# Patient Record
Sex: MALE | Race: WHITE | NOT HISPANIC OR LATINO | Employment: OTHER | URBAN - NONMETROPOLITAN AREA
[De-identification: names, ages, dates, MRNs, and addresses within clinical notes are randomized per-mention and may not be internally consistent; named-entity substitution may affect disease eponyms.]

---

## 2017-01-17 RX ORDER — QUETIAPINE FUMARATE 300 MG/1
TABLET, FILM COATED ORAL
Qty: 60 TABLET | Refills: 5 | Status: SHIPPED | OUTPATIENT
Start: 2017-01-17 | End: 2017-10-09 | Stop reason: SDUPTHER

## 2017-03-23 ENCOUNTER — OFFICE VISIT (OUTPATIENT)
Dept: ORTHOPEDIC SURGERY | Facility: CLINIC | Age: 62
End: 2017-03-23

## 2017-03-23 VITALS — HEIGHT: 69 IN | WEIGHT: 218 LBS | BODY MASS INDEX: 32.29 KG/M2

## 2017-03-23 DIAGNOSIS — M75.41 SHOULDER IMPINGEMENT, RIGHT: Primary | ICD-10-CM

## 2017-03-23 DIAGNOSIS — M25.512 CHRONIC PAIN OF BOTH SHOULDERS: ICD-10-CM

## 2017-03-23 DIAGNOSIS — G89.29 CHRONIC PAIN OF BOTH SHOULDERS: ICD-10-CM

## 2017-03-23 DIAGNOSIS — E11.9 TYPE 2 DIABETES MELLITUS WITHOUT COMPLICATION, WITHOUT LONG-TERM CURRENT USE OF INSULIN (HCC): ICD-10-CM

## 2017-03-23 DIAGNOSIS — M25.511 CHRONIC PAIN OF BOTH SHOULDERS: ICD-10-CM

## 2017-03-23 PROCEDURE — 99213 OFFICE O/P EST LOW 20 MIN: CPT | Performed by: ORTHOPAEDIC SURGERY

## 2017-03-23 RX ORDER — METHOCARBAMOL 750 MG/1
750 TABLET, FILM COATED ORAL 4 TIMES DAILY PRN
COMMUNITY
End: 2017-10-09

## 2017-03-23 RX ORDER — GABAPENTIN 300 MG/1
300 CAPSULE ORAL 3 TIMES DAILY
COMMUNITY
End: 2017-11-22

## 2017-03-23 RX ORDER — NABUMETONE 750 MG/1
750 TABLET, FILM COATED ORAL 2 TIMES DAILY
Qty: 60 TABLET | Refills: 2 | Status: SHIPPED | OUTPATIENT
Start: 2017-03-23 | End: 2017-03-25 | Stop reason: ALTCHOICE

## 2017-03-23 RX ORDER — HYDROXYZINE 50 MG/1
50 TABLET, FILM COATED ORAL 3 TIMES DAILY PRN
COMMUNITY
End: 2017-11-22 | Stop reason: SDUPTHER

## 2017-03-23 RX ORDER — CYCLOBENZAPRINE HCL 10 MG
10 TABLET ORAL 3 TIMES DAILY PRN
COMMUNITY
End: 2018-08-20

## 2017-03-23 NOTE — PROGRESS NOTES
Mikey Allison is a 61 y.o. male returns for     Chief Complaint   Patient presents with   • Right Shoulder - Follow-up   • Results     repeat xrays done today in office       HISTORY OF PRESENT ILLNESS:   Patient returns with right shoulder pain.  No specific injury.  Pain with activity.  2 weeks ago got an IM injection in buttock which helped a little.  Has been taking meloxicam without improvement.       CONCURRENT MEDICAL HISTORY:    Past Medical History:   Diagnosis Date   • Abdominal pain     left side   • Acute sinusitis    • Ankle joint pain    • Bipolar disorder    • Bipolar disorder, unspecified    • Cellulitis and abscess of trunk     axilla   • Chest pain    • Chronic anemia    • Degenerative joint disease involving multiple joints     back   • Depressive disorder    • Diabetes mellitus    • Diabetes mellitus with no complication     type 2 or unspecified type uncontrolled   • Diverticular disease    • Dyspnea    • Enlarged prostate     on [place]-on CT   • Essential hypertension    • Febrile convulsion    • Gastroesophageal reflux disease    • Generalized anxiety disorder    • H/O echocardiogram 10/16/2007    Mild to moderate concentric LV hypertrophy with mild left atrial enlargement. LV appears to be hypodynamic with preserved LV systolic function with EF 55-60%. Pericardium appears to be normal with a small pericardial effusion    • Hemorrhoids    • Hyperlipidemia    • Hypertensive disorder    • Hypoglycemia    • Infectious disorder of kidney     kidney infection-treated by [facility]   • Left lower quadrant pain    • Loss of appetite    • Obesity    • Osteoarthritis    • Pain in joint involving ankle and foot    • Pain in limb    • Psoriasis    • Retention of urine     with cath-treated by the [facility]   • Screening for malignant neoplasm of colon    • Sepsis due to urinary tract infection     urosepsis treated by [facility]   • Sinusitis    • Syncope    • Unspecified essential hypertension         Allergies   Allergen Reactions   • Codeine    • Sulfa Antibiotics          Current Outpatient Prescriptions:   •  cyclobenzaprine (FLEXERIL) 10 MG tablet, Take 10 mg by mouth 3 (Three) Times a Day As Needed for Muscle Spasms., Disp: , Rfl:   •  gabapentin (NEURONTIN) 300 MG capsule, Take 300 mg by mouth 3 (Three) Times a Day., Disp: , Rfl:   •  hydrOXYzine (ATARAX) 50 MG tablet, Take 50 mg by mouth 3 (Three) Times a Day As Needed for Itching., Disp: , Rfl:   •  methocarbamol (ROBAXIN) 750 MG tablet, Take 750 mg by mouth 4 (Four) Times a Day As Needed for Muscle Spasms., Disp: , Rfl:   •  SITagliptin (JANUVIA) 100 MG tablet, Take 100 mg by mouth Daily., Disp: , Rfl:   •  Canagliflozin (INVOKANA) 300 MG tablet, Take 300 mg by mouth., Disp: , Rfl:   •  diclofenac (VOLTAREN) 75 MG EC tablet, Take 1 tablet by mouth 2 (Two) Times a Day., Disp: 60 tablet, Rfl: 1  •  lisinopril-hydrochlorothiazide (PRINZIDE,ZESTORETIC) 20-12.5 MG per tablet, , Disp: , Rfl:   •  metFORMIN (GLUCOPHAGE) 1000 MG tablet, , Disp: , Rfl:   •  pantoprazole (PROTONIX) 40 MG EC tablet, , Disp: , Rfl: 5  •  pravastatin (PRAVACHOL) 40 MG tablet, , Disp: , Rfl:   •  QUEtiapine (SEROquel) 300 MG tablet, TAKE TWO TABLETS BY MOUTH AT BEDTIME, Disp: 60 tablet, Rfl: 5    Past Surgical History:   Procedure Laterality Date   • ANKLE SURGERY Left 11/11/2008    Removal of syndesmotic screws, left ankle. Painful syndesmotic screws, left ankle.)   • ANKLE SURGERY  10/19/2007    Open reduction-internal fixation with fluoroscopic control with final x-ray PA and lateral of the ankle. Trimalleolar fracture/subluxation, left ankle.)   • CIRCUMCISION  2016   • COLONOSCOPY  03/10/2015    Diverticulosis found in the sigmoid colon. hemorrhoids found in the anus. Normal cecum   • CYSTOSCOPY  06/22/2016    Cystoscopy, dilation, anchor of Tim catheter. Benign prostatic hypertrophy, urinary retention, urethral stricture history of.   • INJECTION OF MEDICATION       "Kenalog (3)   • STOMACH SURGERY     • TIBIA FRACTURE SURGERY         ROS  No fevers or chills.  No chest pain or shortness of air.  No GI or  disturbances.    PHYSICAL EXAMINATION:       Ht 69\" (175.3 cm)  Wt 218 lb (98.9 kg)  BMI 32.19 kg/m2    Physical Exam   Constitutional: He is oriented to person, place, and time. He appears well-developed and well-nourished.   Neurological: He is alert and oriented to person, place, and time.   Psychiatric: He has a normal mood and affect. His behavior is normal. Judgment and thought content normal.       GAIT:     [x]  Normal  []  Antalgic    Assistive device: [x]  None  []  Walker     []  Crutches  []  Cane     []  Wheelchair  []  Stretcher    Right Shoulder Exam     Tenderness   The patient is experiencing tenderness in the acromioclavicular joint.    Range of Motion   Active Abduction: 90   Forward Flexion: 130   Internal Rotation 0 degrees: Sacrum     Muscle Strength   Abduction: 3/5   Supraspinatus: 3/5     Tests   Cross Arm: positive  Hawkin's test: positive  Impingement: positive    Other   Sensation: normal  Pulse: present    Comments:  Stable exam.              Xr Shoulder 2+ View Right    Result Date: 3/23/2017  Narrative: 3 views of the right shoulder show acceptable position and alignment of the glenohumeral joint.  There is mild osteophyte formation on the inferior aspect of the humeral head.  There is moderate to severe osteoarthritic change noted at the acromioclavicular joint with subclavicular spurring.  Type II acromion is noted.  no comparison views. 03/23/17 at 4:46 PM by Maximus Schreiber MD             ASSESSMENT:    Diagnoses and all orders for this visit:    Shoulder impingement, right  -     MRI Shoulder Right Without Contrast; Future    Chronic pain of both shoulders  -     MRI Shoulder Right Without Contrast; Future    Type 2 diabetes mellitus without complication, without long-term current use of insulin    Other orders  -     SITagliptin " (JANUVIA) 100 MG tablet; Take 100 mg by mouth Daily.  -     gabapentin (NEURONTIN) 300 MG capsule; Take 300 mg by mouth 3 (Three) Times a Day.  -     methocarbamol (ROBAXIN) 750 MG tablet; Take 750 mg by mouth 4 (Four) Times a Day As Needed for Muscle Spasms.  -     cyclobenzaprine (FLEXERIL) 10 MG tablet; Take 10 mg by mouth 3 (Three) Times a Day As Needed for Muscle Spasms.  -     hydrOXYzine (ATARAX) 50 MG tablet; Take 50 mg by mouth 3 (Three) Times a Day As Needed for Itching.  -     Discontinue: nabumetone (RELAFEN) 750 MG tablet; Take 1 tablet by mouth 2 (Two) Times a Day.          PLAN    Patient has had NSAIDS and an IM steroid injection without improvement - need MRI to assess integrity of rotator cuff.  Will switch from mobic to nabumetone (pharmacy later called stating that he was actually already on nabumetone and therefore he was switched to diclofenac.  Will continue with home exercises and stretching.    Return for recheck for MRI results.    Maximus Schreiber MD

## 2017-03-24 ENCOUNTER — TELEPHONE (OUTPATIENT)
Dept: ORTHOPEDIC SURGERY | Facility: CLINIC | Age: 62
End: 2017-03-24

## 2017-03-24 RX ORDER — DICLOFENAC SODIUM 75 MG/1
75 TABLET, DELAYED RELEASE ORAL 2 TIMES DAILY
Qty: 60 TABLET | Refills: 1 | Status: SHIPPED | OUTPATIENT
Start: 2017-03-24 | End: 2017-05-09

## 2017-03-24 NOTE — TELEPHONE ENCOUNTER
----- Message from Maximus Schreiber MD sent at 3/24/2017  2:48 PM CDT -----  Try diclofenac instead of nabumetone.  75bid  Thanks  maryann  ----- Message -----     From: Sheila Valentino MA     Sent: 3/24/2017   2:30 PM       To: Maximus Schreiber MD        ----- Message -----     From: Amirah Davis     Sent: 3/24/2017   1:55 PM       To: [facility]    DR. Schreiber,    Patient called stating that the medicine that was called into his pharmacy was the same medicine that he had been taking that was prescribed by dr monteiro in [place].  He states you told him to stop taking the medicine prescribed by dr monteiro and to take the new medicine, but these medicines are the same.    Pt's# [phone]    -Amirah

## 2017-03-30 ENCOUNTER — HOSPITAL ENCOUNTER (OUTPATIENT)
Dept: MRI IMAGING | Facility: HOSPITAL | Age: 62
Discharge: HOME | End: 2017-03-30
Admitting: ORTHOPAEDIC SURGERY

## 2017-03-30 DIAGNOSIS — M25.512 CHRONIC PAIN OF BOTH SHOULDERS: ICD-10-CM

## 2017-03-30 DIAGNOSIS — M75.41 SHOULDER IMPINGEMENT, RIGHT: ICD-10-CM

## 2017-03-30 DIAGNOSIS — G89.29 CHRONIC PAIN OF BOTH SHOULDERS: ICD-10-CM

## 2017-03-30 DIAGNOSIS — M25.511 CHRONIC PAIN OF BOTH SHOULDERS: ICD-10-CM

## 2017-03-30 PROCEDURE — 73221 MRI JOINT UPR EXTREM W/O DYE: CPT

## 2017-04-09 ENCOUNTER — APPOINTMENT (OUTPATIENT)
Dept: GENERAL RADIOLOGY | Facility: HOSPITAL | Age: 62
End: 2017-04-09

## 2017-04-09 ENCOUNTER — HOSPITAL ENCOUNTER (EMERGENCY)
Facility: HOSPITAL | Age: 62
Discharge: HOME | End: 2017-04-09
Attending: FAMILY MEDICINE | Admitting: FAMILY MEDICINE

## 2017-04-09 VITALS
DIASTOLIC BLOOD PRESSURE: 76 MMHG | RESPIRATION RATE: 18 BRPM | HEART RATE: 72 BPM | WEIGHT: 218 LBS | BODY MASS INDEX: 32.29 KG/M2 | HEIGHT: 69 IN | SYSTOLIC BLOOD PRESSURE: 107 MMHG | TEMPERATURE: 97.7 F | OXYGEN SATURATION: 100 %

## 2017-04-09 DIAGNOSIS — R10.11 RIGHT UPPER QUADRANT ABDOMINAL PAIN: Primary | ICD-10-CM

## 2017-04-09 DIAGNOSIS — K59.00 CONSTIPATION, UNSPECIFIED CONSTIPATION TYPE: ICD-10-CM

## 2017-04-09 LAB
ALBUMIN SERPL-MCNC: 3.9 G/DL (ref 3.4–4.8)
ALBUMIN/GLOB SERPL: 1.3 G/DL (ref 1.1–1.8)
ALP SERPL-CCNC: 85 U/L (ref 38–126)
ALT SERPL W P-5'-P-CCNC: 18 U/L (ref 21–72)
ANION GAP SERPL CALCULATED.3IONS-SCNC: 14 MMOL/L (ref 5–15)
AST SERPL-CCNC: 28 U/L (ref 17–59)
BACTERIA UR QL AUTO: ABNORMAL /HPF
BASOPHILS # BLD AUTO: 0.03 10*3/MM3 (ref 0–0.2)
BASOPHILS NFR BLD AUTO: 0.3 % (ref 0–2)
BILIRUB SERPL-MCNC: 0.6 MG/DL (ref 0.2–1.3)
BILIRUB UR QL STRIP: NEGATIVE
BUN BLD-MCNC: 19 MG/DL (ref 7–21)
BUN/CREAT SERPL: 18.3 (ref 7–25)
CALCIUM SPEC-SCNC: 8.4 MG/DL (ref 8.4–10.2)
CHLORIDE SERPL-SCNC: 94 MMOL/L (ref 95–110)
CLARITY UR: CLEAR
CO2 SERPL-SCNC: 23 MMOL/L (ref 22–31)
COLOR UR: YELLOW
CREAT BLD-MCNC: 1.04 MG/DL (ref 0.7–1.3)
DEPRECATED RDW RBC AUTO: 40.1 FL (ref 35.1–43.9)
EOSINOPHIL # BLD AUTO: 0.25 10*3/MM3 (ref 0–0.7)
EOSINOPHIL NFR BLD AUTO: 2.9 % (ref 0–7)
ERYTHROCYTE [DISTWIDTH] IN BLOOD BY AUTOMATED COUNT: 13.2 % (ref 11.5–14.5)
GFR SERPL CREATININE-BSD FRML MDRD: 72 ML/MIN/1.73 (ref 60–113)
GLOBULIN UR ELPH-MCNC: 2.9 GM/DL (ref 2.3–3.5)
GLUCOSE BLD-MCNC: 82 MG/DL (ref 60–100)
GLUCOSE UR STRIP-MCNC: NEGATIVE MG/DL
HCT VFR BLD AUTO: 30.4 % (ref 39–49)
HGB BLD-MCNC: 10.8 G/DL (ref 13.7–17.3)
HGB UR QL STRIP.AUTO: NEGATIVE
HOLD SPECIMEN: NORMAL
HYALINE CASTS UR QL AUTO: ABNORMAL /LPF
IMM GRANULOCYTES # BLD: 0.03 10*3/MM3 (ref 0–0.02)
IMM GRANULOCYTES NFR BLD: 0.3 % (ref 0–0.5)
KETONES UR QL STRIP: ABNORMAL
LEUKOCYTE ESTERASE UR QL STRIP.AUTO: ABNORMAL
LYMPHOCYTES # BLD AUTO: 1.68 10*3/MM3 (ref 0.6–4.2)
LYMPHOCYTES NFR BLD AUTO: 19.4 % (ref 10–50)
MCH RBC QN AUTO: 29.7 PG (ref 26.5–34)
MCHC RBC AUTO-ENTMCNC: 35.5 G/DL (ref 31.5–36.3)
MCV RBC AUTO: 83.5 FL (ref 80–98)
MONOCYTES # BLD AUTO: 1.03 10*3/MM3 (ref 0–0.9)
MONOCYTES NFR BLD AUTO: 11.9 % (ref 0–12)
NEUTROPHILS # BLD AUTO: 5.64 10*3/MM3 (ref 2–8.6)
NEUTROPHILS NFR BLD AUTO: 65.2 % (ref 37–80)
NITRITE UR QL STRIP: NEGATIVE
PH UR STRIP.AUTO: 6 [PH] (ref 5–9)
PLATELET # BLD AUTO: 285 10*3/MM3 (ref 150–450)
PMV BLD AUTO: 8 FL (ref 8–12)
POTASSIUM BLD-SCNC: 3.6 MMOL/L (ref 3.5–5.1)
PROT SERPL-MCNC: 6.8 G/DL (ref 6.3–8.6)
PROT UR QL STRIP: NEGATIVE
RBC # BLD AUTO: 3.64 10*6/MM3 (ref 4.37–5.74)
RBC # UR: ABNORMAL /HPF
REF LAB TEST METHOD: ABNORMAL
SODIUM BLD-SCNC: 131 MMOL/L (ref 137–145)
SP GR UR STRIP: 1.02 (ref 1–1.03)
SQUAMOUS #/AREA URNS HPF: ABNORMAL /HPF
UROBILINOGEN UR QL STRIP: ABNORMAL
WBC NRBC COR # BLD: 8.66 10*3/MM3 (ref 3.2–9.8)
WBC UR QL AUTO: ABNORMAL /HPF
WHOLE BLOOD HOLD SPECIMEN: NORMAL

## 2017-04-09 PROCEDURE — 87086 URINE CULTURE/COLONY COUNT: CPT | Performed by: NURSE PRACTITIONER

## 2017-04-09 PROCEDURE — 74022 RADEX COMPL AQT ABD SERIES: CPT

## 2017-04-09 PROCEDURE — 81001 URINALYSIS AUTO W/SCOPE: CPT | Performed by: NURSE PRACTITIONER

## 2017-04-09 PROCEDURE — 85025 COMPLETE CBC W/AUTO DIFF WBC: CPT | Performed by: NURSE PRACTITIONER

## 2017-04-09 PROCEDURE — 99284 EMERGENCY DEPT VISIT MOD MDM: CPT

## 2017-04-09 PROCEDURE — 80053 COMPREHEN METABOLIC PANEL: CPT | Performed by: NURSE PRACTITIONER

## 2017-04-09 RX ORDER — ASPIRIN AND DIPYRIDAMOLE 25; 200 MG/1; MG/1
1 CAPSULE, EXTENDED RELEASE ORAL DAILY
COMMUNITY
End: 2017-10-09

## 2017-04-09 RX ORDER — MAGNESIUM CARB/ALUMINUM HYDROX 105-160MG
296 TABLET,CHEWABLE ORAL ONCE
COMMUNITY
End: 2017-10-09

## 2017-04-09 RX ORDER — LEVETIRACETAM 500 MG/1
500 TABLET ORAL DAILY
COMMUNITY
End: 2017-10-09

## 2017-04-09 RX ORDER — CIPROFLOXACIN 500 MG/1
500 TABLET, FILM COATED ORAL 2 TIMES DAILY
COMMUNITY
End: 2017-05-09

## 2017-04-09 RX ORDER — HYDROCODONE BITARTRATE AND ACETAMINOPHEN 10; 325 MG/1; MG/1
1 TABLET ORAL EVERY 6 HOURS PRN
COMMUNITY
End: 2017-10-09

## 2017-04-09 RX ORDER — CLINDAMYCIN HYDROCHLORIDE 300 MG/1
300 CAPSULE ORAL 3 TIMES DAILY
COMMUNITY
End: 2017-05-09

## 2017-04-09 RX ORDER — HYDROCODONE BITARTRATE AND ACETAMINOPHEN 5; 325 MG/1; MG/1
1 TABLET ORAL EVERY 6 HOURS PRN
Qty: 15 TABLET | Refills: 0 | Status: SHIPPED | OUTPATIENT
Start: 2017-04-09 | End: 2017-10-09

## 2017-04-09 RX ORDER — CLONAZEPAM 0.5 MG/1
0.5 TABLET ORAL 2 TIMES DAILY PRN
COMMUNITY
End: 2017-10-09

## 2017-04-09 RX ORDER — FLUOXETINE HYDROCHLORIDE 20 MG/1
20 CAPSULE ORAL 3 TIMES DAILY
COMMUNITY
End: 2017-11-22 | Stop reason: SDUPTHER

## 2017-04-09 NOTE — ED NOTES
Patient presented to Ed with C/O Right upper side pain that began about one wk with increased pain in the last 2 days. Seen at [facility] ED yesterday. Given pain med shot, and antibiotic RX.  Lesion noted on left neck area, redness and swelling.     Alyssa Wan LPN  04/09/17 1519

## 2017-04-09 NOTE — ED PROVIDER NOTES
Subjective   HPI Comments: C/o abdominal pain and     Patient is a 61 y.o. male presenting with abdominal pain.   History provided by:  Patient  Abdominal Pain   Pain location:  RUQ and RLQ  Pain radiates to:  Does not radiate  Pain severity:  Mild  Onset quality:  Gradual  Duration: several   Timing:  Intermittent  Associated symptoms: no diarrhea, no nausea and no vomiting        Review of Systems   Constitutional: Negative.    HENT: Negative.    Eyes: Negative.    Respiratory: Negative.    Cardiovascular: Negative.    Gastrointestinal: Positive for abdominal pain. Negative for diarrhea, nausea and vomiting.   Genitourinary: Negative.    Musculoskeletal: Negative.    Skin: Negative.    Neurological: Negative.    Psychiatric/Behavioral: Negative.        Past Medical History:   Diagnosis Date   • Abdominal pain     left side   • Acute sinusitis    • Ankle joint pain    • Bipolar disorder    • Bipolar disorder, unspecified    • Cellulitis and abscess of trunk     axilla   • Chest pain    • Chronic anemia    • Degenerative joint disease involving multiple joints     back   • Depressive disorder    • Diabetes mellitus    • Diabetes mellitus with no complication     type 2 or unspecified type uncontrolled   • Diverticular disease    • Dyspnea    • Enlarged prostate     on [place]-on CT   • Essential hypertension    • Febrile convulsion    • Gastroesophageal reflux disease    • Generalized anxiety disorder    • H/O echocardiogram 10/16/2007    Mild to moderate concentric LV hypertrophy with mild left atrial enlargement. LV appears to be hypodynamic with preserved LV systolic function with EF 55-60%. Pericardium appears to be normal with a small pericardial effusion    • Hemorrhoids    • Hyperlipidemia    • Hypertensive disorder    • Hypoglycemia    • Infectious disorder of kidney     kidney infection-treated by [facility]   • Left lower quadrant pain    • Loss of appetite    • Obesity    • Osteoarthritis    • Pain in joint  involving ankle and foot    • Pain in limb    • Psoriasis    • Retention of urine     with cath-treated by the [facility]   • Screening for malignant neoplasm of colon    • Sepsis due to urinary tract infection     urosepsis treated by [facility]   • Sinusitis    • Syncope    • Unspecified essential hypertension        Allergies   Allergen Reactions   • Codeine    • Sulfa Antibiotics        Past Surgical History:   Procedure Laterality Date   • ANKLE SURGERY Left 11/11/2008    Removal of syndesmotic screws, left ankle. Painful syndesmotic screws, left ankle.)   • ANKLE SURGERY  10/19/2007    Open reduction-internal fixation with fluoroscopic control with final x-ray PA and lateral of the ankle. Trimalleolar fracture/subluxation, left ankle.)   • CIRCUMCISION  2016   • COLONOSCOPY  03/10/2015    Diverticulosis found in the sigmoid colon. hemorrhoids found in the anus. Normal cecum   • CYSTOSCOPY  06/22/2016    Cystoscopy, dilation, anchor of Tim catheter. Benign prostatic hypertrophy, urinary retention, urethral stricture history of.   • INJECTION OF MEDICATION      Kenalog (3)   • STOMACH SURGERY     • TIBIA FRACTURE SURGERY         Family History   Problem Relation Age of Onset   • Diabetes Other    • Heart disease Other    • Hypertension Other    • Kidney disease Other        Social History     Social History   • Marital status:      Spouse name: N/A   • Number of children: N/A   • Years of education: N/A     Social History Main Topics   • Smoking status: Former Smoker   • Smokeless tobacco: None      Comment: smoked 2 years   • Alcohol use 0.6 oz/week     1 Cans of beer per week      Comment: 1-2 every other day   • Drug use: No   • Sexual activity: Defer     Other Topics Concern   • None     Social History Narrative   • None           Objective   Physical Exam   Constitutional: He is oriented to person, place, and time. He appears well-developed and well-nourished.   HENT:   Head: Normocephalic.  "  Eyes: EOM are normal. Pupils are equal, round, and reactive to light.   Neck: Normal range of motion. Neck supple.   Cardiovascular: Normal rate and regular rhythm.    Pulmonary/Chest: Effort normal and breath sounds normal.   Abdominal: Soft. Bowel sounds are normal.   Abdomen large, round, soft, mildly tender on right quads, + BS   Neurological: He is alert and oriented to person, place, and time.   Skin: Skin is warm and dry.   Nursing note and vitals reviewed.  /76 (BP Location: Left arm, Patient Position: Lying)  Pulse 72  Temp 97.7 °F (36.5 °C) (Oral)   Resp 18  Ht 69\" (175.3 cm)  Wt 218 lb (98.9 kg)  SpO2 100%  BMI 32.19 kg/m2      Procedures         ED Course  ED Course      XR Abdomen 2 View With Chest 1 View   Final Result   CONCLUSION:   Large amount retained feces in the colon.   No acute disease in the chest.      [id number]      Electronically signed by:  Ino Wilhelm MD  4/9/2017 5:40 PM CDT   Workstation: [email or web address]        Results for orders placed or performed during the hospital encounter of 04/09/17   Urine Culture   Result Value Ref Range    Urine Culture No growth at 24 hours    Comprehensive Metabolic Panel   Result Value Ref Range    Glucose 82 60 - 100 mg/dL    BUN 19 7 - 21 mg/dL    Creatinine 1.04 0.70 - 1.30 mg/dL    Sodium 131 (L) 137 - 145 mmol/L    Potassium 3.6 3.5 - 5.1 mmol/L    Chloride 94 (L) 95 - 110 mmol/L    CO2 23.0 22.0 - 31.0 mmol/L    Calcium 8.4 8.4 - 10.2 mg/dL    Total Protein 6.8 6.3 - 8.6 g/dL    Albumin 3.90 3.40 - 4.80 g/dL    ALT (SGPT) 18 (L) 21 - 72 U/L    AST (SGOT) 28 17 - 59 U/L    Alkaline Phosphatase 85 38 - 126 U/L    Total Bilirubin 0.6 0.2 - 1.3 mg/dL    eGFR Non African Amer 72 >60 mL/min/1.73    Globulin 2.9 2.3 - 3.5 gm/dL    A/G Ratio 1.3 1.1 - 1.8 g/dL    BUN/Creatinine Ratio 18.3 7.0 - 25.0    Anion Gap 14.0 5.0 - 15.0 mmol/L   CBC Auto Differential   Result Value Ref Range    WBC 8.66 3.20 - 9.80 10*3/mm3    RBC 3.64 (L) 4.37 - 5.74 " 10*6/mm3    Hemoglobin 10.8 (L) 13.7 - 17.3 g/dL    Hematocrit 30.4 (L) 39.0 - 49.0 %    MCV 83.5 80.0 - 98.0 fL    MCH 29.7 26.5 - 34.0 pg    MCHC 35.5 31.5 - 36.3 g/dL    RDW 13.2 11.5 - 14.5 %    RDW-SD 40.1 35.1 - 43.9 fl    MPV 8.0 8.0 - 12.0 fL    Platelets 285 150 - 450 10*3/mm3    Neutrophil % 65.2 37.0 - 80.0 %    Lymphocyte % 19.4 10.0 - 50.0 %    Monocyte % 11.9 0.0 - 12.0 %    Eosinophil % 2.9 0.0 - 7.0 %    Basophil % 0.3 0.0 - 2.0 %    Immature Grans % 0.3 0.0 - 0.5 %    Neutrophils, Absolute 5.64 2.00 - 8.60 10*3/mm3    Lymphocytes, Absolute 1.68 0.60 - 4.20 10*3/mm3    Monocytes, Absolute 1.03 (H) 0.00 - 0.90 10*3/mm3    Eosinophils, Absolute 0.25 0.00 - 0.70 10*3/mm3    Basophils, Absolute 0.03 0.00 - 0.20 10*3/mm3    Immature Grans, Absolute 0.03 (H) 0.00 - 0.02 10*3/mm3   Urinalysis With / Culture If Indicated   Result Value Ref Range    Color, UA Yellow Yellow, Straw, Dark Yellow, Mariama    Appearance, UA Clear Clear    pH, UA 6.0 5.0 - 9.0    Specific Gravity, UA 1.025 1.003 - 1.030    Glucose, UA Negative Negative    Ketones, UA Trace (A) Negative    Bilirubin, UA Negative Negative    Blood, UA Negative Negative    Protein, UA Negative Negative    Leuk Esterase, UA Trace (A) Negative    Nitrite, UA Negative Negative    Urobilinogen, UA 1.0 E.U./dL 0.2 - 1.0 E.U./dL   Urinalysis, Microscopic Only   Result Value Ref Range    RBC, UA 0-2 (A) None Seen /HPF    WBC, UA 6-12 (A) None Seen, 0-2, 3-5 /HPF    Bacteria, UA None Seen None Seen /HPF    Squamous Epithelial Cells, UA None Seen None Seen, 0-2 /HPF    Hyaline Casts, UA 3-6 None Seen /LPF    Methodology Automated Microscopy    Light Blue Top   Result Value Ref Range    Extra Tube hold for add-on    Gold Top - SST   Result Value Ref Range    Extra Tube Hold for add-ons.                    MDM  Number of Diagnoses or Management Options  Constipation, unspecified constipation type:   Right upper quadrant abdominal pain:   Diagnosis management  comments: ISAIAH Request # : [id number]  C/o abdominal pain. He has with him DC note from ED from [place] yesterday. Admits he has not yet taken the magnesium citrate he was given yesterday for the constipation. Wants something for pain. Few norco given, advised to use the magnesium cit. Labs WNL. X-rays reveal large amt. Feces in colon.      Final diagnoses:   Right upper quadrant abdominal pain   Constipation, unspecified constipation type            Angela Mobley, APRN  04/11/17 0711

## 2017-04-09 NOTE — DISCHARGE INSTRUCTIONS
Continue on the antibiotics.  Take the magnesium citrate.  Do not drive on the pain medicine.  Follow up with DR Kim.

## 2017-04-14 LAB — BACTERIA SPEC AEROBE CULT: NORMAL

## 2017-05-09 ENCOUNTER — OFFICE VISIT (OUTPATIENT)
Dept: ORTHOPEDIC SURGERY | Facility: CLINIC | Age: 62
End: 2017-05-09

## 2017-05-09 VITALS — BODY MASS INDEX: 31.16 KG/M2 | HEIGHT: 69 IN | WEIGHT: 210.4 LBS

## 2017-05-09 DIAGNOSIS — E11.9 DIABETES MELLITUS WITH NO COMPLICATION (HCC): ICD-10-CM

## 2017-05-09 DIAGNOSIS — I10 ESSENTIAL HYPERTENSION: Primary | ICD-10-CM

## 2017-05-09 DIAGNOSIS — M75.41 SHOULDER IMPINGEMENT, RIGHT: ICD-10-CM

## 2017-05-09 PROBLEM — M75.101 ROTATOR CUFF SYNDROME OF RIGHT SHOULDER: Status: ACTIVE | Noted: 2017-05-09

## 2017-05-09 PROCEDURE — 99213 OFFICE O/P EST LOW 20 MIN: CPT | Performed by: ORTHOPAEDIC SURGERY

## 2017-05-09 PROCEDURE — 20610 DRAIN/INJ JOINT/BURSA W/O US: CPT | Performed by: ORTHOPAEDIC SURGERY

## 2017-05-09 RX ADMIN — TRIAMCINOLONE ACETONIDE 40 MG: 40 INJECTION, SUSPENSION INTRA-ARTICULAR; INTRAMUSCULAR at 09:45

## 2017-05-09 RX ADMIN — LIDOCAINE HYDROCHLORIDE 2 ML: 10 INJECTION, SOLUTION INFILTRATION; PERINEURAL at 09:45

## 2017-05-12 RX ORDER — LIDOCAINE HYDROCHLORIDE 10 MG/ML
2 INJECTION, SOLUTION INFILTRATION; PERINEURAL
Status: COMPLETED | OUTPATIENT
Start: 2017-05-09 | End: 2017-05-09

## 2017-05-12 RX ORDER — TRIAMCINOLONE ACETONIDE 40 MG/ML
40 INJECTION, SUSPENSION INTRA-ARTICULAR; INTRAMUSCULAR
Status: COMPLETED | OUTPATIENT
Start: 2017-05-09 | End: 2017-05-09

## 2017-08-08 ENCOUNTER — OFFICE VISIT (OUTPATIENT)
Dept: ORTHOPEDIC SURGERY | Facility: CLINIC | Age: 62
End: 2017-08-08

## 2017-08-08 VITALS — HEIGHT: 69 IN | BODY MASS INDEX: 31.1 KG/M2 | WEIGHT: 210 LBS

## 2017-08-08 DIAGNOSIS — M75.41 SHOULDER IMPINGEMENT, RIGHT: ICD-10-CM

## 2017-08-08 DIAGNOSIS — M75.101 ROTATOR CUFF SYNDROME OF RIGHT SHOULDER: Primary | ICD-10-CM

## 2017-08-08 PROCEDURE — 20610 DRAIN/INJ JOINT/BURSA W/O US: CPT | Performed by: NURSE PRACTITIONER

## 2017-08-08 PROCEDURE — 99214 OFFICE O/P EST MOD 30 MIN: CPT | Performed by: NURSE PRACTITIONER

## 2017-08-08 RX ORDER — TRIAMCINOLONE ACETONIDE 40 MG/ML
40 INJECTION, SUSPENSION INTRA-ARTICULAR; INTRAMUSCULAR
Status: COMPLETED | OUTPATIENT
Start: 2017-08-08 | End: 2017-08-08

## 2017-08-08 RX ORDER — LIDOCAINE HYDROCHLORIDE 10 MG/ML
2 INJECTION, SOLUTION INFILTRATION; PERINEURAL
Status: COMPLETED | OUTPATIENT
Start: 2017-08-08 | End: 2017-08-08

## 2017-08-08 RX ADMIN — LIDOCAINE HYDROCHLORIDE 2 ML: 10 INJECTION, SOLUTION INFILTRATION; PERINEURAL at 13:41

## 2017-08-08 RX ADMIN — TRIAMCINOLONE ACETONIDE 40 MG: 40 INJECTION, SUSPENSION INTRA-ARTICULAR; INTRAMUSCULAR at 13:41

## 2017-08-08 NOTE — PROGRESS NOTES
Mikey Allison is a 62 y.o. male returns for     Chief Complaint   Patient presents with   • Right Shoulder - Follow-up       HISTORY OF PRESENT ILLNESS: Patient follows up for right shoulder pain. His last injection was given 5/9/17.       CONCURRENT MEDICAL HISTORY:    Past Medical History:   Diagnosis Date   • Abdominal pain     left side   • Acute sinusitis    • Ankle joint pain    • Bipolar disorder    • Bipolar disorder, unspecified    • Cellulitis and abscess of trunk     axilla   • Chest pain    • Chronic anemia    • Degenerative joint disease involving multiple joints     back   • Depressive disorder    • Diabetes mellitus    • Diabetes mellitus with no complication     type 2 or unspecified type uncontrolled   • Diverticular disease    • Dyspnea    • Enlarged prostate     on [place]-on CT   • Essential hypertension    • Febrile convulsion    • Gastroesophageal reflux disease    • Generalized anxiety disorder    • H/O echocardiogram 10/16/2007    Mild to moderate concentric LV hypertrophy with mild left atrial enlargement. LV appears to be hypodynamic with preserved LV systolic function with EF 55-60%. Pericardium appears to be normal with a small pericardial effusion    • Hemorrhoids    • Hyperlipidemia    • Hypertensive disorder    • Hypoglycemia    • Infectious disorder of kidney     kidney infection-treated by [facility]   • Left lower quadrant pain    • Loss of appetite    • Obesity    • Osteoarthritis    • Pain in joint involving ankle and foot    • Pain in limb    • Psoriasis    • Retention of urine     with cath-treated by the [facility]   • Screening for malignant neoplasm of colon    • Sepsis due to urinary tract infection     urosepsis treated by [facility]   • Sinusitis    • Syncope    • Unspecified essential hypertension        Allergies   Allergen Reactions   • Codeine    • Sulfa Antibiotics          Current Outpatient Prescriptions:   •  aspirin-dipyridamole (AGGRENOX)  MG per 12  hr capsule, Take 1 capsule by mouth Daily., Disp: , Rfl:   •  Canagliflozin (INVOKANA) 300 MG tablet, Take 300 mg by mouth., Disp: , Rfl:   •  clonazePAM (KlonoPIN) 0.5 MG tablet, Take 0.5 mg by mouth 2 (Two) Times a Day As Needed for Seizures., Disp: , Rfl:   •  cyclobenzaprine (FLEXERIL) 10 MG tablet, Take 10 mg by mouth 3 (Three) Times a Day As Needed for Muscle Spasms., Disp: , Rfl:   •  FLUoxetine (PROzac) 20 MG capsule, Take 20 mg by mouth 3 (Three) Times a Day., Disp: , Rfl:   •  gabapentin (NEURONTIN) 300 MG capsule, Take 300 mg by mouth 3 (Three) Times a Day., Disp: , Rfl:   •  HYDROcodone-acetaminophen (NORCO)  MG per tablet, Take 1 tablet by mouth Every 6 (Six) Hours As Needed for Moderate Pain (4-6)., Disp: , Rfl:   •  HYDROcodone-acetaminophen (NORCO) 5-325 MG per tablet, Take 1 tablet by mouth Every 6 (Six) Hours As Needed for Moderate Pain (4-6)., Disp: 15 tablet, Rfl: 0  •  hydrOXYzine (ATARAX) 50 MG tablet, Take 50 mg by mouth 3 (Three) Times a Day As Needed for Itching., Disp: , Rfl:   •  levETIRAcetam (KEPPRA) 500 MG tablet, Take 500 mg by mouth Daily., Disp: , Rfl:   •  lisinopril-hydrochlorothiazide (PRINZIDE,ZESTORETIC) 20-12.5 MG per tablet, , Disp: , Rfl:   •  magnesium citrate 1.745 GM/30ML solution solution, Take 296 mL by mouth 1 (One) Time., Disp: , Rfl:   •  metFORMIN (GLUCOPHAGE) 1000 MG tablet, , Disp: , Rfl:   •  methocarbamol (ROBAXIN) 750 MG tablet, Take 750 mg by mouth 4 (Four) Times a Day As Needed for Muscle Spasms., Disp: , Rfl:   •  oxaprozin (DAYPRO) 600 MG tablet, Take 2 tablets by mouth Daily., Disp: 60 tablet, Rfl: 2  •  pantoprazole (PROTONIX) 40 MG EC tablet, , Disp: , Rfl: 5  •  pravastatin (PRAVACHOL) 40 MG tablet, , Disp: , Rfl:   •  QUEtiapine (SEROquel) 300 MG tablet, TAKE TWO TABLETS BY MOUTH AT BEDTIME, Disp: 60 tablet, Rfl: 5  •  SITagliptin (JANUVIA) 100 MG tablet, Take 100 mg by mouth Daily., Disp: , Rfl:     Past Surgical History:   Procedure Laterality  "Date   • ANKLE SURGERY Left 11/11/2008    Removal of syndesmotic screws, left ankle. Painful syndesmotic screws, left ankle.)   • ANKLE SURGERY  10/19/2007    Open reduction-internal fixation with fluoroscopic control with final x-ray PA and lateral of the ankle. Trimalleolar fracture/subluxation, left ankle.)   • CIRCUMCISION  2016   • COLONOSCOPY  03/10/2015    Diverticulosis found in the sigmoid colon. hemorrhoids found in the anus. Normal cecum   • CYSTOSCOPY  06/22/2016    Cystoscopy, dilation, anchor of Tim catheter. Benign prostatic hypertrophy, urinary retention, urethral stricture history of.   • INJECTION OF MEDICATION      Kenalog (3)   • STOMACH SURGERY     • TIBIA FRACTURE SURGERY         ROS  No fevers or chills.  No chest pain or shortness of air.  No GI or  disturbances.    PHYSICAL EXAMINATION:       Ht 69\" (175.3 cm)  Wt 210 lb (95.3 kg)  BMI 31.01 kg/m2    Physical Exam   Constitutional: He is oriented to person, place, and time. Vital signs are normal. He appears well-developed and well-nourished. He is cooperative.   HENT:   Head: Normocephalic and atraumatic.   Neck: Trachea normal and phonation normal.   Pulmonary/Chest: Effort normal. No respiratory distress.   Abdominal: Soft. Normal appearance. He exhibits no distension.   Neurological: He is alert and oriented to person, place, and time. GCS eye subscore is 4. GCS verbal subscore is 5. GCS motor subscore is 6.   Skin: Skin is warm, dry and intact.   Psychiatric: He has a normal mood and affect. His speech is normal and behavior is normal. Judgment and thought content normal. Cognition and memory are normal.   Vitals reviewed.      GAIT:     [x]  Normal  []  Antalgic    Assistive device: [x]  None  []  Walker     []  Crutches  []  Cane     []  Wheelchair  []  Stretcher    Right Shoulder Exam     Tenderness   The patient is experiencing tenderness in the acromioclavicular joint.    Range of Motion   Active Abduction: 90   Forward " Flexion: 130   Internal Rotation 0 degrees: Sacrum     Muscle Strength   Abduction: 3/5   Supraspinatus: 3/5     Tests   Cross Arm: positive  Hawkin's test: positive  Impingement: positive    Other   Sensation: normal  Pulse: present    Comments:  Stable exam.        Large Joint Arthrocentesis  Date/Time: 8/8/2017 1:41 PM  Consent given by: patient  Site marked: site marked  Timeout: Immediately prior to procedure a time out was called to verify the correct patient, procedure, equipment, support staff and site/side marked as required   Supporting Documentation  Indications: pain   Procedure Details  Location: shoulder - R subacromial bursa  Preparation: Patient was prepped and draped in the usual sterile fashion  Needle size: 22 G  Approach: posterior  Medications administered: 40 mg triamcinolone acetonide 40 MG/ML; 2 mL lidocaine 1 %  Patient tolerance: patient tolerated the procedure well with no immediate complications            No results found.          ASSESSMENT:    Diagnoses and all orders for this visit:    Rotator cuff syndrome of right shoulder  -     Large Joint Arthrocentesis    Shoulder impingement, right  -     Large Joint Arthrocentesis          PLAN  Repeated the injection today and recommend progression of activity as tolerated based on pain.  F/u 4-6 weeks with Dr. Schreiber.   No Follow-up on file.    SAMUEL Ellis

## 2017-08-29 ENCOUNTER — OFFICE VISIT (OUTPATIENT)
Dept: ORTHOPEDIC SURGERY | Facility: CLINIC | Age: 62
End: 2017-08-29

## 2017-08-29 VITALS — WEIGHT: 213 LBS | BODY MASS INDEX: 31.55 KG/M2 | HEIGHT: 69 IN

## 2017-08-29 DIAGNOSIS — M75.41 SHOULDER IMPINGEMENT, RIGHT: Primary | ICD-10-CM

## 2017-08-29 DIAGNOSIS — M75.101 ROTATOR CUFF SYNDROME OF RIGHT SHOULDER: ICD-10-CM

## 2017-08-29 PROCEDURE — 20610 DRAIN/INJ JOINT/BURSA W/O US: CPT | Performed by: ORTHOPAEDIC SURGERY

## 2017-08-29 PROCEDURE — 99213 OFFICE O/P EST LOW 20 MIN: CPT | Performed by: ORTHOPAEDIC SURGERY

## 2017-08-29 RX ORDER — CELECOXIB 200 MG/1
200 CAPSULE ORAL DAILY
Qty: 30 CAPSULE | Refills: 3 | Status: SHIPPED | OUTPATIENT
Start: 2017-08-29 | End: 2017-11-22 | Stop reason: SDUPTHER

## 2017-08-29 RX ADMIN — TRIAMCINOLONE ACETONIDE 40 MG: 40 INJECTION, SUSPENSION INTRA-ARTICULAR; INTRAMUSCULAR at 14:30

## 2017-08-29 RX ADMIN — LIDOCAINE HYDROCHLORIDE 2 ML: 10 INJECTION, SOLUTION INFILTRATION; PERINEURAL at 14:30

## 2017-08-31 RX ORDER — TRIAMCINOLONE ACETONIDE 40 MG/ML
40 INJECTION, SUSPENSION INTRA-ARTICULAR; INTRAMUSCULAR
Status: COMPLETED | OUTPATIENT
Start: 2017-08-29 | End: 2017-08-29

## 2017-08-31 RX ORDER — LIDOCAINE HYDROCHLORIDE 10 MG/ML
2 INJECTION, SOLUTION INFILTRATION; PERINEURAL
Status: COMPLETED | OUTPATIENT
Start: 2017-08-29 | End: 2017-08-29

## 2017-09-19 ENCOUNTER — OFFICE VISIT (OUTPATIENT)
Dept: ORTHOPEDIC SURGERY | Facility: CLINIC | Age: 62
End: 2017-09-19

## 2017-09-19 VITALS — BODY MASS INDEX: 32.14 KG/M2 | HEIGHT: 69 IN | WEIGHT: 217 LBS

## 2017-09-19 DIAGNOSIS — I10 ESSENTIAL HYPERTENSION: ICD-10-CM

## 2017-09-19 DIAGNOSIS — M75.111 PARTIAL NONTRAUMATIC TEAR OF ROTATOR CUFF, RIGHT: ICD-10-CM

## 2017-09-19 DIAGNOSIS — M75.41 SHOULDER IMPINGEMENT, RIGHT: Primary | ICD-10-CM

## 2017-09-19 DIAGNOSIS — M75.101 ROTATOR CUFF SYNDROME OF RIGHT SHOULDER: ICD-10-CM

## 2017-09-19 DIAGNOSIS — E11.9 DIABETES MELLITUS WITH NO COMPLICATION (HCC): ICD-10-CM

## 2017-09-19 PROCEDURE — 99214 OFFICE O/P EST MOD 30 MIN: CPT | Performed by: ORTHOPAEDIC SURGERY

## 2017-09-19 NOTE — PROGRESS NOTES
Mikey Allison is a 62 y.o. male returns for     Chief Complaint   Patient presents with   • Right Shoulder - Follow-up       HISTORY OF PRESENT ILLNESS: steroid injection right shoulder done on 8/29/2017 lasted about 2 weeks. Patient would like to discuss surgery.   Injector last month and has been injected 3-4 times in total.  The last injection helped some but now his pain has worsened again.  He is doing home exercise program.  He has pain at night and pain with any activity, especially that overhead.     CONCURRENT MEDICAL HISTORY:    Past Medical History:   Diagnosis Date   • Abdominal pain     left side   • Acute sinusitis    • Ankle joint pain    • Bipolar disorder    • Bipolar disorder, unspecified    • Cellulitis and abscess of trunk     axilla   • Chest pain    • Chronic anemia    • Degenerative joint disease involving multiple joints     back   • Depressive disorder    • Diabetes mellitus    • Diabetes mellitus with no complication     type 2 or unspecified type uncontrolled   • Diverticular disease    • Dyspnea    • Enlarged prostate     on ME-on CT   • Essential hypertension    • Febrile convulsion    • Gastroesophageal reflux disease    • Generalized anxiety disorder    • H/O echocardiogram 10/16/2007    Mild to moderate concentric LV hypertrophy with mild left atrial enlargement. LV appears to be hypodynamic with preserved LV systolic function with EF 55-60%. Pericardium appears to be normal with a small pericardial effusion    • Hemorrhoids    • Hyperlipidemia    • Hypertensive disorder    • Hypoglycemia    • Infectious disorder of kidney     kidney infection-treated by [facility]   • Left lower quadrant pain    • Loss of appetite    • Obesity    • Osteoarthritis    • Pain in joint involving ankle and foot    • Pain in limb    • Psoriasis    • Retention of urine     with cath-treated by the [facility]   • Screening for malignant neoplasm of colon    • Sepsis due to urinary tract infection      urosepsis treated by [facility]   • Sinusitis    • Syncope    • Unspecified essential hypertension        Allergies   Allergen Reactions   • Codeine    • Sulfa Antibiotics          Current Outpatient Prescriptions:   •  aspirin-dipyridamole (AGGRENOX)  MG per 12 hr capsule, Take 1 capsule by mouth Daily., Disp: , Rfl:   •  Canagliflozin (INVOKANA) 300 MG tablet, Take 300 mg by mouth., Disp: , Rfl:   •  celecoxib (CeleBREX) 200 MG capsule, Take 1 capsule by mouth Daily., Disp: 30 capsule, Rfl: 3  •  clonazePAM (KlonoPIN) 0.5 MG tablet, Take 0.5 mg by mouth 2 (Two) Times a Day As Needed for Seizures., Disp: , Rfl:   •  cyclobenzaprine (FLEXERIL) 10 MG tablet, Take 10 mg by mouth 3 (Three) Times a Day As Needed for Muscle Spasms., Disp: , Rfl:   •  FLUoxetine (PROzac) 20 MG capsule, Take 20 mg by mouth 3 (Three) Times a Day., Disp: , Rfl:   •  gabapentin (NEURONTIN) 300 MG capsule, Take 300 mg by mouth 3 (Three) Times a Day., Disp: , Rfl:   •  HYDROcodone-acetaminophen (NORCO)  MG per tablet, Take 1 tablet by mouth Every 6 (Six) Hours As Needed for Moderate Pain (4-6)., Disp: , Rfl:   •  HYDROcodone-acetaminophen (NORCO) 5-325 MG per tablet, Take 1 tablet by mouth Every 6 (Six) Hours As Needed for Moderate Pain (4-6)., Disp: 15 tablet, Rfl: 0  •  hydrOXYzine (ATARAX) 50 MG tablet, Take 50 mg by mouth 3 (Three) Times a Day As Needed for Itching., Disp: , Rfl:   •  levETIRAcetam (KEPPRA) 500 MG tablet, Take 500 mg by mouth Daily., Disp: , Rfl:   •  lisinopril-hydrochlorothiazide (PRINZIDE,ZESTORETIC) 20-12.5 MG per tablet, , Disp: , Rfl:   •  magnesium citrate 1.745 GM/30ML solution solution, Take 296 mL by mouth 1 (One) Time., Disp: , Rfl:   •  metFORMIN (GLUCOPHAGE) 1000 MG tablet, , Disp: , Rfl:   •  methocarbamol (ROBAXIN) 750 MG tablet, Take 750 mg by mouth 4 (Four) Times a Day As Needed for Muscle Spasms., Disp: , Rfl:   •  oxaprozin (DAYPRO) 600 MG tablet, Take 2 tablets by mouth Daily., Disp: 60  "tablet, Rfl: 2  •  pantoprazole (PROTONIX) 40 MG EC tablet, , Disp: , Rfl: 5  •  pravastatin (PRAVACHOL) 40 MG tablet, , Disp: , Rfl:   •  QUEtiapine (SEROquel) 300 MG tablet, TAKE TWO TABLETS BY MOUTH AT BEDTIME, Disp: 60 tablet, Rfl: 5  •  SITagliptin (JANUVIA) 100 MG tablet, Take 100 mg by mouth Daily., Disp: , Rfl:     Past Surgical History:   Procedure Laterality Date   • ANKLE SURGERY Left 11/11/2008    Removal of syndesmotic screws, left ankle. Painful syndesmotic screws, left ankle.)   • ANKLE SURGERY  10/19/2007    Open reduction-internal fixation with fluoroscopic control with final x-ray PA and lateral of the ankle. Trimalleolar fracture/subluxation, left ankle.)   • CIRCUMCISION  2016   • COLONOSCOPY  03/10/2015    Diverticulosis found in the sigmoid colon. hemorrhoids found in the anus. Normal cecum   • CYSTOSCOPY  06/22/2016    Cystoscopy, dilation, anchor of Tim catheter. Benign prostatic hypertrophy, urinary retention, urethral stricture history of.   • INJECTION OF MEDICATION      Kenalog (3)   • STOMACH SURGERY     • TIBIA FRACTURE SURGERY         ROS  No fevers or chills.  No chest pain or shortness of air.  No GI or  disturbances.    PHYSICAL EXAMINATION:       Ht 69\" (175.3 cm)  Wt 217 lb (98.4 kg)  BMI 32.05 kg/m2    Physical Exam   Constitutional: He is oriented to person, place, and time. He appears well-developed and well-nourished. No distress.   Cardiovascular: Normal rate and regular rhythm.    Pulmonary/Chest: Effort normal and breath sounds normal.   Abdominal: Soft. Bowel sounds are normal.   Neurological: He is alert and oriented to person, place, and time.   Psychiatric: He has a normal mood and affect. His behavior is normal. Judgment and thought content normal.   Vitals reviewed.      GAIT:     [x]  Normal  []  Antalgic    Assistive device: [x]  None  []  Walker     []  Crutches  []  Cane     []  Wheelchair  []  Stretcher    Right Shoulder Exam     Tenderness   The patient is " experiencing tenderness in the acromioclavicular joint and acromion.    Range of Motion   Active Abduction: 70   Forward Flexion: 110   Internal Rotation 0 degrees: L3     Muscle Strength   Abduction: 4/5   Supraspinatus: 4/5     Tests   Cross Arm: positive  Hawkin's test: positive  Impingement: positive    Other   Erythema: absent  Sensation: normal  Pulse: present                PROCEDURE: MRI  right  Shoulder without contrast     Indication:  Increasing severity of chronic right shoulder pain  x2 years      Technique: Coronal T1, T2, PD; sagittal T1 and T2; axial T1 and  T2*.1.5 Mariola Magnet      Prior Relevant Exam: None        Limitations:None     Acromioclavicular Joint: Moderate arthropathy acromioclavicular  joint with joint space narrowing, osteophyte production,  subchondral edema, joint space hypertrophy, and mild impingement  upon the tendon and muscle of the supraspinatus.      Acromion: Type II     Rotator Cuff: Mild increased signal intensity involving the mid  to anterior fibers of the tendon of the supraspinatus, consistent  with a mild tendinosis. No evidence of partial or full-thickness  tear. Tendon of the infraspinatus appears intact.      Subacromial/Subdeltoid fluid: Small      Joint effusion:Physiologic     Biceps Labral Complex: Normal     Inferior Glenohumeral Ligament: Thickening of the inferior  glenohumeral ligament with relatively normal signal intensity and  no abnormal fluid collection. Correlation for adhesive  capsulitis.      Labrum: Absence of the anterior superior labrum noted with  thickening of the middle glenohumeral ligament, consistent with  Driss complex, normal variant. The mid to posterior labrum  appears intact.      Teres Minor Tendon: Normal     Subscapularis Tendon: Mild thinning of the tendon near its  attachment, likely tendinosis.      Biceps Tendon: Normal     Glenoid Fossa: Normal     Rotator interval: Normal     Articular Cartilage: Thinning of the articular  cartilage, likely  degenerative      Bone Marrow: Irregularity bone marrow signal intensity involving  the superior glenoid with associated osteophyte formation, likely  degenerative. 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 Correlation for any evidence of  suprascapular nerve impingement.      Misc: NA        IMPRESSION:  CONCLUSION:     1. Mild tendinosis mid to anterior fibers of the tendon of the  supraspinatus.  2. Irregular bone marrow signal intensity involving the superior  glenoid with associated osteophyte formation, likely  degenerative. 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 Correlation for any evidence of  suprascapular nerve impingement.  3. Tendinosis of the subscapularis tendon.  4. Absence of the anterior superior labrum with thickening of the  middle glenohumeral ligament, consistent with Driss complex,  normal variant.  5. Thickening of the inferior glenohumeral ligament with  relatively normal signal intensity and no abnormal fluid  collection. Correlation for adhesive capsulitis.  6. Subacromial/subdeltoid effusion.  7. Moderate acromioclavicular joint arthropathy      Electronically signed by:  Masood Russell MD  3/30/2017 3:43 PM CDT  Workstation: [employer]        ASSESSMENT:    Diagnoses and all orders for this visit:    Shoulder impingement, right  -     Case Request; Standing  -     ceFAZolin (ANCEF) 2 g in sodium chloride 0.9 % 100 mL IVPB; Infuse 2 g into a venous catheter 1 (One) Time.  -     Case Request    Rotator cuff syndrome of right shoulder  -     Case Request; Standing  -     ceFAZolin (ANCEF) 2 g in sodium chloride 0.9 % 100 mL IVPB; Infuse 2 g into a venous catheter 1 (One) Time.  -     Case Request    Essential hypertension  -     Case Request; Standing  -     ceFAZolin (ANCEF) 2 g in sodium chloride 0.9 % 100 mL IVPB; Infuse 2 g into  a venous catheter 1 (One) Time.  -     Case Request    Diabetes mellitus with no complication  -     Case Request; Standing  -     ceFAZolin (ANCEF) 2 g in sodium chloride 0.9 % 100 mL IVPB; Infuse 2 g into a venous catheter 1 (One) Time.  -     Case Request    Partial nontraumatic tear of rotator cuff, right  -     Case Request; Standing  -     ceFAZolin (ANCEF) 2 g in sodium chloride 0.9 % 100 mL IVPB; Infuse 2 g into a venous catheter 1 (One) Time.  -     Case Request    Other orders  -     Follow Anesthesia Guidelines / Standing Orders; Future  -     Provide instructions to patient regarding NPO status  -     Follow Anesthesia Guidelines / Standing Orders; Standing  -     Verify NPO Status; Standing  -     AMPARO hose- To be placed on patient in pre-op; Standing  -     POC Glucose Fingerstick; Standing  -     Clip operative site; Standing  -     Obtain informed consent (if not collected inpatient or PAT); Standing          PLAN    The patient voiced understanding of the risks, benefits, and alternative forms of treatment that were discussed and the patient consents to proceed with surgery.  All risks, benefits and alternatives were discussed.  Risks including to but not exclusive to anesthetic complications, including death, MI, CVA, infection, bleeding DVT, fracture, residual pain and need for future surgery.  This discussion was held with the patient by Maximus Schreiber MD and all questions were answered.    Plan SHOULDER ARTHROSCOPY with  RAFAELA procedure, and Subacromial decompression, and possible rotator cuff repair - right    Return for Post-operative eval.    Maximus Schreiber MD

## 2017-09-21 PROBLEM — M75.110 PARTIAL NONTRAUMATIC TEAR OF ROTATOR CUFF: Status: ACTIVE | Noted: 2017-09-21

## 2017-10-09 ENCOUNTER — APPOINTMENT (OUTPATIENT)
Dept: PREADMISSION TESTING | Facility: HOSPITAL | Age: 62
End: 2017-10-09

## 2017-10-09 VITALS
OXYGEN SATURATION: 99 % | SYSTOLIC BLOOD PRESSURE: 110 MMHG | HEART RATE: 79 BPM | DIASTOLIC BLOOD PRESSURE: 70 MMHG | RESPIRATION RATE: 16 BRPM | WEIGHT: 219 LBS | HEIGHT: 69 IN | BODY MASS INDEX: 32.44 KG/M2

## 2017-10-09 LAB
ANION GAP SERPL CALCULATED.3IONS-SCNC: 12 MMOL/L (ref 5–15)
BUN BLD-MCNC: 14 MG/DL (ref 7–21)
BUN/CREAT SERPL: 18.7 (ref 7–25)
CALCIUM SPEC-SCNC: 9.5 MG/DL (ref 8.4–10.2)
CHLORIDE SERPL-SCNC: 102 MMOL/L (ref 95–110)
CO2 SERPL-SCNC: 23 MMOL/L (ref 22–31)
CREAT BLD-MCNC: 0.75 MG/DL (ref 0.7–1.3)
GFR SERPL CREATININE-BSD FRML MDRD: 105 ML/MIN/1.73 (ref 49–113)
GLUCOSE BLD-MCNC: 168 MG/DL (ref 60–100)
POTASSIUM BLD-SCNC: 3.9 MMOL/L (ref 3.5–5.1)
SODIUM BLD-SCNC: 137 MMOL/L (ref 137–145)

## 2017-10-09 PROCEDURE — 36415 COLL VENOUS BLD VENIPUNCTURE: CPT

## 2017-10-09 PROCEDURE — 93005 ELECTROCARDIOGRAM TRACING: CPT

## 2017-10-09 PROCEDURE — 93010 ELECTROCARDIOGRAM REPORT: CPT | Performed by: INTERNAL MEDICINE

## 2017-10-09 PROCEDURE — 80048 BASIC METABOLIC PNL TOTAL CA: CPT | Performed by: ANESTHESIOLOGY

## 2017-10-09 RX ORDER — QUETIAPINE FUMARATE 300 MG/1
600 TABLET, FILM COATED ORAL NIGHTLY
COMMUNITY
End: 2017-11-22 | Stop reason: SDUPTHER

## 2017-10-09 RX ORDER — SODIUM CHLORIDE 9 MG/ML
1000 INJECTION, SOLUTION INTRAVENOUS CONTINUOUS PRN
Status: CANCELLED | OUTPATIENT
Start: 2017-10-18

## 2017-10-09 NOTE — DISCHARGE INSTRUCTIONS
[facility]  Pre-op Information and Guidelines    You will be called after 2 p.m. the day before your surgery (Friday for Monday surgery) and notified of your time for arrival and approximate surgery time.  If you have not received a call by 4P.M., please contact Same Day Surgery at [phone] of if outside [facility] call [phone].    Please Follow these Important Safety Guidelines:    • The morning of your procedure, take only the medications listed below with   A sip of water:_____________________________________________       FLEXERIL, PROZAC, NEURONTIN, PROTONIX_____      • DO NOT eat or drink anything after 12:00 midnight the night before surgery  Specific instructions concerning drinking clear liquids will be discussed during  the pre-surgery instruction call the day before your surgery.    • If you take a blood thinner (ex. Plavix, Coumadin, aspirin), ask your doctor when to stop it before surgery  STOP DATE: _________________    • Only 2 visitors are allowed in patient rooms at a time  Your visitors will be asked to wait in the lobby until the admission process is complete with the exception of a parent with a child and patients in need of special assistance.    • YOU CANNOT DRIVE YOURSELF HOME  You must be accompanied by someone who will be responsible for driving you home after surgery and for your care at home.    • DO NOT chew gum, use breath mints, hard candy, or smoke the day of surgery  • DO NOT drink alcohol for at least 24 hours before your surgery  • DO NOT wear any jewelry and remove all body piercing before coming to the hospital  • DO NOT wear make-up to the hospital  • If you are having surgery on an extremity (arm/leg/foot) remove nail polish/artificial nails on the surgical side  • Clothing, glasses, contacts, dentures, and hairpieces must be removed before surgery  • Bathe the night before or the morning of your surgery and do not use powders/lotions on  skin.

## 2017-10-18 ENCOUNTER — ANESTHESIA (OUTPATIENT)
Dept: PERIOP | Facility: HOSPITAL | Age: 62
End: 2017-10-18

## 2017-10-18 ENCOUNTER — HOSPITAL ENCOUNTER (OUTPATIENT)
Facility: HOSPITAL | Age: 62
Setting detail: HOSPITAL OUTPATIENT SURGERY
Discharge: HOME | End: 2017-10-18
Attending: ORTHOPAEDIC SURGERY | Admitting: ORTHOPAEDIC SURGERY

## 2017-10-18 ENCOUNTER — ANESTHESIA EVENT (OUTPATIENT)
Dept: PERIOP | Facility: HOSPITAL | Age: 62
End: 2017-10-18

## 2017-10-18 VITALS
WEIGHT: 222 LBS | HEART RATE: 72 BPM | SYSTOLIC BLOOD PRESSURE: 154 MMHG | RESPIRATION RATE: 18 BRPM | TEMPERATURE: 97.1 F | DIASTOLIC BLOOD PRESSURE: 83 MMHG | OXYGEN SATURATION: 99 % | BODY MASS INDEX: 32.88 KG/M2 | HEIGHT: 69 IN

## 2017-10-18 DIAGNOSIS — M75.111 PARTIAL NONTRAUMATIC TEAR OF ROTATOR CUFF, RIGHT: ICD-10-CM

## 2017-10-18 DIAGNOSIS — I10 ESSENTIAL HYPERTENSION: ICD-10-CM

## 2017-10-18 DIAGNOSIS — M75.41 IMPINGEMENT SYNDROME OF RIGHT SHOULDER: Primary | ICD-10-CM

## 2017-10-18 DIAGNOSIS — M75.41 SHOULDER IMPINGEMENT, RIGHT: ICD-10-CM

## 2017-10-18 DIAGNOSIS — E11.9 DIABETES MELLITUS WITH NO COMPLICATION (HCC): ICD-10-CM

## 2017-10-18 DIAGNOSIS — M75.101 ROTATOR CUFF SYNDROME OF RIGHT SHOULDER: ICD-10-CM

## 2017-10-18 PROBLEM — M75.110 PARTIAL NONTRAUMATIC TEAR OF ROTATOR CUFF: Status: ACTIVE | Noted: 2017-09-21

## 2017-10-18 LAB
GLUCOSE BLDC GLUCOMTR-MCNC: 115 MG/DL (ref 70–130)
GLUCOSE BLDC GLUCOMTR-MCNC: 174 MG/DL (ref 70–130)

## 2017-10-18 PROCEDURE — 25010000002 FENTANYL CITRATE (PF) 100 MCG/2ML SOLUTION: Performed by: NURSE ANESTHETIST, CERTIFIED REGISTERED

## 2017-10-18 PROCEDURE — 82962 GLUCOSE BLOOD TEST: CPT

## 2017-10-18 PROCEDURE — 25010000002 HYDROMORPHONE PER 4 MG: Performed by: NURSE ANESTHETIST, CERTIFIED REGISTERED

## 2017-10-18 PROCEDURE — 25010000002 ROPIVACAINE PER 1 MG: Performed by: NURSE ANESTHETIST, CERTIFIED REGISTERED

## 2017-10-18 PROCEDURE — 25010000002 DEXAMETHASONE PER 1 MG: Performed by: NURSE ANESTHETIST, CERTIFIED REGISTERED

## 2017-10-18 PROCEDURE — 25010000002 ONDANSETRON PER 1 MG: Performed by: NURSE ANESTHETIST, CERTIFIED REGISTERED

## 2017-10-18 PROCEDURE — 29826 SHO ARTHRS SRG DECOMPRESSION: CPT | Performed by: ORTHOPAEDIC SURGERY

## 2017-10-18 PROCEDURE — 25010000003 CEFAZOLIN PER 500 MG: Performed by: ORTHOPAEDIC SURGERY

## 2017-10-18 PROCEDURE — 25010000002 MIDAZOLAM PER 1 MG: Performed by: NURSE ANESTHETIST, CERTIFIED REGISTERED

## 2017-10-18 PROCEDURE — 29827 SHO ARTHRS SRG RT8TR CUF RPR: CPT | Performed by: ORTHOPAEDIC SURGERY

## 2017-10-18 PROCEDURE — 25010000002 PROPOFOL 10 MG/ML EMULSION: Performed by: NURSE ANESTHETIST, CERTIFIED REGISTERED

## 2017-10-18 PROCEDURE — 25010000002 EPINEPHRINE 1 MG/ML SOLUTION: Performed by: ORTHOPAEDIC SURGERY

## 2017-10-18 PROCEDURE — 29824 SHO ARTHRS SRG DSTL CLAVICLC: CPT | Performed by: ORTHOPAEDIC SURGERY

## 2017-10-18 RX ORDER — FENTANYL CITRATE 50 UG/ML
INJECTION, SOLUTION INTRAMUSCULAR; INTRAVENOUS AS NEEDED
Status: DISCONTINUED | OUTPATIENT
Start: 2017-10-18 | End: 2017-10-18 | Stop reason: SURG

## 2017-10-18 RX ORDER — PROMETHAZINE HYDROCHLORIDE 25 MG/ML
12.5 INJECTION, SOLUTION INTRAMUSCULAR; INTRAVENOUS ONCE AS NEEDED
Status: DISCONTINUED | OUTPATIENT
Start: 2017-10-18 | End: 2017-10-18 | Stop reason: HOSPADM

## 2017-10-18 RX ORDER — ROPIVACAINE HYDROCHLORIDE 5 MG/ML
INJECTION, SOLUTION EPIDURAL; INFILTRATION; PERINEURAL AS NEEDED
Status: DISCONTINUED | OUTPATIENT
Start: 2017-10-18 | End: 2017-10-18 | Stop reason: SURG

## 2017-10-18 RX ORDER — PROPOFOL 10 MG/ML
VIAL (ML) INTRAVENOUS AS NEEDED
Status: DISCONTINUED | OUTPATIENT
Start: 2017-10-18 | End: 2017-10-18 | Stop reason: SURG

## 2017-10-18 RX ORDER — EPINEPHRINE 1 MG/ML
INJECTION INTRAMUSCULAR; INTRAVENOUS; SUBCUTANEOUS AS NEEDED
Status: DISCONTINUED | OUTPATIENT
Start: 2017-10-18 | End: 2017-10-18 | Stop reason: HOSPADM

## 2017-10-18 RX ORDER — MIDAZOLAM HYDROCHLORIDE 1 MG/ML
INJECTION INTRAMUSCULAR; INTRAVENOUS AS NEEDED
Status: DISCONTINUED | OUTPATIENT
Start: 2017-10-18 | End: 2017-10-18 | Stop reason: SURG

## 2017-10-18 RX ORDER — HYDROCODONE BITARTRATE AND ACETAMINOPHEN 7.5; 325 MG/1; MG/1
1 TABLET ORAL ONCE AS NEEDED
Status: DISCONTINUED | OUTPATIENT
Start: 2017-10-18 | End: 2017-10-18 | Stop reason: HOSPADM

## 2017-10-18 RX ORDER — SODIUM CHLORIDE 9 MG/ML
1000 INJECTION, SOLUTION INTRAVENOUS CONTINUOUS PRN
Status: DISCONTINUED | OUTPATIENT
Start: 2017-10-18 | End: 2017-10-18 | Stop reason: HOSPADM

## 2017-10-18 RX ORDER — LIDOCAINE HYDROCHLORIDE 20 MG/ML
INJECTION, SOLUTION INFILTRATION; PERINEURAL AS NEEDED
Status: DISCONTINUED | OUTPATIENT
Start: 2017-10-18 | End: 2017-10-18 | Stop reason: SURG

## 2017-10-18 RX ORDER — HYDROMORPHONE HCL 110MG/55ML
PATIENT CONTROLLED ANALGESIA SYRINGE INTRAVENOUS AS NEEDED
Status: DISCONTINUED | OUTPATIENT
Start: 2017-10-18 | End: 2017-10-18 | Stop reason: SURG

## 2017-10-18 RX ORDER — DEXAMETHASONE SODIUM PHOSPHATE 4 MG/ML
INJECTION, SOLUTION INTRA-ARTICULAR; INTRALESIONAL; INTRAMUSCULAR; INTRAVENOUS; SOFT TISSUE AS NEEDED
Status: DISCONTINUED | OUTPATIENT
Start: 2017-10-18 | End: 2017-10-18 | Stop reason: SURG

## 2017-10-18 RX ORDER — ONDANSETRON 2 MG/ML
4 INJECTION INTRAMUSCULAR; INTRAVENOUS ONCE AS NEEDED
Status: DISCONTINUED | OUTPATIENT
Start: 2017-10-18 | End: 2017-10-18 | Stop reason: HOSPADM

## 2017-10-18 RX ORDER — HYDROCODONE BITARTRATE AND ACETAMINOPHEN 7.5; 325 MG/1; MG/1
1 TABLET ORAL EVERY 4 HOURS PRN
Qty: 40 TABLET | Refills: 0 | Status: SHIPPED | OUTPATIENT
Start: 2017-10-18 | End: 2017-10-25

## 2017-10-18 RX ORDER — ONDANSETRON 2 MG/ML
INJECTION INTRAMUSCULAR; INTRAVENOUS AS NEEDED
Status: DISCONTINUED | OUTPATIENT
Start: 2017-10-18 | End: 2017-10-18 | Stop reason: SURG

## 2017-10-18 RX ADMIN — HYDROCODONE BITARTRATE AND ACETAMINOPHEN 1 TABLET: 7.5; 325 TABLET ORAL at 13:06

## 2017-10-18 RX ADMIN — HYDROMORPHONE HYDROCHLORIDE 0.5 MG: 2 INJECTION INTRAMUSCULAR; INTRAVENOUS; SUBCUTANEOUS at 10:44

## 2017-10-18 RX ADMIN — HYDROMORPHONE HYDROCHLORIDE 0.5 MG: 2 INJECTION INTRAMUSCULAR; INTRAVENOUS; SUBCUTANEOUS at 10:40

## 2017-10-18 RX ADMIN — FENTANYL CITRATE 50 MCG: 50 INJECTION, SOLUTION INTRAMUSCULAR; INTRAVENOUS at 09:57

## 2017-10-18 RX ADMIN — MIDAZOLAM 2 MG: 1 INJECTION INTRAMUSCULAR; INTRAVENOUS at 09:54

## 2017-10-18 RX ADMIN — ONDANSETRON 4 MG: 2 INJECTION INTRAMUSCULAR; INTRAVENOUS at 10:05

## 2017-10-18 RX ADMIN — FENTANYL CITRATE 50 MCG: 50 INJECTION, SOLUTION INTRAMUSCULAR; INTRAVENOUS at 10:38

## 2017-10-18 RX ADMIN — HYDROMORPHONE HYDROCHLORIDE 0.5 MG: 1 INJECTION, SOLUTION INTRAMUSCULAR; INTRAVENOUS; SUBCUTANEOUS at 11:50

## 2017-10-18 RX ADMIN — ROPIVACAINE HYDROCHLORIDE 30 ML: 5 INJECTION, SOLUTION EPIDURAL; INFILTRATION; PERINEURAL at 10:04

## 2017-10-18 RX ADMIN — SODIUM CHLORIDE 1000 ML: 9 INJECTION, SOLUTION INTRAVENOUS at 07:53

## 2017-10-18 RX ADMIN — CEFAZOLIN SODIUM 2 G: 1 INJECTION, POWDER, FOR SOLUTION INTRAMUSCULAR; INTRAVENOUS at 10:10

## 2017-10-18 RX ADMIN — DEXAMETHASONE SODIUM PHOSPHATE 4 MG: 4 INJECTION, SOLUTION INTRAMUSCULAR; INTRAVENOUS at 10:05

## 2017-10-18 RX ADMIN — PROPOFOL 150 MG: 10 INJECTION, EMULSION INTRAVENOUS at 10:05

## 2017-10-18 RX ADMIN — LIDOCAINE HYDROCHLORIDE 40 MG: 20 INJECTION, SOLUTION INFILTRATION; PERINEURAL at 10:05

## 2017-10-18 RX ADMIN — PROPOFOL 30 MG: 10 INJECTION, EMULSION INTRAVENOUS at 10:38

## 2017-10-18 NOTE — ANESTHESIA PROCEDURE NOTES
Airway  Urgency: elective    Airway not difficult    General Information and Staff    Patient location during procedure: OR  CRNA: BRANDON ROSAS    Indications and Patient Condition  Indications for airway management: airway protection    Preoxygenated: yes  MILS maintained throughout  Mask difficulty assessment: 0 - not attempted    Final Airway Details  Final airway type: supraglottic airway      Successful airway: classic  Size 4    Number of attempts at approach: 1

## 2017-10-18 NOTE — ANESTHESIA POSTPROCEDURE EVALUATION
Patient: Mikey Allison    Procedure Summary     Date Anesthesia Start Anesthesia Stop Room / Location    10/18/17 0954 [id number]  [facility] OR 08 / [facility] OR       Procedure Diagnosis Surgeon Provider    SHOULDER ARTHROSCOPY WITH RAFAELA PROCEDURE, AND SUBACROMIAL DECOMPRESSION, AND POSSIBLE ROTATOR  CUFF REPAIR       SHOULDER ARTHROSCOPY WITH RAFAELA PROCEDURE, AND SUBACROMIAL DECOMPRESSION, NO ROTATOR CUFF REPAIR PERFORMED (Right Shoulder) Essential hypertension; Diabetes mellitus with no complication; Rotator cuff syndrome of right shoulder; Partial nontraumatic tear of rotator cuff, right; Shoulder impingement, right  (Essential hypertension [I10]; Diabetes mellitus with no complication [E11.9]; Rotator cuff syndrome of right shoulder [M75.101]; Partial nontraumatic tear of rotator cuff, right [M75.111]; Shoulder impingement, right [M75.41]) MD Collin Black MD          Anesthesia Type: general, regional  Last vitals  BP   103/63 (10/18/17 0800)   Temp   96.3 °F (35.7 °C) (10/18/17 0800)   Pulse   69 (10/18/17 0800)   Resp   20 (10/18/17 0800)     SpO2   99 % (10/18/17 0800)     Post Anesthesia Care and Evaluation    Patient location during evaluation: PACU  Patient participation: complete - patient participated  Level of consciousness: awake and alert  Pain management: adequate  Airway patency: patent  Anesthetic complications: No anesthetic complications    Cardiovascular status: acceptable  Respiratory status: acceptable  Hydration status: acceptable

## 2017-10-18 NOTE — PLAN OF CARE
Problem: Patient Care Overview (Adult)  Goal: Plan of Care Review  Outcome: Outcome(s) achieved Date Met:  10/18/17  Discharge criteria met

## 2017-10-18 NOTE — OP NOTE
NAME: Mikey Allison     YOB: 1955    DATE OF SURGERY: 10/18/2017    PREOPERATIVE DIAGNOSIS:  Essential hypertension [I10]  Diabetes mellitus with no complication [E11.9]  Rotator cuff syndrome of right shoulder [M75.101]  Partial nontraumatic tear of rotator cuff, right [M75.111]  Shoulder impingement, right [M75.41]    POSTOPERATIVE DIAGNOSIS:  Post-Op Diagnosis Codes:     * Essential hypertension [I10]     * Diabetes mellitus with no complication [E11.9]     * Rotator cuff syndrome of right shoulder [M75.101]     * Partial nontraumatic tear of rotator cuff, right [M75.111]     * Shoulder impingement, right [M75.41]    PROCEDURE PERFORMED:      Arthroscopy of the Right shoulder with   1.  RAFAELA procedure   2.  Sub-acromial decompression    SURGEON:  Maximus Schreiber MD    Assistant:  MAYI Arboleda    Staff:    Circulator: Sharon Harris RN  Scrub Person: John Soria RN  Assistant: Pebbles Salcido CSA    Anesthesia:  General with Block      Estimated Blood Loss:  minimal    Specimens : None    Complications: none    Implants:  Nothing was implanted during the procedure    DESCRIPTION OF PROCEDURE:   Once consent was obtained, the patient was taken to the operating. Once adequate anesthesia was obtained, then the patient was rolled in the lateral decubitus position, Right side up. All bony prominences were well padded, and an axillary roll was placed. The Right upper extremity was then manipulated through a full range of  motion without any difficulty. The Right upper extremity was then  prepped and draped in the standard surgical fashion. The Right arm was  then placed in the arthroscopic arm gomez. The standard posterior  portal was made, and the diagnostic portion of the arthroscopy began.  The intra-articular portion of the exam showed that the articular  surface was Noted to have multiple areas of 1 cartilage.  No rei bare bone was encountered but significant  arthritic change was identified.. The biceps tendon showed a firm attachment onto the glenoid labrum. The rotator cuff was visualized and found to be intact.  There was a large intra-articular bubble present.. There were no loose bodies in the intra-articular space or in the subacromial recess.      The subacromial space was then entered and there was noted to be a large  subacromial spur on the anterior aspect. There also was noted to be  significant inflammatory bursitis, which was resected using electrocautery and the  suction shaver. Once the acromion and the clavicle were clearly  identified, then the ememtt was used to resect the subacromial spur and to  turn the acromion into a type 1 acromion. This was done after having  established a lateral portal using an 18-gauge spinal needle for  localization and a small stab wound incision.      At this point, the anterior portal was also established using an 18-  gauge spinal needle for localization and a small stab wound incision.  The cannula was inserted and the distal clavicle resection was performed  in the standard fashion resecting approximately 8-10 mm of bone.      The attention was returned to the rotator cuff which was found to be intact with no signs of bursal sided fraying.  No partial thickness tear was identified.    The shaver was then allowed to run on suction to remove any  remaining loose fragments. The arthroscopy was then terminated. The  wounds were closed with interrupted nylon suture, covered with Xeroform  gauze, 4x4's, and Elastoplast dressing. The patient was then awakened  and taken to the recovery room in good condition. He tolerated the  procedure very well.      Maximus Schreiber MD     Date: 10/18/2017  Time: 11:32 AM

## 2017-10-18 NOTE — ANESTHESIA PROCEDURE NOTES
Peripheral Block    Patient location during procedure: OR  Start time: 10/18/2017 10:00 AM  Stop time: 10/18/2017 10:04 AM  Reason for block: at surgeon's request and post-op pain management  Performed by  CRNA: BRANDON ROSAS  Preanesthetic Checklist  Completed: patient identified, site marked, surgical consent, pre-op evaluation, timeout performed, IV checked, risks and benefits discussed and monitors and equipment checked  Prep:  Pt Position: supine  Sterile barriers:cap, mask and sterile barriers  Procedure  Sedation:yes  Performed under: MAC  Guidance:ultrasound guided  ULTRASOUND INTERPRETATION.  Using ultrasound guidance a 20 G gauge needle was placed in close proximity to the brachial plexus nerve, at which point, under ultrasound guidance anesthetic was injected in the area of the nerve and spread of the anesthesia was seen on ultrasound in close proximity thereto.  There were no abnormalities seen on ultrasound; a digital image was taken; and the patient tolerated the procedure with no complications. Images:still images obtained    Laterality:right  Block Type:interscalene  Injection Technique:single-shot  Needle Type:echogenic  Needle Gauge:20 G    Medications  Local Injected:ropivacaine 0.5% Local Amount Injected:30mL  Post Assessment  Injection Assessment: negative aspiration for heme, no paresthesia on injection and incremental injection  Patient Tolerance:comfortable throughout block  Complications:no

## 2017-10-18 NOTE — ANESTHESIA PREPROCEDURE EVALUATION
Anesthesia Evaluation     NPO Solid Status: > 8 hours  NPO Liquid Status: > 8 hours     Airway   Mallampati: II  TM distance: >3 FB  possible difficult intubation  Dental    (+) poor dentition    Pulmonary     breath sounds clear to auscultation  (+) shortness of breath,   Cardiovascular     ECG reviewed  Rhythm: regular  Rate: normal    (+) hypertension well controlled, hyperlipidemia      Neuro/Psych  (+) seizures well controlled, syncope, psychiatric history Anxiety and Depression,    GI/Hepatic/Renal/Endo    (+) obesity,  GERD well controlled, diabetes mellitus type 2 well controlled,     Musculoskeletal     Abdominal     Abdomen: soft.   Substance History      OB/GYN          Other   (+) arthritis                                     Anesthesia Plan    ASA 3     general and regional   (Explained the interscalene block to patient along with the plan for a general anesthetic.)  intravenous induction   Anesthetic plan and risks discussed with patient.

## 2017-10-18 NOTE — H&P (VIEW-ONLY)
Mikey Allison is a 62 y.o. male returns for     Chief Complaint   Patient presents with   • Right Shoulder - Follow-up       HISTORY OF PRESENT ILLNESS: steroid injection right shoulder done on 8/29/2017 lasted about 2 weeks. Patient would like to discuss surgery.   Injector last month and has been injected 3-4 times in total.  The last injection helped some but now his pain has worsened again.  He is doing home exercise program.  He has pain at night and pain with any activity, especially that overhead.     CONCURRENT MEDICAL HISTORY:    Past Medical History:   Diagnosis Date   • Abdominal pain     left side   • Acute sinusitis    • Ankle joint pain    • Bipolar disorder    • Bipolar disorder, unspecified    • Cellulitis and abscess of trunk     axilla   • Chest pain    • Chronic anemia    • Degenerative joint disease involving multiple joints     back   • Depressive disorder    • Diabetes mellitus    • Diabetes mellitus with no complication     type 2 or unspecified type uncontrolled   • Diverticular disease    • Dyspnea    • Enlarged prostate     on [place]-on CT   • Essential hypertension    • Febrile convulsion    • Gastroesophageal reflux disease    • Generalized anxiety disorder    • H/O echocardiogram 10/16/2007    Mild to moderate concentric LV hypertrophy with mild left atrial enlargement. LV appears to be hypodynamic with preserved LV systolic function with EF 55-60%. Pericardium appears to be normal with a small pericardial effusion    • Hemorrhoids    • Hyperlipidemia    • Hypertensive disorder    • Hypoglycemia    • Infectious disorder of kidney     kidney infection-treated by [facility]   • Left lower quadrant pain    • Loss of appetite    • Obesity    • Osteoarthritis    • Pain in joint involving ankle and foot    • Pain in limb    • Psoriasis    • Retention of urine     with cath-treated by the [facility]   • Screening for malignant neoplasm of colon    • Sepsis due to urinary tract infection      urosepsis treated by [facility]   • Sinusitis    • Syncope    • Unspecified essential hypertension        Allergies   Allergen Reactions   • Codeine    • Sulfa Antibiotics          Current Outpatient Prescriptions:   •  aspirin-dipyridamole (AGGRENOX)  MG per 12 hr capsule, Take 1 capsule by mouth Daily., Disp: , Rfl:   •  Canagliflozin (INVOKANA) 300 MG tablet, Take 300 mg by mouth., Disp: , Rfl:   •  celecoxib (CeleBREX) 200 MG capsule, Take 1 capsule by mouth Daily., Disp: 30 capsule, Rfl: 3  •  clonazePAM (KlonoPIN) 0.5 MG tablet, Take 0.5 mg by mouth 2 (Two) Times a Day As Needed for Seizures., Disp: , Rfl:   •  cyclobenzaprine (FLEXERIL) 10 MG tablet, Take 10 mg by mouth 3 (Three) Times a Day As Needed for Muscle Spasms., Disp: , Rfl:   •  FLUoxetine (PROzac) 20 MG capsule, Take 20 mg by mouth 3 (Three) Times a Day., Disp: , Rfl:   •  gabapentin (NEURONTIN) 300 MG capsule, Take 300 mg by mouth 3 (Three) Times a Day., Disp: , Rfl:   •  HYDROcodone-acetaminophen (NORCO)  MG per tablet, Take 1 tablet by mouth Every 6 (Six) Hours As Needed for Moderate Pain (4-6)., Disp: , Rfl:   •  HYDROcodone-acetaminophen (NORCO) 5-325 MG per tablet, Take 1 tablet by mouth Every 6 (Six) Hours As Needed for Moderate Pain (4-6)., Disp: 15 tablet, Rfl: 0  •  hydrOXYzine (ATARAX) 50 MG tablet, Take 50 mg by mouth 3 (Three) Times a Day As Needed for Itching., Disp: , Rfl:   •  levETIRAcetam (KEPPRA) 500 MG tablet, Take 500 mg by mouth Daily., Disp: , Rfl:   •  lisinopril-hydrochlorothiazide (PRINZIDE,ZESTORETIC) 20-12.5 MG per tablet, , Disp: , Rfl:   •  magnesium citrate 1.745 GM/30ML solution solution, Take 296 mL by mouth 1 (One) Time., Disp: , Rfl:   •  metFORMIN (GLUCOPHAGE) 1000 MG tablet, , Disp: , Rfl:   •  methocarbamol (ROBAXIN) 750 MG tablet, Take 750 mg by mouth 4 (Four) Times a Day As Needed for Muscle Spasms., Disp: , Rfl:   •  oxaprozin (DAYPRO) 600 MG tablet, Take 2 tablets by mouth Daily., Disp: 60  "tablet, Rfl: 2  •  pantoprazole (PROTONIX) 40 MG EC tablet, , Disp: , Rfl: 5  •  pravastatin (PRAVACHOL) 40 MG tablet, , Disp: , Rfl:   •  QUEtiapine (SEROquel) 300 MG tablet, TAKE TWO TABLETS BY MOUTH AT BEDTIME, Disp: 60 tablet, Rfl: 5  •  SITagliptin (JANUVIA) 100 MG tablet, Take 100 mg by mouth Daily., Disp: , Rfl:     Past Surgical History:   Procedure Laterality Date   • ANKLE SURGERY Left 11/11/2008    Removal of syndesmotic screws, left ankle. Painful syndesmotic screws, left ankle.)   • ANKLE SURGERY  10/19/2007    Open reduction-internal fixation with fluoroscopic control with final x-ray PA and lateral of the ankle. Trimalleolar fracture/subluxation, left ankle.)   • CIRCUMCISION  2016   • COLONOSCOPY  03/10/2015    Diverticulosis found in the sigmoid colon. hemorrhoids found in the anus. Normal cecum   • CYSTOSCOPY  06/22/2016    Cystoscopy, dilation, anchor of Tim catheter. Benign prostatic hypertrophy, urinary retention, urethral stricture history of.   • INJECTION OF MEDICATION      Kenalog (3)   • STOMACH SURGERY     • TIBIA FRACTURE SURGERY         ROS  No fevers or chills.  No chest pain or shortness of air.  No GI or  disturbances.    PHYSICAL EXAMINATION:       Ht 69\" (175.3 cm)  Wt 217 lb (98.4 kg)  BMI 32.05 kg/m2    Physical Exam   Constitutional: He is oriented to person, place, and time. He appears well-developed and well-nourished. No distress.   Cardiovascular: Normal rate and regular rhythm.    Pulmonary/Chest: Effort normal and breath sounds normal.   Abdominal: Soft. Bowel sounds are normal.   Neurological: He is alert and oriented to person, place, and time.   Psychiatric: He has a normal mood and affect. His behavior is normal. Judgment and thought content normal.   Vitals reviewed.      GAIT:     [x]  Normal  []  Antalgic    Assistive device: [x]  None  []  Walker     []  Crutches  []  Cane     []  Wheelchair  []  Stretcher    Right Shoulder Exam     Tenderness   The patient is " experiencing tenderness in the acromioclavicular joint and acromion.    Range of Motion   Active Abduction: 70   Forward Flexion: 110   Internal Rotation 0 degrees: L3     Muscle Strength   Abduction: 4/5   Supraspinatus: 4/5     Tests   Cross Arm: positive  Hawkin's test: positive  Impingement: positive    Other   Erythema: absent  Sensation: normal  Pulse: present                PROCEDURE: MRI  right  Shoulder without contrast     Indication:  Increasing severity of chronic right shoulder pain  x2 years      Technique: Coronal T1, T2, PD; sagittal T1 and T2; axial T1 and  T2*.1.5 Mariola Magnet      Prior Relevant Exam: None        Limitations:None     Acromioclavicular Joint: Moderate arthropathy acromioclavicular  joint with joint space narrowing, osteophyte production,  subchondral edema, joint space hypertrophy, and mild impingement  upon the tendon and muscle of the supraspinatus.      Acromion: Type II     Rotator Cuff: Mild increased signal intensity involving the mid  to anterior fibers of the tendon of the supraspinatus, consistent  with a mild tendinosis. No evidence of partial or full-thickness  tear. Tendon of the infraspinatus appears intact.      Subacromial/Subdeltoid fluid: Small      Joint effusion:Physiologic     Biceps Labral Complex: Normal     Inferior Glenohumeral Ligament: Thickening of the inferior  glenohumeral ligament with relatively normal signal intensity and  no abnormal fluid collection. Correlation for adhesive  capsulitis.      Labrum: Absence of the anterior superior labrum noted with  thickening of the middle glenohumeral ligament, consistent with  Driss complex, normal variant. The mid to posterior labrum  appears intact.      Teres Minor Tendon: Normal     Subscapularis Tendon: Mild thinning of the tendon near its  attachment, likely tendinosis.      Biceps Tendon: Normal     Glenoid Fossa: Normal     Rotator interval: Normal     Articular Cartilage: Thinning of the articular  cartilage, likely  degenerative      Bone Marrow: Irregularity bone marrow signal intensity involving  the superior glenoid with associated osteophyte formation, likely  degenerative. 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 Correlation for any evidence of  suprascapular nerve impingement.      Misc: NA        IMPRESSION:  CONCLUSION:     1. Mild tendinosis mid to anterior fibers of the tendon of the  supraspinatus.  2. Irregular bone marrow signal intensity involving the superior  glenoid with associated osteophyte formation, likely  degenerative. 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 Correlation for any evidence of  suprascapular nerve impingement.  3. Tendinosis of the subscapularis tendon.  4. Absence of the anterior superior labrum with thickening of the  middle glenohumeral ligament, consistent with Driss complex,  normal variant.  5. Thickening of the inferior glenohumeral ligament with  relatively normal signal intensity and no abnormal fluid  collection. Correlation for adhesive capsulitis.  6. Subacromial/subdeltoid effusion.  7. Moderate acromioclavicular joint arthropathy      Electronically signed by:  Masood Russell MD  3/30/2017 3:43 PM CDT  Workstation: [employer]        ASSESSMENT:    Diagnoses and all orders for this visit:    Shoulder impingement, right  -     Case Request; Standing  -     ceFAZolin (ANCEF) 2 g in sodium chloride 0.9 % 100 mL IVPB; Infuse 2 g into a venous catheter 1 (One) Time.  -     Case Request    Rotator cuff syndrome of right shoulder  -     Case Request; Standing  -     ceFAZolin (ANCEF) 2 g in sodium chloride 0.9 % 100 mL IVPB; Infuse 2 g into a venous catheter 1 (One) Time.  -     Case Request    Essential hypertension  -     Case Request; Standing  -     ceFAZolin (ANCEF) 2 g in sodium chloride 0.9 % 100 mL IVPB; Infuse 2 g into  a venous catheter 1 (One) Time.  -     Case Request    Diabetes mellitus with no complication  -     Case Request; Standing  -     ceFAZolin (ANCEF) 2 g in sodium chloride 0.9 % 100 mL IVPB; Infuse 2 g into a venous catheter 1 (One) Time.  -     Case Request    Partial nontraumatic tear of rotator cuff, right  -     Case Request; Standing  -     ceFAZolin (ANCEF) 2 g in sodium chloride 0.9 % 100 mL IVPB; Infuse 2 g into a venous catheter 1 (One) Time.  -     Case Request    Other orders  -     Follow Anesthesia Guidelines / Standing Orders; Future  -     Provide instructions to patient regarding NPO status  -     Follow Anesthesia Guidelines / Standing Orders; Standing  -     Verify NPO Status; Standing  -     AMPARO hose- To be placed on patient in pre-op; Standing  -     POC Glucose Fingerstick; Standing  -     Clip operative site; Standing  -     Obtain informed consent (if not collected inpatient or PAT); Standing          PLAN    The patient voiced understanding of the risks, benefits, and alternative forms of treatment that were discussed and the patient consents to proceed with surgery.  All risks, benefits and alternatives were discussed.  Risks including to but not exclusive to anesthetic complications, including death, MI, CVA, infection, bleeding DVT, fracture, residual pain and need for future surgery.  This discussion was held with the patient by Maximus Schreiber MD and all questions were answered.    Plan SHOULDER ARTHROSCOPY with  RAFAELA procedure, and Subacromial decompression, and possible rotator cuff repair - right    Return for Post-operative eval.    Maximus Schreiber MD

## 2017-10-18 NOTE — PLAN OF CARE
"Problem: Perioperative Period (Adult)  Goal: Signs and Symptoms of Listed Potential Problems Will be Absent or Manageable (Perioperative Period)  Outcome: Ongoing (interventions implemented as appropriate)    Problem: Patient Care Overview (Adult)  Goal: Plan of Care Review  Outcome: Ongoing (interventions implemented as appropriate)    10/18/17 1205   Coping/Psychosocial Response Interventions   Plan Of Care Reviewed With patient   Patient Care Overview   Progress improving   Outcome Evaluation   Outcome Summary/Follow up Plan VSS on room air oxygen. Denies having nausea, tolerating ice chips well. States right shoulder is heavy and \"tired\" feeling. No nonverbal indicators of pain present. Ready for phase II care.           "

## 2017-10-20 ENCOUNTER — TELEPHONE (OUTPATIENT)
Dept: ORTHOPEDIC SURGERY | Facility: CLINIC | Age: 62
End: 2017-10-20

## 2017-10-20 NOTE — TELEPHONE ENCOUNTER
Patient has been instructed to put ice on it. Which he has not done. Also to start the celebrex back.

## 2017-10-25 ENCOUNTER — OFFICE VISIT (OUTPATIENT)
Dept: ORTHOPEDIC SURGERY | Facility: CLINIC | Age: 62
End: 2017-10-25

## 2017-10-25 DIAGNOSIS — M75.41 SHOULDER IMPINGEMENT, RIGHT: Primary | ICD-10-CM

## 2017-10-25 DIAGNOSIS — M75.101 ROTATOR CUFF SYNDROME OF RIGHT SHOULDER: ICD-10-CM

## 2017-10-25 PROBLEM — M25.811 SHOULDER IMPINGEMENT, RIGHT: Status: ACTIVE | Noted: 2017-10-25

## 2017-10-25 PROCEDURE — 99024 POSTOP FOLLOW-UP VISIT: CPT | Performed by: NURSE PRACTITIONER

## 2017-10-25 RX ORDER — NABUMETONE 750 MG/1
TABLET, FILM COATED ORAL
COMMUNITY
Start: 2017-10-07 | End: 2017-11-07

## 2017-10-25 RX ORDER — HYDROCODONE BITARTRATE AND ACETAMINOPHEN 10; 325 MG/1; MG/1
TABLET ORAL
Qty: 40 TABLET | Refills: 0 | Status: SHIPPED | OUTPATIENT
Start: 2017-10-25 | End: 2017-11-01 | Stop reason: SDUPTHER

## 2017-10-25 NOTE — PROGRESS NOTES
Mikey Allison is a 62 y.o. male returns for     Chief Complaint   Patient presents with   • Right Shoulder - Post-op       HISTORY OF PRESENT ILLNESS: Patient presents to office for increased right shoulder pain post-operatively.  Patient underwent right shoulder arthroscopy with Rashawn procedure and subacromial decompression on 10/18/2017 per Dr. Schreiber.  Patient is wearing a sling and has started physical therapy.       CONCURRENT MEDICAL HISTORY:    Past Medical History:   Diagnosis Date   • Abdominal pain     left side   • Acute sinusitis    • Ankle joint pain    • Bipolar disorder    • Bipolar disorder, unspecified    • Cellulitis and abscess of trunk     axilla   • Chest pain    • Chronic anemia    • Degenerative joint disease involving multiple joints     back   • Depressive disorder    • Diabetes mellitus    • Diabetes mellitus with no complication     type 2 or unspecified type uncontrolled   • Diverticular disease    • Dyspnea    • Enlarged prostate     on [place]-on CT   • Essential hypertension    • Febrile convulsion    • Gastroesophageal reflux disease    • Generalized anxiety disorder    • H/O echocardiogram 10/16/2007    Mild to moderate concentric LV hypertrophy with mild left atrial enlargement. LV appears to be hypodynamic with preserved LV systolic function with EF 55-60%. Pericardium appears to be normal with a small pericardial effusion    • Hemorrhoids    • Hyperlipidemia    • Hypertensive disorder    • Hypoglycemia    • Infectious disorder of kidney     kidney infection-treated by [facility]   • Left lower quadrant pain    • Loss of appetite    • Obesity    • Osteoarthritis    • Pain in joint involving ankle and foot    • Pain in limb    • Psoriasis    • Retention of urine     with cath-treated by the [facility]   • Screening for malignant neoplasm of colon    • Sepsis due to urinary tract infection     urosepsis treated by [facility]   • Sinusitis    • Syncope    • Unspecified  essential hypertension        Allergies   Allergen Reactions   • Codeine Itching and Nausea And Vomiting   • Sulfa Antibiotics Other (See Comments)     unknown         Current Outpatient Prescriptions:   •  Canagliflozin (INVOKANA) 300 MG tablet, Take 300 mg by mouth Daily., Disp: , Rfl:   •  cyclobenzaprine (FLEXERIL) 10 MG tablet, Take 10 mg by mouth 3 (Three) Times a Day As Needed for Muscle Spasms., Disp: , Rfl:   •  FLUoxetine (PROzac) 20 MG capsule, Take 20 mg by mouth 3 (Three) Times a Day., Disp: , Rfl:   •  gabapentin (NEURONTIN) 300 MG capsule, Take 300 mg by mouth 3 (Three) Times a Day., Disp: , Rfl:   •  hydrOXYzine (ATARAX) 50 MG tablet, Take 50 mg by mouth 3 (Three) Times a Day As Needed for Itching., Disp: , Rfl:   •  lisinopril-hydrochlorothiazide (PRINZIDE,ZESTORETIC) 20-12.5 MG per tablet, Take 1 tablet by mouth Daily., Disp: , Rfl:   •  metFORMIN (GLUCOPHAGE) 1000 MG tablet, Take 1,000 mg by mouth 2 (Two) Times a Day With Meals., Disp: , Rfl:   •  metFORMIN (GLUCOPHAGE) 1000 MG tablet, Take 500 mg by mouth Every Night., Disp: , Rfl:   •  nabumetone (RELAFEN) 750 MG tablet, , Disp: , Rfl:   •  oxaprozin (DAYPRO) 600 MG tablet, Take 2 tablets by mouth Daily., Disp: 60 tablet, Rfl: 2  •  pantoprazole (PROTONIX) 40 MG EC tablet, Take 40 mg by mouth Daily., Disp: , Rfl: 5  •  pravastatin (PRAVACHOL) 40 MG tablet, , Disp: , Rfl:   •  QUEtiapine (SEROquel) 300 MG tablet, Take 600 mg by mouth Every Night., Disp: , Rfl:   •  SITagliptin (JANUVIA) 100 MG tablet, Take 100 mg by mouth Daily., Disp: , Rfl:   •  celecoxib (CeleBREX) 200 MG capsule, Take 1 capsule by mouth Daily., Disp: 30 capsule, Rfl: 3  •  HYDROcodone-acetaminophen (NORCO)  MG per tablet, 1 tab po q 4 to 6 hour prn pain, Disp: 40 tablet, Rfl: 0    Past Surgical History:   Procedure Laterality Date   • ANKLE SURGERY Left 11/11/2008    Removal of syndesmotic screws, left ankle. Painful syndesmotic screws, left ankle.)   • ANKLE SURGERY   10/19/2007    Open reduction-internal fixation with fluoroscopic control with final x-ray PA and lateral of the ankle. Trimalleolar fracture/subluxation, left ankle.)   • CIRCUMCISION  2016   • COLONOSCOPY  03/10/2015    Diverticulosis found in the sigmoid colon. hemorrhoids found in the anus. Normal cecum   • CYSTOSCOPY  06/22/2016    Cystoscopy, dilation, anchor of Tim catheter. Benign prostatic hypertrophy, urinary retention, urethral stricture history of.   • INJECTION OF MEDICATION      Kenalog (3)   • SHOULDER ARTHROSCOPY Right 10/18/2017    Procedure: SHOULDER ARTHROSCOPY WITH RAFAELA PROCEDURE, AND SUBACROMIAL DECOMPRESSION, AND POSSIBLE ROTATOR  CUFF REPAIR       SHOULDER ARTHROSCOPY WITH RAFAELA PROCEDURE, AND SUBACROMIAL DECOMPRESSION, NO ROTATOR CUFF REPAIR PERFORMED;  Surgeon: Maximus Schreiber MD;  Location: [facility];  Service:    • STOMACH SURGERY     • TIBIA FRACTURE SURGERY         ROS  No fevers or chills.  No chest pain or shortness of air.  No GI or  disturbances. Right shoulder pain.     PHYSICAL EXAMINATION:       There were no vitals taken for this visit.    Physical Exam   Constitutional: He is oriented to person, place, and time. Vital signs are normal. He appears well-developed and well-nourished.   HENT:   Head: Normocephalic.   Pulmonary/Chest: Effort normal. No respiratory distress.   Abdominal: Soft. He exhibits no distension.   Neurological: He is alert and oriented to person, place, and time. GCS eye subscore is 4. GCS verbal subscore is 5. GCS motor subscore is 6.   Skin: Skin is warm, dry and intact.   Psychiatric: He has a normal mood and affect. His speech is normal and behavior is normal. Judgment and thought content normal. Cognition and memory are normal.   Vitals reviewed.      GAIT:     [x]  Normal  []  Antalgic    Assistive device: [x]  None  []  Walker     []  Crutches  []  Cane     []  Wheelchair  []  Stretcher    Right Shoulder Exam     Tenderness   Right  "shoulder tenderness location: diffuse.    Range of Motion   Active Abduction: 70   Passive Abduction: 90   Forward Flexion: 70     Muscle Strength   Abduction: 4/5   Supraspinatus: 4/5   Biceps: 4/5     Other   Erythema: absent  Sensation: normal  Pulse: present    Comments:  Mild, diffuse swelling present to right shoulder.  Arthroscopic incisions ×3 noted with sutures in place.  Incisions are well approximated with no erythema and no drainage.  No signs of infection noted.      Left Shoulder Exam   Left shoulder exam is normal.              ASSESSMENT:    Diagnoses and all orders for this visit:    Shoulder impingement, right    Rotator cuff syndrome of right shoulder    PLAN    Patient reports he is having increased pain since undergoing right shoulder arthroscopy with Rashawn procedure and subacromial decompression per Dr. Schreiber on 10/18/2017.  Patient states that he has taken all his pain medication that he was given and that it was \"not really helping him at all\".  Patient has started physical therapy with some increased pain following therapy as well.  On physical exam, his incisions look good with no signs of infection noted.  Patient is smiling, talkative and interactive with staff in office today.  He appears in no acute distress.  Pain medication is refilled today and Norco is increased for improved pain control.  Norco 10 mg prescribed today.  Prescription written per SAMUEL Kessler.  Patient is instructed to take the medication as prescribed.  Continue with physical therapy as previously ordered.  Follow-up on 10/30/2017 as scheduled with SAMUEL Kessler for recheck.    Return on 10/30/2017 with SAMUEL Kessler as scheduled.      This document has been electronically signed by SAMUEL Dangelo on October 25, 2017 3:37 PM      SAMUEL Dangelo  "

## 2017-11-01 ENCOUNTER — OFFICE VISIT (OUTPATIENT)
Dept: ORTHOPEDIC SURGERY | Facility: CLINIC | Age: 62
End: 2017-11-01

## 2017-11-01 VITALS — WEIGHT: 222 LBS | BODY MASS INDEX: 32.88 KG/M2 | HEIGHT: 69 IN

## 2017-11-01 DIAGNOSIS — R52 PAIN: Primary | ICD-10-CM

## 2017-11-01 DIAGNOSIS — M75.101 ROTATOR CUFF SYNDROME OF RIGHT SHOULDER: Primary | ICD-10-CM

## 2017-11-01 DIAGNOSIS — M25.511 CHRONIC RIGHT SHOULDER PAIN: ICD-10-CM

## 2017-11-01 DIAGNOSIS — G89.29 CHRONIC RIGHT SHOULDER PAIN: ICD-10-CM

## 2017-11-01 DIAGNOSIS — M75.41 SHOULDER IMPINGEMENT, RIGHT: ICD-10-CM

## 2017-11-01 PROCEDURE — 99024 POSTOP FOLLOW-UP VISIT: CPT | Performed by: NURSE PRACTITIONER

## 2017-11-01 RX ORDER — HYDROCODONE BITARTRATE AND ACETAMINOPHEN 10; 325 MG/1; MG/1
TABLET ORAL
Qty: 40 TABLET | Refills: 0 | Status: SHIPPED | OUTPATIENT
Start: 2017-11-01 | End: 2017-11-10 | Stop reason: SDUPTHER

## 2017-11-01 NOTE — PROGRESS NOTES
Mikey Allison is a 62 y.o. male returns for     Chief Complaint   Patient presents with   • Right Shoulder - Follow-up     New xrays done today       HISTORY OF PRESENT ILLNESS: Right shoulder pain. Patient fell on Monday and injured his shoulder.        CONCURRENT MEDICAL HISTORY:    Past Medical History:   Diagnosis Date   • Abdominal pain     left side   • Acute sinusitis    • Ankle joint pain    • Bipolar disorder    • Bipolar disorder, unspecified    • Cellulitis and abscess of trunk     axilla   • Chest pain    • Chronic anemia    • Degenerative joint disease involving multiple joints     back   • Depressive disorder    • Diabetes mellitus    • Diabetes mellitus with no complication     type 2 or unspecified type uncontrolled   • Diverticular disease    • Dyspnea    • Enlarged prostate     on [place]-on CT   • Essential hypertension    • Febrile convulsion    • Gastroesophageal reflux disease    • Generalized anxiety disorder    • H/O echocardiogram 10/16/2007    Mild to moderate concentric LV hypertrophy with mild left atrial enlargement. LV appears to be hypodynamic with preserved LV systolic function with EF 55-60%. Pericardium appears to be normal with a small pericardial effusion    • Hemorrhoids    • Hyperlipidemia    • Hypertensive disorder    • Hypoglycemia    • Infectious disorder of kidney     kidney infection-treated by [facility]   • Left lower quadrant pain    • Loss of appetite    • Obesity    • Osteoarthritis    • Pain in joint involving ankle and foot    • Pain in limb    • Psoriasis    • Retention of urine     with cath-treated by the [facility]   • Screening for malignant neoplasm of colon    • Sepsis due to urinary tract infection     urosepsis treated by [facility]   • Sinusitis    • Syncope    • Unspecified essential hypertension        Allergies   Allergen Reactions   • Codeine Itching and Nausea And Vomiting   • Sulfa Antibiotics Other (See Comments)     unknown         Current  Outpatient Prescriptions:   •  Canagliflozin (INVOKANA) 300 MG tablet, Take 300 mg by mouth Daily., Disp: , Rfl:   •  celecoxib (CeleBREX) 200 MG capsule, Take 1 capsule by mouth Daily., Disp: 30 capsule, Rfl: 3  •  cyclobenzaprine (FLEXERIL) 10 MG tablet, Take 10 mg by mouth 3 (Three) Times a Day As Needed for Muscle Spasms., Disp: , Rfl:   •  FLUoxetine (PROzac) 20 MG capsule, Take 20 mg by mouth 3 (Three) Times a Day., Disp: , Rfl:   •  gabapentin (NEURONTIN) 300 MG capsule, Take 300 mg by mouth 3 (Three) Times a Day., Disp: , Rfl:   •  HYDROcodone-acetaminophen (NORCO)  MG per tablet, 1 tab po q 6 hour prn pain, Disp: 40 tablet, Rfl: 0  •  hydrOXYzine (ATARAX) 50 MG tablet, Take 50 mg by mouth 3 (Three) Times a Day As Needed for Itching., Disp: , Rfl:   •  lisinopril-hydrochlorothiazide (PRINZIDE,ZESTORETIC) 20-12.5 MG per tablet, Take 1 tablet by mouth Daily., Disp: , Rfl:   •  metFORMIN (GLUCOPHAGE) 1000 MG tablet, Take 1,000 mg by mouth 2 (Two) Times a Day With Meals., Disp: , Rfl:   •  metFORMIN (GLUCOPHAGE) 1000 MG tablet, Take 500 mg by mouth Every Night., Disp: , Rfl:   •  nabumetone (RELAFEN) 750 MG tablet, , Disp: , Rfl:   •  oxaprozin (DAYPRO) 600 MG tablet, Take 2 tablets by mouth Daily., Disp: 60 tablet, Rfl: 2  •  pantoprazole (PROTONIX) 40 MG EC tablet, Take 40 mg by mouth Daily., Disp: , Rfl: 5  •  pravastatin (PRAVACHOL) 40 MG tablet, , Disp: , Rfl:   •  QUEtiapine (SEROquel) 300 MG tablet, Take 600 mg by mouth Every Night., Disp: , Rfl:   •  SITagliptin (JANUVIA) 100 MG tablet, Take 100 mg by mouth Daily., Disp: , Rfl:     Past Surgical History:   Procedure Laterality Date   • ANKLE SURGERY Left 11/11/2008    Removal of syndesmotic screws, left ankle. Painful syndesmotic screws, left ankle.)   • ANKLE SURGERY  10/19/2007    Open reduction-internal fixation with fluoroscopic control with final x-ray PA and lateral of the ankle. Trimalleolar fracture/subluxation, left ankle.)   •  "CIRCUMCISION  2016   • COLONOSCOPY  03/10/2015    Diverticulosis found in the sigmoid colon. hemorrhoids found in the anus. Normal cecum   • CYSTOSCOPY  06/22/2016    Cystoscopy, dilation, anchor of Tim catheter. Benign prostatic hypertrophy, urinary retention, urethral stricture history of.   • INJECTION OF MEDICATION      Kenalog (3)   • SHOULDER ARTHROSCOPY Right 10/18/2017    Procedure: SHOULDER ARTHROSCOPY WITH RAFAELA PROCEDURE, AND SUBACROMIAL DECOMPRESSION, AND POSSIBLE ROTATOR  CUFF REPAIR       SHOULDER ARTHROSCOPY WITH RAFAELA PROCEDURE, AND SUBACROMIAL DECOMPRESSION, NO ROTATOR CUFF REPAIR PERFORMED;  Surgeon: Maximus Schreiber MD;  Location: [facility];  Service:    • STOMACH SURGERY     • TIBIA FRACTURE SURGERY         ROS  No fevers or chills.  No chest pain or shortness of air.  No GI or  disturbances.    PHYSICAL EXAMINATION:       Ht 69\" (175.3 cm)  Wt 222 lb (101 kg)  BMI 32.78 kg/m2    Physical Exam   Constitutional: He is oriented to person, place, and time. Vital signs are normal. He appears well-developed and well-nourished. He is cooperative.   HENT:   Head: Normocephalic and atraumatic.   Neck: Trachea normal and phonation normal.   Pulmonary/Chest: Effort normal. No respiratory distress.   Abdominal: Soft. Normal appearance. He exhibits no distension.   Neurological: He is alert and oriented to person, place, and time. GCS eye subscore is 4. GCS verbal subscore is 5. GCS motor subscore is 6.   Skin: Skin is warm, dry and intact.   Psychiatric: He has a normal mood and affect. His speech is normal and behavior is normal. Judgment and thought content normal. Cognition and memory are normal.   Vitals reviewed.      GAIT:     [x]  Normal  []  Antalgic    Assistive device: [x]  None  []  Walker     []  Crutches  []  Cane     []  Wheelchair  []  Stretcher    Right Shoulder Exam     Tenderness   The patient is experiencing tenderness in the acromioclavicular joint.    Other   Erythema: " absent  Scars: present  Sensation: normal  Pulse: present    Comments:  See pt note for motion and strength.       Left Shoulder Exam   Left shoulder exam is normal.              Xr Clavicle Right    Result Date: 11/2/2017  Narrative: 2 views of the right clavicle reveal no acute radiological abnormality noted. 11/02/17 at 8:03 AM by SAMUEL Ellis     Xr Shoulder 2+ View Right    Result Date: 11/2/2017  Narrative: 3 views of the right shoulder reveal mild glenohumeral degenerative changes without any acute radiological abnormalities noted. 11/02/17 at 8:03 AM by SAMUEL Ellis             ASSESSMENT:    Diagnoses and all orders for this visit:    Rotator cuff syndrome of right shoulder    Shoulder impingement, right    Chronic right shoulder pain    Other orders  -     HYDROcodone-acetaminophen (NORCO)  MG per tablet; 1 tab po q 6 hour prn pain          PLAN  No acute fracture noted on xray, continued ice and ROM therapy with f/u planned in one month.   No Follow-up on file.    SAMUEL Ellis

## 2017-11-07 ENCOUNTER — LAB (OUTPATIENT)
Dept: LAB | Facility: HOSPITAL | Age: 62
End: 2017-11-07

## 2017-11-07 ENCOUNTER — OFFICE VISIT (OUTPATIENT)
Dept: ORTHOPEDIC SURGERY | Facility: CLINIC | Age: 62
End: 2017-11-07

## 2017-11-07 DIAGNOSIS — M75.101 ROTATOR CUFF SYNDROME OF RIGHT SHOULDER: Primary | ICD-10-CM

## 2017-11-07 DIAGNOSIS — M75.101 ROTATOR CUFF SYNDROME OF RIGHT SHOULDER: ICD-10-CM

## 2017-11-07 DIAGNOSIS — G89.29 CHRONIC RIGHT SHOULDER PAIN: ICD-10-CM

## 2017-11-07 DIAGNOSIS — M25.511 CHRONIC RIGHT SHOULDER PAIN: ICD-10-CM

## 2017-11-07 DIAGNOSIS — M75.41 SHOULDER IMPINGEMENT, RIGHT: ICD-10-CM

## 2017-11-07 LAB
BASOPHILS # BLD AUTO: 0.04 10*3/MM3 (ref 0–0.2)
BASOPHILS NFR BLD AUTO: 0.6 % (ref 0–2)
CRP SERPL-MCNC: 1.1 MG/DL (ref 0–1)
DEPRECATED RDW RBC AUTO: 44.9 FL (ref 35.1–43.9)
EOSINOPHIL # BLD AUTO: 0.24 10*3/MM3 (ref 0–0.7)
EOSINOPHIL NFR BLD AUTO: 3.7 % (ref 0–7)
ERYTHROCYTE [DISTWIDTH] IN BLOOD BY AUTOMATED COUNT: 13.5 % (ref 11.5–14.5)
HCT VFR BLD AUTO: 37.8 % (ref 39–49)
HGB BLD-MCNC: 12.9 G/DL (ref 13.7–17.3)
IMM GRANULOCYTES # BLD: 0.01 10*3/MM3 (ref 0–0.02)
IMM GRANULOCYTES NFR BLD: 0.2 % (ref 0–0.5)
LYMPHOCYTES # BLD AUTO: 1.77 10*3/MM3 (ref 0.6–4.2)
LYMPHOCYTES NFR BLD AUTO: 26.9 % (ref 10–50)
MCH RBC QN AUTO: 30.9 PG (ref 26.5–34)
MCHC RBC AUTO-ENTMCNC: 34.1 G/DL (ref 31.5–36.3)
MCV RBC AUTO: 90.4 FL (ref 80–98)
MONOCYTES # BLD AUTO: 0.66 10*3/MM3 (ref 0–0.9)
MONOCYTES NFR BLD AUTO: 10 % (ref 0–12)
NEUTROPHILS # BLD AUTO: 3.85 10*3/MM3 (ref 2–8.6)
NEUTROPHILS NFR BLD AUTO: 58.6 % (ref 37–80)
PLATELET # BLD AUTO: 318 10*3/MM3 (ref 150–450)
PMV BLD AUTO: 9.5 FL (ref 8–12)
RBC # BLD AUTO: 4.18 10*6/MM3 (ref 4.37–5.74)
WBC NRBC COR # BLD: 6.57 10*3/MM3 (ref 3.2–9.8)

## 2017-11-07 PROCEDURE — 99024 POSTOP FOLLOW-UP VISIT: CPT | Performed by: NURSE PRACTITIONER

## 2017-11-07 PROCEDURE — 86140 C-REACTIVE PROTEIN: CPT | Performed by: NURSE PRACTITIONER

## 2017-11-07 PROCEDURE — 85025 COMPLETE CBC W/AUTO DIFF WBC: CPT | Performed by: NURSE PRACTITIONER

## 2017-11-07 PROCEDURE — 36415 COLL VENOUS BLD VENIPUNCTURE: CPT

## 2017-11-07 RX ORDER — CLINDAMYCIN HYDROCHLORIDE 300 MG/1
300 CAPSULE ORAL 3 TIMES DAILY
Qty: 30 CAPSULE | Refills: 0 | Status: SHIPPED | OUTPATIENT
Start: 2017-11-07 | End: 2017-11-22

## 2017-11-07 NOTE — PROGRESS NOTES
Mikey Allison is a 62 y.o. male is s/p       Chief Complaint   Patient presents with   • Right Shoulder - Post-op       HISTORY OF PRESENT ILLNESS: Arthroscopy of the Right shoulder with                        1.  RAFAELA procedure                        2.  Sub-acromial decompression done on 10/18/2017.     62-year-old  male status post subacromial decompression with Rafaela procedure is in the office today complaining of some drainage out of the posterior portal incision which he noted earlier this week.  We received a call from the physical therapy's office stating that he needed to be evaluated today for this drainage.  Patient reports a scant amount of drainage that he noted one day on his shirt.  He denies any fever chills or evidence of sepsis.  I have evaluated him last week after a fall and told him to resume his therapy once he felt he could.  He reports that his pain has improved since his fall last week.       Allergies   Allergen Reactions   • Codeine Itching and Nausea And Vomiting   • Sulfa Antibiotics Other (See Comments)     unknown         Current Outpatient Prescriptions:   •  Canagliflozin (INVOKANA) 300 MG tablet, Take 300 mg by mouth Daily., Disp: , Rfl:   •  celecoxib (CeleBREX) 200 MG capsule, Take 1 capsule by mouth Daily., Disp: 30 capsule, Rfl: 3  •  cyclobenzaprine (FLEXERIL) 10 MG tablet, Take 10 mg by mouth 3 (Three) Times a Day As Needed for Muscle Spasms., Disp: , Rfl:   •  FLUoxetine (PROzac) 20 MG capsule, Take 20 mg by mouth 3 (Three) Times a Day., Disp: , Rfl:   •  gabapentin (NEURONTIN) 300 MG capsule, Take 300 mg by mouth 3 (Three) Times a Day., Disp: , Rfl:   •  HYDROcodone-acetaminophen (NORCO)  MG per tablet, 1 tab po q 6 hour prn pain, Disp: 40 tablet, Rfl: 0  •  hydrOXYzine (ATARAX) 50 MG tablet, Take 50 mg by mouth 3 (Three) Times a Day As Needed for Itching., Disp: , Rfl:   •  lisinopril-hydrochlorothiazide (PRINZIDE,ZESTORETIC) 20-12.5 MG per  tablet, Take 1 tablet by mouth Daily., Disp: , Rfl:   •  metFORMIN (GLUCOPHAGE) 1000 MG tablet, Take 1,000 mg by mouth 2 (Two) Times a Day With Meals., Disp: , Rfl:   •  metFORMIN (GLUCOPHAGE) 1000 MG tablet, Take 500 mg by mouth Every Night., Disp: , Rfl:   •  nabumetone (RELAFEN) 750 MG tablet, , Disp: , Rfl:   •  oxaprozin (DAYPRO) 600 MG tablet, Take 2 tablets by mouth Daily., Disp: 60 tablet, Rfl: 2  •  pantoprazole (PROTONIX) 40 MG EC tablet, Take 40 mg by mouth Daily., Disp: , Rfl: 5  •  pravastatin (PRAVACHOL) 40 MG tablet, , Disp: , Rfl:   •  QUEtiapine (SEROquel) 300 MG tablet, Take 600 mg by mouth Every Night., Disp: , Rfl:   •  SITagliptin (JANUVIA) 100 MG tablet, Take 100 mg by mouth Daily., Disp: , Rfl:     No fevers or chills.  No nausea or vomiting.      PHYSICAL EXAMINATION:       Mikey Allison is a 62 y.o. male    Patient is awake and alert, answers questions appropriately and is in no apparent distress.    GAIT:     [x]  Normal  []  Antalgic    Assistive device: [x]  None  []  Walker     []  Crutches  []  Cane     []  Wheelchair  []  Stretcher    Right Shoulder Exam     Comments:  His sutures were removed today.  There is some tenderness and erythema surrounding the port incision in the posterior shoulder however no active drainage is noted.  The other portal incisions are approximated without any evidence of erythema or drainage as well.      Left Shoulder Exam   Left shoulder exam is normal.                      ASSESSMENT:    Diagnoses and all orders for this visit:    Rotator cuff syndrome of right shoulder    Shoulder impingement, right    Chronic right shoulder pain          PLAN  We will recommend some labs for baseline CRP and sedimentation rate and we'll start the patient on a short course of clindamycin for possible stitch abscess with follow-up planned next week for recheck.  Patient was instructed to continue physical therapy as directed  No Follow-up on file.    Prince CARLTON  Mely, SAMUEL

## 2017-11-10 ENCOUNTER — OFFICE VISIT (OUTPATIENT)
Dept: ORTHOPEDIC SURGERY | Facility: CLINIC | Age: 62
End: 2017-11-10

## 2017-11-10 VITALS — BODY MASS INDEX: 32.88 KG/M2 | WEIGHT: 222 LBS | HEIGHT: 69 IN

## 2017-11-10 DIAGNOSIS — M25.511 CHRONIC RIGHT SHOULDER PAIN: ICD-10-CM

## 2017-11-10 DIAGNOSIS — M75.101 ROTATOR CUFF SYNDROME OF RIGHT SHOULDER: ICD-10-CM

## 2017-11-10 DIAGNOSIS — M75.41 SHOULDER IMPINGEMENT, RIGHT: Primary | ICD-10-CM

## 2017-11-10 DIAGNOSIS — G89.29 CHRONIC RIGHT SHOULDER PAIN: ICD-10-CM

## 2017-11-10 PROCEDURE — 99024 POSTOP FOLLOW-UP VISIT: CPT | Performed by: NURSE PRACTITIONER

## 2017-11-10 RX ORDER — HYDROCODONE BITARTRATE AND ACETAMINOPHEN 10; 325 MG/1; MG/1
TABLET ORAL
Qty: 40 TABLET | Refills: 0 | Status: SHIPPED | OUTPATIENT
Start: 2017-11-10 | End: 2017-11-20 | Stop reason: SDUPTHER

## 2017-11-10 NOTE — PROGRESS NOTES
Postop Follow-up    Name:  Mikey Allison  Date:  11/10/2017  :  1955    Chief Complaint:    Chief Complaint   Patient presents with   • Right Shoulder - Follow-up     Date of surgery:  10/18/17    Procedure: SHOULDER ARTHROSCOPY WITH RAFAELA PROCEDURE, AND SUBACROMIAL DECOMPRESSION, AND POSSIBLE ROTATOR  CUFF REPAIR       SHOULDER ARTHROSCOPY WITH RAFAELA PROCEDURE, AND SUBACROMIAL DECOMPRESSION, NO ROTATOR CUFF REPAIR PERFORMED - Right    History of Present Illness: Patient is having a lot of pain. He does continue with PT but is finding it difficult.        Current Outpatient Prescriptions:   •  Canagliflozin (INVOKANA) 300 MG tablet, Take 300 mg by mouth Daily., Disp: , Rfl:   •  celecoxib (CeleBREX) 200 MG capsule, Take 1 capsule by mouth Daily., Disp: 30 capsule, Rfl: 3  •  clindamycin (CLEOCIN) 300 MG capsule, Take 1 capsule by mouth 3 (Three) Times a Day., Disp: 30 capsule, Rfl: 0  •  cyclobenzaprine (FLEXERIL) 10 MG tablet, Take 10 mg by mouth 3 (Three) Times a Day As Needed for Muscle Spasms., Disp: , Rfl:   •  FLUoxetine (PROzac) 20 MG capsule, Take 20 mg by mouth 3 (Three) Times a Day., Disp: , Rfl:   •  gabapentin (NEURONTIN) 300 MG capsule, Take 300 mg by mouth 3 (Three) Times a Day., Disp: , Rfl:   •  HYDROcodone-acetaminophen (NORCO)  MG per tablet, 1 tab po q 6 hour prn pain, Disp: 40 tablet, Rfl: 0  •  hydrOXYzine (ATARAX) 50 MG tablet, Take 50 mg by mouth 3 (Three) Times a Day As Needed for Itching., Disp: , Rfl:   •  lisinopril-hydrochlorothiazide (PRINZIDE,ZESTORETIC) 20-12.5 MG per tablet, Take 1 tablet by mouth Daily., Disp: , Rfl:   •  metFORMIN (GLUCOPHAGE) 1000 MG tablet, Take 1,000 mg by mouth 2 (Two) Times a Day With Meals., Disp: , Rfl:   •  metFORMIN (GLUCOPHAGE) 1000 MG tablet, Take 500 mg by mouth Every Night., Disp: , Rfl:   •  pantoprazole (PROTONIX) 40 MG EC tablet, Take 40 mg by mouth Daily., Disp: , Rfl: 5  •  pravastatin (PRAVACHOL) 40 MG tablet, , Disp: ,  "Rfl:   •  QUEtiapine (SEROquel) 300 MG tablet, Take 600 mg by mouth Every Night., Disp: , Rfl:   •  SITagliptin (JANUVIA) 100 MG tablet, Take 100 mg by mouth Daily., Disp: , Rfl:     Allergies   Allergen Reactions   • Codeine Itching and Nausea And Vomiting   • Sulfa Antibiotics Other (See Comments)     unknown         Exam:  Vitals:    11/10/17 1322   Weight: 222 lb (101 kg)   Height: 69\" (175.3 cm)       The patient is awake, alert, and oriented and in no apparent distress.    Right upper extremity:     Motion limited, edges of the incisions are approximated without any evidence of infection.    Erythema of the posterior port incision has improved.       Left upper extremity:    Right lower extremity:    Left lower extremity:      Xr Clavicle Right    Result Date: 11/2/2017  Narrative: 2 views of the right clavicle reveal no acute radiological abnormality noted. 11/02/17 at 8:03 AM by SAMUEL Ellis     Xr Shoulder 2+ View Right    Result Date: 11/2/2017  Narrative: 3 views of the right shoulder reveal mild glenohumeral degenerative changes without any acute radiological abnormalities noted. 11/02/17 at 8:03 AM by SAMUEL Ellis         Assessment:  Diagnoses and all orders for this visit:    Shoulder impingement, right  -     MRI Shoulder Right Without Contrast; Future    Rotator cuff syndrome of right shoulder  -     MRI Shoulder Right Without Contrast; Future    Chronic right shoulder pain  -     MRI Shoulder Right Without Contrast; Future    Other orders  -     HYDROcodone-acetaminophen (NORCO)  MG per tablet; 1 tab po q 6 hour prn pain          Plan:  Recommend MRI of the right shoulder to evaluate the integrity of the RTC post fall over one week ago without any improvement.       No Follow-up on file.      11/10/17 at 1:22 PM by SAMUEL Ellis  "

## 2017-11-20 ENCOUNTER — OFFICE VISIT (OUTPATIENT)
Dept: ORTHOPEDIC SURGERY | Facility: CLINIC | Age: 62
End: 2017-11-20

## 2017-11-20 VITALS — BODY MASS INDEX: 32.88 KG/M2 | HEIGHT: 69 IN | WEIGHT: 222 LBS

## 2017-11-20 DIAGNOSIS — Z53.21 PATIENT LEFT WITHOUT BEING SEEN: Primary | ICD-10-CM

## 2017-11-20 RX ORDER — HYDROCODONE BITARTRATE AND ACETAMINOPHEN 10; 325 MG/1; MG/1
TABLET ORAL
Qty: 40 TABLET | Refills: 0 | Status: SHIPPED | OUTPATIENT
Start: 2017-11-20 | End: 2017-11-29

## 2017-11-22 ENCOUNTER — OFFICE VISIT (OUTPATIENT)
Dept: FAMILY MEDICINE CLINIC | Facility: CLINIC | Age: 62
End: 2017-11-22

## 2017-11-22 VITALS
HEART RATE: 94 BPM | SYSTOLIC BLOOD PRESSURE: 130 MMHG | WEIGHT: 225 LBS | BODY MASS INDEX: 33.33 KG/M2 | HEIGHT: 69 IN | OXYGEN SATURATION: 98 % | DIASTOLIC BLOOD PRESSURE: 78 MMHG

## 2017-11-22 DIAGNOSIS — M15.9 PRIMARY OSTEOARTHRITIS INVOLVING MULTIPLE JOINTS: Chronic | ICD-10-CM

## 2017-11-22 DIAGNOSIS — Z00.00 ROUTINE GENERAL MEDICAL EXAMINATION AT A HEALTH CARE FACILITY: ICD-10-CM

## 2017-11-22 DIAGNOSIS — F33.1 DEPRESSION, MAJOR, RECURRENT, MODERATE (HCC): Primary | Chronic | ICD-10-CM

## 2017-11-22 DIAGNOSIS — E11.9 TYPE 2 DIABETES MELLITUS WITHOUT COMPLICATION, WITHOUT LONG-TERM CURRENT USE OF INSULIN (HCC): Chronic | ICD-10-CM

## 2017-11-22 DIAGNOSIS — K21.00 REFLUX ESOPHAGITIS: Chronic | ICD-10-CM

## 2017-11-22 DIAGNOSIS — Z12.5 SCREENING FOR PROSTATE CANCER: ICD-10-CM

## 2017-11-22 DIAGNOSIS — I10 ESSENTIAL HYPERTENSION: Chronic | ICD-10-CM

## 2017-11-22 PROCEDURE — 99214 OFFICE O/P EST MOD 30 MIN: CPT | Performed by: INTERNAL MEDICINE

## 2017-11-22 RX ORDER — QUETIAPINE FUMARATE 300 MG/1
600 TABLET, FILM COATED ORAL NIGHTLY
Qty: 60 TABLET | Refills: 0 | Status: SHIPPED | OUTPATIENT
Start: 2017-11-22 | End: 2017-12-22 | Stop reason: SDUPTHER

## 2017-11-22 RX ORDER — CELECOXIB 200 MG/1
200 CAPSULE ORAL DAILY
Qty: 30 CAPSULE | Refills: 0 | Status: SHIPPED | OUTPATIENT
Start: 2017-11-22 | End: 2017-12-22 | Stop reason: SDUPTHER

## 2017-11-22 RX ORDER — LISINOPRIL AND HYDROCHLOROTHIAZIDE 20; 12.5 MG/1; MG/1
1 TABLET ORAL DAILY
Qty: 30 TABLET | Refills: 0 | Status: SHIPPED | OUTPATIENT
Start: 2017-11-22 | End: 2017-12-22 | Stop reason: SDUPTHER

## 2017-11-22 RX ORDER — HYDROXYZINE 50 MG/1
50 TABLET, FILM COATED ORAL 3 TIMES DAILY PRN
Qty: 90 TABLET | Refills: 0 | Status: SHIPPED | OUTPATIENT
Start: 2017-11-22 | End: 2017-12-22 | Stop reason: SDUPTHER

## 2017-11-22 RX ORDER — FLUOXETINE HYDROCHLORIDE 20 MG/1
20 CAPSULE ORAL 3 TIMES DAILY
Qty: 90 CAPSULE | Refills: 0 | Status: SHIPPED | OUTPATIENT
Start: 2017-11-22 | End: 2017-12-22 | Stop reason: SDUPTHER

## 2017-11-28 DIAGNOSIS — G89.29 CHRONIC RIGHT SHOULDER PAIN: ICD-10-CM

## 2017-11-28 DIAGNOSIS — M25.511 CHRONIC RIGHT SHOULDER PAIN: ICD-10-CM

## 2017-11-28 DIAGNOSIS — M75.41 SHOULDER IMPINGEMENT, RIGHT: ICD-10-CM

## 2017-11-28 DIAGNOSIS — M75.101 ROTATOR CUFF SYNDROME OF RIGHT SHOULDER: ICD-10-CM

## 2017-11-29 ENCOUNTER — OFFICE VISIT (OUTPATIENT)
Dept: ORTHOPEDIC SURGERY | Facility: CLINIC | Age: 62
End: 2017-11-29

## 2017-11-29 VITALS — WEIGHT: 231 LBS | HEIGHT: 69 IN | BODY MASS INDEX: 34.21 KG/M2
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
## 2017-11-29 DIAGNOSIS — Z98.890 S/P ROTATOR CUFF REPAIR: Primary | ICD-10-CM

## 2017-11-29 PROCEDURE — 99024 POSTOP FOLLOW-UP VISIT: CPT | Performed by: NURSE PRACTITIONER

## 2017-11-29 RX ORDER — HYDROCODONE BITARTRATE AND ACETAMINOPHEN 7.5; 325 MG/1; MG/1
1 TABLET ORAL EVERY 6 HOURS PRN
Qty: 40 TABLET | Refills: 0 | Status: SHIPPED | OUTPATIENT
Start: 2017-11-29 | End: 2017-12-09

## 2017-11-29 NOTE — PROGRESS NOTES
Mikey Allison is a 62 y.o. male returns for     Chief Complaint   Patient presents with   • Right Shoulder - Follow-up       HISTORY OF PRESENT ILLNESS:       CONCURRENT MEDICAL HISTORY:    Past Medical History:   Diagnosis Date   • Abdominal pain     left side   • Acute sinusitis    • Ankle joint pain    • Bipolar disorder    • Bipolar disorder, unspecified    • Cellulitis and abscess of trunk     axilla   • Chest pain    • Chronic anemia    • Degenerative joint disease involving multiple joints     back   • Depressive disorder    • Diabetes mellitus    • Diabetes mellitus with no complication     type 2 or unspecified type uncontrolled   • Diverticular disease    • Dyspnea    • Enlarged prostate     on [place]-on CT   • Essential hypertension    • Febrile convulsion    • Gastroesophageal reflux disease    • Generalized anxiety disorder    • H/O echocardiogram 10/16/2007    Mild to moderate concentric LV hypertrophy with mild left atrial enlargement. LV appears to be hypodynamic with preserved LV systolic function with EF 55-60%. Pericardium appears to be normal with a small pericardial effusion    • Hemorrhoids    • Hyperlipidemia    • Hypertensive disorder    • Hypoglycemia    • Infectious disorder of kidney     kidney infection-treated by [facility]   • Left lower quadrant pain    • Loss of appetite    • Obesity    • Osteoarthritis    • Pain in joint involving ankle and foot    • Pain in limb    • Psoriasis    • Retention of urine     with cath-treated by the [facility]   • Screening for malignant neoplasm of colon    • Sepsis due to urinary tract infection     urosepsis treated by [facility]   • Sinusitis    • Syncope    • Unspecified essential hypertension        Allergies   Allergen Reactions   • Codeine Itching and Nausea And Vomiting   • Sulfa Antibiotics Other (See Comments)     unknown         Current Outpatient Prescriptions:   •  celecoxib (CeleBREX) 200 MG capsule, Take 1 capsule by mouth Daily.,  Disp: 30 capsule, Rfl: 0  •  cyclobenzaprine (FLEXERIL) 10 MG tablet, Take 10 mg by mouth 3 (Three) Times a Day As Needed for Muscle Spasms., Disp: , Rfl:   •  FLUoxetine (PROzac) 20 MG capsule, Take 1 capsule by mouth 3 (Three) Times a Day., Disp: 90 capsule, Rfl: 0  •  HYDROcodone-acetaminophen (NORCO) 7.5-325 MG per tablet, Take 1 tablet by mouth Every 6 (Six) Hours As Needed for Moderate Pain  for up to 10 days., Disp: 40 tablet, Rfl: 0  •  hydrOXYzine (ATARAX) 50 MG tablet, Take 1 tablet by mouth 3 (Three) Times a Day As Needed for Itching., Disp: 90 tablet, Rfl: 0  •  lisinopril-hydrochlorothiazide (PRINZIDE,ZESTORETIC) 20-12.5 MG per tablet, Take 1 tablet by mouth Daily., Disp: 30 tablet, Rfl: 0  •  metFORMIN (GLUCOPHAGE) 1000 MG tablet, Take 1 tablet by mouth 2 (Two) Times a Day With Meals., Disp: 60 tablet, Rfl: 0  •  pantoprazole (PROTONIX) 40 MG EC tablet, Take 40 mg by mouth Daily., Disp: , Rfl: 5  •  QUEtiapine (SEROquel) 300 MG tablet, Take 2 tablets by mouth Every Night., Disp: 60 tablet, Rfl: 0  •  SITagliptin (JANUVIA) 100 MG tablet, Take 1 tablet by mouth Daily., Disp: 30 tablet, Rfl: 0    Past Surgical History:   Procedure Laterality Date   • ANKLE SURGERY Left 11/11/2008    Removal of syndesmotic screws, left ankle. Painful syndesmotic screws, left ankle.)   • ANKLE SURGERY  10/19/2007    Open reduction-internal fixation with fluoroscopic control with final x-ray PA and lateral of the ankle. Trimalleolar fracture/subluxation, left ankle.)   • CIRCUMCISION  2016   • COLONOSCOPY  03/10/2015    Diverticulosis found in the sigmoid colon. hemorrhoids found in the anus. Normal cecum   • CYSTOSCOPY  06/22/2016    Cystoscopy, dilation, anchor of Tim catheter. Benign prostatic hypertrophy, urinary retention, urethral stricture history of.   • INJECTION OF MEDICATION      Kenalog (3)   • SHOULDER ARTHROSCOPY Right 10/18/2017    Procedure: SHOULDER ARTHROSCOPY WITH RAFAELA PROCEDURE, AND SUBACROMIAL  "DECOMPRESSION, AND POSSIBLE ROTATOR  CUFF REPAIR       SHOULDER ARTHROSCOPY WITH RAFAELA PROCEDURE, AND SUBACROMIAL DECOMPRESSION, NO ROTATOR CUFF REPAIR PERFORMED;  Surgeon: Maximus Schreiber MD;  Location: [facility];  Service:    • STOMACH SURGERY     • TIBIA FRACTURE SURGERY         ROS  No fevers or chills.  No chest pain or shortness of air.  No GI or  disturbances.    PHYSICAL EXAMINATION:       Ht 69\" (175.3 cm)  Wt 231 lb (105 kg)  BMI 34.11 kg/m2    Physical Exam   Constitutional: He is oriented to person, place, and time. Vital signs are normal. He appears well-developed and well-nourished. He is cooperative.   HENT:   Head: Normocephalic and atraumatic.   Neck: Trachea normal and phonation normal.   Pulmonary/Chest: Effort normal. No respiratory distress.   Abdominal: Soft. Normal appearance. He exhibits no distension.   Neurological: He is alert and oriented to person, place, and time. GCS eye subscore is 4. GCS verbal subscore is 5. GCS motor subscore is 6.   Skin: Skin is warm, dry and intact.   Psychiatric: He has a normal mood and affect. His speech is normal and behavior is normal. Judgment and thought content normal. Cognition and memory are normal.   Vitals reviewed.      GAIT:     []  Normal  []  Antalgic    Assistive device: []  None  []  Walker     []  Crutches  []  Cane     []  Wheelchair  []  Stretcher    Right Shoulder Exam     Other   Erythema: absent  Scars: present  Sensation: normal  Pulse: present    Comments:  Motion and pain has improved, incisions have healed without any evidence of infection.         Left Shoulder Exam   Left shoulder exam is normal.              MRI shoulder right without [id number]/2017  [facility]  Result Impression     1. Undisplaced fracture of the acromion .  2. Degenerative change in the AC joint .  3. Tear of the superior labrum without displacement .   4. Mild degenerative change in the glenohumeral joint  5. Contusion to the posterior " upper deltoid   Result Narrative   Fell 3 weeks ago    MRI right shoulder: There is extensive marrow edema in the acromion with a vertical line of signal change that crosses the acromion without displacement, this may represent a fracture .    The AC joint has degenerative change .    The rotator cuff is intact . There is fluid in the subacromial recess that may be related to the injury to the acromion . There is low-grade injury or contusion to the posterior deltoid without a focal hematoma .    The superior labrum is indistinct and has increased signal and has probably been torn but is not displaced .      there is mild degenerative change in the glenohumeral joint               ASSESSMENT:    Diagnoses and all orders for this visit:    S/P rotator cuff repair  -     Ambulatory Referral to Physical Therapy Evaluate and treat    Fall, initial encounter  -     Ambulatory Referral to Physical Therapy Evaluate and treat    Other orders  -     HYDROcodone-acetaminophen (NORCO) 7.5-325 MG per tablet; Take 1 tablet by mouth Every 6 (Six) Hours As Needed for Moderate Pain  for up to 10 days.          PLAN  Progress ROM and activity as tolerated based on pain, [facility],  Transition out of sling, titrate  pain meds     This patient has tried and failed a course of multiple treatment modalities which include but are not limited to the use of  anti-inflammatories/acetaminiphine, physical therapy/home exercises, rest/ice/heat and/or has underwent a surgical procedure/ traumatic injury.  Therefore, I will recommend a course of narcotic pain medication for this patient until they're pain has been sufficiently reduced to a level that I deem acceptable to be treated with alternative treatment options.       No Follow-up on file.    SAMUEL Ellis

## 2017-12-12 RX ORDER — HYDROCODONE BITARTRATE AND ACETAMINOPHEN 7.5; 325 MG/1; MG/1
1 TABLET ORAL EVERY 8 HOURS PRN
Qty: 40 TABLET | Refills: 0 | Status: SHIPPED | OUTPATIENT
Start: 2017-12-12 | End: 2017-12-22

## 2017-12-21 ENCOUNTER — LAB (OUTPATIENT)
Dept: LAB | Facility: OTHER | Age: 62
End: 2017-12-21

## 2017-12-21 ENCOUNTER — TELEPHONE (OUTPATIENT)
Dept: ORTHOPEDIC SURGERY | Facility: CLINIC | Age: 62
End: 2017-12-21

## 2017-12-21 DIAGNOSIS — Z00.00 ROUTINE GENERAL MEDICAL EXAMINATION AT A HEALTH CARE FACILITY: ICD-10-CM

## 2017-12-21 DIAGNOSIS — E11.9 TYPE 2 DIABETES MELLITUS WITHOUT COMPLICATION, WITHOUT LONG-TERM CURRENT USE OF INSULIN (HCC): Chronic | ICD-10-CM

## 2017-12-21 DIAGNOSIS — I10 ESSENTIAL HYPERTENSION: Chronic | ICD-10-CM

## 2017-12-21 LAB
ALBUMIN SERPL-MCNC: 4.2 G/DL (ref 3.2–5.5)
ALBUMIN UR-MCNC: 1.7 MG/L
ALBUMIN/GLOB SERPL: 1.4 G/DL (ref 1–3)
ALP SERPL-CCNC: 80 U/L (ref 15–121)
ALT SERPL W P-5'-P-CCNC: 9 U/L (ref 10–60)
ANION GAP SERPL CALCULATED.3IONS-SCNC: 10 MMOL/L (ref 5–15)
AST SERPL-CCNC: 18 U/L (ref 10–60)
BILIRUB SERPL-MCNC: 0.6 MG/DL (ref 0.2–1)
BUN BLD-MCNC: 15 MG/DL (ref 8–25)
BUN/CREAT SERPL: 15 (ref 7–25)
CALCIUM SPEC-SCNC: 9.4 MG/DL (ref 8.4–10.8)
CHLORIDE SERPL-SCNC: 102 MMOL/L (ref 100–112)
CHOLEST SERPL-MCNC: 181 MG/DL (ref 150–200)
CO2 SERPL-SCNC: 24 MMOL/L (ref 20–32)
CREAT BLD-MCNC: 1 MG/DL (ref 0.4–1.3)
GFR SERPL CREATININE-BSD FRML MDRD: 75 ML/MIN/1.73 (ref 49–113)
GLOBULIN UR ELPH-MCNC: 3 GM/DL (ref 2.5–4.6)
GLUCOSE BLD-MCNC: 105 MG/DL (ref 70–100)
HBA1C MFR BLD: 6.3 % (ref 4–5.6)
HDLC SERPL-MCNC: 52 MG/DL (ref 35–100)
LDLC SERPL CALC-MCNC: 114 MG/DL
LDLC/HDLC SERPL: 2.2 {RATIO}
POTASSIUM BLD-SCNC: 4.2 MMOL/L (ref 3.4–5.4)
PROT SERPL-MCNC: 7.2 G/DL (ref 6.7–8.2)
PROT UR-MCNC: 8 MG/DL
SODIUM BLD-SCNC: 136 MMOL/L (ref 134–146)
TRIGL SERPL-MCNC: 73 MG/DL (ref 35–160)
VLDLC SERPL-MCNC: 14.6 MG/DL

## 2017-12-21 PROCEDURE — 36415 COLL VENOUS BLD VENIPUNCTURE: CPT | Performed by: INTERNAL MEDICINE

## 2017-12-21 PROCEDURE — 84443 ASSAY THYROID STIM HORMONE: CPT | Performed by: INTERNAL MEDICINE

## 2017-12-21 PROCEDURE — 84436 ASSAY OF TOTAL THYROXINE: CPT | Performed by: INTERNAL MEDICINE

## 2017-12-21 PROCEDURE — 84153 ASSAY OF PSA TOTAL: CPT | Performed by: INTERNAL MEDICINE

## 2017-12-21 PROCEDURE — 80053 COMPREHEN METABOLIC PANEL: CPT | Performed by: INTERNAL MEDICINE

## 2017-12-21 PROCEDURE — 84156 ASSAY OF PROTEIN URINE: CPT | Performed by: INTERNAL MEDICINE

## 2017-12-21 PROCEDURE — 80061 LIPID PANEL: CPT | Performed by: INTERNAL MEDICINE

## 2017-12-21 PROCEDURE — 83036 HEMOGLOBIN GLYCOSYLATED A1C: CPT | Performed by: INTERNAL MEDICINE

## 2017-12-21 PROCEDURE — 82043 UR ALBUMIN QUANTITATIVE: CPT | Performed by: INTERNAL MEDICINE

## 2017-12-22 LAB
PSA SERPL-MCNC: 0.23 NG/ML (ref 0–4)
T4 SERPL-MCNC: 11.4 MCG/DL (ref 5.5–11)
TSH SERPL DL<=0.05 MIU/L-ACNC: 1.99 MIU/ML (ref 0.46–4.68)

## 2017-12-22 RX ORDER — CELECOXIB 200 MG/1
200 CAPSULE ORAL DAILY
Qty: 30 CAPSULE | Refills: 5 | Status: SHIPPED | OUTPATIENT
Start: 2017-12-22 | End: 2018-06-11 | Stop reason: SDUPTHER

## 2017-12-22 RX ORDER — LISINOPRIL AND HYDROCHLOROTHIAZIDE 20; 12.5 MG/1; MG/1
1 TABLET ORAL DAILY
Qty: 30 TABLET | Refills: 5 | Status: SHIPPED | OUTPATIENT
Start: 2017-12-22 | End: 2018-06-11 | Stop reason: SDUPTHER

## 2017-12-22 RX ORDER — HYDROXYZINE 50 MG/1
50 TABLET, FILM COATED ORAL 3 TIMES DAILY PRN
Qty: 90 TABLET | Refills: 5 | Status: SHIPPED | OUTPATIENT
Start: 2017-12-22 | End: 2018-06-11

## 2017-12-22 RX ORDER — HYDROCODONE BITARTRATE AND ACETAMINOPHEN 5; 325 MG/1; MG/1
1 TABLET ORAL EVERY 6 HOURS PRN
Qty: 40 TABLET | Refills: 0 | Status: SHIPPED | OUTPATIENT
Start: 2017-12-22 | End: 2018-01-01

## 2017-12-22 RX ORDER — FLUOXETINE HYDROCHLORIDE 20 MG/1
20 CAPSULE ORAL 3 TIMES DAILY
Qty: 90 CAPSULE | Refills: 5 | Status: SHIPPED | OUTPATIENT
Start: 2017-12-22 | End: 2018-02-12 | Stop reason: ALTCHOICE

## 2017-12-22 RX ORDER — QUETIAPINE FUMARATE 300 MG/1
600 TABLET, FILM COATED ORAL NIGHTLY
Qty: 60 TABLET | Refills: 5 | Status: SHIPPED | OUTPATIENT
Start: 2017-12-22 | End: 2018-06-11 | Stop reason: SDUPTHER

## 2017-12-22 RX ORDER — PANTOPRAZOLE SODIUM 40 MG/1
40 TABLET, DELAYED RELEASE ORAL DAILY
Qty: 30 TABLET | Refills: 5 | Status: SHIPPED | OUTPATIENT
Start: 2017-12-22 | End: 2018-06-11 | Stop reason: SDUPTHER

## 2018-01-03 ENCOUNTER — OFFICE VISIT (OUTPATIENT)
Dept: ORTHOPEDIC SURGERY | Facility: CLINIC | Age: 63
End: 2018-01-03

## 2018-01-03 VITALS — HEIGHT: 69 IN | WEIGHT: 226.2 LBS | BODY MASS INDEX: 33.5 KG/M2

## 2018-01-03 DIAGNOSIS — Z98.890 S/P ROTATOR CUFF REPAIR: ICD-10-CM

## 2018-01-03 DIAGNOSIS — M75.41 SHOULDER IMPINGEMENT, RIGHT: Primary | ICD-10-CM

## 2018-01-03 DIAGNOSIS — S42.124D CLOSED NONDISPLACED FRACTURE OF RIGHT ACROMIAL PROCESS WITH ROUTINE HEALING, SUBSEQUENT ENCOUNTER: ICD-10-CM

## 2018-01-03 PROCEDURE — 99024 POSTOP FOLLOW-UP VISIT: CPT | Performed by: NURSE PRACTITIONER

## 2018-01-03 RX ORDER — HYDROCODONE BITARTRATE AND ACETAMINOPHEN 5; 325 MG/1; MG/1
1 TABLET ORAL EVERY 8 HOURS PRN
Qty: 30 TABLET | Refills: 0 | Status: SHIPPED | OUTPATIENT
Start: 2018-01-03 | End: 2018-01-13

## 2018-01-03 NOTE — PROGRESS NOTES
Mikey Allison is a 62 y.o. male returns for     Chief Complaint   Patient presents with   • Right Shoulder - Follow-up       HISTORY OF PRESENT ILLNESS:   Patient reports that he has improved since both previous evaluation.  He still states that he is experiencing some pain over the posterior aspect of the shoulder.     CONCURRENT MEDICAL HISTORY:    Past Medical History:   Diagnosis Date   • Abdominal pain     left side   • Acute sinusitis    • Ankle joint pain    • Bipolar disorder    • Bipolar disorder, unspecified    • Cellulitis and abscess of trunk     axilla   • Chest pain    • Chronic anemia    • Degenerative joint disease involving multiple joints     back   • Depressive disorder    • Diabetes mellitus    • Diabetes mellitus with no complication     type 2 or unspecified type uncontrolled   • Diverticular disease    • Dyspnea    • Enlarged prostate     on OR-on CT   • Essential hypertension    • Febrile convulsion    • Gastroesophageal reflux disease    • Generalized anxiety disorder    • H/O echocardiogram 10/16/2007    Mild to moderate concentric LV hypertrophy with mild left atrial enlargement. LV appears to be hypodynamic with preserved LV systolic function with EF 55-60%. Pericardium appears to be normal with a small pericardial effusion    • Hemorrhoids    • Hyperlipidemia    • Hypertensive disorder    • Hypoglycemia    • Infectious disorder of kidney     kidney infection-treated by [facility]   • Left lower quadrant pain    • Loss of appetite    • Obesity    • Osteoarthritis    • Pain in joint involving ankle and foot    • Pain in limb    • Psoriasis    • Retention of urine     with cath-treated by the [facility]   • Screening for malignant neoplasm of colon    • Sepsis due to urinary tract infection     urosepsis treated by [facility]   • Sinusitis    • Syncope    • Unspecified essential hypertension        Allergies   Allergen Reactions   • Codeine Itching and Nausea And Vomiting   •  Sulfa Antibiotics Other (See Comments)     unknown         Current Outpatient Prescriptions:   •  celecoxib (CeleBREX) 200 MG capsule, Take 1 capsule by mouth Daily., Disp: 30 capsule, Rfl: 5  •  cyclobenzaprine (FLEXERIL) 10 MG tablet, Take 10 mg by mouth 3 (Three) Times a Day As Needed for Muscle Spasms., Disp: , Rfl:   •  FLUoxetine (PROzac) 20 MG capsule, Take 1 capsule by mouth 3 (Three) Times a Day., Disp: 90 capsule, Rfl: 5  •  HYDROcodone-acetaminophen (NORCO) 5-325 MG per tablet, Take 1 tablet by mouth Every 8 (Eight) Hours As Needed for Mild Pain  (PRN) for up to 10 days., Disp: 30 tablet, Rfl: 0  •  hydrOXYzine (ATARAX) 50 MG tablet, Take 1 tablet by mouth 3 (Three) Times a Day As Needed for Itching., Disp: 90 tablet, Rfl: 5  •  lisinopril-hydrochlorothiazide (PRINZIDE,ZESTORETIC) 20-12.5 MG per tablet, Take 1 tablet by mouth Daily., Disp: 30 tablet, Rfl: 5  •  metFORMIN (GLUCOPHAGE) 1000 MG tablet, Take 1 tablet by mouth 2 (Two) Times a Day With Meals., Disp: 60 tablet, Rfl: 5  •  pantoprazole (PROTONIX) 40 MG EC tablet, Take 1 tablet by mouth Daily., Disp: 30 tablet, Rfl: 5  •  QUEtiapine (SEROquel) 300 MG tablet, Take 2 tablets by mouth Every Night., Disp: 60 tablet, Rfl: 5  •  SITagliptin (JANUVIA) 100 MG tablet, Take 1 tablet by mouth Daily., Disp: 30 tablet, Rfl: 5    Past Surgical History:   Procedure Laterality Date   • ANKLE SURGERY Left 11/11/2008    Removal of syndesmotic screws, left ankle. Painful syndesmotic screws, left ankle.)   • ANKLE SURGERY  10/19/2007    Open reduction-internal fixation with fluoroscopic control with final x-ray PA and lateral of the ankle. Trimalleolar fracture/subluxation, left ankle.)   • CIRCUMCISION  2016   • COLONOSCOPY  03/10/2015    Diverticulosis found in the sigmoid colon. hemorrhoids found in the anus. Normal cecum   • CYSTOSCOPY  06/22/2016    Cystoscopy, dilation, anchor of Tim catheter. Benign prostatic hypertrophy, urinary retention, urethral  "stricture history of.   • INJECTION OF MEDICATION      Kenalog (3)   • SHOULDER ARTHROSCOPY Right 10/18/2017    Procedure: SHOULDER ARTHROSCOPY WITH RAFAELA PROCEDURE, AND SUBACROMIAL DECOMPRESSION, AND POSSIBLE ROTATOR  CUFF REPAIR       SHOULDER ARTHROSCOPY WITH RAFAELA PROCEDURE, AND SUBACROMIAL DECOMPRESSION, NO ROTATOR CUFF REPAIR PERFORMED;  Surgeon: Maximus Schreiber MD;  Location: [facility];  Service:    • STOMACH SURGERY     • TIBIA FRACTURE SURGERY         ROS  No fevers or chills.  No chest pain or shortness of air.  No GI or  disturbances.    PHYSICAL EXAMINATION:       Ht 175.3 cm (69\")  Wt 103 kg (226 lb 3.2 oz)  BMI 33.4 kg/m2    Physical Exam   Constitutional: He is oriented to person, place, and time. Vital signs are normal. He appears well-developed and well-nourished. He is cooperative.   HENT:   Head: Normocephalic and atraumatic.   Neck: Trachea normal and phonation normal.   Pulmonary/Chest: Effort normal. No respiratory distress.   Abdominal: Soft. Normal appearance. He exhibits no distension.   Neurological: He is alert and oriented to person, place, and time. GCS eye subscore is 4. GCS verbal subscore is 5. GCS motor subscore is 6.   Skin: Skin is warm, dry and intact.   Psychiatric: He has a normal mood and affect. His speech is normal and behavior is normal. Judgment and thought content normal. Cognition and memory are normal.   Vitals reviewed.      GAIT:     [x]  Normal  []  Antalgic    Assistive device: [x]  None  []  Walker     []  Crutches  []  Cane     []  Wheelchair  []  Stretcher    Right Shoulder Exam     Tenderness   The patient is experiencing tenderness in the acromion.    Range of Motion   Right shoulder active abduction: 90.   Right shoulder forward flexion: 90.   Internal Rotation 0 degrees: Sacrum     Muscle Strength   Abduction: 4/5   Internal Rotation: 4/5   External Rotation: 4/5   Supraspinatus: 4/5     Other   Erythema: absent  Scars: present  Sensation: " normal  Pulse: present      Left Shoulder Exam   Left shoulder exam is normal.              No results found.          ASSESSMENT:    Diagnoses and all orders for this visit:    Shoulder impingement, right  -     XR Shoulder 2+ View Right    S/P rotator cuff repair  -     XR Shoulder 2+ View Right    Closed nondisplaced fracture of right acromial process with routine healing, subsequent encounter    Other orders  -     HYDROcodone-acetaminophen (NORCO) 5-325 MG per tablet; Take 1 tablet by mouth Every 8 (Eight) Hours As Needed for Mild Pain  (PRN) for up to 10 days.          PLAN  Motion and pain has improved, the plan will be continue doing his range of motion exercises and have him follow up in 4-6 weeks for recheck.  We will also continue titrating his narcotic pain medication.  This patient has tried and failed a course of multiple treatment modalities which include but are not limited to the use of  anti-inflammatories/acetaminiphine, physical therapy/home exercises, rest/ice/heat and/or has underwent a surgical procedure/ traumatic injury.  Therefore, I will recommend a course of narcotic pain medication for this patient until they're pain has been sufficiently reduced to a level that I deem acceptable to be treated with alternative treatment options.       No Follow-up on file.    Prince Boone, SAMUEL

## 2018-01-15 RX ORDER — HYDROCODONE BITARTRATE AND ACETAMINOPHEN 5; 325 MG/1; MG/1
1 TABLET ORAL EVERY 8 HOURS PRN
Qty: 30 TABLET | Refills: 0 | Status: SHIPPED | OUTPATIENT
Start: 2018-01-15 | End: 2018-01-25

## 2018-01-23 ENCOUNTER — OFFICE VISIT (OUTPATIENT)
Dept: FAMILY MEDICINE CLINIC | Facility: CLINIC | Age: 63
End: 2018-01-23

## 2018-01-23 VITALS
HEART RATE: 89 BPM | DIASTOLIC BLOOD PRESSURE: 70 MMHG | OXYGEN SATURATION: 98 % | SYSTOLIC BLOOD PRESSURE: 128 MMHG | HEIGHT: 69 IN | BODY MASS INDEX: 33.47 KG/M2 | WEIGHT: 226 LBS

## 2018-01-23 DIAGNOSIS — F33.42 RECURRENT MAJOR DEPRESSIVE DISORDER, IN FULL REMISSION (HCC): Chronic | ICD-10-CM

## 2018-01-23 DIAGNOSIS — I10 ESSENTIAL HYPERTENSION: Chronic | ICD-10-CM

## 2018-01-23 DIAGNOSIS — E11.9 TYPE 2 DIABETES MELLITUS WITHOUT COMPLICATION, WITHOUT LONG-TERM CURRENT USE OF INSULIN (HCC): Primary | ICD-10-CM

## 2018-01-23 DIAGNOSIS — M15.9 PRIMARY OSTEOARTHRITIS INVOLVING MULTIPLE JOINTS: Chronic | ICD-10-CM

## 2018-01-23 DIAGNOSIS — K21.9 GASTROESOPHAGEAL REFLUX DISEASE WITHOUT ESOPHAGITIS: Chronic | ICD-10-CM

## 2018-01-23 PROCEDURE — 99213 OFFICE O/P EST LOW 20 MIN: CPT | Performed by: INTERNAL MEDICINE

## 2018-01-25 RX ORDER — TRAMADOL HYDROCHLORIDE 50 MG/1
TABLET ORAL
Qty: 60 TABLET | Refills: 0 | Status: SHIPPED | OUTPATIENT
Start: 2018-01-25 | End: 2018-06-28 | Stop reason: SDUPTHER

## 2018-01-25 RX ORDER — HYDROCODONE BITARTRATE AND ACETAMINOPHEN 5; 325 MG/1; MG/1
1 TABLET ORAL EVERY 8 HOURS PRN
Qty: 30 TABLET | Refills: 0 | OUTPATIENT
Start: 2018-01-25

## 2018-01-30 ENCOUNTER — TELEPHONE (OUTPATIENT)
Dept: ORTHOPEDIC SURGERY | Facility: CLINIC | Age: 63
End: 2018-01-30

## 2018-02-12 ENCOUNTER — LAB (OUTPATIENT)
Dept: LAB | Facility: OTHER | Age: 63
End: 2018-02-12

## 2018-02-12 ENCOUNTER — OFFICE VISIT (OUTPATIENT)
Dept: FAMILY MEDICINE CLINIC | Facility: CLINIC | Age: 63
End: 2018-02-12

## 2018-02-12 VITALS
TEMPERATURE: 99.9 F | HEART RATE: 94 BPM | DIASTOLIC BLOOD PRESSURE: 80 MMHG | RESPIRATION RATE: 22 BRPM | BODY MASS INDEX: 31.55 KG/M2 | WEIGHT: 213 LBS | OXYGEN SATURATION: 99 % | HEIGHT: 69 IN | SYSTOLIC BLOOD PRESSURE: 130 MMHG

## 2018-02-12 DIAGNOSIS — M25.511 ACUTE PAIN OF RIGHT SHOULDER: ICD-10-CM

## 2018-02-12 DIAGNOSIS — F41.9 ANXIETY: ICD-10-CM

## 2018-02-12 DIAGNOSIS — R50.9 FEVER, UNSPECIFIED FEVER CAUSE: ICD-10-CM

## 2018-02-12 DIAGNOSIS — J40 BRONCHITIS: Primary | ICD-10-CM

## 2018-02-12 DIAGNOSIS — J01.00 ACUTE MAXILLARY SINUSITIS, RECURRENCE NOT SPECIFIED: ICD-10-CM

## 2018-02-12 LAB
FLUAV AG NPH QL: NEGATIVE
FLUBV AG NPH QL IA: NEGATIVE

## 2018-02-12 PROCEDURE — 96372 THER/PROPH/DIAG INJ SC/IM: CPT | Performed by: NURSE PRACTITIONER

## 2018-02-12 PROCEDURE — 99214 OFFICE O/P EST MOD 30 MIN: CPT | Performed by: NURSE PRACTITIONER

## 2018-02-12 PROCEDURE — 87804 INFLUENZA ASSAY W/OPTIC: CPT | Performed by: NURSE PRACTITIONER

## 2018-02-12 RX ORDER — METHYLPREDNISOLONE ACETATE 80 MG/ML
80 INJECTION, SUSPENSION INTRA-ARTICULAR; INTRALESIONAL; INTRAMUSCULAR; SOFT TISSUE ONCE
Status: COMPLETED | OUTPATIENT
Start: 2018-02-12 | End: 2018-02-12

## 2018-02-12 RX ORDER — AMOXICILLIN AND CLAVULANATE POTASSIUM 875; 125 MG/1; MG/1
1 TABLET, FILM COATED ORAL 2 TIMES DAILY
Qty: 20 TABLET | Refills: 0 | Status: SHIPPED | OUTPATIENT
Start: 2018-02-12 | End: 2018-06-11

## 2018-02-12 RX ORDER — GUAIFENESIN 600 MG/1
1200 TABLET, EXTENDED RELEASE ORAL 2 TIMES DAILY
Qty: 40 TABLET | Refills: 0 | Status: SHIPPED | OUTPATIENT
Start: 2018-02-12 | End: 2018-06-11

## 2018-02-12 RX ORDER — FLUOXETINE HYDROCHLORIDE 20 MG/1
20 CAPSULE ORAL 3 TIMES DAILY
COMMUNITY
End: 2018-06-11

## 2018-02-12 RX ORDER — DEXTROMETHORPHAN HYDROBROMIDE AND PROMETHAZINE HYDROCHLORIDE 15; 6.25 MG/5ML; MG/5ML
5 SYRUP ORAL 4 TIMES DAILY PRN
Qty: 118 ML | Refills: 0 | Status: SHIPPED | OUTPATIENT
Start: 2018-02-12 | End: 2018-06-11

## 2018-02-12 RX ORDER — ALBUTEROL SULFATE 90 UG/1
AEROSOL, METERED RESPIRATORY (INHALATION)
Qty: 18 G | Refills: 0 | Status: SHIPPED | OUTPATIENT
Start: 2018-02-12 | End: 2018-08-20 | Stop reason: SDUPTHER

## 2018-02-12 RX ADMIN — METHYLPREDNISOLONE ACETATE 80 MG: 80 INJECTION, SUSPENSION INTRA-ARTICULAR; INTRALESIONAL; INTRAMUSCULAR; SOFT TISSUE at 11:30

## 2018-02-14 NOTE — PROGRESS NOTES
Subjective   Mikey Allison is a 62 y.o. male who presents to the office complaining of possible flu.     History of Present Illness     Patient comes in for possible flu.  He states that last Sunday he started coughing a lot, was told he was pale looking, and started to drink plenty of fluids.  He states that over the next couple of days he developed a fever at home, coughing a lot more water, shortness of breath, chest tightness, sinus pain and pressure maxillary, headache, and wheezing at bedtime.  Patient also states that he hurts all over, 10 out of 10 on the pain scale.  Patient states that he has not really taken anything for this, just increased his fluid intake.  Patient is a diabetic, last A1c on 12/21/17 was 6.3.    Patient states that he recently broke the right side of his collarbone, and that or so is weaning him off Lortabs.  Patient states that this is still bothering him and causing him significant amount of pain.  Patient records show that patient was prescribed tramadol by Prince Hassan or so last month on 1/25/17.  Patient also has an appointment with Dr. Schreiber on 2/14/18.    Patient states that he is getting treatment for anxiety and depression, states that the Prozac is not helping him anymore with the anxiety but the depression is not that bad.  Patient denies any thoughts of harming himself or others.  Patient is currently being prescribed Seroquel at 600 mg at bedtime, hydroxyzine and 50 mg 3 times a day, and Prozac at 20 mg 3 times a day.  Patient mentions that he would like to be put on something else for his anxiety.    The following portions of the patient's history were reviewed and updated as appropriate: allergies, current medications, past family history, past medical history, past social history, past surgical history and problem list.    Review of Systems   Constitutional: Positive for activity change, appetite change, chills, fatigue and fever. Negative for unexpected  weight change.   HENT: Positive for congestion, postnasal drip, rhinorrhea, sinus pain, sinus pressure and sore throat. Negative for dental problem, drooling, ear discharge, ear pain, facial swelling, hearing loss, mouth sores, nosebleeds, sneezing, tinnitus and trouble swallowing.    Eyes: Negative.    Respiratory: Positive for cough, chest tightness, shortness of breath and wheezing. Negative for apnea, choking and stridor.    Cardiovascular: Negative for chest pain, palpitations and leg swelling.   Gastrointestinal: Negative for abdominal distention, abdominal pain, constipation, nausea and vomiting.   Genitourinary: Negative.    Musculoskeletal: Positive for arthralgias (right shoulder pain).   Neurological: Positive for headaches. Negative for dizziness and weakness.   Psychiatric/Behavioral: Negative for self-injury and suicidal ideas. The patient is nervous/anxious.        Past Medical History:   Diagnosis Date   • Abdominal pain     left side   • Acute sinusitis    • Ankle joint pain    • Bipolar disorder    • Bipolar disorder, unspecified    • Cellulitis and abscess of trunk     axilla   • Chest pain    • Chronic anemia    • Degenerative joint disease involving multiple joints     back   • Depressive disorder    • Diabetes mellitus    • Diabetes mellitus with no complication     type 2 or unspecified type uncontrolled   • Diverticular disease    • Dyspnea    • Enlarged prostate     on PA-on CT   • Essential hypertension    • Febrile convulsion    • Gastroesophageal reflux disease    • Generalized anxiety disorder    • H/O echocardiogram 10/16/2007    Mild to moderate concentric LV hypertrophy with mild left atrial enlargement. LV appears to be hypodynamic with preserved LV systolic function with EF 55-60%. Pericardium appears to be normal with a small pericardial effusion    • Hemorrhoids    • Hyperlipidemia    • Hypertensive disorder    • Hypoglycemia    • Infectious disorder of kidney     kidney  "infection-treated by [facility]   • Left lower quadrant pain    • Loss of appetite    • Obesity    • Osteoarthritis    • Pain in joint involving ankle and foot    • Pain in limb    • Psoriasis    • Retention of urine     with cath-treated by the [facility]   • Screening for malignant neoplasm of colon    • Sepsis due to urinary tract infection     urosepsis treated by [facility]   • Sinusitis    • Syncope    • Unspecified essential hypertension        Family History   Problem Relation Age of Onset   • Diabetes Other    • Heart disease Other    • Hypertension Other    • Kidney disease Other           Objective   /80  Pulse 94  Temp 99.9 °F (37.7 °C) (Temporal Artery )   Resp 22  Ht 175.3 cm (69\")  Wt 96.6 kg (213 lb)  SpO2 99%  BMI 31.45 kg/m2  Physical Exam   Constitutional: He is oriented to person, place, and time. He appears well-developed and well-nourished. He is cooperative. He does not have a sickly appearance. He does not appear ill. No distress.   HENT:   Right Ear: Hearing, tympanic membrane, external ear and ear canal normal. No drainage. Tympanic membrane is not injected, not erythematous, not retracted and not bulging. No decreased hearing is noted.   Left Ear: Hearing, tympanic membrane, external ear and ear canal normal. No drainage. Tympanic membrane is not injected, not erythematous, not retracted and not bulging. No decreased hearing is noted.   Nose: Mucosal edema and sinus tenderness present. No rhinorrhea. Right sinus exhibits maxillary sinus tenderness. Right sinus exhibits no frontal sinus tenderness. Left sinus exhibits maxillary sinus tenderness. Left sinus exhibits no frontal sinus tenderness.   Mouth/Throat: Uvula is midline and mucous membranes are normal. Oropharyngeal exudate present.   Eyes: Conjunctivae are normal. Pupils are equal, round, and reactive to light.   Cardiovascular: Normal rate, regular rhythm, normal heart sounds and intact distal pulses.  Exam reveals " no gallop and no friction rub.    No murmur heard.  Pulmonary/Chest: Effort normal and breath sounds normal. No respiratory distress. He has no wheezes. He has no rales.   Abdominal: Soft. Normal appearance and bowel sounds are normal. He exhibits no shifting dullness, no distension, no pulsatile liver, no fluid wave, no abdominal bruit, no ascites, no pulsatile midline mass and no mass. There is no hepatosplenomegaly. There is no tenderness. There is no rebound, no guarding, no CVA tenderness, no tenderness at McBurney's point and negative Gavin's sign. No hernia.   Musculoskeletal:        Right shoulder: He exhibits decreased range of motion (Limited due to pain), tenderness and pain.   Neurological: He is alert and oriented to person, place, and time. He has normal strength and normal reflexes. He is not disoriented. No sensory deficit.   Psychiatric: He has a normal mood and affect. His speech is normal and behavior is normal. Thought content normal. His mood appears not anxious. He is not actively hallucinating. Cognition and memory are normal. He does not exhibit a depressed mood.   Patient is dressed appropriately for weather and situation, makes eye contact, and engages in conversation.  He is attentive.   Nursing note and vitals reviewed.       PHQ-2/PHQ-9 Depression Screening 2/12/2018   Little interest or pleasure in doing things 3   Feeling down, depressed, or hopeless 3   Trouble falling or staying asleep, or sleeping too much 2   Feeling tired or having little energy 3   Poor appetite or overeating 3   Feeling bad about yourself - or that you are a failure or have let yourself or your family down 0   Trouble concentrating on things, such as reading the newspaper or watching television 0   Moving or speaking so slowly that other people could have noticed. Or the opposite - being so fidgety or restless that you have been moving around a lot more than usual 3   Thoughts that you would be better off dead,  or of hurting yourself in some way 0   Total Score 17   If you checked off any problems, how difficult have these problems made it for you to do your work, take care of things at home, or get along with other people? Somewhat difficult         Assessment/Plan   Mikey was seen today for influenza.    Diagnoses and all orders for this visit:    Fever, unspecified fever cause  -     Influenza Antigen, Rapid - Swab, Nasopharynx; Future    Bronchitis  -     promethazine-dextromethorphan (PROMETHAZINE-DM) 6.25-15 MG/5ML syrup; Take 5 mL by mouth 4 (Four) Times a Day As Needed (cough and nausea). Do not drive with this medication  -     methylPREDNISolone acetate (DEPO-medrol) injection 80 mg; Inject 1 mL into the shoulder, thigh, or buttocks 1 (One) Time.  -     albuterol (PROVENTIL HFA;VENTOLIN HFA) 108 (90 Base) MCG/ACT inhaler; 2 puffs every 4-6 hours until cough/wheezing improve, than taper off of    Acute maxillary sinusitis, recurrence not specified  -     guaiFENesin (MUCINEX) 600 MG 12 hr tablet; Take 2 tablets by mouth 2 (Two) Times a Day.  -     amoxicillin-clavulanate (AUGMENTIN) 875-125 MG per tablet; Take 1 tablet by mouth 2 (Two) Times a Day.    Acute pain of right shoulder  -     ketorolac (TORADOL) injection 60 mg; Inject 2 mL into the shoulder, thigh, or buttocks 1 (One) Time.    Anxiety      Bronchitis, maxillary sinusitis, and fever-acute problem: Rapid flu test done in office, flu test negative.  Patient given Depo-Medrol in office, prescribed Augmentin, Mucinex, promethazine DM for cough, and albuterol inhaler to use every 4-6 hours 2 puffs while awake until wheezing and cough improve then he may taper off.  Patient educated drink plenty of fluids and rest.    Acute pain of right shoulder: Gave Toradol injection in office.  Patient was recently prescribed tramadol and is following up with Dr. Schreiber for his shoulder pain, educated patient that she needed to discuss this pain with Dr. Schreiber tomorrow  and that I could not prescribe anything else for his pain at this time.    Anxiety: Patient educated that he is already taking 3 psych medications that after to psych medications I refer patient to psychiatry to continue if they are not working.  I educated patient that I do not feel comfortable changing his medications since he is on 3 different medications at very high dosages.  Educated patient that he would need to follow up with Dr. Alvarado to change these work and refer him to psych.  Patient will schedule appointment with Dr. Alvarado.     Patient educated to follow-up sooner than next scheduled appointment if condition(s) worse or do not improve. Patient states understanding and is in agreeance with plan of care. An After Visit Summary was printed and given to the patient.      Current Outpatient Prescriptions:   •  celecoxib (CeleBREX) 200 MG capsule, Take 1 capsule by mouth Daily., Disp: 30 capsule, Rfl: 5  •  cyclobenzaprine (FLEXERIL) 10 MG tablet, Take 10 mg by mouth 3 (Three) Times a Day As Needed for Muscle Spasms., Disp: , Rfl:   •  FLUoxetine (PROzac) 20 MG capsule, Take 20 mg by mouth 3 (Three) Times a Day., Disp: , Rfl:   •  hydrOXYzine (ATARAX) 50 MG tablet, Take 1 tablet by mouth 3 (Three) Times a Day As Needed for Itching., Disp: 90 tablet, Rfl: 5  •  lisinopril-hydrochlorothiazide (PRINZIDE,ZESTORETIC) 20-12.5 MG per tablet, Take 1 tablet by mouth Daily., Disp: 30 tablet, Rfl: 5  •  metFORMIN (GLUCOPHAGE) 1000 MG tablet, Take 1 tablet by mouth 2 (Two) Times a Day With Meals., Disp: 60 tablet, Rfl: 5  •  pantoprazole (PROTONIX) 40 MG EC tablet, Take 1 tablet by mouth Daily., Disp: 30 tablet, Rfl: 5  •  QUEtiapine (SEROquel) 300 MG tablet, Take 2 tablets by mouth Every Night., Disp: 60 tablet, Rfl: 5  •  SITagliptin (JANUVIA) 100 MG tablet, Take 1 tablet by mouth Daily., Disp: 30 tablet, Rfl: 5  •  traMADol (ULTRAM) 50 MG tablet, 1-2 tab po q 8 hour prn pain, Disp: 60 tablet, Rfl: 0  •   albuterol (PROVENTIL HFA;VENTOLIN HFA) 108 (90 Base) MCG/ACT inhaler, 2 puffs every 4-6 hours until cough/wheezing improve, than taper off of, Disp: 18 g, Rfl: 0  •  amoxicillin-clavulanate (AUGMENTIN) 875-125 MG per tablet, Take 1 tablet by mouth 2 (Two) Times a Day., Disp: 20 tablet, Rfl: 0  •  guaiFENesin (MUCINEX) 600 MG 12 hr tablet, Take 2 tablets by mouth 2 (Two) Times a Day., Disp: 40 tablet, Rfl: 0  •  promethazine-dextromethorphan (PROMETHAZINE-DM) 6.25-15 MG/5ML syrup, Take 5 mL by mouth 4 (Four) Times a Day As Needed (cough and nausea). Do not drive with this medication, Disp: 118 mL, Rfl: 0      SAMUEL Wiseman        This document has been electronically signed by SAMUEL Wiseman on February 14, 2018 3:09 PM      EMR/Transcription Dragon Disclaimer:  Some of this note may be an electronic dragon transcription/translation of spoken language to printed text. The electronic translation of spoken language may permit erroneous, or at times, nonsensical words or phrases to be inadvertently transcribed. Although I have reviewed the note for such errors, some may still exist.

## 2018-02-15 ENCOUNTER — OFFICE VISIT (OUTPATIENT)
Dept: FAMILY MEDICINE CLINIC | Facility: CLINIC | Age: 63
End: 2018-02-15

## 2018-02-15 VITALS
WEIGHT: 213 LBS | HEIGHT: 69 IN | TEMPERATURE: 97.7 F | BODY MASS INDEX: 31.55 KG/M2 | HEART RATE: 78 BPM | RESPIRATION RATE: 22 BRPM | OXYGEN SATURATION: 99 %

## 2018-02-15 DIAGNOSIS — R68.83 CHILLS: ICD-10-CM

## 2018-02-15 DIAGNOSIS — R50.9 FEVER, UNSPECIFIED FEVER CAUSE: ICD-10-CM

## 2018-02-15 DIAGNOSIS — R52 BODY ACHES: ICD-10-CM

## 2018-02-15 DIAGNOSIS — R07.9 CHEST PAIN, UNSPECIFIED TYPE: ICD-10-CM

## 2018-02-15 DIAGNOSIS — R06.02 SHORTNESS OF BREATH: Primary | ICD-10-CM

## 2018-02-15 DIAGNOSIS — R05.9 COUGH: ICD-10-CM

## 2018-02-15 DIAGNOSIS — J40 BRONCHITIS: ICD-10-CM

## 2018-02-15 DIAGNOSIS — J01.00 ACUTE MAXILLARY SINUSITIS, RECURRENCE NOT SPECIFIED: ICD-10-CM

## 2018-02-15 LAB
ERYTHROCYTE [SEDIMENTATION RATE] IN BLOOD: 18 MM/HR (ref 0–15)
FLUAV AG NPH QL: NEGATIVE
FLUBV AG NPH QL IA: NEGATIVE

## 2018-02-15 PROCEDURE — 94640 AIRWAY INHALATION TREATMENT: CPT | Performed by: NURSE PRACTITIONER

## 2018-02-15 PROCEDURE — 85651 RBC SED RATE NONAUTOMATED: CPT

## 2018-02-15 PROCEDURE — 87804 INFLUENZA ASSAY W/OPTIC: CPT | Performed by: NURSE PRACTITIONER

## 2018-02-15 PROCEDURE — 36415 COLL VENOUS BLD VENIPUNCTURE: CPT | Performed by: NURSE PRACTITIONER

## 2018-02-15 PROCEDURE — 96372 THER/PROPH/DIAG INJ SC/IM: CPT | Performed by: NURSE PRACTITIONER

## 2018-02-15 PROCEDURE — 99214 OFFICE O/P EST MOD 30 MIN: CPT | Performed by: NURSE PRACTITIONER

## 2018-02-15 PROCEDURE — 93000 ELECTROCARDIOGRAM COMPLETE: CPT | Performed by: NURSE PRACTITIONER

## 2018-02-15 RX ORDER — AZITHROMYCIN 500 MG/1
500 TABLET, FILM COATED ORAL DAILY
Qty: 5 TABLET | Refills: 0 | Status: SHIPPED | OUTPATIENT
Start: 2018-02-15 | End: 2018-06-11

## 2018-02-15 RX ORDER — IPRATROPIUM BROMIDE AND ALBUTEROL SULFATE 2.5; .5 MG/3ML; MG/3ML
3 SOLUTION RESPIRATORY (INHALATION) ONCE
Status: COMPLETED | OUTPATIENT
Start: 2018-02-15 | End: 2018-02-15

## 2018-02-15 RX ORDER — DEXTROMETHORPHAN HYDROBROMIDE AND PROMETHAZINE HYDROCHLORIDE 15; 6.25 MG/5ML; MG/5ML
5 SYRUP ORAL 4 TIMES DAILY PRN
Qty: 180 ML | Refills: 0 | Status: SHIPPED | OUTPATIENT
Start: 2018-02-15 | End: 2018-06-11

## 2018-02-15 RX ORDER — PREDNISONE 20 MG/1
20 TABLET ORAL DAILY
Qty: 5 TABLET | Refills: 0 | Status: SHIPPED | OUTPATIENT
Start: 2018-02-15 | End: 2018-06-11

## 2018-02-15 RX ORDER — ASPIRIN 81 MG/1
81 TABLET, CHEWABLE ORAL ONCE
Status: COMPLETED | OUTPATIENT
Start: 2018-02-15 | End: 2018-02-15

## 2018-02-15 RX ADMIN — ASPIRIN 81 MG: 81 TABLET, CHEWABLE ORAL at 16:41

## 2018-02-15 RX ADMIN — IPRATROPIUM BROMIDE AND ALBUTEROL SULFATE 3 ML: 2.5; .5 SOLUTION RESPIRATORY (INHALATION) at 16:26

## 2018-02-15 NOTE — PROGRESS NOTES
Subjective   Mikey Allison is a 62 y.o. male who presents to the office for f/u of URI, patient states not getting any better.     History of Present Illness     On 2/12/18: Patient comes in for possible flu which started 2/4/14, with complaints of coughing, fever, shortness of breath, chest tightness, sinus pain and pressure maxillary, headache, and wheezing at bedtime, as well as hurting all over.  Patient was given Depo-Medrol shot in office, flu test was negative, prescribed Augmentin, Mucinex, promethazine DM, and albuterol inhaler.  Patient educated to drink plenty of fluids and rest.    Today 2/15/18, patient states he is no better, in fact he is worse.  Patient states cough has become productive, he is still hurting all over, intense headache, ears are stopped up, and he is wheezing during the daytime now.  Patient states he still has a fever home although he does not have one today his appointment, he states sinus pain and pressure as well.  Patient's main complaint is having pain all over.  Patient states he is taking all of his medications prescribed at last visit except for his albuterol inhaler because he could not afford it.  Patient denies being a smoker ever even though his records show that he is a previous smoker.  Mcdonald agreeable to chest x-ray and would like to be rechecked for the flu.    ISAIAH reviewed: Patient was prescribed Norco 5 mg by Prince Hassan on 1/15/18 and then tramadol 30mg by Prince Hassan on 1/25/18.    The following portions of the patient's history were reviewed and updated as appropriate: allergies, current medications, past family history, past medical history, past social history, past surgical history and problem list.    Review of Systems   Constitutional: Positive for activity change, chills, diaphoresis, fatigue and fever. Negative for appetite change.   HENT: Positive for congestion, postnasal drip, rhinorrhea, sinus pain, sinus pressure and sore throat.  Negative for ear discharge and ear pain.    Eyes: Negative.    Respiratory: Positive for cough, chest tightness, shortness of breath and wheezing.    Cardiovascular: Positive for chest pain (during office visit not prior) and palpitations (during office visit not prior). Negative for leg swelling.   Gastrointestinal: Negative for abdominal pain, constipation, diarrhea, nausea and vomiting.   Genitourinary: Negative.    Musculoskeletal: Negative.         Body aches, hurting all over     Skin: Negative for pallor and rash.   Neurological: Positive for weakness and headaches.       Past Medical History:   Diagnosis Date   • Abdominal pain     left side   • Acute sinusitis    • Ankle joint pain    • Bipolar disorder    • Bipolar disorder, unspecified    • Cellulitis and abscess of trunk     axilla   • Chest pain    • Chronic anemia    • Degenerative joint disease involving multiple joints     back   • Depressive disorder    • Diabetes mellitus    • Diabetes mellitus with no complication     type 2 or unspecified type uncontrolled   • Diverticular disease    • Dyspnea    • Enlarged prostate     on AL-on CT   • Essential hypertension    • Febrile convulsion    • Gastroesophageal reflux disease    • Generalized anxiety disorder    • H/O echocardiogram 10/16/2007    Mild to moderate concentric LV hypertrophy with mild left atrial enlargement. LV appears to be hypodynamic with preserved LV systolic function with EF 55-60%. Pericardium appears to be normal with a small pericardial effusion    • Hemorrhoids    • Hyperlipidemia    • Hypertensive disorder    • Hypoglycemia    • Infectious disorder of kidney     kidney infection-treated by [facility]   • Left lower quadrant pain    • Loss of appetite    • Obesity    • Osteoarthritis    • Pain in joint involving ankle and foot    • Pain in limb    • Psoriasis    • Retention of urine     with cath-treated by the [facility]   • Screening for malignant neoplasm of colon    • Sepsis  "due to urinary tract infection     urosepsis treated by [facility]   • Sinusitis    • Syncope    • Unspecified essential hypertension        Family History   Problem Relation Age of Onset   • Diabetes Other    • Heart disease Other    • Hypertension Other    • Kidney disease Other           Objective   Pulse 78  Temp 97.7 °F (36.5 °C) (Temporal Artery )   Resp 22  Ht 175.3 cm (69\")  Wt 96.6 kg (213 lb)  SpO2 99%  BMI 31.45 kg/m2  Physical Exam   Constitutional: He is oriented to person, place, and time. He appears well-developed and well-nourished. He is cooperative.  Non-toxic appearance. He does not have a sickly appearance. He does not appear ill. No distress.   HENT:   Right Ear: Hearing, tympanic membrane, external ear and ear canal normal. No drainage. Tympanic membrane is not injected, not erythematous, not retracted and not bulging. No decreased hearing is noted.   Left Ear: Hearing, tympanic membrane, external ear and ear canal normal. No drainage. Tympanic membrane is not injected, not erythematous, not retracted and not bulging. No decreased hearing is noted.   Nose: Mucosal edema and sinus tenderness present. No rhinorrhea. Right sinus exhibits maxillary sinus tenderness. Right sinus exhibits no frontal sinus tenderness. Left sinus exhibits maxillary sinus tenderness. Left sinus exhibits no frontal sinus tenderness.   Mouth/Throat: Uvula is midline and mucous membranes are normal. Oropharyngeal exudate present.   Eyes: Conjunctivae are normal. Pupils are equal, round, and reactive to light.   Cardiovascular: Normal rate, regular rhythm, normal heart sounds and intact distal pulses.  Exam reveals no gallop and no friction rub.    No murmur heard.  Pulmonary/Chest: Effort normal. No respiratory distress. He has wheezes (wheezing prior to duoneb, lungs sounded clear after duoneb). He has no rales.   Abdominal: Soft. Normal appearance and bowel sounds are normal. He exhibits no shifting dullness, no " distension, no pulsatile liver, no fluid wave, no abdominal bruit, no ascites, no pulsatile midline mass and no mass. There is no hepatosplenomegaly. There is no tenderness. There is no rebound, no guarding, no CVA tenderness, no tenderness at McBurney's point and negative Gavin's sign. No hernia.   Neurological: He is alert and oriented to person, place, and time. He is not disoriented.   Skin: Skin is warm and dry.   Psychiatric: His speech is normal and behavior is normal. Thought content normal. His mood appears anxious. He is not actively hallucinating. Cognition and memory are normal.   Patient is dressed appropriately for weather and situation, makes eye contact, and engages in conversation. Pt does appear anxious and cries several times during visit stating he just doesn't feel good.  He is attentive.   Nursing note and vitals reviewed.       PHQ-2/PHQ-9 Depression Screening 2/12/2018   Little interest or pleasure in doing things 3   Feeling down, depressed, or hopeless 3   Trouble falling or staying asleep, or sleeping too much 2   Feeling tired or having little energy 3   Poor appetite or overeating 3   Feeling bad about yourself - or that you are a failure or have let yourself or your family down 0   Trouble concentrating on things, such as reading the newspaper or watching television 0   Moving or speaking so slowly that other people could have noticed. Or the opposite - being so fidgety or restless that you have been moving around a lot more than usual 3   Thoughts that you would be better off dead, or of hurting yourself in some way 0   Total Score 17   If you checked off any problems, how difficult have these problems made it for you to do your work, take care of things at home, or get along with other people? Somewhat difficult     EKG:  Indication: Chest pain during office visit  Vent Rate: 70 bpm  MADISON:  158 ms  QRS:  90 ms  QT/QTc: 426/460 ms  Interpretation: Normal sinus rhythm, normal  EKG  Previous EKG: No EKG previously to compare to.         Assessment/Plan   Mikey was seen today for cough.    Diagnoses and all orders for this visit:    Shortness of breath  -     XR Chest 2 View  -     Nebulizer Treatment; Future  -     Nebulizer Treatment  -     ECG 12 Lead    Cough  -     XR Chest 2 View  -     Nebulizer Treatment; Future  -     Nebulizer Treatment    Fever, unspecified fever cause  -     XR Chest 2 View  -     Influenza Antigen, Rapid - Swab, Nasopharynx    Body aches  -     Influenza Antigen, Rapid - Swab, Nasopharynx  -     ketorolac (TORADOL) injection 30 mg; Inject 1 mL into the shoulder, thigh, or buttocks 1 (One) Time.    Chills  -     Influenza Antigen, Rapid - Swab, Nasopharynx    Bronchitis  -     predniSONE (DELTASONE) 20 MG tablet; Take 1 tablet by mouth Daily. In am  -     promethazine-dextromethorphan (PROMETHAZINE-DM) 6.25-15 MG/5ML syrup; Take 5 mL by mouth 4 (Four) Times a Day As Needed (cough and nausea). Do not drive with this medication  -     azithromycin (ZITHROMAX) 500 MG tablet; Take 1 tablet by mouth Daily.  -     ipratropium-albuterol (DUO-NEB) nebulizer solution 3 mL; Take 3 mL by nebulization 1 (One) Time.  -     Nebulizer Treatment; Future  -     Nebulizer Treatment    Acute maxillary sinusitis, recurrence not specified  -     promethazine-dextromethorphan (PROMETHAZINE-DM) 6.25-15 MG/5ML syrup; Take 5 mL by mouth 4 (Four) Times a Day As Needed (cough and nausea). Do not drive with this medication  -     azithromycin (ZITHROMAX) 500 MG tablet; Take 1 tablet by mouth Daily.    Chest pain, unspecified type  -     ECG 12 Lead  -     aspirin chewable tablet 81 mg; Chew 1 tablet 1 (One) Time.    Patient experiencing symptoms of cough, fever, body aches, chills, and shortness of breath: Ordered stat chest x-ray and flu test to be done during office visit.  Flu test negative.  Chest x-ray negative.    After patient returned from a getting chest x-ray done and flu  done, patient is agreeable to getting DuoNeb, prior to DuoNeb I educated patient that he may fill little jittery and have a rapid heart rate afterwards since he states that he has not had DuoNeb before. Pt states after his breathing treatment to my MA Shae that he felt worse, was having chest pain, and some shortness of breath.  Shae MAGDALENO took patient's vitals and oxygen was 100% on room air with heart rate of 84. Upon exam by myself after duoneb, pt was breathing heavily but lungs sounded clear and heart rate regular rhythm and rate. Stat EKG in office was then done, EKG was normal. See results for ekg above. Gave pt aspirin 81mg and strongly advise him to go the ER. Pt stated to Shae MAGDALENO he was feeling much better and was asking if he could get some lunch before going to the ER.     Plan of care included: treatment for sinusitis and bronchitis-worsening: toradol shot 30mg in office, pt educated he would not get another shot for at least two weeks and would have to have current lab work in order to get one. Prescribed prednisone, prom dm, zithromax, and provided pt with Spiriva inhaler sample to use once daily two inhalations x one week.  Patient education drink plenty of fluids, rest, and take Tylenol/ibuprofen as needed.       Patient educated to follow-up sooner than next scheduled appointment if condition(s) worse or do not improve. Patient states understanding and is in agreeance with plan of care. An After Visit Summary was printed and given to the patient.      Current Outpatient Prescriptions:   •  albuterol (PROVENTIL HFA;VENTOLIN HFA) 108 (90 Base) MCG/ACT inhaler, 2 puffs every 4-6 hours until cough/wheezing improve, than taper off of, Disp: 18 g, Rfl: 0  •  amoxicillin-clavulanate (AUGMENTIN) 875-125 MG per tablet, Take 1 tablet by mouth 2 (Two) Times a Day., Disp: 20 tablet, Rfl: 0  •  celecoxib (CeleBREX) 200 MG capsule, Take 1 capsule by mouth Daily., Disp: 30 capsule, Rfl: 5  •  FLUoxetine  (PROzac) 20 MG capsule, Take 20 mg by mouth 3 (Three) Times a Day., Disp: , Rfl:   •  guaiFENesin (MUCINEX) 600 MG 12 hr tablet, Take 2 tablets by mouth 2 (Two) Times a Day., Disp: 40 tablet, Rfl: 0  •  hydrOXYzine (ATARAX) 50 MG tablet, Take 1 tablet by mouth 3 (Three) Times a Day As Needed for Itching., Disp: 90 tablet, Rfl: 5  •  lisinopril-hydrochlorothiazide (PRINZIDE,ZESTORETIC) 20-12.5 MG per tablet, Take 1 tablet by mouth Daily., Disp: 30 tablet, Rfl: 5  •  metFORMIN (GLUCOPHAGE) 1000 MG tablet, Take 1 tablet by mouth 2 (Two) Times a Day With Meals., Disp: 60 tablet, Rfl: 5  •  pantoprazole (PROTONIX) 40 MG EC tablet, Take 1 tablet by mouth Daily., Disp: 30 tablet, Rfl: 5  •  QUEtiapine (SEROquel) 300 MG tablet, Take 2 tablets by mouth Every Night., Disp: 60 tablet, Rfl: 5  •  SITagliptin (JANUVIA) 100 MG tablet, Take 1 tablet by mouth Daily., Disp: 30 tablet, Rfl: 5  •  traMADol (ULTRAM) 50 MG tablet, 1-2 tab po q 8 hour prn pain, Disp: 60 tablet, Rfl: 0  •  azithromycin (ZITHROMAX) 500 MG tablet, Take 1 tablet by mouth Daily., Disp: 5 tablet, Rfl: 0  •  cyclobenzaprine (FLEXERIL) 10 MG tablet, Take 10 mg by mouth 3 (Three) Times a Day As Needed for Muscle Spasms., Disp: , Rfl:   •  predniSONE (DELTASONE) 20 MG tablet, Take 1 tablet by mouth Daily. In am, Disp: 5 tablet, Rfl: 0  •  promethazine-dextromethorphan (PROMETHAZINE-DM) 6.25-15 MG/5ML syrup, Take 5 mL by mouth 4 (Four) Times a Day As Needed (cough and nausea). Do not drive with this medication, Disp: 118 mL, Rfl: 0  •  promethazine-dextromethorphan (PROMETHAZINE-DM) 6.25-15 MG/5ML syrup, Take 5 mL by mouth 4 (Four) Times a Day As Needed (cough and nausea). Do not drive with this medication, Disp: 180 mL, Rfl: 0  No current facility-administered medications for this visit.       SAMUEL Wiseman        This document has been electronically signed by SAMUEL Wiseman on February 16, 2018 9:36 AM      EMR/Transcription Dragon  Disclaimer:  Some of this note may be an electronic dragon transcription/translation of spoken language to printed text. The electronic translation of spoken language may permit erroneous, or at times, nonsensical words or phrases to be inadvertently transcribed. Although I have reviewed the note for such errors, some may still exist.

## 2018-03-05 ENCOUNTER — OFFICE VISIT (OUTPATIENT)
Dept: FAMILY MEDICINE CLINIC | Facility: CLINIC | Age: 63
End: 2018-03-05

## 2018-03-05 VITALS
HEIGHT: 69 IN | SYSTOLIC BLOOD PRESSURE: 160 MMHG | WEIGHT: 213 LBS | TEMPERATURE: 97.8 F | DIASTOLIC BLOOD PRESSURE: 100 MMHG | HEART RATE: 92 BPM | BODY MASS INDEX: 31.55 KG/M2

## 2018-03-05 DIAGNOSIS — G62.9 NEUROPATHY: ICD-10-CM

## 2018-03-05 DIAGNOSIS — M19.079 DJD (DEGENERATIVE JOINT DISEASE), ANKLE AND FOOT, UNSPECIFIED LATERALITY: Primary | ICD-10-CM

## 2018-03-05 PROCEDURE — 99213 OFFICE O/P EST LOW 20 MIN: CPT | Performed by: INTERNAL MEDICINE

## 2018-03-05 RX ORDER — GABAPENTIN 300 MG/1
300 CAPSULE ORAL 3 TIMES DAILY
Qty: 90 CAPSULE | Refills: 5 | Status: SHIPPED | OUTPATIENT
Start: 2018-03-05 | End: 2018-05-24 | Stop reason: SDUPTHER

## 2018-04-02 NOTE — PROGRESS NOTES
Subjective   Mikey Allison is a 62 y.o. male.   Chief Complaint   Patient presents with   • Follow-up     from Cyndie Young   • Leg Pain     History of Present Illness patient complains of burning in his legs.  Shalom bailey nurse practitioner thought he had neuropathy and referred to me.  She felt he needed Neurontin.    The following portions of the patient's history were reviewed and updated as appropriate: allergies, current medications, past family history, past medical history, past social history, past surgical history and problem list.    Review of Systems   Constitutional: Negative for activity change, appetite change, fatigue and unexpected weight change.   HENT: Negative for ear pain, facial swelling and hearing loss.    Eyes: Negative for discharge and visual disturbance.   Respiratory: Negative for cough, chest tightness, shortness of breath and wheezing.    Cardiovascular: Negative for chest pain, palpitations and leg swelling.   Gastrointestinal: Negative for abdominal pain.   Genitourinary: Negative for difficulty urinating, dysuria, flank pain, frequency, hematuria and urgency.   Musculoskeletal: Negative for joint swelling and myalgias.   Skin: Negative for color change and rash.   Allergic/Immunologic: Negative for food allergies.   Neurological: Positive for numbness. Negative for dizziness, syncope, weakness and headaches.   Hematological: Negative for adenopathy.   Psychiatric/Behavioral: Negative for confusion, decreased concentration, dysphoric mood, hallucinations, self-injury, sleep disturbance and suicidal ideas. The patient is not nervous/anxious.    All other systems reviewed and are negative.  In past two weeks the patient has not felt down, depressed, hopeless, or lost interest in doing things.     Objective   Physical Exam   Constitutional: He is oriented to person, place, and time. Vital signs are normal. He appears well-developed and well-nourished.   HENT:   Head:  Normocephalic.   Neck: No thyromegaly present.   Cardiovascular: Normal rate, regular rhythm and normal heart sounds.    No murmur heard.  Pulmonary/Chest: Effort normal and breath sounds normal. He has no wheezes. He has no rales.   Musculoskeletal: He exhibits tenderness. He exhibits no edema.   Lymphadenopathy:     He has no cervical adenopathy.   Neurological: He is alert and oriented to person, place, and time. No cranial nerve deficit.   Skin: Skin is warm and dry.   Psychiatric: He has a normal mood and affect. His speech is normal and behavior is normal. Judgment and thought content normal. His mood appears not anxious. Thought content is not paranoid and not delusional. Cognition and memory are normal. He does not exhibit a depressed mood. He expresses no homicidal and no suicidal ideation. He expresses no suicidal plans and no homicidal plans.   Nursing note and vitals reviewed.      Assessment/Plan   Mikey was seen today for follow-up and leg pain.    Diagnoses and all orders for this visit:    DJD (degenerative joint disease), ankle and foot, unspecified laterality  -     Ambulatory Referral to Podiatry    Neuropathy    Other orders  -     gabapentin (NEURONTIN) 300 MG capsule; Take 1 capsule by mouth 3 (Three) Times a Day.

## 2018-05-24 ENCOUNTER — TELEPHONE (OUTPATIENT)
Dept: PODIATRY | Facility: CLINIC | Age: 63
End: 2018-05-24

## 2018-05-24 ENCOUNTER — OFFICE VISIT (OUTPATIENT)
Dept: PODIATRY | Facility: CLINIC | Age: 63
End: 2018-05-24

## 2018-05-24 VITALS
WEIGHT: 233 LBS | OXYGEN SATURATION: 96 % | DIASTOLIC BLOOD PRESSURE: 82 MMHG | BODY MASS INDEX: 34.51 KG/M2 | SYSTOLIC BLOOD PRESSURE: 144 MMHG | HEIGHT: 69 IN | HEART RATE: 80 BPM

## 2018-05-24 DIAGNOSIS — M19.079 ANKLE ARTHRITIS: ICD-10-CM

## 2018-05-24 DIAGNOSIS — M79.672 BILATERAL FOOT PAIN: Primary | ICD-10-CM

## 2018-05-24 DIAGNOSIS — M79.674 PAIN IN TOES OF BOTH FEET: ICD-10-CM

## 2018-05-24 DIAGNOSIS — M20.41 HAMMER TOES OF BOTH FEET: ICD-10-CM

## 2018-05-24 DIAGNOSIS — B35.1 ONYCHOMYCOSIS: ICD-10-CM

## 2018-05-24 DIAGNOSIS — M20.42 HAMMER TOES OF BOTH FEET: ICD-10-CM

## 2018-05-24 DIAGNOSIS — E11.42 DIABETIC POLYNEUROPATHY ASSOCIATED WITH TYPE 2 DIABETES MELLITUS (HCC): ICD-10-CM

## 2018-05-24 DIAGNOSIS — M79.675 PAIN IN TOES OF BOTH FEET: ICD-10-CM

## 2018-05-24 DIAGNOSIS — M79.671 BILATERAL FOOT PAIN: Primary | ICD-10-CM

## 2018-05-24 PROCEDURE — 99203 OFFICE O/P NEW LOW 30 MIN: CPT | Performed by: PODIATRIST

## 2018-05-24 PROCEDURE — 11721 DEBRIDE NAIL 6 OR MORE: CPT | Performed by: PODIATRIST

## 2018-05-24 RX ORDER — GABAPENTIN 400 MG/1
400 CAPSULE ORAL 3 TIMES DAILY
Qty: 90 CAPSULE | Refills: 5 | Status: SHIPPED | OUTPATIENT
Start: 2018-05-24 | End: 2018-11-12 | Stop reason: SDUPTHER

## 2018-05-24 NOTE — TELEPHONE ENCOUNTER
Spoke with Brooklynn from [facility].  Informed her that Dr. Amaral is aware that pt got his 1st rx from Dr. Alvarado.   Dr. Amaral increased medication due to increased neuropathy and due to previous PCP retiring.

## 2018-05-24 NOTE — PROGRESS NOTES
Mikey Allison  1955  62 y.o. male   BS-171 on 5/23/2018.  Patient presents today for bilateral foot pain with shooting pains.     05/24/2018  Chief Complaint   Patient presents with   • Right Foot - Pain   • Left Foot - Pain           History of Present Illness    Mikey Allison is a 62 y.o. male who presents on referral from Dr. Liriano for evaluation of chronic bilateral foot pain.  He describes the pain as shooting and at times burning.  The pain comes and goes independent of activity level typically.  He states the pain is primarily in his toes but is also occasionally in his arches and left ankle.  He does have a history of old left ankle fracture which underwent surgical repair.  He was started on Neurontin by Dr. Guzmán's and a little over a month prior which he states helps somewhat but does not totally relieve his symptoms.  He is diabetic.  He denies any history of diabetic foot ulcerations or wounds.  He does note that his nails become thickened and dystrophic and difficult for him to care for on his own.            Past Medical History:   Diagnosis Date   • Abdominal pain     left side   • Acute sinusitis    • Ankle joint pain    • Bipolar disorder    • Bipolar disorder, unspecified    • Cellulitis and abscess of trunk     axilla   • Chest pain    • Chronic anemia    • Degenerative joint disease involving multiple joints     back   • Depressive disorder    • Diabetes mellitus    • Diabetes mellitus with no complication     type 2 or unspecified type uncontrolled   • Diverticular disease    • Dyspnea    • Enlarged prostate     on [place]-on CT   • Essential hypertension    • Febrile convulsion    • Gastroesophageal reflux disease    • Generalized anxiety disorder    • H/O echocardiogram 10/16/2007    Mild to moderate concentric LV hypertrophy with mild left atrial enlargement. LV appears to be hypodynamic with preserved LV systolic function with EF 55-60%. Pericardium appears to be normal  with a small pericardial effusion    • Hemorrhoids    • Hyperlipidemia    • Hypertensive disorder    • Hypoglycemia    • Infectious disorder of kidney     kidney infection-treated by [facility]   • Left lower quadrant pain    • Loss of appetite    • Obesity    • Osteoarthritis    • Pain in joint involving ankle and foot    • Pain in limb    • Psoriasis    • Retention of urine     with cath-treated by the [facility]   • Screening for malignant neoplasm of colon    • Sepsis due to urinary tract infection     urosepsis treated by [facility]   • Sinusitis    • Syncope    • Unspecified essential hypertension          Past Surgical History:   Procedure Laterality Date   • ANKLE SURGERY Left 11/11/2008    Removal of syndesmotic screws, left ankle. Painful syndesmotic screws, left ankle.)   • ANKLE SURGERY  10/19/2007    Open reduction-internal fixation with fluoroscopic control with final x-ray PA and lateral of the ankle. Trimalleolar fracture/subluxation, left ankle.)   • CIRCUMCISION  2016   • COLONOSCOPY  03/10/2015    Diverticulosis found in the sigmoid colon. hemorrhoids found in the anus. Normal cecum   • CYSTOSCOPY  06/22/2016    Cystoscopy, dilation, anchor of Tim catheter. Benign prostatic hypertrophy, urinary retention, urethral stricture history of.   • INJECTION OF MEDICATION      Kenalog (3)   • SHOULDER ARTHROSCOPY Right 10/18/2017    Procedure: SHOULDER ARTHROSCOPY WITH RAFAELA PROCEDURE, AND SUBACROMIAL DECOMPRESSION, AND POSSIBLE ROTATOR  CUFF REPAIR       SHOULDER ARTHROSCOPY WITH RAFAELA PROCEDURE, AND SUBACROMIAL DECOMPRESSION, NO ROTATOR CUFF REPAIR PERFORMED;  Surgeon: Maximus Schreiber MD;  Location: [facility];  Service:    • STOMACH SURGERY     • TIBIA FRACTURE SURGERY           Family History   Problem Relation Age of Onset   • Diabetes Other    • Heart disease Other    • Hypertension Other    • Kidney disease Other    • Hypertension Mother    • Clotting disorder Mother          Social  History     Social History   • Marital status:      Spouse name: N/A   • Number of children: N/A   • Years of education: N/A     Occupational History   • Not on file.     Social History Main Topics   • Smoking status: Former Smoker     Types: Cigarettes     Quit date: 1985   • Smokeless tobacco: Never Used      Comment: smoked 3 years   • Alcohol use 0.6 oz/week     1 Cans of beer per week      Comment: 1-2 every other day   • Drug use: No   • Sexual activity: Defer     Other Topics Concern   • Not on file     Social History Narrative   • No narrative on file         Current Outpatient Prescriptions   Medication Sig Dispense Refill   • albuterol (PROVENTIL HFA;VENTOLIN HFA) 108 (90 Base) MCG/ACT inhaler 2 puffs every 4-6 hours until cough/wheezing improve, than taper off of 18 g 0   • celecoxib (CeleBREX) 200 MG capsule Take 1 capsule by mouth Daily. 30 capsule 5   • cyclobenzaprine (FLEXERIL) 10 MG tablet Take 10 mg by mouth 3 (Three) Times a Day As Needed for Muscle Spasms.     • FLUoxetine (PROzac) 20 MG capsule Take 20 mg by mouth 3 (Three) Times a Day.     • gabapentin (NEURONTIN) 400 MG capsule Take 1 capsule by mouth 3 (Three) Times a Day. 90 capsule 5   • hydrOXYzine (ATARAX) 50 MG tablet Take 1 tablet by mouth 3 (Three) Times a Day As Needed for Itching. 90 tablet 5   • lisinopril-hydrochlorothiazide (PRINZIDE,ZESTORETIC) 20-12.5 MG per tablet Take 1 tablet by mouth Daily. 30 tablet 5   • metFORMIN (GLUCOPHAGE) 1000 MG tablet Take 1 tablet by mouth 2 (Two) Times a Day With Meals. 60 tablet 5   • pantoprazole (PROTONIX) 40 MG EC tablet Take 1 tablet by mouth Daily. 30 tablet 5   • QUEtiapine (SEROquel) 300 MG tablet Take 2 tablets by mouth Every Night. 60 tablet 5   • SITagliptin (JANUVIA) 100 MG tablet Take 1 tablet by mouth Daily. 30 tablet 5   • traMADol (ULTRAM) 50 MG tablet 1-2 tab po q 8 hour prn pain 60 tablet 0   • amoxicillin-clavulanate (AUGMENTIN) 875-125 MG per tablet Take 1 tablet by  "mouth 2 (Two) Times a Day. 20 tablet 0   • azithromycin (ZITHROMAX) 500 MG tablet Take 1 tablet by mouth Daily. 5 tablet 0   • guaiFENesin (MUCINEX) 600 MG 12 hr tablet Take 2 tablets by mouth 2 (Two) Times a Day. 40 tablet 0   • predniSONE (DELTASONE) 20 MG tablet Take 1 tablet by mouth Daily. In am 5 tablet 0   • promethazine-dextromethorphan (PROMETHAZINE-DM) 6.25-15 MG/5ML syrup Take 5 mL by mouth 4 (Four) Times a Day As Needed (cough and nausea). Do not drive with this medication 118 mL 0   • promethazine-dextromethorphan (PROMETHAZINE-DM) 6.25-15 MG/5ML syrup Take 5 mL by mouth 4 (Four) Times a Day As Needed (cough and nausea). Do not drive with this medication 180 mL 0     No current facility-administered medications for this visit.          OBJECTIVE    /82   Pulse 80   Ht 175.3 cm (69\")   Wt 106 kg (233 lb)   SpO2 96%   BMI 34.41 kg/m²       Review of Systems   Constitutional: Negative.    HENT: Negative.    Eyes: Negative.    Respiratory: Negative.    Cardiovascular: Negative.    Gastrointestinal: Negative.    Endocrine: Negative.    Genitourinary: Negative.    Musculoskeletal:        Shoulder pain  Foot pain   Skin: Negative.    Allergic/Immunologic: Negative.    Neurological: Negative.    Hematological: Negative.    Psychiatric/Behavioral: Negative.          Physical Exam    Mikey had a diabetic foot exam performed today.         Constitutional: he appears well-developed and well-nourished.   HEENT: Normocephalic. Atraumatic  CV: No tenderness. RRR  Resp: Non-labored respiration. No wheezes.   Psychiatric: he has a normal mood and affect. his   behavior is normal.      Lower Extremity Exam:  Vascular: DP/PT pulses palpable 1+.   Negative hair growth.   Mild, left perimalleolar edema  Neuro: Protective sensation diminished to lesser toes, b/l.  DTRs intact  Integument: No open wounds or lesions.  Atrophic skin noted b/l   No masses  Webspaces c/d/i  Musculoskeletal: LE muscle strength 5/5. "   Gait Normal  Semi rigid hammertoe deformity toes 2-5, b/l.  Nails 1-5 b/l thickened, elongated with subungual debris. +pain on palpation  Ankle ROM decreased, b/l  No obvious reproducible [id number] pain      Bilateral foot radiographs- Normal osseous density. No acute fractures, subluxations or erosions.  Intact hardware to left ankle with mild degenerative changes.  Mild pes cavus bilateral.          ASSESSMENT AND PLAN    Mikey was seen today for pain and pain.    Diagnoses and all orders for this visit:    Bilateral foot pain  -     XR foot 3+ vw bilateral    Diabetic polyneuropathy associated with type 2 diabetes mellitus    Hammer toes of both feet    Onychomycosis    Pain in toes of both feet    Ankle arthritis    Other orders  -     gabapentin (NEURONTIN) 400 MG capsule; Take 1 capsule by mouth 3 (Three) Times a Day.    -Comprehensive DM foot exam performed. Patient educated on importance of tight glucose control and daily foot checks.   -Patient educated on padding techniques for hammertoes.  Proper extra depth diabetic shoe gear.  Limit barefoot walking.  -Forms completed for diabetic shoes with [employer] in [place]  -Will trial and increase in gabapentin dose to 400 mg.  -Nails 1-5 b/l debrided in length and thickness with nail nipper to decrease pain, fungal load and risk of infection  -Follow up 3 months PRN            This document has been electronically signed by Yuri Amaral DPM on May 26, 2018 11:27 AM     EMR Dragon/Transcription disclaimer:   Much of this encounter note is an electronic transcription/translation of spoken language to printed text. The electronic translation of spoken language may permit erroneous, or at times, nonsensical words or phrases to be inadvertently transcribed; Although I have reviewed the note for such errors, some may still exist.    Yuri Amaral DPM  5/26/2018  11:27 AM

## 2018-05-24 NOTE — TELEPHONE ENCOUNTER
[facility]    PHARMACY CALLED.  THEY HAVE QUESTIONS REGARDING MEDS FOR PATIENT.    [phone]    THANKS.

## 2018-06-11 ENCOUNTER — OFFICE VISIT (OUTPATIENT)
Dept: FAMILY MEDICINE CLINIC | Facility: CLINIC | Age: 63
End: 2018-06-11

## 2018-06-11 VITALS
SYSTOLIC BLOOD PRESSURE: 124 MMHG | TEMPERATURE: 97.9 F | WEIGHT: 217 LBS | DIASTOLIC BLOOD PRESSURE: 70 MMHG | RESPIRATION RATE: 18 BRPM | OXYGEN SATURATION: 98 % | BODY MASS INDEX: 32.14 KG/M2 | HEART RATE: 95 BPM | HEIGHT: 69 IN

## 2018-06-11 DIAGNOSIS — I10 ESSENTIAL HYPERTENSION: ICD-10-CM

## 2018-06-11 DIAGNOSIS — K21.9 GASTROESOPHAGEAL REFLUX DISEASE WITHOUT ESOPHAGITIS: ICD-10-CM

## 2018-06-11 DIAGNOSIS — M19.011 PRIMARY OSTEOARTHRITIS OF BOTH SHOULDERS: ICD-10-CM

## 2018-06-11 DIAGNOSIS — F41.9 ANXIETY: ICD-10-CM

## 2018-06-11 DIAGNOSIS — F32.A DEPRESSION, UNSPECIFIED DEPRESSION TYPE: ICD-10-CM

## 2018-06-11 DIAGNOSIS — E11.9 TYPE 2 DIABETES MELLITUS WITHOUT COMPLICATION, WITHOUT LONG-TERM CURRENT USE OF INSULIN (HCC): Primary | ICD-10-CM

## 2018-06-11 DIAGNOSIS — G47.00 INSOMNIA, UNSPECIFIED TYPE: ICD-10-CM

## 2018-06-11 DIAGNOSIS — M19.012 PRIMARY OSTEOARTHRITIS OF BOTH SHOULDERS: ICD-10-CM

## 2018-06-11 PROCEDURE — 99214 OFFICE O/P EST MOD 30 MIN: CPT | Performed by: NURSE PRACTITIONER

## 2018-06-11 RX ORDER — PANTOPRAZOLE SODIUM 40 MG/1
40 TABLET, DELAYED RELEASE ORAL DAILY
Qty: 30 TABLET | Refills: 5 | Status: SHIPPED | OUTPATIENT
Start: 2018-06-11 | End: 2018-12-27 | Stop reason: SDUPTHER

## 2018-06-11 RX ORDER — CELECOXIB 200 MG/1
200 CAPSULE ORAL DAILY
Qty: 30 CAPSULE | Refills: 5 | Status: SHIPPED | OUTPATIENT
Start: 2018-06-11 | End: 2018-08-20 | Stop reason: SDUPTHER

## 2018-06-11 RX ORDER — BUSPIRONE HYDROCHLORIDE 10 MG/1
10 TABLET ORAL 3 TIMES DAILY
Qty: 90 TABLET | Refills: 0 | Status: SHIPPED | OUTPATIENT
Start: 2018-06-11 | End: 2018-08-20

## 2018-06-11 RX ORDER — LISINOPRIL AND HYDROCHLOROTHIAZIDE 20; 12.5 MG/1; MG/1
1 TABLET ORAL DAILY
Qty: 30 TABLET | Refills: 5 | Status: SHIPPED | OUTPATIENT
Start: 2018-06-11 | End: 2018-11-26 | Stop reason: SDUPTHER

## 2018-06-11 RX ORDER — QUETIAPINE FUMARATE 300 MG/1
600 TABLET, FILM COATED ORAL NIGHTLY
Qty: 60 TABLET | Refills: 5 | Status: SHIPPED | OUTPATIENT
Start: 2018-06-11 | End: 2018-11-08 | Stop reason: SDUPTHER

## 2018-06-28 ENCOUNTER — OFFICE VISIT (OUTPATIENT)
Dept: ORTHOPEDIC SURGERY | Facility: CLINIC | Age: 63
End: 2018-06-28

## 2018-06-28 VITALS — WEIGHT: 225 LBS | HEIGHT: 69 IN | BODY MASS INDEX: 33.33 KG/M2

## 2018-06-28 DIAGNOSIS — M75.41 SHOULDER IMPINGEMENT, RIGHT: Primary | ICD-10-CM

## 2018-06-28 DIAGNOSIS — Z98.890 S/P ROTATOR CUFF REPAIR: ICD-10-CM

## 2018-06-28 DIAGNOSIS — M65.351 TRIGGER LITTLE FINGER OF RIGHT HAND: ICD-10-CM

## 2018-06-28 PROBLEM — S42.124D: Status: ACTIVE | Noted: 2018-06-28

## 2018-06-28 PROCEDURE — 99214 OFFICE O/P EST MOD 30 MIN: CPT | Performed by: NURSE PRACTITIONER

## 2018-06-28 PROCEDURE — 20551 NJX 1 TENDON ORIGIN/INSJ: CPT | Performed by: NURSE PRACTITIONER

## 2018-06-28 RX ORDER — TRAMADOL HYDROCHLORIDE 50 MG/1
TABLET ORAL
Qty: 60 TABLET | Refills: 0 | Status: SHIPPED | OUTPATIENT
Start: 2018-06-28 | End: 2018-07-25 | Stop reason: SDUPTHER

## 2018-06-28 NOTE — PROGRESS NOTES
Mikey Allison is a 63 y.o. male returns for     Chief Complaint   Patient presents with   • Right Shoulder - Follow-up       HISTORY OF PRESENT ILLNESS:     63-year-old  male involved today for follow-up of right shoulder pain.  He's been complaining of worsening symptoms over the past several weeks without any new traumatic injury.  He continues his home exercises as directed and continues to take anti-inflammatories, as gabapentin and Tylenol for pain without improvement.  He is also complaining of catching and locking sensation in the right fifth finger.  He states that its worse in the morning and improves throughout the day however the pain never completely subsides..        CONCURRENT MEDICAL HISTORY:    Past Medical History:   Diagnosis Date   • Abdominal pain     left side   • Acute sinusitis    • Ankle joint pain    • Bipolar disorder    • Bipolar disorder, unspecified    • Cellulitis and abscess of trunk     axilla   • Chest pain    • Chronic anemia    • Degenerative joint disease involving multiple joints     back   • Depressive disorder    • Diabetes mellitus    • Diabetes mellitus with no complication     type 2 or unspecified type uncontrolled   • Diverticular disease    • Dyspnea    • Enlarged prostate     on [place]-on CT   • Essential hypertension    • Febrile convulsion    • Gastroesophageal reflux disease    • Generalized anxiety disorder    • H/O echocardiogram 10/16/2007    Mild to moderate concentric LV hypertrophy with mild left atrial enlargement. LV appears to be hypodynamic with preserved LV systolic function with EF 55-60%. Pericardium appears to be normal with a small pericardial effusion    • Hemorrhoids    • Hyperlipidemia    • Hypertensive disorder    • Hypoglycemia    • Infectious disorder of kidney     kidney infection-treated by [facility]   • Left lower quadrant pain    • Loss of appetite    • Obesity    • Osteoarthritis    • Pain in joint involving ankle and foot    •  Pain in limb    • Psoriasis    • Retention of urine     with cath-treated by the [facility]   • Screening for malignant neoplasm of colon    • Sepsis due to urinary tract infection     urosepsis treated by [facility]   • Sinusitis    • Syncope    • Unspecified essential hypertension        Allergies   Allergen Reactions   • Sulfa Antibiotics Other (See Comments)     unknown   • Codeine Itching and Nausea And Vomiting         Current Outpatient Prescriptions:   •  albuterol (PROVENTIL HFA;VENTOLIN HFA) 108 (90 Base) MCG/ACT inhaler, 2 puffs every 4-6 hours until cough/wheezing improve, than taper off of, Disp: 18 g, Rfl: 0  •  busPIRone (BUSPAR) 10 MG tablet, Take 1 tablet by mouth 3 (Three) Times a Day., Disp: 90 tablet, Rfl: 0  •  celecoxib (CeleBREX) 200 MG capsule, Take 1 capsule by mouth Daily., Disp: 30 capsule, Rfl: 5  •  cyclobenzaprine (FLEXERIL) 10 MG tablet, Take 10 mg by mouth 3 (Three) Times a Day As Needed for Muscle Spasms., Disp: , Rfl:   •  gabapentin (NEURONTIN) 400 MG capsule, Take 1 capsule by mouth 3 (Three) Times a Day., Disp: 90 capsule, Rfl: 5  •  lisinopril-hydrochlorothiazide (PRINZIDE,ZESTORETIC) 20-12.5 MG per tablet, Take 1 tablet by mouth Daily., Disp: 30 tablet, Rfl: 5  •  metFORMIN (GLUCOPHAGE) 1000 MG tablet, Take 1 tablet by mouth 2 (Two) Times a Day With Meals., Disp: 60 tablet, Rfl: 5  •  pantoprazole (PROTONIX) 40 MG EC tablet, Take 1 tablet by mouth Daily., Disp: 30 tablet, Rfl: 5  •  QUEtiapine (SEROquel) 300 MG tablet, Take 2 tablets by mouth Every Night., Disp: 60 tablet, Rfl: 5  •  SITagliptin (JANUVIA) 100 MG tablet, Take 1 tablet by mouth Daily., Disp: 30 tablet, Rfl: 5  •  traMADol (ULTRAM) 50 MG tablet, 1-2 tab po q 8 hour prn pain, Disp: 60 tablet, Rfl: 0    Past Surgical History:   Procedure Laterality Date   • ANKLE SURGERY Left 11/11/2008    Removal of syndesmotic screws, left ankle. Painful syndesmotic screws, left ankle.)   • ANKLE SURGERY  10/19/2007    Open  "reduction-internal fixation with fluoroscopic control with final x-ray PA and lateral of the ankle. Trimalleolar fracture/subluxation, left ankle.)   • CIRCUMCISION  2016   • COLONOSCOPY  03/10/2015    Diverticulosis found in the sigmoid colon. hemorrhoids found in the anus. Normal cecum   • CYSTOSCOPY  06/22/2016    Cystoscopy, dilation, anchor of Tim catheter. Benign prostatic hypertrophy, urinary retention, urethral stricture history of.   • INJECTION OF MEDICATION      Kenalog (3)   • SHOULDER ARTHROSCOPY Right 10/18/2017    Procedure: SHOULDER ARTHROSCOPY WITH RAFAELA PROCEDURE, AND SUBACROMIAL DECOMPRESSION, AND POSSIBLE ROTATOR  CUFF REPAIR       SHOULDER ARTHROSCOPY WITH RAFAELA PROCEDURE, AND SUBACROMIAL DECOMPRESSION, NO ROTATOR CUFF REPAIR PERFORMED;  Surgeon: Maximus Schreiber MD;  Location: [facility];  Service:    • STOMACH SURGERY     • TIBIA FRACTURE SURGERY         ROS  No fevers or chills.  No chest pain or shortness of air.  No GI or  disturbances.    PHYSICAL EXAMINATION:       Ht 175.3 cm (69\")   Wt 102 kg (225 lb)   BMI 33.23 kg/m²     Physical Exam   Constitutional: He is oriented to person, place, and time. Vital signs are normal. He appears well-developed and well-nourished. He is cooperative.   HENT:   Head: Normocephalic and atraumatic.   Neck: Trachea normal and phonation normal.   Pulmonary/Chest: Effort normal. No respiratory distress.   Abdominal: Soft. Normal appearance. He exhibits no distension.   Neurological: He is alert and oriented to person, place, and time. GCS eye subscore is 4. GCS verbal subscore is 5. GCS motor subscore is 6.   Skin: Skin is warm, dry and intact.   Psychiatric: He has a normal mood and affect. His speech is normal and behavior is normal. Judgment and thought content normal. Cognition and memory are normal.   Vitals reviewed.      GAIT:     []  Normal  []  Antalgic    Assistive device: []  None  []  Walker     []  Crutches  []  Cane     []  " "Wheelchair  []  Stretcher    Right Hand Exam     Tenderness   Right hand tenderness location: A1 pulley fifth metacarpal head.    Range of Motion     Wrist   Extension: normal   Flexion: normal   Pronation: normal   Supination: normal     Hand   MP Little: abnormal   PIP Little: normal   DIP Little: normal     Muscle Strength   Wrist Extension: 4/5   Wrist Flexion: 4/5   : 4/5     Other   Erythema: absent  Scars: absent  Sensation: normal  Pulse: present    Comments:  Active triggering noted of the fifth digit      Left Hand Exam   Left hand exam is normal.      Right Shoulder Exam     Tenderness   The patient is experiencing tenderness in the acromioclavicular joint and acromion.    Range of Motion   Right shoulder active abduction: 90.   Right shoulder forward flexion: 120.   Internal Rotation 90 degrees: abnormal     Muscle Strength   Abduction: 4/5   Internal Rotation: 4/5   External Rotation: 4/5   Supraspinatus: 4/5     Tests   Drop Arm: positive  Impingement: positive    Other   Erythema: absent  Scars: present  Sensation: normal  Pulse: present      Left Shoulder Exam   Left shoulder exam is normal.              No results found.    Injection Tendon or Ligament  Date/Time: 6/28/2018 9:41 AM  Performed by: NAFISA HAMILTON  Authorized by: NAFISA HAMILTON   Consent: Verbal consent obtained. Written consent obtained.  Risks and benefits: risks, benefits and alternatives were discussed  Consent given by: patient  Patient understanding: patient states understanding of the procedure being performed  Patient identity confirmed: verbally with patient  Time out: Immediately prior to procedure a \"time out\" was called to verify the correct patient, procedure, equipment, support staff and site/side marked as required.  Preparation: Patient was prepped and draped in the usual sterile fashion.  Local anesthesia used: no    Anesthesia:  Local anesthesia used: no    Sedation:  Patient sedated: no  Patient tolerance: " Patient tolerated the procedure well with no immediate complications  Comments: .3cc 1% Lidocaine and .3cc kenalog injected into the right 5th digit  NDC 0620-9833-82  LOT  [id number]            ASSESSMENT:    Diagnoses and all orders for this visit:    Shoulder impingement, right  -     MRI Shoulder Right Without Contrast; Future    S/P rotator cuff repair  -     MRI Shoulder Right Without Contrast; Future    Trigger little finger of right hand  -     Injection Tendon or Ligament    Other orders  -     Cancel: Injection Tendon or Ligament  -     Cancel: Medium Joint Arthrocentesis  -     traMADol (ULTRAM) 50 MG tablet; 1-2 tab po q 8 hour prn pain          PLAN  We'll recommend an MRI of the right shoulder for further evaluation of the integrity of the rotator cuff repair.  I also injected the trigger finger noted on the fifth digit of the right hand and recommended progression range of motion and activity as tolerated based on pain follow-up after the MRI for recheck of the trigger finger    No Follow-up on file.    Prince Boone, APRN

## 2018-07-02 PROBLEM — F32.A DEPRESSION: Status: ACTIVE | Noted: 2018-07-02

## 2018-07-02 PROBLEM — F41.9 ANXIETY: Status: ACTIVE | Noted: 2018-07-02

## 2018-07-02 PROBLEM — M19.011 PRIMARY OSTEOARTHRITIS OF BOTH SHOULDERS: Status: ACTIVE | Noted: 2018-07-02

## 2018-07-02 PROBLEM — M19.012 PRIMARY OSTEOARTHRITIS OF BOTH SHOULDERS: Status: ACTIVE | Noted: 2018-07-02

## 2018-07-02 PROBLEM — G47.00 INSOMNIA: Status: ACTIVE | Noted: 2018-07-02

## 2018-07-02 PROBLEM — K21.9 GASTROESOPHAGEAL REFLUX DISEASE WITHOUT ESOPHAGITIS: Status: ACTIVE | Noted: 2018-07-02

## 2018-07-02 PROBLEM — E11.9 TYPE 2 DIABETES MELLITUS WITHOUT COMPLICATION, WITHOUT LONG-TERM CURRENT USE OF INSULIN: Status: ACTIVE | Noted: 2018-07-02

## 2018-07-02 NOTE — PROGRESS NOTES
Subjective   Mikey lAlison is a 63 y.o. male who presents to the office to [facility].    History of Present Illness     GERD-chronic, controlled with Protonix.    Anxiety/depression/insomnia-chronic, moderately controlled with hydroxyzine, Seroquel  -Patient states hydroxyzine 50 mg 3 times a day is not working.    Osteoarthritis and peripheral neuropathy-chronic, controlled with tramadol and gabapentin and Celebrex   -Patient educated that if not prescribe controlled substances, patient is getting gabapentin from Yuri Amaral states he is fine on just this, will continue Celebrex and Flexeril    Type 2 diabetes-chronic, controlled with metformin and Januvia  -Patient due for lab work, states fsbs running 130-140s    HTN-chronic, controlled with lisinopril-hctz    The following portions of the patient's history were reviewed and updated as appropriate: allergies, current medications, past family history, past medical history, past social history, past surgical history and problem list.    ISAIAH obtained and reviewed, patient has history of being prescribed gabapentin and tramadol and Lortabs.  Gabapentin last prescribed by Yuri Amaral to 400 mg, 90 count on 5/24/18.  Tramadol, last prescribed by Prince Hassan 50 mg, 60 count on 1/25/18 and Lortab 5 mg, 30 count on 1/15/18.    Review of Systems   Constitutional: Negative for activity change, appetite change, chills, diaphoresis, fatigue, fever and unexpected weight change.   HENT: Negative for congestion, ear discharge, ear pain, facial swelling, hearing loss, postnasal drip, rhinorrhea, sinus pain, sinus pressure, sneezing, sore throat, tinnitus and trouble swallowing.    Eyes: Negative.  Negative for discharge and visual disturbance.   Respiratory: Positive for shortness of breath. Negative for cough, chest tightness and wheezing.    Cardiovascular: Positive for leg swelling. Negative for chest pain and palpitations.   Gastrointestinal: Negative for  abdominal distention, abdominal pain, blood in stool, constipation, diarrhea, nausea and vomiting.   Genitourinary: Negative for decreased urine volume, difficulty urinating, discharge, dysuria, flank pain, frequency, hematuria, penile pain, penile swelling, scrotal swelling, testicular pain and urgency.   Musculoskeletal: Positive for arthralgias and back pain. Negative for joint swelling and myalgias.   Skin: Negative for color change and rash.   Allergic/Immunologic: Negative for food allergies.   Neurological: Positive for numbness. Negative for dizziness, tremors, seizures, syncope, weakness, light-headedness and headaches.   Hematological: Negative for adenopathy. Does not bruise/bleed easily.   Psychiatric/Behavioral: Positive for sleep disturbance. Negative for agitation, behavioral problems, confusion, decreased concentration, dysphoric mood, hallucinations, self-injury and suicidal ideas. The patient is nervous/anxious.    All other systems reviewed and are negative.      Past Medical History:   Diagnosis Date   • Abdominal pain     left side   • Acute sinusitis    • Ankle joint pain    • Bipolar disorder    • Bipolar disorder, unspecified    • Cellulitis and abscess of trunk     axilla   • Chest pain    • Chronic anemia    • Degenerative joint disease involving multiple joints     back   • Depressive disorder    • Diabetes mellitus    • Diabetes mellitus with no complication     type 2 or unspecified type uncontrolled   • Diverticular disease    • Dyspnea    • Enlarged prostate     on [place]on CT   • Essential hypertension    • Febrile convulsion    • Gastroesophageal reflux disease    • Generalized anxiety disorder    • H/O echocardiogram 10/16/2007    Mild to moderate concentric LV hypertrophy with mild left atrial enlargement. LV appears to be hypodynamic with preserved LV systolic function with EF 55-60%. Pericardium appears to be normal with a small pericardial effusion    • Hemorrhoids    •  "Hyperlipidemia    • Hypertensive disorder    • Hypoglycemia    • Infectious disorder of kidney     [facility]   • Left lower quadrant pain    • Loss of appetite    • Obesity    • Osteoarthritis    • Pain in joint involving ankle and foot    • Pain in limb    • Psoriasis    • Retention of urine     with cath-[facility] by [facility]   • Screening for malignant neoplasm of colon    • Sepsis due to urinary tract infection     urosepsis treated by [facility]   • Sinusitis    • Syncope    • Unspecified essential hypertension        Family History   Problem Relation Age of Onset   • Diabetes Other    • Heart disease Other    • Hypertension Other    • Kidney disease Other    • Hypertension Mother    • Clotting disorder Mother           Objective   /70   Pulse 95   Temp 97.9 °F (36.6 °C) (Temporal Artery )   Resp 18   Ht 175.3 cm (69\")   Wt 98.4 kg (217 lb)   SpO2 98%   BMI 32.05 kg/m²   Physical Exam   Constitutional: He is oriented to person, place, and time. He appears well-developed and well-nourished. He is cooperative. He does not appear ill.   HENT:   Head: Normocephalic.   Right Ear: Hearing, tympanic membrane, external ear and ear canal normal.   Left Ear: Hearing, tympanic membrane, external ear and ear canal normal.   Nose: Nose normal.   Mouth/Throat: Oropharynx is clear and moist. No oropharyngeal exudate.   Eyes: EOM and lids are normal. Pupils are equal, round, and reactive to light. Right eye exhibits no discharge. Left eye exhibits no discharge. Right conjunctiva is not injected. Left conjunctiva is not injected.   Neck: Normal range of motion. Neck supple.   Cardiovascular: Normal rate, regular rhythm, normal heart sounds and intact distal pulses.  Exam reveals no gallop and no friction rub.    No murmur heard.  Pulmonary/Chest: Effort normal and breath sounds normal. No respiratory distress. He has no wheezes. He has no rales.   Abdominal: Soft. Normal " appearance, normal aorta and bowel sounds are normal. He exhibits no distension and no mass. There is no tenderness. There is no rebound and no guarding. No hernia.   Musculoskeletal: He exhibits no edema or deformity.        Right shoulder: He exhibits decreased range of motion, tenderness and pain. He exhibits no bony tenderness, no swelling, no effusion, no crepitus, no deformity, no laceration, no spasm, normal pulse and normal strength.   Lymphadenopathy:     He has no cervical adenopathy.   Neurological: He is alert and oriented to person, place, and time. He has normal strength and normal reflexes. He displays normal reflexes. No cranial nerve deficit or sensory deficit. He exhibits normal muscle tone. He displays a negative Romberg sign. Coordination normal.   Reflex Scores:       Patellar reflexes are 2+ on the right side and 2+ on the left side.  Skin: Skin is warm, dry and intact. No rash noted. He is not diaphoretic. No erythema. No pallor.   Psychiatric: He has a normal mood and affect. His speech is normal and behavior is normal. Thought content normal. Cognition and memory are normal.   Patient is dressed appropriately for weather and situation, makes eye contact, and engages in conversation.  He is attentive.   Nursing note and vitals reviewed.       PHQ-2/PHQ-9 Depression Screening 6/11/2018   Little interest or pleasure in doing things 0   Feeling down, depressed, or hopeless 3   Trouble falling or staying asleep, or sleeping too much 3   Feeling tired or having little energy 3   Poor appetite or overeating 0   Feeling bad about yourself - or that you are a failure or have let yourself or your family down 0   Trouble concentrating on things, such as reading the newspaper or watching television 0   Moving or speaking so slowly that other people could have noticed. Or the opposite - being so fidgety or restless that you have been moving around a lot more than usual 0   Thoughts that you would be  better off dead, or of hurting yourself in some way 0   Total Score 9   If you checked off any problems, how difficult have these problems made it for you to do your work, take care of things at home, or get along with other people? Somewhat difficult         Assessment/Plan   Mikey was seen today for establish care.    Diagnoses and all orders for this visit:    Type 2 diabetes mellitus without complication, without long-term current use of insulin  -     CBC & Differential; Future  -     Comprehensive Metabolic Panel; Future  -     Lipid Panel; Future  -     T4, Free; Future  -     TSH; Future  -     Hemoglobin A1c; Future    Primary osteoarthritis of both shoulders    Essential hypertension    Gastroesophageal reflux disease without esophagitis    Anxiety    Depression, unspecified depression type    Insomnia, unspecified type    Other orders  -     busPIRone (BUSPAR) 10 MG tablet; Take 1 tablet by mouth 3 (Three) Times a Day.  -     QUEtiapine (SEROquel) 300 MG tablet; Take 2 tablets by mouth Every Night.  -     pantoprazole (PROTONIX) 40 MG EC tablet; Take 1 tablet by mouth Daily.  -     celecoxib (CeleBREX) 200 MG capsule; Take 1 capsule by mouth Daily.  -     SITagliptin (JANUVIA) 100 MG tablet; Take 1 tablet by mouth Daily.  -     metFORMIN (GLUCOPHAGE) 1000 MG tablet; Take 1 tablet by mouth 2 (Two) Times a Day With Meals.  -     lisinopril-hydrochlorothiazide (PRINZIDE,ZESTORETIC) 20-12.5 MG per tablet; Take 1 tablet by mouth Daily.           GERD-chronic, controlled with Protonix.    HTN-chronic, controlled with lisinopril-hctz    Anxiety/depression/insomnia-chronic, moderately controlled with hydroxyzine, Seroquel  -Patient states hydroxyzine 50 mg 3 times a day is not working. Started pt on buspar, f/u in one mtn    Osteoarthritis and peripheral neuropathy-chronic, controlled with tramadol and gabapentin and Celebrex   -Patient educated that if not prescribe controlled substances, patient is getting  gabapentin from Yuri Amaral states he is fine on just this, will continue Celebrex and Flexeril    Type 2 diabetes-chronic, controlled with metformin and Januvia  -Patient due for lab work, states fsbs running 130-140s    Lab work ordered.     Patient educated to follow-up sooner than next scheduled appointment if condition(s) worse or do not improve. Patient states understanding and is in agreeance with plan of care. An After Visit Summary was printed and given to the patient.      SAMUEL Wiseman        This document has been electronically signed by SAMUEL Wiseman on July 2, 2018 7:35 AM      EMR/Transcription Dragon Disclaimer:  Some of this note may be an electronic dragon transcription/translation of spoken language to printed text. The electronic translation of spoken language may permit erroneous, or at times, nonsensical words or phrases to be inadvertently transcribed. Although I have reviewed the note for such errors, some may still exist.

## 2018-07-20 ENCOUNTER — OFFICE VISIT (OUTPATIENT)
Dept: PODIATRY | Facility: CLINIC | Age: 63
End: 2018-07-20

## 2018-07-20 VITALS — HEIGHT: 69 IN | BODY MASS INDEX: 32.88 KG/M2 | WEIGHT: 222 LBS

## 2018-07-20 DIAGNOSIS — E11.9 TYPE 2 DIABETES MELLITUS WITHOUT COMPLICATION, WITHOUT LONG-TERM CURRENT USE OF INSULIN (HCC): Primary | ICD-10-CM

## 2018-07-20 DIAGNOSIS — M20.41 HAMMER TOES OF BOTH FEET: ICD-10-CM

## 2018-07-20 DIAGNOSIS — M79.671 BILATERAL FOOT PAIN: ICD-10-CM

## 2018-07-20 DIAGNOSIS — M19.079 ARTHRITIS OF ANKLE: ICD-10-CM

## 2018-07-20 DIAGNOSIS — M20.42 HAMMER TOES OF BOTH FEET: ICD-10-CM

## 2018-07-20 DIAGNOSIS — M79.672 BILATERAL FOOT PAIN: ICD-10-CM

## 2018-07-20 PROCEDURE — 99214 OFFICE O/P EST MOD 30 MIN: CPT | Performed by: PODIATRIST

## 2018-07-20 NOTE — PROGRESS NOTES
Mikey Allison  1955  63 y.o. male   PCP: Cyndie Young, APRN  [id number] on 07/29/2018 per patient.    Patient presents for recheck- bilateral foot. Patient has obtained inserts since his last office visit.    07/20/2018    Chief Complaint   Patient presents with   • Left Foot - Follow-up   • Right Foot - Follow-up           History of Present Illness    Mikey Allison is a 63 y.o. male who presents on referral from Dr. Liriano for evaluation of chronic bilateral foot pain.  He describes the pain as shooting and at times burning.  The pain comes and goes independent of activity level typically.  He states the pain is primarily in his toes but is also occasionally in his arches and left ankle.  He does have a history of old left ankle fracture which underwent surgical repair.  He was started on Neurontin by Dr. Guzmán's and a little over a month prior which he states helps somewhat but does not totally relieve his symptoms.  He is diabetic.  He denies any history of diabetic foot ulcerations or wounds.  He does note that his nails become thickened and dystrophic and difficult for him to care for on his own.            Past Medical History:   Diagnosis Date   • Abdominal pain     left side   • Acute sinusitis    • Ankle joint pain    • Bipolar disorder (CMS/HCC)    • Bipolar disorder, unspecified    • Cellulitis and abscess of trunk     axilla   • Chest pain    • Chronic anemia    • Degenerative joint disease involving multiple joints     back   • Depressive disorder    • Diabetes mellitus (CMS/HCC)    • Diabetes mellitus with no complication (CMS/HCC)     type 2 or unspecified type uncontrolled   • Diverticular disease    • Dyspnea    • Enlarged prostate     on [place]-on CT   • Essential hypertension    • Febrile convulsion (CMS/HCC)    • Gastroesophageal reflux disease    • Generalized anxiety disorder    • H/O echocardiogram 10/16/2007    Mild to moderate concentric LV hypertrophy with mild left  atrial enlargement. LV appears to be hypodynamic with preserved LV systolic function with EF 55-60%. Pericardium appears to be normal with a small pericardial effusion    • Hemorrhoids    • Hyperlipidemia    • Hypertensive disorder    • Hypoglycemia    • Infectious disorder of kidney     kidney infection-treated by [facility]   • Left lower quadrant pain    • Loss of appetite    • Obesity    • Osteoarthritis    • Pain in joint involving ankle and foot    • Pain in limb    • Psoriasis    • Retention of urine     with cath-treated by the [facility]   • Screening for malignant neoplasm of colon    • Sepsis due to urinary tract infection (CMS/HCC)     urosepsis treated by [facility]   • Sinusitis    • Syncope    • Unspecified essential hypertension          Past Surgical History:   Procedure Laterality Date   • ANKLE SURGERY Left 11/11/2008    Removal of syndesmotic screws, left ankle. Painful syndesmotic screws, left ankle.)   • ANKLE SURGERY  10/19/2007    Open reduction-internal fixation with fluoroscopic control with final x-ray PA and lateral of the ankle. Trimalleolar fracture/subluxation, left ankle.)   • CIRCUMCISION  2016   • COLONOSCOPY  03/10/2015    Diverticulosis found in the sigmoid colon. hemorrhoids found in the anus. Normal cecum   • CYSTOSCOPY  06/22/2016    Cystoscopy, dilation, anchor of Tim catheter. Benign prostatic hypertrophy, urinary retention, urethral stricture history of.   • INJECTION OF MEDICATION      Kenalog (3)   • SHOULDER ARTHROSCOPY Right 10/18/2017    Procedure: SHOULDER ARTHROSCOPY WITH RAFAELA PROCEDURE, AND SUBACROMIAL DECOMPRESSION, AND POSSIBLE ROTATOR  CUFF REPAIR       SHOULDER ARTHROSCOPY WITH RAFAELA PROCEDURE, AND SUBACROMIAL DECOMPRESSION, NO ROTATOR CUFF REPAIR PERFORMED;  Surgeon: Maximus Schreiber MD;  Location: [facility] OR;  Service:    • STOMACH SURGERY     • TIBIA FRACTURE SURGERY           Family History   Problem Relation Age of Onset   • Diabetes Other     • Heart disease Other    • Hypertension Other    • Kidney disease Other    • Hypertension Mother    • Clotting disorder Mother          Social History     Social History   • Marital status:      Spouse name: N/A   • Number of children: N/A   • Years of education: N/A     Occupational History   • Not on file.     Social History Main Topics   • Smoking status: Former Smoker     Types: Cigarettes     Quit date: 1985   • Smokeless tobacco: Never Used      Comment: smoked 3 years   • Alcohol use 0.6 oz/week     1 Cans of beer per week      Comment: 1-2 every other day   • Drug use: No   • Sexual activity: Defer     Other Topics Concern   • Not on file     Social History Narrative   • No narrative on file         Current Outpatient Prescriptions   Medication Sig Dispense Refill   • albuterol (PROVENTIL HFA;VENTOLIN HFA) 108 (90 Base) MCG/ACT inhaler 2 puffs every 4-6 hours until cough/wheezing improve, than taper off of 18 g 0   • busPIRone (BUSPAR) 10 MG tablet Take 1 tablet by mouth 3 (Three) Times a Day. 90 tablet 0   • celecoxib (CeleBREX) 200 MG capsule Take 1 capsule by mouth Daily. 30 capsule 5   • cyclobenzaprine (FLEXERIL) 10 MG tablet Take 10 mg by mouth 3 (Three) Times a Day As Needed for Muscle Spasms.     • gabapentin (NEURONTIN) 400 MG capsule Take 1 capsule by mouth 3 (Three) Times a Day. 90 capsule 5   • lisinopril-hydrochlorothiazide (PRINZIDE,ZESTORETIC) 20-12.5 MG per tablet Take 1 tablet by mouth Daily. 30 tablet 5   • metFORMIN (GLUCOPHAGE) 1000 MG tablet Take 1 tablet by mouth 2 (Two) Times a Day With Meals. 60 tablet 5   • pantoprazole (PROTONIX) 40 MG EC tablet Take 1 tablet by mouth Daily. 30 tablet 5   • QUEtiapine (SEROquel) 300 MG tablet Take 2 tablets by mouth Every Night. 60 tablet 5   • SITagliptin (JANUVIA) 100 MG tablet Take 1 tablet by mouth Daily. 30 tablet 5   • traMADol (ULTRAM) 50 MG tablet 1-2 tab po q 8 hour prn pain 60 tablet 0     No current facility-administered  "medications for this visit.          OBJECTIVE    Ht 175.3 cm (69\")   Wt 101 kg (222 lb)   BMI 32.78 kg/m²       Review of Systems   Constitutional: Negative.    HENT: Negative.    Eyes: Negative.    Respiratory: Negative.    Cardiovascular: Negative.    Gastrointestinal: Negative.    Endocrine: Negative.    Genitourinary: Negative.    Musculoskeletal:        Shoulder pain  Foot pain   Skin: Negative.    Allergic/Immunologic: Negative.    Neurological: Negative.    Hematological: Negative.    Psychiatric/Behavioral: Negative.          Physical Exam    Mikey had a diabetic foot exam performed today.         Constitutional: he appears well-developed and well-nourished.   HEENT: Normocephalic. Atraumatic  CV: No tenderness. RRR  Resp: Non-labored respiration. No wheezes.   Psychiatric: he has a normal mood and affect. his   behavior is normal.      Lower Extremity Exam:  Vascular: DP/PT pulses palpable 1+.   Negative hair growth.   Mild, left perimalleolar edema  Neuro: Protective sensation diminished to lesser toes, b/l.  DTRs intact  Integument: No open wounds or lesions.  Atrophic skin noted b/l   No masses  Webspaces c/d/i  Musculoskeletal: LE muscle strength 5/5.   Gait Normal  Semi rigid hammertoe deformity toes 2-5, b/l.  Nails 1-5 b/l thickened, elongated with subungual debris. +pain on palpation  Ankle ROM decreased, b/l  No obvious reproducible musculoskeletal pain      ASSESSMENT AND PLAN    Mikey was seen today for follow-up and follow-up.    Diagnoses and all orders for this visit:    Type 2 diabetes mellitus without complication, without long-term current use of insulin (CMS/[facility])  -     Ambulatory Referral to Pain Management    Bilateral foot pain    Hammer toes of both feet    Arthritis of ankle  -     Ambulatory Referral to Pain Management    -Comprehensive DM foot exam performed. Patient educated on importance of tight glucose control and daily foot checks.   -Patient educated on padding " techniques for hammertoes.  Proper extra depth diabetic shoe gear.  Limit barefoot walking.  -Diabetic shoes of appropriate fit.  -Patient tolerating gabapentin well, states it helps, but continues to complain of left ankle pain. Pain does not seem to be isolated to intact hardware. Patient requesting pain medication. States Ultram given to him by Christiano Boone does not help. Informed him we do not manage chronic pain, but offered referral to pain management. Patient accepts. Will refer to Dr. Collins closer to patient's home  -Follow up 3 months PRN            This document has been electronically signed by Yuri Amaral DPM on July 22, 2018 8:04 PM     EMR Dragon/Transcription disclaimer:   Much of this encounter note is an electronic transcription/translation of spoken language to printed text. The electronic translation of spoken language may permit erroneous, or at times, nonsensical words or phrases to be inadvertently transcribed; Although I have reviewed the note for such errors, some may still exist.    Yuri Amaral DPM  7/22/2018  8:04 PM

## 2018-07-23 ENCOUNTER — LAB (OUTPATIENT)
Dept: LAB | Facility: OTHER | Age: 63
End: 2018-07-23

## 2018-07-23 DIAGNOSIS — E11.9 TYPE 2 DIABETES MELLITUS WITHOUT COMPLICATION, WITHOUT LONG-TERM CURRENT USE OF INSULIN (HCC): ICD-10-CM

## 2018-07-23 LAB
ALBUMIN SERPL-MCNC: 4.8 G/DL (ref 3.5–5)
ALBUMIN/GLOB SERPL: 1.5 G/DL (ref 1.1–1.8)
ALP SERPL-CCNC: 85 U/L (ref 38–126)
ALT SERPL W P-5'-P-CCNC: 14 U/L
ANION GAP SERPL CALCULATED.3IONS-SCNC: 9 MMOL/L (ref 5–15)
AST SERPL-CCNC: 19 U/L (ref 17–59)
BASOPHILS # BLD AUTO: 0.01 10*3/MM3 (ref 0–0.2)
BASOPHILS NFR BLD AUTO: 0.1 % (ref 0–2)
BILIRUB SERPL-MCNC: 0.3 MG/DL (ref 0.2–1.3)
BUN BLD-MCNC: 23 MG/DL (ref 9–20)
BUN/CREAT SERPL: 25.6 (ref 7–25)
CALCIUM SPEC-SCNC: 9.5 MG/DL (ref 8.4–10.2)
CHLORIDE SERPL-SCNC: 105 MMOL/L (ref 98–107)
CHOLEST SERPL-MCNC: 202 MG/DL (ref 150–200)
CO2 SERPL-SCNC: 24 MMOL/L (ref 22–30)
CREAT BLD-MCNC: 0.9 MG/DL (ref 0.66–1.25)
DEPRECATED RDW RBC AUTO: 43.8 FL (ref 35.1–43.9)
EOSINOPHIL # BLD AUTO: 0.31 10*3/MM3 (ref 0–0.7)
EOSINOPHIL NFR BLD AUTO: 4.6 % (ref 0–7)
ERYTHROCYTE [DISTWIDTH] IN BLOOD BY AUTOMATED COUNT: 13.8 % (ref 11.5–14.5)
GFR SERPL CREATININE-BSD FRML MDRD: 85 ML/MIN/1.73 (ref 49–113)
GLOBULIN UR ELPH-MCNC: 3.1 GM/DL (ref 2.3–3.5)
GLUCOSE BLD-MCNC: 154 MG/DL (ref 74–99)
HBA1C MFR BLD: 6.6 % (ref 4–5.6)
HCT VFR BLD AUTO: 39.5 % (ref 39–49)
HDLC SERPL-MCNC: 60 MG/DL (ref 40–59)
HGB BLD-MCNC: 13.2 G/DL (ref 13.7–17.3)
LDLC SERPL CALC-MCNC: 127 MG/DL
LDLC/HDLC SERPL: 2.12 {RATIO} (ref 0–3.55)
LYMPHOCYTES # BLD AUTO: 2.03 10*3/MM3 (ref 0.6–4.2)
LYMPHOCYTES NFR BLD AUTO: 29.9 % (ref 10–50)
MCH RBC QN AUTO: 29.9 PG (ref 26.5–34)
MCHC RBC AUTO-ENTMCNC: 33.4 G/DL (ref 31.5–36.3)
MCV RBC AUTO: 89.4 FL (ref 80–98)
MONOCYTES # BLD AUTO: 0.85 10*3/MM3 (ref 0–0.9)
MONOCYTES NFR BLD AUTO: 12.5 % (ref 0–12)
NEUTROPHILS # BLD AUTO: 3.58 10*3/MM3 (ref 2–8.6)
NEUTROPHILS NFR BLD AUTO: 52.9 % (ref 37–80)
PLATELET # BLD AUTO: 314 10*3/MM3 (ref 150–450)
PMV BLD AUTO: 8.8 FL (ref 8–12)
POTASSIUM BLD-SCNC: 4.3 MMOL/L (ref 3.4–5)
PROT SERPL-MCNC: 7.9 G/DL (ref 6.3–8.2)
RBC # BLD AUTO: 4.42 10*6/MM3 (ref 4.37–5.74)
SODIUM BLD-SCNC: 138 MMOL/L (ref 137–145)
T4 FREE SERPL-MCNC: 0.85 NG/DL (ref 0.78–2.19)
TRIGL SERPL-MCNC: 75 MG/DL
TSH SERPL DL<=0.05 MIU/L-ACNC: 2.37 MIU/ML (ref 0.46–4.68)
VLDLC SERPL-MCNC: 15 MG/DL
WBC NRBC COR # BLD: 6.78 10*3/MM3 (ref 3.2–9.8)

## 2018-07-23 PROCEDURE — 36415 COLL VENOUS BLD VENIPUNCTURE: CPT | Performed by: NURSE PRACTITIONER

## 2018-07-23 PROCEDURE — 80061 LIPID PANEL: CPT | Performed by: NURSE PRACTITIONER

## 2018-07-23 PROCEDURE — 80053 COMPREHEN METABOLIC PANEL: CPT | Performed by: NURSE PRACTITIONER

## 2018-07-23 PROCEDURE — 84443 ASSAY THYROID STIM HORMONE: CPT | Performed by: NURSE PRACTITIONER

## 2018-07-23 PROCEDURE — 85025 COMPLETE CBC W/AUTO DIFF WBC: CPT | Performed by: NURSE PRACTITIONER

## 2018-07-23 PROCEDURE — 84439 ASSAY OF FREE THYROXINE: CPT | Performed by: NURSE PRACTITIONER

## 2018-07-23 PROCEDURE — 83036 HEMOGLOBIN GLYCOSYLATED A1C: CPT | Performed by: NURSE PRACTITIONER

## 2018-07-24 ENCOUNTER — TELEPHONE (OUTPATIENT)
Dept: FAMILY MEDICINE CLINIC | Facility: CLINIC | Age: 63
End: 2018-07-24

## 2018-07-24 DIAGNOSIS — E11.9 TYPE 2 DIABETES MELLITUS WITHOUT COMPLICATION, WITHOUT LONG-TERM CURRENT USE OF INSULIN (HCC): ICD-10-CM

## 2018-07-24 DIAGNOSIS — Z98.890 S/P ROTATOR CUFF REPAIR: ICD-10-CM

## 2018-07-24 DIAGNOSIS — M75.41 SHOULDER IMPINGEMENT, RIGHT: ICD-10-CM

## 2018-07-24 DIAGNOSIS — E78.5 HYPERLIPIDEMIA, UNSPECIFIED HYPERLIPIDEMIA TYPE: Primary | ICD-10-CM

## 2018-07-24 DIAGNOSIS — E11.9 TYPE 2 DIABETES MELLITUS WITHOUT COMPLICATION, WITHOUT LONG-TERM CURRENT USE OF INSULIN (HCC): Primary | ICD-10-CM

## 2018-07-24 DIAGNOSIS — J40 BRONCHITIS: ICD-10-CM

## 2018-07-24 RX ORDER — BLOOD-GLUCOSE METER
1 KIT MISCELLANEOUS DAILY
Qty: 1 EACH | Refills: 0 | Status: SHIPPED | OUTPATIENT
Start: 2018-07-24

## 2018-07-24 RX ORDER — ATORVASTATIN CALCIUM 20 MG/1
20 TABLET, FILM COATED ORAL NIGHTLY
Qty: 30 TABLET | Refills: 5 | Status: SHIPPED | OUTPATIENT
Start: 2018-07-24 | End: 2019-01-25 | Stop reason: SDUPTHER

## 2018-07-24 RX ORDER — BLOOD-GLUCOSE METER
1 KIT MISCELLANEOUS DAILY
Qty: 1 EACH | Refills: 0 | Status: SHIPPED | OUTPATIENT
Start: 2018-07-24 | End: 2018-07-24 | Stop reason: SDUPTHER

## 2018-07-24 NOTE — TELEPHONE ENCOUNTER
THE [facility] DOES NOT HAVE GLUCOMETERS AND SUPPLIES. cAN YOU RESEND TO [facility] HERE AT THE CLINIC? THANKS

## 2018-07-25 ENCOUNTER — OFFICE VISIT (OUTPATIENT)
Dept: ORTHOPEDIC SURGERY | Facility: CLINIC | Age: 63
End: 2018-07-25

## 2018-07-25 VITALS — WEIGHT: 225 LBS | HEIGHT: 69 IN | BODY MASS INDEX: 33.33 KG/M2

## 2018-07-25 DIAGNOSIS — G89.29 CHRONIC RIGHT SHOULDER PAIN: ICD-10-CM

## 2018-07-25 DIAGNOSIS — Z98.890 S/P ROTATOR CUFF REPAIR: Primary | ICD-10-CM

## 2018-07-25 DIAGNOSIS — M25.511 CHRONIC RIGHT SHOULDER PAIN: ICD-10-CM

## 2018-07-25 DIAGNOSIS — M75.101 ROTATOR CUFF SYNDROME OF RIGHT SHOULDER: ICD-10-CM

## 2018-07-25 PROCEDURE — 20610 DRAIN/INJ JOINT/BURSA W/O US: CPT | Performed by: NURSE PRACTITIONER

## 2018-07-25 PROCEDURE — 99213 OFFICE O/P EST LOW 20 MIN: CPT | Performed by: NURSE PRACTITIONER

## 2018-07-25 RX ORDER — TRIAMCINOLONE ACETONIDE 40 MG/ML
40 INJECTION, SUSPENSION INTRA-ARTICULAR; INTRAMUSCULAR
Status: COMPLETED | OUTPATIENT
Start: 2018-07-25 | End: 2018-07-25

## 2018-07-25 RX ORDER — LIDOCAINE HYDROCHLORIDE 20 MG/ML
2 INJECTION, SOLUTION INFILTRATION; PERINEURAL
Status: COMPLETED | OUTPATIENT
Start: 2018-07-25 | End: 2018-07-25

## 2018-07-25 RX ORDER — TRAMADOL HYDROCHLORIDE 50 MG/1
TABLET ORAL
Qty: 60 TABLET | Refills: 1 | Status: SHIPPED | OUTPATIENT
Start: 2018-07-25 | End: 2018-08-28 | Stop reason: SDUPTHER

## 2018-07-25 RX ADMIN — LIDOCAINE HYDROCHLORIDE 2 ML: 20 INJECTION, SOLUTION INFILTRATION; PERINEURAL at 10:26

## 2018-07-25 RX ADMIN — TRIAMCINOLONE ACETONIDE 40 MG: 40 INJECTION, SUSPENSION INTRA-ARTICULAR; INTRAMUSCULAR at 10:26

## 2018-07-25 NOTE — PROGRESS NOTES
Mikey Allison is a 63 y.o. male returns for     Chief Complaint   Patient presents with   • Right Shoulder - Follow-up   • Results     MRI       HISTORY OF PRESENT ILLNESS:   Patient being seen for right shoulder follow up. MRI done at [facility],  would like to discuss findings today.        CONCURRENT MEDICAL HISTORY:    Past Medical History:   Diagnosis Date   • Abdominal pain     left side   • Acute sinusitis    • Ankle joint pain    • Bipolar disorder (CMS/HCC)    • Bipolar disorder, unspecified    • Cellulitis and abscess of trunk     axilla   • Chest pain    • Chronic anemia    • Degenerative joint disease involving multiple joints     back   • Depressive disorder    • Diabetes mellitus (CMS/HCC)    • Diabetes mellitus with no complication (CMS/HCC)     type 2 or unspecified type uncontrolled   • Diverticular disease    • Dyspnea    • Enlarged prostate     on HI-on CT   • Essential hypertension    • Febrile convulsion (CMS/HCC)    • Gastroesophageal reflux disease    • Generalized anxiety disorder    • H/O echocardiogram 10/16/2007    Mild to moderate concentric LV hypertrophy with mild left atrial enlargement. LV appears to be hypodynamic with preserved LV systolic function with EF 55-60%. Pericardium appears to be normal with a small pericardial effusion    • Hemorrhoids    • Hyperlipidemia    • Hypertensive disorder    • Hypoglycemia    • Infectious disorder of kidney     kidney infection-treated by [facility]   • Left lower quadrant pain    • Loss of appetite    • Obesity    • Osteoarthritis    • Pain in joint involving ankle and foot    • Pain in limb    • Psoriasis    • Retention of urine     with cath-treated by the [facility]   • Screening for malignant neoplasm of colon    • Sepsis due to urinary tract infection (CMS/[facility])     urosepsis treated by [facility]   • Sinusitis    • Syncope    • Unspecified essential hypertension        Allergies   Allergen Reactions   • Sulfa  Antibiotics Other (See Comments)     unknown   • Codeine Itching and Nausea And Vomiting         Current Outpatient Prescriptions:   •  albuterol (PROVENTIL HFA;VENTOLIN HFA) 108 (90 Base) MCG/ACT inhaler, 2 puffs every 4-6 hours until cough/wheezing improve, than taper off of, Disp: 18 g, Rfl: 0  •  atorvastatin (LIPITOR) 20 MG tablet, Take 1 tablet by mouth Every Night., Disp: 30 tablet, Rfl: 5  •  busPIRone (BUSPAR) 10 MG tablet, Take 1 tablet by mouth 3 (Three) Times a Day., Disp: 90 tablet, Rfl: 0  •  celecoxib (CeleBREX) 200 MG capsule, Take 1 capsule by mouth Daily., Disp: 30 capsule, Rfl: 5  •  cyclobenzaprine (FLEXERIL) 10 MG tablet, Take 10 mg by mouth 3 (Three) Times a Day As Needed for Muscle Spasms., Disp: , Rfl:   •  gabapentin (NEURONTIN) 400 MG capsule, Take 1 capsule by mouth 3 (Three) Times a Day., Disp: 90 capsule, Rfl: 5  •  glucose blood test strip, bid, Disp: 100 each, Rfl: 6  •  glucose monitor monitoring kit, 1 each Daily., Disp: 1 each, Rfl: 0  •  lisinopril-hydrochlorothiazide (PRINZIDE,ZESTORETIC) 20-12.5 MG per tablet, Take 1 tablet by mouth Daily., Disp: 30 tablet, Rfl: 5  •  metFORMIN (GLUCOPHAGE) 1000 MG tablet, Take 1 tablet by mouth 2 (Two) Times a Day With Meals., Disp: 60 tablet, Rfl: 5  •  ONE TOUCH LANCETS misc, 1 each Daily., Disp: 200 each, Rfl: 6  •  pantoprazole (PROTONIX) 40 MG EC tablet, Take 1 tablet by mouth Daily., Disp: 30 tablet, Rfl: 5  •  QUEtiapine (SEROquel) 300 MG tablet, Take 2 tablets by mouth Every Night., Disp: 60 tablet, Rfl: 5  •  SITagliptin (JANUVIA) 100 MG tablet, Take 1 tablet by mouth Daily., Disp: 30 tablet, Rfl: 5  •  traMADol (ULTRAM) 50 MG tablet, 1-2 tab po q 8 hour prn pain, Disp: 60 tablet, Rfl: 1    Past Surgical History:   Procedure Laterality Date   • ANKLE SURGERY Left 11/11/2008    Removal of syndesmotic screws, left ankle. Painful syndesmotic screws, left ankle.)   • ANKLE SURGERY  10/19/2007    Open reduction-internal fixation with  "fluoroscopic control with final x-ray PA and lateral of the ankle. Trimalleolar fracture/subluxation, left ankle.)   • CIRCUMCISION  2016   • COLONOSCOPY  03/10/2015    Diverticulosis found in the sigmoid colon. hemorrhoids found in the anus. Normal cecum   • CYSTOSCOPY  06/22/2016    Cystoscopy, dilation, anchor of Tim catheter. Benign prostatic hypertrophy, urinary retention, urethral stricture history of.   • INJECTION OF MEDICATION      Kenalog (3)   • SHOULDER ARTHROSCOPY Right 10/18/2017    Procedure: SHOULDER ARTHROSCOPY WITH RAFAELA PROCEDURE, AND SUBACROMIAL DECOMPRESSION, AND POSSIBLE ROTATOR  CUFF REPAIR       SHOULDER ARTHROSCOPY WITH RAFAELA PROCEDURE, AND SUBACROMIAL DECOMPRESSION, NO ROTATOR CUFF REPAIR PERFORMED;  Surgeon: Maximus Schreiber MD;  Location: [facility];  Service:    • STOMACH SURGERY     • TIBIA FRACTURE SURGERY         ROS  No fevers or chills.  No chest pain or shortness of air.  No GI or  disturbances.    PHYSICAL EXAMINATION:       Ht 175.3 cm (69\")   Wt 102 kg (225 lb)   BMI 33.23 kg/m²     Physical Exam   Constitutional: He is oriented to person, place, and time. Vital signs are normal. He appears well-developed and well-nourished. He is cooperative.   HENT:   Head: Normocephalic and atraumatic.   Neck: Trachea normal and phonation normal.   Pulmonary/Chest: Effort normal. No respiratory distress.   Abdominal: Soft. Normal appearance. He exhibits no distension.   Neurological: He is alert and oriented to person, place, and time. GCS eye subscore is 4. GCS verbal subscore is 5. GCS motor subscore is 6.   Skin: Skin is warm, dry and intact.   Psychiatric: He has a normal mood and affect. His speech is normal and behavior is normal. Judgment and thought content normal. Cognition and memory are normal.   Vitals reviewed.      GAIT:     []  Normal  []  Antalgic    Assistive device: []  None  []  Walker     []  Crutches  [x]  Cane     []  Wheelchair  []  Stretcher    Right " Hand Exam     Tenderness   Right hand tenderness location: A1 pulley fifth metacarpal head.    Range of Motion     Wrist   Extension: normal   Flexion: normal   Pronation: normal   Supination: normal     Hand   MP Little: abnormal   PIP Little: normal   DIP Little: normal     Muscle Strength   Wrist Extension: 4/5   Wrist Flexion: 4/5   : 4/5     Other   Erythema: absent  Scars: absent  Sensation: normal  Pulse: present    Comments:  Active triggering noted of the fifth digit      Left Hand Exam   Left hand exam is normal.      Right Shoulder Exam     Tenderness   The patient is experiencing tenderness in the acromioclavicular joint and acromion.    Range of Motion   Right shoulder active abduction: 90.   Right shoulder forward flexion: 120.   Internal Rotation 90 degrees: abnormal     Muscle Strength   Abduction: 4/5   Internal Rotation: 4/5   External Rotation: 4/5   Supraspinatus: 4/5     Tests   Drop Arm: positive  Impingement: positive    Other   Erythema: absent  Scars: present  Sensation: normal  Pulse: present      Left Shoulder Exam   Left shoulder exam is normal.              MRI shoulder right without contrast            ASSESSMENT:    Diagnoses and all orders for this visit:    S/P rotator cuff repair  -     Large Joint Arthrocentesis    Chronic right shoulder pain  -     Large Joint Arthrocentesis    Rotator cuff syndrome of right shoulder  -     Large Joint Arthrocentesis    Other orders  -     traMADol (ULTRAM) 50 MG tablet; 1-2 tab po q 8 hour prn pain        Large Joint Arthrocentesis  Date/Time: 7/25/2018 10:26 AM  Consent given by: patient  Site marked: site marked  Timeout: Immediately prior to procedure a time out was called to verify the correct patient, procedure, equipment, support staff and site/side marked as required   Supporting Documentation  Indications: pain   Procedure Details  Location: shoulder - R subacromial bursa  Preparation: Patient was prepped and draped in the usual  sterile fashion  Needle size: 22 G  Approach: posterior  Medications administered: 40 mg triamcinolone acetonide 40 MG/ML; 2 mL lidocaine 2%  Patient tolerance: patient tolerated the procedure well with no immediate complications          PLAN  I reviewed the MRI and discussed the findings with the patient in detail.  I recommended an injection of steroids into the shoulder, continued home exercises and follow-up in 6 weeks for recheck.  There is no new rotator cuff tear noted on today's exam  No Follow-up on file.    Prince Boone, APRN

## 2018-08-20 ENCOUNTER — OFFICE VISIT (OUTPATIENT)
Dept: FAMILY MEDICINE CLINIC | Facility: CLINIC | Age: 63
End: 2018-08-20

## 2018-08-20 VITALS
BODY MASS INDEX: 33.33 KG/M2 | OXYGEN SATURATION: 98 % | RESPIRATION RATE: 16 BRPM | HEART RATE: 83 BPM | WEIGHT: 225 LBS | SYSTOLIC BLOOD PRESSURE: 120 MMHG | DIASTOLIC BLOOD PRESSURE: 64 MMHG | TEMPERATURE: 98.9 F | HEIGHT: 69 IN

## 2018-08-20 DIAGNOSIS — F32.A DEPRESSION, UNSPECIFIED DEPRESSION TYPE: ICD-10-CM

## 2018-08-20 DIAGNOSIS — M54.2 NECK PAIN, ACUTE: ICD-10-CM

## 2018-08-20 DIAGNOSIS — F41.9 ANXIETY: ICD-10-CM

## 2018-08-20 DIAGNOSIS — G47.00 INSOMNIA, UNSPECIFIED TYPE: ICD-10-CM

## 2018-08-20 DIAGNOSIS — R22.1 NECK SWELLING: Primary | ICD-10-CM

## 2018-08-20 PROCEDURE — 99214 OFFICE O/P EST MOD 30 MIN: CPT | Performed by: NURSE PRACTITIONER

## 2018-08-20 PROCEDURE — 96372 THER/PROPH/DIAG INJ SC/IM: CPT | Performed by: NURSE PRACTITIONER

## 2018-08-20 RX ORDER — CYCLOBENZAPRINE HCL 10 MG
10 TABLET ORAL NIGHTLY PRN
Qty: 30 TABLET | Refills: 0 | Status: SHIPPED | OUTPATIENT
Start: 2018-08-20 | End: 2018-10-02

## 2018-08-20 RX ORDER — CELECOXIB 200 MG/1
200 CAPSULE ORAL DAILY
Qty: 30 CAPSULE | Refills: 5 | Status: SHIPPED | OUTPATIENT
Start: 2018-08-20 | End: 2019-01-17

## 2018-08-20 RX ORDER — KETOROLAC TROMETHAMINE 30 MG/ML
60 INJECTION, SOLUTION INTRAMUSCULAR; INTRAVENOUS ONCE
Status: COMPLETED | OUTPATIENT
Start: 2018-08-20 | End: 2018-08-20

## 2018-08-20 RX ORDER — PREDNISONE 10 MG/1
TABLET ORAL
Qty: 24 TABLET | Refills: 0 | Status: SHIPPED | OUTPATIENT
Start: 2018-08-20 | End: 2019-03-21

## 2018-08-20 RX ORDER — ALBUTEROL SULFATE 90 UG/1
AEROSOL, METERED RESPIRATORY (INHALATION)
Qty: 18 G | Refills: 3 | Status: SHIPPED | OUTPATIENT
Start: 2018-08-20 | End: 2019-02-01 | Stop reason: SDUPTHER

## 2018-08-20 RX ADMIN — KETOROLAC TROMETHAMINE 60 MG: 30 INJECTION, SOLUTION INTRAMUSCULAR; INTRAVENOUS at 17:15

## 2018-08-28 RX ORDER — TRAMADOL HYDROCHLORIDE 50 MG/1
TABLET ORAL
Qty: 60 TABLET | Refills: 1 | Status: SHIPPED | OUTPATIENT
Start: 2018-08-28 | End: 2018-09-05 | Stop reason: SDUPTHER

## 2018-09-03 NOTE — PROGRESS NOTES
"Subjective   Mikey Allison is a 63 y.o. male who presents to the office to f/u.    Neck Pain    Associated symptoms include numbness. Pertinent negatives include no chest pain, fever, headaches, trouble swallowing or weakness.      Last labs 7/23/18.    Neck swelling-acute  X 1week states much better, thinks related to tramadol    Anxiety/depression/insomnia-chronic, moderately controlled with buspar, Seroquel 600mg at hs  -ineffective on hydroxyzine  -pt states trouble sleeping but due to his neck pain, states \"allergic to tramadol\" and pain keeping him up at nighttime. Wants to increase seroquel.     Osteoarthritis and peripheral neuropathy/chronic neck pain-chronic, controlled with tramadol and gabapentin and Celebrex   -patient is getting gabapentin from [facility] states he is fine on just this, will continue Celebrex. Prescribed flexeril at bedtime. Getting tramadol from  [facility], last prescribed on 7/25/18, pt has been having neck swelling for past week but it has gone down, thinks this is due to tramadol because it the only new drug. However, pt has been on tramadol in the past with no neck swelling. Denies any other symptoms such as dysphagia, trouble breathing or rashes. See Mely again on 8/30    Past Medical History:  HTN-chronic, controlled with lisinopril-hctz  GERD-chronic, controlled with Protonix.  Type 2 diabetes-chronic, controlled with metformin and Januvia    The following portions of the patient's history were reviewed and updated as appropriate: allergies, current medications, past family history, past medical history, past social history, past surgical history and problem list.    ISAIHA obtained and reviewed at last visit, patient has history of being prescribed gabapentin and tramadol and Lortabs.  Gabapentin last prescribed by [facility] to 400 mg, 90 count on 5/24/18.  Tramadol, last prescribed by [facility] Hassan 50 mg, 60 count on 1/25/18 and Lortab 5 mg, 30 " count on 1/15/18.    Review of Systems   Constitutional: Negative for activity change, appetite change, chills, diaphoresis, fatigue, fever and unexpected weight change.   HENT: Negative for congestion, ear discharge, ear pain, facial swelling, hearing loss, postnasal drip, rhinorrhea, sinus pain, sinus pressure, sneezing, sore throat, tinnitus and trouble swallowing.    Eyes: Negative.  Negative for discharge and visual disturbance.   Respiratory: Negative for cough, chest tightness, shortness of breath and wheezing.    Cardiovascular: Positive for leg swelling. Negative for chest pain and palpitations.   Gastrointestinal: Negative for abdominal distention, abdominal pain, blood in stool, constipation, diarrhea, nausea and vomiting.   Genitourinary: Negative for decreased urine volume, difficulty urinating, discharge, dysuria, flank pain, frequency, hematuria, penile pain, penile swelling, scrotal swelling, testicular pain and urgency.   Musculoskeletal: Positive for arthralgias, back pain and neck pain. Negative for joint swelling and myalgias.   Skin: Negative for color change and rash.   Allergic/Immunologic: Negative for food allergies.   Neurological: Positive for numbness. Negative for dizziness, tremors, seizures, syncope, weakness, light-headedness and headaches.   Hematological: Negative for adenopathy. Does not bruise/bleed easily.   Psychiatric/Behavioral: Positive for sleep disturbance. Negative for agitation, behavioral problems, confusion, decreased concentration, dysphoric mood, hallucinations, self-injury and suicidal ideas. The patient is nervous/anxious.    All other systems reviewed and are negative.      Past Medical History:   Diagnosis Date   • Abdominal pain     left side   • Acute sinusitis    • Ankle joint pain    • Bipolar disorder (CMS/[facility])    • Bipolar disorder, unspecified    • Cellulitis and abscess of trunk     axilla   • Chest pain    • Chronic anemia    • Degenerative joint disease  "involving multiple joints     back   • Depressive disorder    • Diabetes mellitus (CMS/HCC)    • Diabetes mellitus with no complication (CMS/HCC)     type 2 or unspecified type uncontrolled   • Diverticular disease    • Dyspnea    • Enlarged prostate     on GA-on CT   • Essential hypertension    • Febrile convulsion (CMS/HCC)    • Gastroesophageal reflux disease    • Generalized anxiety disorder    • H/O echocardiogram 10/16/2007    Mild to moderate concentric LV hypertrophy with mild left atrial enlargement. LV appears to be hypodynamic with preserved LV systolic function with EF 55-60%. Pericardium appears to be normal with a small pericardial effusion    • Hemorrhoids    • Hyperlipidemia    • Hypertensive disorder    • Hypoglycemia    • Infectious disorder of kidney     kidney infection-treated by [facility]   • Left lower quadrant pain    • Loss of appetite    • Obesity    • Osteoarthritis    • Pain in joint involving ankle and foot    • Pain in limb    • Psoriasis    • Retention of urine     with cath-treated by the [facility]   • Screening for malignant neoplasm of colon    • Sepsis due to urinary tract infection (CMS/[facility])     urosepsis treated by [facility]   • Sinusitis    • Syncope    • Unspecified essential hypertension        Family History   Problem Relation Age of Onset   • Diabetes Other    • Heart disease Other    • Hypertension Other    • Kidney disease Other    • Hypertension Mother    • Clotting disorder Mother           Objective   /64   Pulse 83   Temp 98.9 °F (37.2 °C) (Temporal Artery )   Resp 16   Ht 175.3 cm (69\")   Wt 102 kg (225 lb)   SpO2 98%   BMI 33.23 kg/m²   Physical Exam   Constitutional: He is oriented to person, place, and time. He appears well-developed and well-nourished. He is cooperative. He does not appear ill.   HENT:   Head: Normocephalic.   Right Ear: Hearing, tympanic membrane, external ear and ear canal normal.   Left Ear: Hearing, tympanic membrane, " external ear and ear canal normal.   Nose: Nose normal.   Mouth/Throat: Oropharynx is clear and moist. No oropharyngeal exudate.   Eyes: Pupils are equal, round, and reactive to light. EOM and lids are normal. Right eye exhibits no discharge. Left eye exhibits no discharge. Right conjunctiva is not injected. Left conjunctiva is not injected.   Neck: Normal range of motion. Neck supple.   Cardiovascular: Normal rate, regular rhythm, normal heart sounds and intact distal pulses.  Exam reveals no gallop and no friction rub.    No murmur heard.  Pulmonary/Chest: Effort normal and breath sounds normal. No respiratory distress. He has no wheezes. He has no rales.   Abdominal: Soft. Normal appearance, normal aorta and bowel sounds are normal. He exhibits no distension and no mass. There is no tenderness. There is no rebound and no guarding. No hernia.   Musculoskeletal: He exhibits no edema or deformity.        Right shoulder: He exhibits decreased range of motion, tenderness and pain. He exhibits no bony tenderness, no swelling, no effusion, no crepitus, no deformity, no laceration, no spasm, normal pulse and normal strength.        Cervical back: He exhibits decreased range of motion, tenderness and pain. He exhibits no bony tenderness, no swelling, no edema, no deformity, no laceration, no spasm and normal pulse.   Lymphadenopathy:     He has no cervical adenopathy.   Neurological: He is alert and oriented to person, place, and time. He has normal strength and normal reflexes. He displays normal reflexes. No cranial nerve deficit or sensory deficit. He exhibits normal muscle tone. He displays a negative Romberg sign. Coordination normal.   Reflex Scores:       Patellar reflexes are 2+ on the right side and 2+ on the left side.  Skin: Skin is warm, dry and intact. No rash noted. He is not diaphoretic. No erythema. No pallor.   Psychiatric: He has a normal mood and affect. His speech is normal and behavior is normal.  Thought content normal. Cognition and memory are normal.   Patient is dressed appropriately for weather and situation, makes eye contact, and engages in conversation.  He is attentive.   Nursing note and vitals reviewed.       PHQ-2/PHQ-9 Depression Screening 6/11/2018   Little interest or pleasure in doing things 0   Feeling down, depressed, or hopeless 3   Trouble falling or staying asleep, or sleeping too much 3   Feeling tired or having little energy 3   Poor appetite or overeating 0   Feeling bad about yourself - or that you are a failure or have let yourself or your family down 0   Trouble concentrating on things, such as reading the newspaper or watching television 0   Moving or speaking so slowly that other people could have noticed. Or the opposite - being so fidgety or restless that you have been moving around a lot more than usual 0   Thoughts that you would be better off dead, or of hurting yourself in some way 0   Total Score 9   If you checked off any problems, how difficult have these problems made it for you to do your work, take care of things at home, or get along with other people? Somewhat difficult         Assessment/Plan   Mikey was seen today for neck pain.    Diagnoses and all orders for this visit:    Neck swelling  -     ketorolac (TORADOL) injection 60 mg; Inject 60 mg into the appropriate muscle as directed by prescriber 1 (One) Time.    Neck pain, acute  -     ketorolac (TORADOL) injection 60 mg; Inject 60 mg into the appropriate muscle as directed by prescriber 1 (One) Time.    Insomnia, unspecified type    Depression, unspecified depression type    Anxiety    Other orders  -     cyclobenzaprine (FLEXERIL) 10 MG tablet; Take 1 tablet by mouth At Night As Needed for Muscle Spasms.  -     predniSONE (DELTASONE) 10 MG tablet; Take [id number], [id number], [id number]  -     albuterol (PROVENTIL HFA;VENTOLIN HFA) 108 (90 Base) MCG/ACT inhaler; 2 puffs every 4-6 hours until  cough/wheezing improve, than taper off of  -     celecoxib (CeleBREX) 200 MG capsule; Take 1 capsule by mouth Daily.           Neck swelling-acute  -improving, educated pt he has been on tramadol before and restarted it on 7/25/18 and has not had issues with it until a week ago, I do not think this is a problem with the tramadol especially since he is still taking it. There are other causes of neck apin, educated pt to f/u if not improving for further workup. Prescribed pred taper as well. Albuterol inhaler if needed. Refilled celebrex.     Anxiety/depression/insomnia-chronic, moderately controlled with buspar, Seroquel 600mg at hs  -ineffective on hydroxyzine  -pt wants to increase seroquel, educated that insomnia is secondary to neck pain so I do not think we need to increase seroquel until neck pain resolves and we see if this is true insomnia, also advise that seroquel already at extremely high dose and would need close f/u if increased. Pt has been on 900mg/nightly by Dr. Alvarado and tolerated in the past. Will add back flexeril at bedtime and f/u in two weeks to assess neck pain.     Osteoarthritis and peripheral neuropathy/chronic neck pain-chronic, controlled with tramadol and gabapentin and Celebrex   -patient is getting gabapentin from [employer] states he is fine on just this, will continue Celebrex. Prescribed flexeril at bedtime. Getting tramadol from  [facility], last prescribed on 7/25/18, pt has been having neck swelling for past week but it has gone down, thinks this is due to tramadol because it the only new drug. However, pt has been on tramadol in the past with no neck swelling. Denies any other symptoms such as dysphagia, trouble breathing or rashes. See Mely again on 8/30. Again, I do not think that pt is allergic to tramadol.     F/u in two weeks.     Patient educated to follow-up sooner than next scheduled appointment if condition(s) worse or do not improve. Patient states  understanding and is in agreeance with plan of care. An After Visit Summary was printed and given to the patient.      SAMUEL Wiseman        This document has been electronically signed by SAMUEL Wiseman on September 3, 2018 6:57 PM      EMR/Transcription Dragon Disclaimer:  Some of this note may be an electronic dragon transcription/translation of spoken language to printed text. The electronic translation of spoken language may permit erroneous, or at times, nonsensical words or phrases to be inadvertently transcribed. Although I have reviewed the note for such errors, some may still exist.

## 2018-09-05 ENCOUNTER — OFFICE VISIT (OUTPATIENT)
Dept: ORTHOPEDIC SURGERY | Facility: CLINIC | Age: 63
End: 2018-09-05

## 2018-09-05 VITALS — HEIGHT: 69 IN | WEIGHT: 228 LBS | BODY MASS INDEX: 33.77 KG/M2

## 2018-09-05 DIAGNOSIS — M75.41 SHOULDER IMPINGEMENT, RIGHT: ICD-10-CM

## 2018-09-05 DIAGNOSIS — M75.101 ROTATOR CUFF SYNDROME OF RIGHT SHOULDER: Primary | ICD-10-CM

## 2018-09-05 DIAGNOSIS — M25.511 CHRONIC RIGHT SHOULDER PAIN: ICD-10-CM

## 2018-09-05 DIAGNOSIS — G89.29 CHRONIC RIGHT SHOULDER PAIN: ICD-10-CM

## 2018-09-05 PROCEDURE — 99213 OFFICE O/P EST LOW 20 MIN: CPT | Performed by: NURSE PRACTITIONER

## 2018-09-05 RX ORDER — TRAMADOL HYDROCHLORIDE 50 MG/1
TABLET ORAL
Qty: 60 TABLET | Refills: 1 | Status: SHIPPED | OUTPATIENT
Start: 2018-09-05 | End: 2018-09-05

## 2018-09-05 NOTE — PROGRESS NOTES
"Mikey Allison is a 63 y.o. male returns for     Chief Complaint   Patient presents with   • Right Shoulder - Follow-up       HISTORY OF PRESENT ILLNESS:     63-year-old  male in the office today for follow-up of his chronic right shoulder pain.  He reports that the subacromial injection and he received on July 25 improved his symptoms overall.  He continues to perform some exercises and takes the tramadol as needed for the discomfort.  He reports that he has a ointment with pain management on Thursday of this week to evaluate his chronic right shoulder pain for continued medication.       CONCURRENT MEDICAL HISTORY:    The following portions of the patient's history were reviewed and updated as appropriate: allergies, current medications, past family history, past medical history, past social history, past surgical history and problem list.     ROS  No fevers or chills.  No chest pain or shortness of air.  No GI or  disturbances.    PHYSICAL EXAMINATION:       Ht 175.3 cm (69\")   Wt 103 kg (228 lb)   BMI 33.67 kg/m²     Physical Exam   Constitutional: He is oriented to person, place, and time. Vital signs are normal. He appears well-developed and well-nourished. He is cooperative.   HENT:   Head: Normocephalic and atraumatic.   Neck: Trachea normal and phonation normal.   Pulmonary/Chest: Effort normal. No respiratory distress.   Abdominal: Soft. Normal appearance. He exhibits no distension.   Neurological: He is alert and oriented to person, place, and time. GCS eye subscore is 4. GCS verbal subscore is 5. GCS motor subscore is 6.   Skin: Skin is warm, dry and intact.   Psychiatric: He has a normal mood and affect. His speech is normal and behavior is normal. Judgment and thought content normal. Cognition and memory are normal.   Vitals reviewed.      GAIT:     []  Normal  []  Antalgic    Assistive device: []  None  []  Walker     []  Crutches  [x]  Cane     []  Wheelchair  []  " Stretcher    Right Shoulder Exam     Tenderness   The patient is experiencing no tenderness.        Range of Motion   Right shoulder active abduction: 90.   Right shoulder forward flexion: 140.   Internal Rotation 0 degrees: L2     Muscle Strength   Abduction: 5/5   Internal Rotation: 5/5   External Rotation: 5/5   Supraspinatus: 4/5     Tests   Impingement: positive    Other   Erythema: absent  Scars: present  Sensation: normal  Pulse: present      Left Shoulder Exam   Left shoulder exam is normal.              No results found.          ASSESSMENT:    Diagnoses and all orders for this visit:    Rotator cuff syndrome of right shoulder    Chronic right shoulder pain    Shoulder impingement, right    Other orders  -     traMADol (ULTRAM) 50 MG tablet; 1-2 tab po q 8 hour prn pain          PLAN  I refilled the tramadol today so he will have enough pain medication until he follows up with pain management.  We discussed that he will need to continue his range of motion exercises and follow-up with Dr. Schreiber in 3-6 months if his pain continues.  No Follow-up on file.    SAMUEL Ellis

## 2018-10-02 ENCOUNTER — LAB (OUTPATIENT)
Dept: LAB | Facility: OTHER | Age: 63
End: 2018-10-02

## 2018-10-02 ENCOUNTER — OFFICE VISIT (OUTPATIENT)
Dept: FAMILY MEDICINE CLINIC | Facility: CLINIC | Age: 63
End: 2018-10-02

## 2018-10-02 VITALS
HEART RATE: 83 BPM | SYSTOLIC BLOOD PRESSURE: 140 MMHG | HEIGHT: 69 IN | TEMPERATURE: 99.2 F | RESPIRATION RATE: 16 BRPM | OXYGEN SATURATION: 99 % | DIASTOLIC BLOOD PRESSURE: 80 MMHG | BODY MASS INDEX: 35.25 KG/M2 | WEIGHT: 238 LBS

## 2018-10-02 DIAGNOSIS — F41.9 ANXIETY: ICD-10-CM

## 2018-10-02 DIAGNOSIS — E11.9 TYPE 2 DIABETES MELLITUS WITHOUT COMPLICATION, WITHOUT LONG-TERM CURRENT USE OF INSULIN (HCC): ICD-10-CM

## 2018-10-02 DIAGNOSIS — Z12.5 SCREENING FOR PROSTATE CANCER: ICD-10-CM

## 2018-10-02 DIAGNOSIS — R22.1 NECK SWELLING: ICD-10-CM

## 2018-10-02 DIAGNOSIS — F32.A DEPRESSION, UNSPECIFIED DEPRESSION TYPE: ICD-10-CM

## 2018-10-02 DIAGNOSIS — M19.012 PRIMARY OSTEOARTHRITIS OF BOTH SHOULDERS: Primary | ICD-10-CM

## 2018-10-02 DIAGNOSIS — Z11.59 NEED FOR HEPATITIS C SCREENING TEST: ICD-10-CM

## 2018-10-02 DIAGNOSIS — M54.2 CHRONIC NECK PAIN: ICD-10-CM

## 2018-10-02 DIAGNOSIS — G89.29 CHRONIC NECK PAIN: ICD-10-CM

## 2018-10-02 DIAGNOSIS — G47.00 INSOMNIA, UNSPECIFIED TYPE: ICD-10-CM

## 2018-10-02 DIAGNOSIS — Z23 IMMUNIZATION DUE: ICD-10-CM

## 2018-10-02 DIAGNOSIS — M19.011 PRIMARY OSTEOARTHRITIS OF BOTH SHOULDERS: Primary | ICD-10-CM

## 2018-10-02 DIAGNOSIS — E78.5 HYPERLIPIDEMIA, UNSPECIFIED HYPERLIPIDEMIA TYPE: ICD-10-CM

## 2018-10-02 LAB
CHOLEST SERPL-MCNC: 180 MG/DL (ref 150–200)
HDLC SERPL-MCNC: 54 MG/DL (ref 40–59)
LDLC SERPL CALC-MCNC: 108 MG/DL
LDLC/HDLC SERPL: 2 {RATIO} (ref 0–3.55)
TRIGL SERPL-MCNC: 90 MG/DL
VLDLC SERPL-MCNC: 18 MG/DL

## 2018-10-02 PROCEDURE — 36415 COLL VENOUS BLD VENIPUNCTURE: CPT | Performed by: NURSE PRACTITIONER

## 2018-10-02 PROCEDURE — 90471 IMMUNIZATION ADMIN: CPT | Performed by: NURSE PRACTITIONER

## 2018-10-02 PROCEDURE — 90715 TDAP VACCINE 7 YRS/> IM: CPT | Performed by: NURSE PRACTITIONER

## 2018-10-02 PROCEDURE — 99214 OFFICE O/P EST MOD 30 MIN: CPT | Performed by: NURSE PRACTITIONER

## 2018-10-02 PROCEDURE — 96372 THER/PROPH/DIAG INJ SC/IM: CPT | Performed by: NURSE PRACTITIONER

## 2018-10-02 PROCEDURE — 80061 LIPID PANEL: CPT | Performed by: NURSE PRACTITIONER

## 2018-10-02 RX ORDER — KETOROLAC TROMETHAMINE 30 MG/ML
60 INJECTION, SOLUTION INTRAMUSCULAR; INTRAVENOUS ONCE
Status: COMPLETED | OUTPATIENT
Start: 2018-10-02 | End: 2018-10-02

## 2018-10-02 RX ORDER — TIZANIDINE HYDROCHLORIDE 4 MG/1
4 CAPSULE, GELATIN COATED ORAL NIGHTLY PRN
Qty: 30 CAPSULE | Refills: 5 | Status: SHIPPED | OUTPATIENT
Start: 2018-10-02 | End: 2019-03-21 | Stop reason: SDUPTHER

## 2018-10-02 RX ORDER — TRAMADOL HYDROCHLORIDE 50 MG/1
50 TABLET ORAL EVERY 6 HOURS PRN
COMMUNITY
End: 2020-10-07 | Stop reason: HOSPADM

## 2018-10-02 RX ORDER — HYDROXYZINE HYDROCHLORIDE 25 MG/1
25 TABLET, FILM COATED ORAL 3 TIMES DAILY PRN
Qty: 90 TABLET | Refills: 2 | Status: SHIPPED | OUTPATIENT
Start: 2018-10-02 | End: 2019-03-21 | Stop reason: SDUPTHER

## 2018-10-02 RX ADMIN — KETOROLAC TROMETHAMINE 60 MG: 30 INJECTION, SOLUTION INTRAMUSCULAR; INTRAVENOUS at 09:45

## 2018-10-21 PROBLEM — M25.511 CHRONIC RIGHT SHOULDER PAIN: Status: RESOLVED | Noted: 2017-11-07 | Resolved: 2018-10-21

## 2018-10-21 PROBLEM — M79.672 BILATERAL FOOT PAIN: Status: RESOLVED | Noted: 2018-07-20 | Resolved: 2018-10-21

## 2018-10-21 PROBLEM — M79.671 BILATERAL FOOT PAIN: Status: RESOLVED | Noted: 2018-07-20 | Resolved: 2018-10-21

## 2018-10-21 PROBLEM — G89.29 CHRONIC RIGHT SHOULDER PAIN: Status: RESOLVED | Noted: 2017-11-07 | Resolved: 2018-10-21

## 2018-10-21 NOTE — PROGRESS NOTES
Subjective   Mikey Allison is a 63 y.o. male who presents to the office to f/u.    Neck Pain    Associated symptoms include numbness. Pertinent negatives include no chest pain, fever, headaches, trouble swallowing or weakness.      Last labs 7/23/18.    Neck swelling-resolved. Pt is still on tramadol with no swelling.   X 1week states much better, thinks related to tramadol    Osteoarthritis and peripheral neuropathy/chronic neck pain-chronic, controlled with tramadol, flexeril, and gabapentin and Celebrex   -patient is getting gabapentin from [facility], will continue Celebrex. Tramadol through [facility]. Changing flexeril to zanaflex because it makes pt too tired during the day.     Anxiety/depression/insomnia-chronic, moderately controlled with buspar, Seroquel 600mg at hs  -ineffective on hydroxyzine in the past per pt, pt aware i'm not comfortable increasing seroquel as he requested with tramadol and gabapentin and muscle relaxer.   -pt wants to go back on hydroxyzine and d/c buspar    Past Medical History:  HTN-chronic, controlled with lisinopril-hctz  GERD-chronic, controlled with Protonix.  Type 2 diabetes-chronic, controlled with metformin and Januvia    The following portions of the patient's history were reviewed and updated as appropriate: allergies, current medications, past family history, past medical history, past social history, past surgical history and problem list.    ISAIAH obtained and reviewed at last visit, patient has history of being prescribed gabapentin and tramadol and Lortabs.  Gabapentin last prescribed by [facility] to 400 mg, 90 count on 5/24/18.  Tramadol, last prescribed by [facility] 50 mg, 60 count on 1/25/18 and Lortab 5 mg, 30 count on 1/15/18.    Review of Systems   Constitutional: Negative for activity change, appetite change, chills, diaphoresis, fatigue, fever and unexpected weight change.   HENT: Negative for congestion, ear  discharge, ear pain, facial swelling, hearing loss, postnasal drip, rhinorrhea, sinus pain, sinus pressure, sneezing, sore throat, tinnitus and trouble swallowing.    Eyes: Negative.  Negative for discharge and visual disturbance.   Respiratory: Negative for cough, chest tightness, shortness of breath and wheezing.    Cardiovascular: Positive for leg swelling. Negative for chest pain and palpitations.   Gastrointestinal: Negative for abdominal distention, abdominal pain, blood in stool, constipation, diarrhea, nausea and vomiting.   Genitourinary: Negative for decreased urine volume, difficulty urinating, discharge, dysuria, flank pain, frequency, hematuria, penile pain, penile swelling, scrotal swelling, testicular pain and urgency.   Musculoskeletal: Positive for arthralgias, back pain and neck pain. Negative for joint swelling and myalgias.   Skin: Negative for color change and rash.   Allergic/Immunologic: Negative for food allergies.   Neurological: Positive for numbness. Negative for dizziness, tremors, seizures, syncope, weakness, light-headedness and headaches.   Hematological: Negative for adenopathy. Does not bruise/bleed easily.   Psychiatric/Behavioral: Positive for sleep disturbance. Negative for agitation, behavioral problems, confusion, decreased concentration, dysphoric mood, hallucinations, self-injury and suicidal ideas. The patient is nervous/anxious.    All other systems reviewed and are negative.      Past Medical History:   Diagnosis Date   • Abdominal pain     left side   • Acute sinusitis    • Ankle joint pain    • Bipolar disorder (CMS/[facility])    • Bipolar disorder, unspecified (CMS/HCC)    • Cellulitis and abscess of trunk     axilla   • Chest pain    • Chronic anemia    • Degenerative joint disease involving multiple joints     back   • Depressive disorder    • Diabetes mellitus (CMS/[facility])    • Diabetes mellitus with no complication (CMS/[facility])     type 2 or unspecified type uncontrolled   •  "Diverticular disease    • Dyspnea    • Enlarged prostate     on [place]-on CT   • Essential hypertension    • Febrile convulsion (CMS/HCC)    • Gastroesophageal reflux disease    • Generalized anxiety disorder    • H/O echocardiogram 10/16/2007    Mild to moderate concentric LV hypertrophy with mild left atrial enlargement. LV appears to be hypodynamic with preserved LV systolic function with EF 55-60%. Pericardium appears to be normal with a small pericardial effusion    • Hemorrhoids    • Hyperlipidemia    • Hypertensive disorder    • Hypoglycemia    • Infectious disorder of kidney     kidney infection-treated by [facility]   • Left lower quadrant pain    • Loss of appetite    • Obesity    • Osteoarthritis    • Pain in joint involving ankle and foot    • Pain in limb    • Psoriasis    • Retention of urine     with cath-treated by the [facility]   • Screening for malignant neoplasm of colon    • Sepsis due to urinary tract infection (CMS/HCC)     urosepsis treated by [facility]   • Sinusitis    • Syncope    • Unspecified essential hypertension        Family History   Problem Relation Age of Onset   • Diabetes Other    • Heart disease Other    • Hypertension Other    • Kidney disease Other    • Hypertension Mother    • Clotting disorder Mother           Objective   /80   Pulse 83   Temp 99.2 °F (37.3 °C) (Temporal Artery )   Resp 16   Ht 175.3 cm (69\")   Wt 108 kg (238 lb)   SpO2 99%   BMI 35.15 kg/m²   Physical Exam   Constitutional: He is oriented to person, place, and time. He appears well-developed and well-nourished. He is cooperative. He does not appear ill.   HENT:   Head: Normocephalic.   Right Ear: Hearing, tympanic membrane, external ear and ear canal normal.   Left Ear: Hearing, tympanic membrane, external ear and ear canal normal.   Nose: Nose normal.   Mouth/Throat: Oropharynx is clear and moist. No oropharyngeal exudate.   Eyes: Pupils are equal, round, and reactive to light. EOM and lids " are normal. Right eye exhibits no discharge. Left eye exhibits no discharge. Right conjunctiva is not injected. Left conjunctiva is not injected.   Neck: Normal range of motion. Neck supple.   Cardiovascular: Normal rate, regular rhythm, normal heart sounds and intact distal pulses.  Exam reveals no gallop and no friction rub.    No murmur heard.  Pulmonary/Chest: Effort normal and breath sounds normal. No respiratory distress. He has no wheezes. He has no rales.   Abdominal: Soft. Normal appearance, normal aorta and bowel sounds are normal. He exhibits no distension and no mass. There is no tenderness. There is no rebound and no guarding. No hernia.   Musculoskeletal: He exhibits no edema or deformity.        Right shoulder: He exhibits decreased range of motion, tenderness and pain. He exhibits no bony tenderness, no swelling, no effusion, no crepitus, no deformity, no laceration, no spasm, normal pulse and normal strength.        Cervical back: He exhibits decreased range of motion, tenderness and pain. He exhibits no bony tenderness, no swelling, no edema, no deformity, no laceration, no spasm and normal pulse.   Lymphadenopathy:     He has no cervical adenopathy.   Neurological: He is alert and oriented to person, place, and time. He has normal strength and normal reflexes. He displays normal reflexes. No cranial nerve deficit or sensory deficit. He exhibits normal muscle tone. He displays a negative Romberg sign. Coordination normal.   Reflex Scores:       Patellar reflexes are 2+ on the right side and 2+ on the left side.  Skin: Skin is warm, dry and intact. No rash noted. He is not diaphoretic. No erythema. No pallor.   Psychiatric: He has a normal mood and affect. His speech is normal and behavior is normal. Thought content normal. Cognition and memory are normal.   Patient is dressed appropriately for weather and situation, makes eye contact, and engages in conversation.  He is attentive.   Nursing note  and vitals reviewed.       PHQ-2/PHQ-9 Depression Screening 10/2/2018   Little interest or pleasure in doing things 3   Feeling down, depressed, or hopeless 3   Trouble falling or staying asleep, or sleeping too much 0   Feeling tired or having little energy 3   Poor appetite or overeating 0   Feeling bad about yourself - or that you are a failure or have let yourself or your family down 0   Trouble concentrating on things, such as reading the newspaper or watching television 3   Moving or speaking so slowly that other people could have noticed. Or the opposite - being so fidgety or restless that you have been moving around a lot more than usual 0   Thoughts that you would be better off dead, or of hurting yourself in some way 0   Total Score 12   If you checked off any problems, how difficult have these problems made it for you to do your work, take care of things at home, or get along with other people? Somewhat difficult         Assessment/Plan   Mikey was seen today for med refill.    Diagnoses and all orders for this visit:    Primary osteoarthritis of both shoulders  -     ketorolac (TORADOL) injection 60 mg; Inject 2 mL into the appropriate muscle as directed by prescriber 1 (One) Time.    Immunization due  -     Tdap Vaccine Greater Than or Equal To 8yo IM    Anxiety    Insomnia, unspecified type    Depression, unspecified depression type    Type 2 diabetes mellitus without complication, without long-term current use of insulin (CMS/HCC)  -     CBC & Differential; Future  -     Comprehensive Metabolic Panel; Future  -     Hemoglobin A1c; Future  -     Lipid Panel; Future  -     T4, Free; Future  -     TSH; Future  -     Microalbumin / Creatinine Urine Ratio - Urine, Clean Catch; Future    Neck swelling    Chronic neck pain    Screening for prostate cancer  -     PSA Screen; Future    Need for hepatitis C screening test  -     Hepatitis Panel, Acute; Future  -     Hepatitis Diagnostic Profile; Future  -      Hepatitis C Antibody; Future    Other orders  -     SITagliptin (JANUVIA) 100 MG tablet; Take 1 tablet by mouth Daily.  -     hydrOXYzine (ATARAX) 25 MG tablet; Take 1 tablet by mouth 3 (Three) Times a Day As Needed for Itching.  -     TiZANidine (ZANAFLEX) 4 MG capsule; Take 1 capsule by mouth At Night As Needed for Muscle Spasms.           Last labs 7/23/18.    Neck swelling-resolved. Pt is still on tramadol with no swelling.   X 1week states much better, thinks related to tramadol    Osteoarthritis and peripheral neuropathy/chronic neck pain-chronic, controlled with tramadol, flexeril, and gabapentin and Celebrex   -patient is getting gabapentin from [facility], will continue Celebrex. Tramadol through [facility]. Changing flexeril to zanaflex because it makes pt too tired during the day.     Anxiety/depression/insomnia-chronic, moderately controlled with buspar, Seroquel 600mg at hs  -pt aware i'm not comfortable increasing seroquel with tramadol and gabapentin and muscle relaxer. -pt wants to go back on hydroxyzine and d/c buspar    tdap givne today. toradol shot to help with related pain. Januvia refilled for t2dm.      F/u in feb with labs prior.     Patient educated to follow-up sooner than next scheduled appointment if condition(s) worse or do not improve. Patient states understanding and is in agreeance with plan of care. An After Visit Summary was printed and given to the patient.      SAMUEL Wiseman        This document has been electronically signed by SAMUEL Wiseman on October 21, 2018 4:16 PM      EMR/Transcription Dragon Disclaimer:  Some of this note may be an electronic dragon transcription/translation of spoken language to printed text. The electronic translation of spoken language may permit erroneous, or at times, nonsensical words or phrases to be inadvertently transcribed. Although I have reviewed the note for such errors, some may still exist.

## 2018-11-05 ENCOUNTER — TELEPHONE (OUTPATIENT)
Dept: PODIATRY | Facility: CLINIC | Age: 63
End: 2018-11-05

## 2018-11-05 NOTE — TELEPHONE ENCOUNTER
INESSA:    PATIENT MADE AN APPOINTMENT FOR 11/12 IN [place] BUT HE WILL BE OUT OF MEDICINES ON 11/10, WAS WONDERING IF YOU COULD GO AHEAD AND CALL IN REFILL TO [facility].  IF YOU HAVE ANY QUESTIONS PLEASE CALL HIM.

## 2018-11-05 NOTE — TELEPHONE ENCOUNTER
Patient will be able to pick script up at his appointment on 11/12 due to it being a control. Patient was okay with this.

## 2018-11-08 ENCOUNTER — TELEPHONE (OUTPATIENT)
Dept: FAMILY MEDICINE CLINIC | Facility: CLINIC | Age: 63
End: 2018-11-08

## 2018-11-08 RX ORDER — QUETIAPINE FUMARATE 300 MG/1
600 TABLET, FILM COATED ORAL NIGHTLY
Qty: 60 TABLET | Refills: 5 | Status: SHIPPED | OUTPATIENT
Start: 2018-11-08 | End: 2019-03-21 | Stop reason: SDUPTHER

## 2018-11-08 RX ORDER — QUETIAPINE FUMARATE 300 MG/1
600 TABLET, FILM COATED ORAL NIGHTLY
Qty: 60 TABLET | Refills: 5 | OUTPATIENT
Start: 2018-11-08

## 2018-11-12 ENCOUNTER — CLINICAL SUPPORT (OUTPATIENT)
Dept: FAMILY MEDICINE CLINIC | Facility: CLINIC | Age: 63
End: 2018-11-12

## 2018-11-12 ENCOUNTER — OFFICE VISIT (OUTPATIENT)
Dept: PODIATRY | Facility: CLINIC | Age: 63
End: 2018-11-12

## 2018-11-12 VITALS — TEMPERATURE: 97.9 F

## 2018-11-12 VITALS — WEIGHT: 238 LBS | BODY MASS INDEX: 35.25 KG/M2 | HEIGHT: 69 IN

## 2018-11-12 DIAGNOSIS — M79.674 PAIN IN TOES OF BOTH FEET: ICD-10-CM

## 2018-11-12 DIAGNOSIS — M79.675 PAIN IN TOES OF BOTH FEET: ICD-10-CM

## 2018-11-12 DIAGNOSIS — M20.41 HAMMER TOES OF BOTH FEET: ICD-10-CM

## 2018-11-12 DIAGNOSIS — M20.42 HAMMER TOES OF BOTH FEET: ICD-10-CM

## 2018-11-12 DIAGNOSIS — B35.1 ONYCHOMYCOSIS: ICD-10-CM

## 2018-11-12 DIAGNOSIS — M19.079 ARTHRITIS OF ANKLE: ICD-10-CM

## 2018-11-12 DIAGNOSIS — Z23 IMMUNIZATION DUE: ICD-10-CM

## 2018-11-12 DIAGNOSIS — E11.42 DIABETIC POLYNEUROPATHY ASSOCIATED WITH TYPE 2 DIABETES MELLITUS (HCC): Primary | ICD-10-CM

## 2018-11-12 PROCEDURE — G0008 ADMIN INFLUENZA VIRUS VAC: HCPCS | Performed by: NURSE PRACTITIONER

## 2018-11-12 PROCEDURE — 90674 CCIIV4 VAC NO PRSV 0.5 ML IM: CPT | Performed by: NURSE PRACTITIONER

## 2018-11-12 PROCEDURE — 99213 OFFICE O/P EST LOW 20 MIN: CPT | Performed by: PODIATRIST

## 2018-11-12 PROCEDURE — 11721 DEBRIDE NAIL 6 OR MORE: CPT | Performed by: PODIATRIST

## 2018-11-12 RX ORDER — GABAPENTIN 400 MG/1
400 CAPSULE ORAL 3 TIMES DAILY
Qty: 90 CAPSULE | Refills: 5 | Status: SHIPPED | OUTPATIENT
Start: 2018-11-12 | End: 2019-03-21 | Stop reason: SDUPTHER

## 2018-11-12 NOTE — PROGRESS NOTES
Mikey Allison  1955  63 y.o. male   PCP: Cyndie Young, APRN 10/02/18  A1C: 6.6 per labs in the computer.    Patient presents for a routine check on his feet and a refill on Gapapentin.       Chief Complaint   Patient presents with   • Right Foot - Diabetic foot exam   • Left Foot - Diabetic foot exam           History of Present Illness    Mikey Allison is a 63 y.o. male who presents for f/u diabetic foot exam. Has started in pain management with Dr. Collins, states that she did not refill his Gabapentin.    Past Medical History:   Diagnosis Date   • Abdominal pain     left side   • Acute sinusitis    • Ankle joint pain    • Bipolar disorder (CMS/HCC)    • Bipolar disorder, unspecified (CMS/HCC)    • Cellulitis and abscess of trunk     axilla   • Chest pain    • Chronic anemia    • Degenerative joint disease involving multiple joints     back   • Depressive disorder    • Diabetes mellitus (CMS/HCC)    • Diabetes mellitus with no complication (CMS/HCC)     type 2 or unspecified type uncontrolled   • Diverticular disease    • Dyspnea    • Enlarged prostate     on [place]-on [place]   • Essential hypertension    • Febrile convulsion (CMS/HCC)    • Gastroesophageal reflux disease    • Generalized anxiety disorder    • H/O echocardiogram 10/16/2007    Mild to moderate concentric LV hypertrophy with mild left atrial enlargement. LV appears to be hypodynamic with preserved LV systolic function with EF 55-60%. Pericardium appears to be normal with a small pericardial effusion    • Hemorrhoids    • Hyperlipidemia    • Hypertensive disorder    • Hypoglycemia    • Infectious disorder of kidney     kidney infection-treated by [facility]   • Left lower quadrant pain    • Loss of appetite    • Obesity    • Osteoarthritis    • Pain in joint involving ankle and foot    • Pain in limb    • Psoriasis    • Retention of urine     with cath-treated by the [facility]   • Screening for malignant neoplasm of colon    •  Sepsis due to urinary tract infection (CMS/HCC)     urosepsis treated by [facility]   • Sinusitis    • Syncope    • Unspecified essential hypertension          Past Surgical History:   Procedure Laterality Date   • ANKLE SURGERY Left 2008    Removal of syndesmotic screws, left ankle. Painful syndesmotic screws, left ankle.)   • ANKLE SURGERY  10/19/2007    Open reduction-internal fixation with fluoroscopic control with final x-ray PA and lateral of the ankle. Trimalleolar fracture/subluxation, left ankle.)   • CIRCUMCISION     • COLONOSCOPY  03/10/2015    Diverticulosis found in the sigmoid colon. hemorrhoids found in the anus. Normal cecum   • CYSTOSCOPY  2016    Cystoscopy, dilation, anchor of Tim catheter. Benign prostatic hypertrophy, urinary retention, urethral stricture history of.   • INJECTION OF MEDICATION      Kenalog (3)   • STOMACH SURGERY     • TIBIA FRACTURE SURGERY           Family History   Problem Relation Age of Onset   • Diabetes Other    • Heart disease Other    • Hypertension Other    • Kidney disease Other    • Hypertension Mother    • Clotting disorder Mother          Social History     Socioeconomic History   • Marital status:      Spouse name: Not on file   • Number of children: Not on file   • Years of education: Not on file   • Highest education level: Not on file   Social Needs   • Financial resource strain: Not on file   • Food insecurity - worry: Not on file   • Food insecurity - inability: Not on file   • Transportation needs - medical: Not on file   • Transportation needs - non-medical: Not on file   Occupational History   • Not on file   Tobacco Use   • Smoking status: Former Smoker     Types: Cigarettes     Last attempt to quit: 1985     Years since quittin.8   • Smokeless tobacco: Never Used   • Tobacco comment: smoked 3 years   Substance and Sexual Activity   • Alcohol use: Yes     Alcohol/week: 1.8 - 3.6 oz     Types: 3 - 6 Cans of beer per week     " Comment: 1-2 every other day   • Drug use: No   • Sexual activity: Defer   Other Topics Concern   • Not on file   Social History Narrative   • Not on file         Current Outpatient Medications   Medication Sig Dispense Refill   • albuterol (PROVENTIL HFA;VENTOLIN HFA) 108 (90 Base) MCG/ACT inhaler 2 puffs every 4-6 hours until cough/wheezing improve, than taper off of 18 g 3   • atorvastatin (LIPITOR) 20 MG tablet Take 1 tablet by mouth Every Night. 30 tablet 5   • celecoxib (CeleBREX) 200 MG capsule Take 1 capsule by mouth Daily. 30 capsule 5   • glucose blood test strip bid 100 each 6   • glucose monitor monitoring kit 1 each Daily. 1 each 0   • hydrOXYzine (ATARAX) 25 MG tablet Take 1 tablet by mouth 3 (Three) Times a Day As Needed for Itching. 90 tablet 2   • lisinopril-hydrochlorothiazide (PRINZIDE,ZESTORETIC) 20-12.5 MG per tablet Take 1 tablet by mouth Daily. 30 tablet 5   • metFORMIN (GLUCOPHAGE) 1000 MG tablet Take 1 tablet by mouth 2 (Two) Times a Day With Meals. 60 tablet 5   • ONE TOUCH LANCETS misc 1 each Daily. 200 each 6   • pantoprazole (PROTONIX) 40 MG EC tablet Take 1 tablet by mouth Daily. 30 tablet 5   • predniSONE (DELTASONE) 10 MG tablet Take [id number], [id number], [id number] 24 tablet 0   • QUEtiapine (SEROquel) 300 MG tablet Take 2 tablets by mouth Every Night. 60 tablet 5   • SITagliptin (JANUVIA) 100 MG tablet Take 1 tablet by mouth Daily. 30 tablet 5   • TiZANidine (ZANAFLEX) 4 MG capsule Take 1 capsule by mouth At Night As Needed for Muscle Spasms. 30 capsule 5   • traMADol (ULTRAM) 50 MG tablet Take 50 mg by mouth Every 6 (Six) Hours As Needed for Moderate Pain . 1 - 2  Tablets po q  8 hours prn pain     • gabapentin (NEURONTIN) 400 MG capsule Take 1 capsule by mouth 3 (Three) Times a Day. 90 capsule 5     No current facility-administered medications for this visit.          OBJECTIVE    Ht 175.3 cm (69\")   Wt 108 kg (238 lb)   BMI 35.15 kg/m²       Review of Systems "   Constitutional: Negative.    HENT: Negative.    Eyes: Negative.    Respiratory: Negative.    Cardiovascular: Negative.    Gastrointestinal: Negative.    Endocrine: Negative.    Genitourinary: Negative.    Musculoskeletal:        Shoulder pain  Foot pain   Skin: Negative.    Allergic/Immunologic: Negative.    Neurological: Negative.    Hematological: Negative.    Psychiatric/Behavioral: Negative.          Physical Exam    Mikey had a diabetic foot exam performed today.         Constitutional: he appears well-developed and well-nourished.   HEENT: Normocephalic. Atraumatic  CV: No tenderness. RRR  Resp: Non-labored respiration. No wheezes.   Psychiatric: he has a normal mood and affect. his   behavior is normal.      Lower Extremity Exam:  Vascular: DP/PT pulses palpable 1+.   Negative hair growth.   Mild, left perimalleolar edema  Neuro: Protective sensation diminished to lesser toes, b/l.  DTRs intact  Integument: No open wounds or lesions.  Atrophic skin noted b/l   No masses  Webspaces c/d/i  Musculoskeletal: LE muscle strength 5/5.   Gait Normal  Semi rigid hammertoe deformity toes 2-5, b/l.  Nails 1-5 b/l thickened, elongated with subungual debris. +pain on palpation  Ankle ROM decreased, b/l  No obvious reproducible musculoskeletal pain      ASSESSMENT AND PLAN    Mikey was seen today for diabetic foot exam and diabetic foot exam.    Diagnoses and all orders for this visit:    Diabetic polyneuropathy associated with type 2 diabetes mellitus (CMS/[facility])    Hammer toes of both feet    Arthritis of ankle    Onychomycosis    Pain in toes of both feet    Other orders  -     gabapentin (NEURONTIN) 400 MG capsule; Take 1 capsule by mouth 3 (Three) Times a Day.    -Comprehensive DM foot exam performed. Patient educated on importance of tight glucose control and daily foot checks.   -Patient educated on padding techniques for hammertoes.  Proper extra depth diabetic shoe gear.  Limit barefoot walking.  -Patient  tolerating gabapentin well, states it helps, but continues to complain of left ankle pain.   -Placed on [place] by Dr. Collins. Will attempt to have gabapentin managed by pain management as well. Refilled today. ISAIAH appropriate.   -Nails 1-5 b/l debrided in thickness and length to decrease pain, risk of infection  -Follow up 3 months PRN            This document has been electronically signed by Yuri Amaral DPM on November 13, 2018 7:28 PM     EMR Dragon/Transcription disclaimer:   Much of this encounter note is an electronic transcription/translation of spoken language to printed text. The electronic translation of spoken language may permit erroneous, or at times, nonsensical words or phrases to be inadvertently transcribed; Although I have reviewed the note for such errors, some may still exist.    Yuri Amaral DPM  11/13/2018  7:28 PM

## 2018-11-26 RX ORDER — LISINOPRIL AND HYDROCHLOROTHIAZIDE 20; 12.5 MG/1; MG/1
1 TABLET ORAL DAILY
Qty: 30 TABLET | Refills: 5 | Status: SHIPPED | OUTPATIENT
Start: 2018-11-26 | End: 2019-03-21 | Stop reason: SDUPTHER

## 2018-12-26 RX ORDER — PANTOPRAZOLE SODIUM 40 MG/1
40 TABLET, DELAYED RELEASE ORAL DAILY
Qty: 30 TABLET | Refills: 5 | Status: CANCELLED | OUTPATIENT
Start: 2018-12-26

## 2018-12-27 ENCOUNTER — TELEPHONE (OUTPATIENT)
Dept: FAMILY MEDICINE CLINIC | Facility: CLINIC | Age: 63
End: 2018-12-27

## 2018-12-27 RX ORDER — PANTOPRAZOLE SODIUM 40 MG/1
40 TABLET, DELAYED RELEASE ORAL DAILY
Qty: 30 TABLET | Refills: 5 | Status: SHIPPED | OUTPATIENT
Start: 2018-12-27 | End: 2019-03-21 | Stop reason: SDUPTHER

## 2018-12-27 NOTE — TELEPHONE ENCOUNTER
Refill on Protonix and Metformin. Hannah patient. He has an appointment on 2/1/19 with Cyndie and labs 1 week before. Send to [facility] please. Thank you

## 2019-01-17 ENCOUNTER — OFFICE VISIT (OUTPATIENT)
Dept: ORTHOPEDIC SURGERY | Facility: CLINIC | Age: 64
End: 2019-01-17

## 2019-01-17 VITALS — BODY MASS INDEX: 35.4 KG/M2 | HEIGHT: 69 IN | WEIGHT: 239 LBS

## 2019-01-17 DIAGNOSIS — M75.41 SHOULDER IMPINGEMENT, RIGHT: ICD-10-CM

## 2019-01-17 DIAGNOSIS — M25.511 CHRONIC RIGHT SHOULDER PAIN: Primary | ICD-10-CM

## 2019-01-17 DIAGNOSIS — G89.29 CHRONIC RIGHT SHOULDER PAIN: Primary | ICD-10-CM

## 2019-01-17 DIAGNOSIS — M75.101 ROTATOR CUFF SYNDROME OF RIGHT SHOULDER: ICD-10-CM

## 2019-01-17 PROCEDURE — 99213 OFFICE O/P EST LOW 20 MIN: CPT | Performed by: ORTHOPAEDIC SURGERY

## 2019-01-17 PROCEDURE — 20610 DRAIN/INJ JOINT/BURSA W/O US: CPT | Performed by: ORTHOPAEDIC SURGERY

## 2019-01-17 RX ORDER — HYDROCODONE BITARTRATE AND ACETAMINOPHEN 7.5; 325 MG/1; MG/1
1 TABLET ORAL EVERY 6 HOURS PRN
COMMUNITY
End: 2020-02-01

## 2019-01-17 RX ADMIN — LIDOCAINE HYDROCHLORIDE 1 ML: 10 INJECTION, SOLUTION EPIDURAL; INFILTRATION; INTRACAUDAL; PERINEURAL at 15:32

## 2019-01-17 RX ADMIN — TRIAMCINOLONE ACETONIDE 40 MG: 40 INJECTION, SUSPENSION INTRA-ARTICULAR; INTRAMUSCULAR at 15:32

## 2019-01-17 NOTE — PROGRESS NOTES
"Mikey Allison is a 63 y.o. male returns for     Chief Complaint   Patient presents with   • Right Shoulder - Follow-up, Pain       HISTORY OF PRESENT ILLNESS: f/u right shoulder, pain score today 10/10. Patient states that pain has gotten worse since last visit  Has been going to pain mgt is taking norco 7.5 for pain.  He reports injection into shoulder did not help  Having pain all the time, pain worse with activity  No new injury  No numbness or tingling.     CONCURRENT MEDICAL HISTORY:    The following portions of the patient's history were reviewed and updated as appropriate: allergies, current medications, past family history, past medical history, past social history, past surgical history and problem list.     ROS  No fevers or chills.  No chest pain or shortness of air.  No GI or  disturbances.    PHYSICAL EXAMINATION:       Ht 175.3 cm (69\")   Wt 108 kg (239 lb)   BMI 35.29 kg/m²     Physical Exam   Constitutional: He is oriented to person, place, and time. He appears well-developed and well-nourished.   Neurological: He is alert and oriented to person, place, and time.   Psychiatric: He has a normal mood and affect. His behavior is normal. Judgment and thought content normal.       GAIT:     []  Normal  []  Antalgic    Assistive device: []  None  []  Walker     []  Crutches  [x]  Cane     []  Wheelchair  []  Stretcher    Right Shoulder Exam     Tenderness   The patient is experiencing tenderness in the acromion.    Range of Motion   Active abduction: 100   Forward flexion: 100     Muscle Strength   Abduction: 4/5   Supraspinatus: 4/5     Tests   Nugent test: positive  Impingement: positive    Other   Erythema: absent  Sensation: normal  Pulse: present              Mri right shoulder 7/23/2018 @ [facility]  Impression:  1. Probable tear posterior superior labrum. Assessment is somewhat limited by motion artifact and joint space narrowing. Arthrography may be useful for more definitive assessment. " There is an adjacent posterior superior para labral cyst.  2. Moderate glenohumeral and acromioclavicular joint primary osteoarthritic changes. Os acromiale is also noted which can potentially be associated with superior impingement.  3. Mild infraspinatus tendinosis  4. Thickening of the anterior band of the inferior glenohumeral ligament which can be associated.  5. Mild subacromial subdeltoid bursitis .  6. Mild proximal deltoid muscle edema is noted posteriorly suggestive of mild muscular stran.      ASSESSMENT:    Diagnoses and all orders for this visit:    Chronic right shoulder pain  -     Large Joint Arthrocentesis: R subacromial bursa    Rotator cuff syndrome of right shoulder  -     Large Joint Arthrocentesis: R subacromial bursa    Shoulder impingement, right  -     Large Joint Arthrocentesis: R subacromial bursa    Other orders  -     HYDROcodone-acetaminophen (NORCO) 7.5-325 MG per tablet; Take 1 tablet by mouth Every 6 (Six) Hours As Needed for Moderate Pain .        Large Joint Arthrocentesis: R subacromial bursa  Date/Time: 1/17/2019 3:32 PM  Consent given by: patient  Site marked: site marked  Timeout: Immediately prior to procedure a time out was called to verify the correct patient, procedure, equipment, support staff and site/side marked as required   Supporting Documentation  Indications: pain   Procedure Details  Location: shoulder - R subacromial bursa  Preparation: Patient was prepped and draped in the usual sterile fashion  Needle size: 22 G  Approach: posterior  Medications administered: 40 mg triamcinolone acetonide 40 MG/ML; 1 mL lidocaine PF 1% 1 %  Patient tolerance: patient tolerated the procedure well with no immediate complications          PLAN    We discussed repeat trial of injection  Activity as tolerated  HEP for strength and conditioning of shoulder  Not sure surgical intervention will solve all the problems.     Return if symptoms worsen or fail to improve, for  joesph.    Maximus Schreiber MD

## 2019-01-20 RX ORDER — TRIAMCINOLONE ACETONIDE 40 MG/ML
40 INJECTION, SUSPENSION INTRA-ARTICULAR; INTRAMUSCULAR
Status: COMPLETED | OUTPATIENT
Start: 2019-01-17 | End: 2019-01-17

## 2019-01-20 RX ORDER — LIDOCAINE HYDROCHLORIDE 10 MG/ML
1 INJECTION, SOLUTION EPIDURAL; INFILTRATION; INTRACAUDAL; PERINEURAL
Status: COMPLETED | OUTPATIENT
Start: 2019-01-17 | End: 2019-01-17

## 2019-01-25 DIAGNOSIS — E78.5 HYPERLIPIDEMIA, UNSPECIFIED HYPERLIPIDEMIA TYPE: ICD-10-CM

## 2019-01-27 RX ORDER — ATORVASTATIN CALCIUM 20 MG/1
20 TABLET, FILM COATED ORAL NIGHTLY
Qty: 30 TABLET | Refills: 5 | Status: SHIPPED | OUTPATIENT
Start: 2019-01-27 | End: 2019-03-21 | Stop reason: SDUPTHER

## 2019-02-01 ENCOUNTER — TELEPHONE (OUTPATIENT)
Dept: FAMILY MEDICINE CLINIC | Facility: CLINIC | Age: 64
End: 2019-02-01

## 2019-02-01 RX ORDER — ALBUTEROL SULFATE 90 UG/1
AEROSOL, METERED RESPIRATORY (INHALATION)
Qty: 18 G | Refills: 3 | Status: SHIPPED | OUTPATIENT
Start: 2019-02-01 | End: 2019-03-21 | Stop reason: SDUPTHER

## 2019-02-05 ENCOUNTER — TELEPHONE (OUTPATIENT)
Dept: PODIATRY | Facility: CLINIC | Age: 64
End: 2019-02-05

## 2019-02-05 NOTE — TELEPHONE ENCOUNTER
BURKETT      PATIENT WILL BE OUT OF HIS NEROTIN ON Sunday.    WANTED TO GET ANOTHER SCRIPT FROM DR WYLIE    CAN YOU FILL THE SCRIPT FOR PATIENT?    I MADE HIM AN APPT FOR 2/25 TO SEE DR WYLIE

## 2019-02-06 RX ORDER — GABAPENTIN 400 MG/1
400 CAPSULE ORAL 3 TIMES DAILY
Qty: 90 CAPSULE | Refills: 2 | Status: SHIPPED | OUTPATIENT
Start: 2019-02-06 | End: 2021-08-02 | Stop reason: DRUGHIGH

## 2019-03-05 ENCOUNTER — OFFICE VISIT (OUTPATIENT)
Dept: PODIATRY | Facility: CLINIC | Age: 64
End: 2019-03-05

## 2019-03-05 VITALS — WEIGHT: 239 LBS | HEIGHT: 69 IN | BODY MASS INDEX: 35.4 KG/M2

## 2019-03-05 DIAGNOSIS — M79.674 PAIN IN TOES OF BOTH FEET: ICD-10-CM

## 2019-03-05 DIAGNOSIS — E11.9 ENCOUNTER FOR DIABETIC FOOT EXAM (HCC): Primary | ICD-10-CM

## 2019-03-05 DIAGNOSIS — E11.42 DIABETIC POLYNEUROPATHY ASSOCIATED WITH TYPE 2 DIABETES MELLITUS (HCC): ICD-10-CM

## 2019-03-05 DIAGNOSIS — B35.1 ONYCHOMYCOSIS: ICD-10-CM

## 2019-03-05 DIAGNOSIS — M20.41 HAMMER TOES OF BOTH FEET: ICD-10-CM

## 2019-03-05 DIAGNOSIS — M19.079 ARTHRITIS OF ANKLE: ICD-10-CM

## 2019-03-05 DIAGNOSIS — M79.675 PAIN IN TOES OF BOTH FEET: ICD-10-CM

## 2019-03-05 DIAGNOSIS — M20.42 HAMMER TOES OF BOTH FEET: ICD-10-CM

## 2019-03-05 PROCEDURE — 11721 DEBRIDE NAIL 6 OR MORE: CPT | Performed by: PODIATRIST

## 2019-03-05 PROCEDURE — 99213 OFFICE O/P EST LOW 20 MIN: CPT | Performed by: PODIATRIST

## 2019-03-05 NOTE — PROGRESS NOTES
Mikey Allison  1955  63 y.o. male   PCP: Cyndie Young, APRN 10/02/18  A1C: 6.6 per labs in the computer.  BS: 157 per patient  Patient presents for a routine check on his feet and a refill on Gapapentin.     03/05/2019    Chief Complaint   Patient presents with   • Left Foot - Diabetic foot exam   • Right Foot - Diabetic foot exam           History of Present Illness    Mikey Allison is a 63 y.o. male who presents for f/u diabetic foot exam. He is in pain management with Dr. Collins. States he needs new diabetic shoes.    Past Medical History:   Diagnosis Date   • Abdominal pain     left side   • Acute sinusitis    • Ankle joint pain    • Bipolar disorder (CMS/HCC)    • Bipolar disorder, unspecified (CMS/HCC)    • Cellulitis and abscess of trunk     axilla   • Chest pain    • Chronic anemia    • Degenerative joint disease involving multiple joints     back   • Depressive disorder    • Diabetes mellitus (CMS/HCC)    • Diabetes mellitus with no complication (CMS/HCC)     type 2 or unspecified type uncontrolled   • Diverticular disease    • Dyspnea    • Enlarged prostate     on [place]-on CT   • Essential hypertension    • Febrile convulsion (CMS/HCC)    • Gastroesophageal reflux disease    • Generalized anxiety disorder    • H/O echocardiogram 10/16/2007    Mild to moderate concentric LV hypertrophy with mild left atrial enlargement. LV appears to be hypodynamic with preserved LV systolic function with EF 55-60%. Pericardium appears to be normal with a small pericardial effusion    • Hemorrhoids    • Hyperlipidemia    • Hypertensive disorder    • Hypoglycemia    • Infectious disorder of kidney     kidney infection-treated by [facility]   • Left lower quadrant pain    • Loss of appetite    • Obesity    • Osteoarthritis    • Pain in joint involving ankle and foot    • Pain in limb    • Psoriasis    • Retention of urine     with cath-treated by the [facility]   • Screening for malignant neoplasm  of colon    • Sepsis due to urinary tract infection (CMS/[facility])     urosepsis treated by [facility]   • Sinusitis    • Syncope    • Unspecified essential hypertension          Past Surgical History:   Procedure Laterality Date   • ANKLE SURGERY Left 11/11/2008    Removal of syndesmotic screws, left ankle. Painful syndesmotic screws, left ankle.)   • ANKLE SURGERY  10/19/2007    Open reduction-internal fixation with fluoroscopic control with final x-ray PA and lateral of the ankle. Trimalleolar fracture/subluxation, left ankle.)   • CIRCUMCISION  2016   • COLONOSCOPY  03/10/2015    Diverticulosis found in the sigmoid colon. hemorrhoids found in the anus. Normal cecum   • CYSTOSCOPY  06/22/2016    Cystoscopy, dilation, anchor of Tim catheter. Benign prostatic hypertrophy, urinary retention, urethral stricture history of.   • INJECTION OF MEDICATION      Kenalog (3)   • SHOULDER ARTHROSCOPY Right 10/18/2017    Procedure: SHOULDER ARTHROSCOPY WITH RAFAELA PROCEDURE, AND SUBACROMIAL DECOMPRESSION, AND POSSIBLE ROTATOR  CUFF REPAIR       SHOULDER ARTHROSCOPY WITH RAFAELA PROCEDURE, AND SUBACROMIAL DECOMPRESSION, NO ROTATOR CUFF REPAIR PERFORMED;  Surgeon: Maximus Schreiber MD;  Location: [facility];  Service:    • STOMACH SURGERY     • TIBIA FRACTURE SURGERY           Family History   Problem Relation Age of Onset   • Diabetes Other    • Heart disease Other    • Hypertension Other    • Kidney disease Other    • Hypertension Mother    • Clotting disorder Mother          Social History     Socioeconomic History   • Marital status:      Spouse name: Not on file   • Number of children: Not on file   • Years of education: Not on file   • Highest education level: Not on file   Social Needs   • Financial resource strain: Not on file   • Food insecurity - worry: Not on file   • Food insecurity - inability: Not on file   • Transportation needs - medical: Not on file   • Transportation needs - non-medical: Not on file    Occupational History   • Not on file   Tobacco Use   • Smoking status: Former Smoker     Types: Cigarettes     Last attempt to quit: 1985     Years since quittin.2   • Smokeless tobacco: Never Used   • Tobacco comment: smoked 3 years   Substance and Sexual Activity   • Alcohol use: Yes     Alcohol/week: 1.8 - 3.6 oz     Types: 3 - 6 Cans of beer per week     Comment: 1-2 every other day   • Drug use: No   • Sexual activity: Defer   Other Topics Concern   • Not on file   Social History Narrative   • Not on file         Current Outpatient Medications   Medication Sig Dispense Refill   • albuterol sulfate  (90 Base) MCG/ACT inhaler 2 puffs every 4-6 hours until cough/wheezing improve, than taper off of 18 g 3   • atorvastatin (LIPITOR) 20 MG tablet TAKE 1 TABLET BY MOUTH EVERY NIGHT. 30 tablet 5   • gabapentin (NEURONTIN) 400 MG capsule Take 1 capsule by mouth 3 (Three) Times a Day. 90 capsule 5   • gabapentin (NEURONTIN) 400 MG capsule Take 1 capsule by mouth 3 (Three) Times a Day. 90 capsule 2   • glucose blood test strip bid 100 each 6   • glucose monitor monitoring kit 1 each Daily. 1 each 0   • HYDROcodone-acetaminophen (NORCO) 7.5-325 MG per tablet Take 1 tablet by mouth Every 6 (Six) Hours As Needed for Moderate Pain .     • hydrOXYzine (ATARAX) 25 MG tablet Take 1 tablet by mouth 3 (Three) Times a Day As Needed for Itching. 90 tablet 2   • lisinopril-hydrochlorothiazide (PRINZIDE,ZESTORETIC) 20-12.5 MG per tablet TAKE 1 TABLET BY MOUTH DAILY. 30 tablet 5   • metFORMIN (GLUCOPHAGE) 1000 MG tablet Take 1 tablet by mouth 2 (Two) Times a Day With Meals. 60 tablet 5   • ONE TOUCH LANCETS misc 1 each Daily. 200 each 6   • pantoprazole (PROTONIX) 40 MG EC tablet Take 1 tablet by mouth Daily. 30 tablet 5   • predniSONE (DELTASONE) 10 MG tablet Take [id number], [id number], [id number] 24 tablet 0   • QUEtiapine (SEROquel) 300 MG tablet Take 2 tablets by mouth Every Night. 60 tablet 5   • SITagliptin  "(JANUVIA) 100 MG tablet Take 1 tablet by mouth Daily. 30 tablet 5   • TiZANidine (ZANAFLEX) 4 MG capsule Take 1 capsule by mouth At Night As Needed for Muscle Spasms. 30 capsule 5   • traMADol (ULTRAM) 50 MG tablet Take 50 mg by mouth Every 6 (Six) Hours As Needed for Moderate Pain . 1 - 2  Tablets po q  8 hours prn pain       No current facility-administered medications for this visit.          OBJECTIVE    Ht 175.3 cm (69\")   Wt 108 kg (239 lb)   BMI 35.29 kg/m²       Review of Systems   Constitutional: Negative.    HENT: Negative.    Eyes: Negative.    Respiratory: Negative.    Cardiovascular: Negative.    Gastrointestinal: Negative.    Endocrine: Negative.    Genitourinary: Negative.    Musculoskeletal:        Shoulder pain  Foot pain   Skin: Negative.    Allergic/Immunologic: Negative.    Neurological: Negative.    Hematological: Negative.    Psychiatric/Behavioral: Negative.          Physical Exam    Mikey had a diabetic foot exam performed today.         Constitutional: he appears well-developed and well-nourished.   HEENT: Normocephalic. Atraumatic  CV: No tenderness. RRR  Resp: Non-labored respiration. No wheezes.   Psychiatric: he has a normal mood and affect. his   behavior is normal.      Lower Extremity Exam:  Vascular: DP/PT pulses palpable 1+.   Negative hair growth.   Mild, left perimalleolar edema  Neuro: Protective sensation diminished to lesser toes, b/l.  DTRs intact  Integument: No open wounds or lesions.  Atrophic skin noted b/l   No masses  Webspaces c/d/i  Musculoskeletal: LE muscle strength 5/5.   Gait Normal  Semi rigid hammertoe deformity toes 2-5, b/l.  Nails 1-5 b/l thickened, elongated with subungual debris. +pain on palpation  Ankle ROM decreased, b/l  No obvious reproducible musculoskeletal pain      ASSESSMENT AND PLAN    Mikey was seen today for diabetic foot exam and diabetic foot exam.    Diagnoses and all orders for this visit:    Encounter for diabetic foot exam " (CMS/HCC)    Diabetic polyneuropathy associated with type 2 diabetes mellitus (CMS/HCC)    Hammer toes of both feet    Arthritis of ankle    Onychomycosis    Pain in toes of both feet    -Comprehensive DM foot exam performed. Patient educated on importance of tight glucose control and daily foot checks.   -Patient educated on padding techniques for hammertoes.  Proper extra depth diabetic shoe gear.  Limit barefoot walking.  -Continue Gabapentin  -Rx DM shoes  -Nails 1-5 b/l debrided in thickness and length to decrease pain, risk of infection  -Follow up 3 months PRN            This document has been electronically signed by Yuri Amaral DPM on March 10, 2019 9:21 PM     EMR Dragon/Transcription disclaimer:   Much of this encounter note is an electronic transcription/translation of spoken language to printed text. The electronic translation of spoken language may permit erroneous, or at times, nonsensical words or phrases to be inadvertently transcribed; Although I have reviewed the note for such errors, some may still exist.    Yuri Amaral DPM  3/10/2019  9:21 PM

## 2019-03-21 ENCOUNTER — LAB (OUTPATIENT)
Dept: LAB | Facility: OTHER | Age: 64
End: 2019-03-21

## 2019-03-21 ENCOUNTER — OFFICE VISIT (OUTPATIENT)
Dept: FAMILY MEDICINE CLINIC | Facility: CLINIC | Age: 64
End: 2019-03-21

## 2019-03-21 VITALS
SYSTOLIC BLOOD PRESSURE: 110 MMHG | TEMPERATURE: 99.3 F | WEIGHT: 238 LBS | HEIGHT: 69 IN | RESPIRATION RATE: 16 BRPM | HEART RATE: 72 BPM | DIASTOLIC BLOOD PRESSURE: 70 MMHG | OXYGEN SATURATION: 99 % | BODY MASS INDEX: 35.25 KG/M2

## 2019-03-21 DIAGNOSIS — M54.2 CERVICAL PAIN: ICD-10-CM

## 2019-03-21 DIAGNOSIS — M19.011 PRIMARY OSTEOARTHRITIS OF BOTH SHOULDERS: ICD-10-CM

## 2019-03-21 DIAGNOSIS — Z12.5 SCREENING FOR PROSTATE CANCER: ICD-10-CM

## 2019-03-21 DIAGNOSIS — R06.02 SOB (SHORTNESS OF BREATH): ICD-10-CM

## 2019-03-21 DIAGNOSIS — I10 ESSENTIAL HYPERTENSION: ICD-10-CM

## 2019-03-21 DIAGNOSIS — E11.9 TYPE 2 DIABETES MELLITUS WITHOUT COMPLICATION, WITHOUT LONG-TERM CURRENT USE OF INSULIN (HCC): Primary | ICD-10-CM

## 2019-03-21 DIAGNOSIS — E78.5 HYPERLIPIDEMIA, UNSPECIFIED HYPERLIPIDEMIA TYPE: ICD-10-CM

## 2019-03-21 DIAGNOSIS — E11.49 OTHER DIABETIC NEUROLOGICAL COMPLICATION ASSOCIATED WITH TYPE 2 DIABETES MELLITUS (HCC): ICD-10-CM

## 2019-03-21 DIAGNOSIS — F32.A DEPRESSION, UNSPECIFIED DEPRESSION TYPE: ICD-10-CM

## 2019-03-21 DIAGNOSIS — G47.00 INSOMNIA, UNSPECIFIED TYPE: ICD-10-CM

## 2019-03-21 DIAGNOSIS — F41.9 ANXIETY: ICD-10-CM

## 2019-03-21 DIAGNOSIS — K21.9 GASTROESOPHAGEAL REFLUX DISEASE WITHOUT ESOPHAGITIS: ICD-10-CM

## 2019-03-21 DIAGNOSIS — Z11.59 NEED FOR HEPATITIS C SCREENING TEST: ICD-10-CM

## 2019-03-21 DIAGNOSIS — M19.90 ARTHRITIS: ICD-10-CM

## 2019-03-21 DIAGNOSIS — M19.012 PRIMARY OSTEOARTHRITIS OF BOTH SHOULDERS: ICD-10-CM

## 2019-03-21 DIAGNOSIS — E11.9 TYPE 2 DIABETES MELLITUS WITHOUT COMPLICATION, WITHOUT LONG-TERM CURRENT USE OF INSULIN (HCC): ICD-10-CM

## 2019-03-21 LAB
ALBUMIN SERPL-MCNC: 4.5 G/DL (ref 3.5–5)
ALBUMIN UR-MCNC: <0.6 MG/L
ALBUMIN/GLOB SERPL: 1.4 G/DL (ref 1.1–1.8)
ALP SERPL-CCNC: 100 U/L (ref 38–126)
ALT SERPL W P-5'-P-CCNC: 19 U/L
ANION GAP SERPL CALCULATED.3IONS-SCNC: 10 MMOL/L (ref 5–15)
AST SERPL-CCNC: 19 U/L (ref 17–59)
BASOPHILS # BLD AUTO: 0.01 10*3/MM3 (ref 0–0.2)
BASOPHILS NFR BLD AUTO: 0.1 % (ref 0–2)
BILIRUB SERPL-MCNC: 0.5 MG/DL (ref 0.2–1.3)
BUN BLD-MCNC: 12 MG/DL (ref 9–20)
BUN/CREAT SERPL: 15 (ref 7–25)
CALCIUM SPEC-SCNC: 9 MG/DL (ref 8.4–10.2)
CHLORIDE SERPL-SCNC: 103 MMOL/L (ref 98–107)
CHOLEST SERPL-MCNC: 149 MG/DL (ref 150–200)
CO2 SERPL-SCNC: 25 MMOL/L (ref 22–30)
CREAT BLD-MCNC: 0.8 MG/DL (ref 0.66–1.25)
CREAT UR-MCNC: 82.5 MG/DL
DEPRECATED RDW RBC AUTO: 45.4 FL (ref 35.1–43.9)
EOSINOPHIL # BLD AUTO: 0.14 10*3/MM3 (ref 0–0.7)
EOSINOPHIL NFR BLD AUTO: 2 % (ref 0–7)
ERYTHROCYTE [DISTWIDTH] IN BLOOD BY AUTOMATED COUNT: 13.9 % (ref 11.5–14.5)
GFR SERPL CREATININE-BSD FRML MDRD: 97 ML/MIN/1.73 (ref 49–113)
GLOBULIN UR ELPH-MCNC: 3.2 GM/DL (ref 2.3–3.5)
GLUCOSE BLD-MCNC: 177 MG/DL (ref 74–99)
HBA1C MFR BLD: 7.7 % (ref 4–5.6)
HCT VFR BLD AUTO: 39.5 % (ref 39–49)
HDLC SERPL-MCNC: 56 MG/DL (ref 40–59)
HGB BLD-MCNC: 13 G/DL (ref 13.7–17.3)
LDLC SERPL CALC-MCNC: 77 MG/DL
LDLC/HDLC SERPL: 1.38 {RATIO} (ref 0–3.55)
LYMPHOCYTES # BLD AUTO: 1.63 10*3/MM3 (ref 0.6–4.2)
LYMPHOCYTES NFR BLD AUTO: 23.2 % (ref 10–50)
MCH RBC QN AUTO: 30.1 PG (ref 26.5–34)
MCHC RBC AUTO-ENTMCNC: 32.9 G/DL (ref 31.5–36.3)
MCV RBC AUTO: 91.4 FL (ref 80–98)
MICROALBUMIN/CREAT UR: NORMAL MG/G (ref 0–30)
MONOCYTES # BLD AUTO: 0.91 10*3/MM3 (ref 0–0.9)
MONOCYTES NFR BLD AUTO: 13 % (ref 0–12)
NEUTROPHILS # BLD AUTO: 4.33 10*3/MM3 (ref 2–8.6)
NEUTROPHILS NFR BLD AUTO: 61.7 % (ref 37–80)
PLATELET # BLD AUTO: 345 10*3/MM3 (ref 150–450)
PMV BLD AUTO: 8.9 FL (ref 8–12)
POTASSIUM BLD-SCNC: 4.2 MMOL/L (ref 3.4–5)
PROT SERPL-MCNC: 7.7 G/DL (ref 6.3–8.2)
PSA SERPL-MCNC: 0.18 NG/ML (ref 0–4)
RBC # BLD AUTO: 4.32 10*6/MM3 (ref 4.37–5.74)
SODIUM BLD-SCNC: 138 MMOL/L (ref 137–145)
T4 FREE SERPL-MCNC: 1.08 NG/DL (ref 0.78–2.19)
TRIGL SERPL-MCNC: 78 MG/DL
TSH SERPL DL<=0.05 MIU/L-ACNC: 2.6 MIU/ML (ref 0.46–4.68)
VLDLC SERPL-MCNC: 15.6 MG/DL
WBC NRBC COR # BLD: 7.02 10*3/MM3 (ref 3.2–9.8)

## 2019-03-21 PROCEDURE — 99214 OFFICE O/P EST MOD 30 MIN: CPT | Performed by: NURSE PRACTITIONER

## 2019-03-21 PROCEDURE — 82570 ASSAY OF URINE CREATININE: CPT | Performed by: NURSE PRACTITIONER

## 2019-03-21 PROCEDURE — 36415 COLL VENOUS BLD VENIPUNCTURE: CPT | Performed by: NURSE PRACTITIONER

## 2019-03-21 PROCEDURE — 80053 COMPREHEN METABOLIC PANEL: CPT | Performed by: NURSE PRACTITIONER

## 2019-03-21 PROCEDURE — 80074 ACUTE HEPATITIS PANEL: CPT | Performed by: NURSE PRACTITIONER

## 2019-03-21 PROCEDURE — 82043 UR ALBUMIN QUANTITATIVE: CPT | Performed by: NURSE PRACTITIONER

## 2019-03-21 PROCEDURE — G0103 PSA SCREENING: HCPCS | Performed by: NURSE PRACTITIONER

## 2019-03-21 PROCEDURE — 80061 LIPID PANEL: CPT | Performed by: NURSE PRACTITIONER

## 2019-03-21 PROCEDURE — 85025 COMPLETE CBC W/AUTO DIFF WBC: CPT | Performed by: NURSE PRACTITIONER

## 2019-03-21 PROCEDURE — 84439 ASSAY OF FREE THYROXINE: CPT | Performed by: NURSE PRACTITIONER

## 2019-03-21 PROCEDURE — 84443 ASSAY THYROID STIM HORMONE: CPT | Performed by: NURSE PRACTITIONER

## 2019-03-21 PROCEDURE — 83036 HEMOGLOBIN GLYCOSYLATED A1C: CPT | Performed by: NURSE PRACTITIONER

## 2019-03-21 RX ORDER — QUETIAPINE FUMARATE 300 MG/1
600 TABLET, FILM COATED ORAL NIGHTLY
Qty: 60 TABLET | Refills: 5 | Status: SHIPPED | OUTPATIENT
Start: 2019-03-21 | End: 2019-09-10

## 2019-03-21 RX ORDER — ALBUTEROL SULFATE 90 UG/1
AEROSOL, METERED RESPIRATORY (INHALATION)
Qty: 18 G | Refills: 3 | Status: SHIPPED | OUTPATIENT
Start: 2019-03-21 | End: 2019-09-10 | Stop reason: SDUPTHER

## 2019-03-21 RX ORDER — LISINOPRIL AND HYDROCHLOROTHIAZIDE 20; 12.5 MG/1; MG/1
1 TABLET ORAL DAILY
Qty: 30 TABLET | Refills: 5 | Status: SHIPPED | OUTPATIENT
Start: 2019-03-21 | End: 2019-09-10 | Stop reason: SDUPTHER

## 2019-03-21 RX ORDER — HYDROXYZINE HYDROCHLORIDE 25 MG/1
25 TABLET, FILM COATED ORAL 3 TIMES DAILY PRN
Qty: 90 TABLET | Refills: 5 | Status: SHIPPED | OUTPATIENT
Start: 2019-03-21 | End: 2019-09-10 | Stop reason: SDUPTHER

## 2019-03-21 RX ORDER — PANTOPRAZOLE SODIUM 40 MG/1
40 TABLET, DELAYED RELEASE ORAL DAILY
Qty: 30 TABLET | Refills: 5 | Status: SHIPPED | OUTPATIENT
Start: 2019-03-21 | End: 2019-09-10 | Stop reason: SDUPTHER

## 2019-03-21 RX ORDER — TIZANIDINE HYDROCHLORIDE 4 MG/1
4 CAPSULE, GELATIN COATED ORAL 2 TIMES DAILY PRN
Qty: 60 CAPSULE | Refills: 5 | Status: SHIPPED | OUTPATIENT
Start: 2019-03-21 | End: 2019-09-10 | Stop reason: SDUPTHER

## 2019-03-21 RX ORDER — ATORVASTATIN CALCIUM 20 MG/1
20 TABLET, FILM COATED ORAL NIGHTLY
Qty: 30 TABLET | Refills: 5 | Status: SHIPPED | OUTPATIENT
Start: 2019-03-21 | End: 2019-09-10 | Stop reason: SDUPTHER

## 2019-03-21 RX ORDER — BUDESONIDE AND FORMOTEROL FUMARATE DIHYDRATE 80; 4.5 UG/1; UG/1
2 AEROSOL RESPIRATORY (INHALATION) 2 TIMES DAILY
Qty: 10.2 G | Refills: 5 | Status: SHIPPED | OUTPATIENT
Start: 2019-03-21 | End: 2019-09-10 | Stop reason: SDUPTHER

## 2019-03-22 ENCOUNTER — TELEPHONE (OUTPATIENT)
Dept: FAMILY MEDICINE CLINIC | Facility: CLINIC | Age: 64
End: 2019-03-22

## 2019-03-22 DIAGNOSIS — E11.8 TYPE 2 DIABETES MELLITUS WITH COMPLICATION, WITHOUT LONG-TERM CURRENT USE OF INSULIN (HCC): Primary | ICD-10-CM

## 2019-03-22 LAB
HAV IGM SERPL QL IA: NEGATIVE
HBV CORE IGM SERPL QL IA: NEGATIVE
HBV SURFACE AG SERPL QL IA: NEGATIVE
HCV AB SER DONR QL: NEGATIVE

## 2019-03-22 NOTE — TELEPHONE ENCOUNTER
----- Message from SAMUEL Wiseman sent at 3/22/2019  8:26 AM CDT -----  Patient is negative for hepatitis C, B, and a  CMP looks at your kidney function, liver function, electrolytes, and a preliminary test for diabetes, all of these labs were normal except fasting blood glucose elevated which is to be expected with diabetes.  CBC looks at WBC counts for infection and RBC/hgb/hct for anemias and other blood disorders, all of these labs were normal.   Cholesterol has significantly improved since October of last year and all is within normal limits.  Prostate specific antigen that checks for prostate cancer is within normal limits.  Thyroid level within normal limits.  Microalbuminuria which checks for spelled over proteins in the urine looking for early signs of kidney disease is normal.     A1c has gotten worse it went up over an entire point from 6.6-7.7.  I think we need to add to his medication list, continue to take metformin and Januvia, would like to add Invokana it is 100 mg a day in the mornings.  And then instead of seeing patient in September let see him in June and have him do lab work on June 24 or later and schedule an appointment for a week after please.  Have him bring a list of his sugars as well.

## 2019-03-25 NOTE — TELEPHONE ENCOUNTER
Pt called in and we went over labs he called here into the office twice and when I got out of the room I called him back to go over results.    Pt understands results and instructions and that he needs to watch his sugars pt stated that he has been eating a lot of cakes and sweet stuff and has been drinking at least 3 gallons sweet tea daily.     He was instructed to cut the sugar out and to make a diary of his testing to bring in office and when he is tired and doesn't feel good to test his sugars as well.

## 2019-03-28 ENCOUNTER — TELEPHONE (OUTPATIENT)
Dept: FAMILY MEDICINE CLINIC | Facility: CLINIC | Age: 64
End: 2019-03-28

## 2019-03-28 NOTE — TELEPHONE ENCOUNTER
He called to say that he had to get off of the Symbicort inhaler cause it was making him Hoarse he said he started doing the albuterol inhaler again and is better.     States his glucose on Monday was 208 fasting and then after lunch it was 272.   Tuesday fasting was 134 and on Wednesday was 215 fasting.

## 2019-03-31 PROBLEM — M19.90 ARTHRITIS: Status: ACTIVE | Noted: 2019-03-31

## 2019-03-31 PROBLEM — M54.2 CERVICAL PAIN: Status: ACTIVE | Noted: 2019-03-31

## 2019-03-31 PROBLEM — E11.9 DIABETES MELLITUS WITH NO COMPLICATION: Status: RESOLVED | Noted: 2017-05-09 | Resolved: 2019-03-31

## 2019-03-31 PROBLEM — M25.811 SHOULDER IMPINGEMENT, RIGHT: Status: RESOLVED | Noted: 2017-10-25 | Resolved: 2019-03-31

## 2019-03-31 PROBLEM — R06.02 SOB (SHORTNESS OF BREATH): Status: ACTIVE | Noted: 2019-03-31

## 2019-03-31 PROBLEM — M75.41 SHOULDER IMPINGEMENT, RIGHT: Status: RESOLVED | Noted: 2017-10-25 | Resolved: 2019-03-31

## 2019-03-31 PROBLEM — E78.5 HYPERLIPIDEMIA: Status: ACTIVE | Noted: 2019-03-31

## 2019-03-31 PROBLEM — E11.40 DIABETIC NEUROPATHY: Status: ACTIVE | Noted: 2019-03-31

## 2019-03-31 PROBLEM — Z53.21 PATIENT LEFT WITHOUT BEING SEEN: Status: RESOLVED | Noted: 2017-11-20 | Resolved: 2019-03-31

## 2019-09-10 ENCOUNTER — OFFICE VISIT (OUTPATIENT)
Dept: FAMILY MEDICINE CLINIC | Facility: CLINIC | Age: 64
End: 2019-09-10

## 2019-09-10 VITALS
BODY MASS INDEX: 34.8 KG/M2 | TEMPERATURE: 97.1 F | RESPIRATION RATE: 16 BRPM | WEIGHT: 235 LBS | DIASTOLIC BLOOD PRESSURE: 70 MMHG | HEIGHT: 69 IN | OXYGEN SATURATION: 99 % | HEART RATE: 87 BPM | SYSTOLIC BLOOD PRESSURE: 110 MMHG

## 2019-09-10 DIAGNOSIS — G62.9 NEUROPATHY: ICD-10-CM

## 2019-09-10 DIAGNOSIS — M54.2 CERVICAL PAIN: ICD-10-CM

## 2019-09-10 DIAGNOSIS — I10 ESSENTIAL HYPERTENSION: ICD-10-CM

## 2019-09-10 DIAGNOSIS — E11.9 TYPE 2 DIABETES MELLITUS WITHOUT COMPLICATION, WITHOUT LONG-TERM CURRENT USE OF INSULIN (HCC): ICD-10-CM

## 2019-09-10 DIAGNOSIS — E78.5 HYPERLIPIDEMIA, UNSPECIFIED HYPERLIPIDEMIA TYPE: ICD-10-CM

## 2019-09-10 DIAGNOSIS — J44.9 CHRONIC OBSTRUCTIVE PULMONARY DISEASE, UNSPECIFIED COPD TYPE (HCC): ICD-10-CM

## 2019-09-10 DIAGNOSIS — F41.9 ANXIETY: Primary | ICD-10-CM

## 2019-09-10 DIAGNOSIS — E11.8 TYPE 2 DIABETES MELLITUS WITH COMPLICATION, WITHOUT LONG-TERM CURRENT USE OF INSULIN (HCC): ICD-10-CM

## 2019-09-10 DIAGNOSIS — K21.9 GASTROESOPHAGEAL REFLUX DISEASE WITHOUT ESOPHAGITIS: ICD-10-CM

## 2019-09-10 DIAGNOSIS — G47.00 INSOMNIA, UNSPECIFIED TYPE: ICD-10-CM

## 2019-09-10 PROCEDURE — 99214 OFFICE O/P EST MOD 30 MIN: CPT | Performed by: NURSE PRACTITIONER

## 2019-09-10 RX ORDER — HYDROXYZINE HYDROCHLORIDE 25 MG/1
25 TABLET, FILM COATED ORAL 3 TIMES DAILY PRN
Qty: 90 TABLET | Refills: 5 | Status: SHIPPED | OUTPATIENT
Start: 2019-09-10 | End: 2020-04-21

## 2019-09-10 RX ORDER — GABAPENTIN 600 MG/1
600 TABLET ORAL 3 TIMES DAILY
Refills: 1 | COMMUNITY
Start: 2019-08-19 | End: 2019-09-10

## 2019-09-10 RX ORDER — BUDESONIDE AND FORMOTEROL FUMARATE DIHYDRATE 80; 4.5 UG/1; UG/1
2 AEROSOL RESPIRATORY (INHALATION) 2 TIMES DAILY
Qty: 10.2 G | Refills: 5 | Status: SHIPPED | OUTPATIENT
Start: 2019-09-10 | End: 2020-04-21 | Stop reason: SDUPTHER

## 2019-09-10 RX ORDER — DULOXETIN HYDROCHLORIDE 30 MG/1
30 CAPSULE, DELAYED RELEASE ORAL DAILY
Qty: 30 CAPSULE | Refills: 0 | Status: SHIPPED | OUTPATIENT
Start: 2019-09-10 | End: 2019-10-10 | Stop reason: SDUPTHER

## 2019-09-10 RX ORDER — LISINOPRIL AND HYDROCHLOROTHIAZIDE 20; 12.5 MG/1; MG/1
1 TABLET ORAL DAILY
Qty: 30 TABLET | Refills: 5 | Status: SHIPPED | OUTPATIENT
Start: 2019-09-10 | End: 2020-04-21 | Stop reason: SDUPTHER

## 2019-09-10 RX ORDER — QUETIAPINE FUMARATE 200 MG/1
400 TABLET, FILM COATED ORAL NIGHTLY
Qty: 60 TABLET | Refills: 0 | Status: SHIPPED | OUTPATIENT
Start: 2019-09-10 | End: 2019-10-01 | Stop reason: SDUPTHER

## 2019-09-10 RX ORDER — PANTOPRAZOLE SODIUM 40 MG/1
40 TABLET, DELAYED RELEASE ORAL DAILY
Qty: 30 TABLET | Refills: 5 | Status: SHIPPED | OUTPATIENT
Start: 2019-09-10 | End: 2019-12-03

## 2019-09-10 RX ORDER — AMITRIPTYLINE HYDROCHLORIDE 25 MG/1
TABLET, FILM COATED ORAL
Qty: 60 TABLET | Refills: 0 | Status: SHIPPED | OUTPATIENT
Start: 2019-09-10 | End: 2019-11-08 | Stop reason: SDUPTHER

## 2019-09-10 RX ORDER — LIDOCAINE AND PRILOCAINE 25; 25 MG/G; MG/G
CREAM TOPICAL
COMMUNITY
Start: 2019-07-22 | End: 2020-01-28 | Stop reason: SDUPTHER

## 2019-09-10 RX ORDER — ATORVASTATIN CALCIUM 20 MG/1
20 TABLET, FILM COATED ORAL NIGHTLY
Qty: 30 TABLET | Refills: 5 | Status: SHIPPED | OUTPATIENT
Start: 2019-09-10 | End: 2020-04-21 | Stop reason: SDUPTHER

## 2019-09-10 RX ORDER — TIZANIDINE 4 MG/1
TABLET ORAL 2 TIMES DAILY PRN
Refills: 5 | COMMUNITY
Start: 2019-08-19 | End: 2019-09-10 | Stop reason: SDUPTHER

## 2019-09-10 RX ORDER — LIDOCAINE 50 MG/G
1 PATCH TOPICAL EVERY 24 HOURS
Qty: 30 EACH | Refills: 5 | Status: SHIPPED | OUTPATIENT
Start: 2019-09-10 | End: 2019-12-03 | Stop reason: SDUPTHER

## 2019-09-10 RX ORDER — ALBUTEROL SULFATE 90 UG/1
AEROSOL, METERED RESPIRATORY (INHALATION)
Qty: 18 G | Refills: 3 | Status: SHIPPED | OUTPATIENT
Start: 2019-09-10 | End: 2020-04-21 | Stop reason: SDUPTHER

## 2019-09-10 RX ORDER — TIZANIDINE HYDROCHLORIDE 4 MG/1
4 CAPSULE, GELATIN COATED ORAL 2 TIMES DAILY PRN
Qty: 60 CAPSULE | Refills: 5 | Status: SHIPPED | OUTPATIENT
Start: 2019-09-10 | End: 2020-03-04

## 2019-10-01 ENCOUNTER — TELEPHONE (OUTPATIENT)
Dept: FAMILY MEDICINE CLINIC | Facility: CLINIC | Age: 64
End: 2019-10-01

## 2019-10-01 DIAGNOSIS — F41.9 ANXIETY: ICD-10-CM

## 2019-10-01 RX ORDER — QUETIAPINE FUMARATE 200 MG/1
400 TABLET, FILM COATED ORAL NIGHTLY
Qty: 60 TABLET | Refills: 0 | Status: SHIPPED | OUTPATIENT
Start: 2019-10-01 | End: 2019-11-07 | Stop reason: SDUPTHER

## 2019-10-01 NOTE — TELEPHONE ENCOUNTER
HE NEEDS REFILLS ON SERAQUEL YOU HAD DECREASED IT TO 400MG AT HS AND HE SAID IT DIDN'T WORK SO HE HAS BEEN TAKING 3 AT NIGHT AND IS OUT. HE HAS AN APPOINTMENT ON Thursday.

## 2019-10-01 NOTE — TELEPHONE ENCOUNTER
He is not allowed to increase medications on his own, next time he will need to call and ask first, we added the amitriptyline to take with it so he didn't have to be on that does. I cannot increase without a visit, I called in a refill but insurance may not cover it, he may have to buy a couple of pills til his appt. I have to have it in my note that I gave him permission to increase and all we did was do the lower dose plus amitriptyline together, please ask him not to increase anything without asking em first. With seroquel at such a high dose that is NOT recommended I have to have documentation that I said he could increase it and I do nto.

## 2019-10-10 DIAGNOSIS — F41.9 ANXIETY: ICD-10-CM

## 2019-10-11 RX ORDER — DULOXETIN HYDROCHLORIDE 30 MG/1
30 CAPSULE, DELAYED RELEASE ORAL DAILY
Qty: 30 CAPSULE | Refills: 0 | Status: SHIPPED | OUTPATIENT
Start: 2019-10-11 | End: 2019-12-09 | Stop reason: SDUPTHER

## 2019-11-07 ENCOUNTER — TELEPHONE (OUTPATIENT)
Dept: FAMILY MEDICINE CLINIC | Facility: CLINIC | Age: 64
End: 2019-11-07

## 2019-11-07 DIAGNOSIS — F41.9 ANXIETY: ICD-10-CM

## 2019-11-07 RX ORDER — QUETIAPINE FUMARATE 200 MG/1
400 TABLET, FILM COATED ORAL NIGHTLY
Qty: 60 TABLET | Refills: 0 | Status: SHIPPED | OUTPATIENT
Start: 2019-11-07 | End: 2019-12-03

## 2019-11-07 NOTE — TELEPHONE ENCOUNTER
Refill on Seraquel 200mg He said he was doing good on it. He has appointment on Monday and doing labs that morning.

## 2019-11-08 DIAGNOSIS — G47.00 INSOMNIA, UNSPECIFIED TYPE: ICD-10-CM

## 2019-11-08 RX ORDER — AMITRIPTYLINE HYDROCHLORIDE 25 MG/1
TABLET, FILM COATED ORAL
Qty: 60 TABLET | Refills: 0 | Status: SHIPPED | OUTPATIENT
Start: 2019-11-08 | End: 2019-12-03

## 2019-11-14 ENCOUNTER — TELEPHONE (OUTPATIENT)
Dept: FAMILY MEDICINE CLINIC | Facility: CLINIC | Age: 64
End: 2019-11-14

## 2019-11-14 NOTE — TELEPHONE ENCOUNTER
4 no shows. Gave him another chance after the 3rd no show since he had surgery on his eyes and may not have been able to come in. Letter was sent at this time for same day appointments only.

## 2019-12-03 ENCOUNTER — OFFICE VISIT (OUTPATIENT)
Dept: FAMILY MEDICINE CLINIC | Facility: CLINIC | Age: 64
End: 2019-12-03

## 2019-12-03 ENCOUNTER — LAB (OUTPATIENT)
Dept: LAB | Facility: OTHER | Age: 64
End: 2019-12-03

## 2019-12-03 VITALS
TEMPERATURE: 97.1 F | RESPIRATION RATE: 16 BRPM | BODY MASS INDEX: 33.59 KG/M2 | HEIGHT: 69 IN | HEART RATE: 91 BPM | OXYGEN SATURATION: 99 % | WEIGHT: 226.8 LBS

## 2019-12-03 DIAGNOSIS — M54.2 CERVICAL PAIN: ICD-10-CM

## 2019-12-03 DIAGNOSIS — F32.A DEPRESSION, UNSPECIFIED DEPRESSION TYPE: ICD-10-CM

## 2019-12-03 DIAGNOSIS — E11.9 TYPE 2 DIABETES MELLITUS WITHOUT COMPLICATION, WITHOUT LONG-TERM CURRENT USE OF INSULIN (HCC): ICD-10-CM

## 2019-12-03 DIAGNOSIS — M53.3 SACRAL PAIN: ICD-10-CM

## 2019-12-03 DIAGNOSIS — M54.50 ACUTE BILATERAL LOW BACK PAIN WITHOUT SCIATICA: ICD-10-CM

## 2019-12-03 DIAGNOSIS — M79.642 PAIN IN BOTH HANDS: ICD-10-CM

## 2019-12-03 DIAGNOSIS — Z23 FLU VACCINE NEED: ICD-10-CM

## 2019-12-03 DIAGNOSIS — F41.9 ANXIETY: ICD-10-CM

## 2019-12-03 DIAGNOSIS — G47.00 INSOMNIA, UNSPECIFIED TYPE: ICD-10-CM

## 2019-12-03 DIAGNOSIS — K21.9 GASTROESOPHAGEAL REFLUX DISEASE WITHOUT ESOPHAGITIS: ICD-10-CM

## 2019-12-03 DIAGNOSIS — S09.90XA INJURY OF HEAD, INITIAL ENCOUNTER: ICD-10-CM
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
## 2019-12-03 DIAGNOSIS — G62.9 NEUROPATHY: ICD-10-CM

## 2019-12-03 DIAGNOSIS — E11.49 OTHER DIABETIC NEUROLOGICAL COMPLICATION ASSOCIATED WITH TYPE 2 DIABETES MELLITUS (HCC): ICD-10-CM

## 2019-12-03 DIAGNOSIS — M79.641 PAIN IN BOTH HANDS: ICD-10-CM

## 2019-12-03 LAB
ALBUMIN SERPL-MCNC: 4 G/DL (ref 3.5–5)
ALBUMIN/GLOB SERPL: 1.2 G/DL (ref 1.1–1.8)
ALP SERPL-CCNC: 92 U/L (ref 38–126)
ALT SERPL W P-5'-P-CCNC: 27 U/L
ANION GAP SERPL CALCULATED.3IONS-SCNC: 5 MMOL/L (ref 5–15)
AST SERPL-CCNC: 28 U/L (ref 17–59)
BASOPHILS # BLD AUTO: 0.02 10*3/MM3 (ref 0–0.2)
BASOPHILS NFR BLD AUTO: 0.2 % (ref 0–1.5)
BILIRUB SERPL-MCNC: 0.3 MG/DL (ref 0.2–1.3)
BUN BLD-MCNC: 10 MG/DL (ref 7–23)
BUN/CREAT SERPL: 11.4 (ref 7–25)
CALCIUM SPEC-SCNC: 9.8 MG/DL (ref 8.4–10.2)
CHLORIDE SERPL-SCNC: 102 MMOL/L (ref 101–112)
CHOLEST SERPL-MCNC: 145 MG/DL (ref 150–200)
CO2 SERPL-SCNC: 30 MMOL/L (ref 22–30)
CREAT BLD-MCNC: 0.88 MG/DL (ref 0.7–1.3)
DEPRECATED RDW RBC AUTO: 44.5 FL (ref 37–54)
EOSINOPHIL # BLD AUTO: 0.3 10*3/MM3 (ref 0–0.4)
EOSINOPHIL NFR BLD AUTO: 3.7 % (ref 0.3–6.2)
ERYTHROCYTE [DISTWIDTH] IN BLOOD BY AUTOMATED COUNT: 13.7 % (ref 12.3–15.4)
GFR SERPL CREATININE-BSD FRML MDRD: 87 ML/MIN/1.73 (ref 49–113)
GLOBULIN UR ELPH-MCNC: 3.4 GM/DL (ref 2.3–3.5)
GLUCOSE BLD-MCNC: 120 MG/DL (ref 70–99)
HCT VFR BLD AUTO: 39.6 % (ref 37.5–51)
HDLC SERPL-MCNC: 62 MG/DL (ref 40–59)
HGB BLD-MCNC: 12.9 G/DL (ref 13–17.7)
LDLC SERPL CALC-MCNC: 65 MG/DL
LDLC/HDLC SERPL: 1.05 {RATIO} (ref 0–3.55)
LYMPHOCYTES # BLD AUTO: 1.74 10*3/MM3 (ref 0.7–3.1)
LYMPHOCYTES NFR BLD AUTO: 21.3 % (ref 19.6–45.3)
MCH RBC QN AUTO: 29.8 PG (ref 26.6–33)
MCHC RBC AUTO-ENTMCNC: 32.6 G/DL (ref 31.5–35.7)
MCV RBC AUTO: 91.5 FL (ref 79–97)
MONOCYTES # BLD AUTO: 0.84 10*3/MM3 (ref 0.1–0.9)
MONOCYTES NFR BLD AUTO: 10.3 % (ref 5–12)
NEUTROPHILS # BLD AUTO: 5.26 10*3/MM3 (ref 1.7–7)
NEUTROPHILS NFR BLD AUTO: 64.5 % (ref 42.7–76)
PLATELET # BLD AUTO: 321 10*3/MM3 (ref 140–450)
PMV BLD AUTO: 8.2 FL (ref 6–12)
POTASSIUM BLD-SCNC: 4.4 MMOL/L (ref 3.4–5)
PROT SERPL-MCNC: 7.4 G/DL (ref 6.3–8.6)
RBC # BLD AUTO: 4.33 10*6/MM3 (ref 4.14–5.8)
SODIUM BLD-SCNC: 137 MMOL/L (ref 137–145)
TRIGL SERPL-MCNC: 90 MG/DL
VLDLC SERPL-MCNC: 18 MG/DL
WBC NRBC COR # BLD: 8.16 10*3/MM3 (ref 3.4–10.8)

## 2019-12-03 PROCEDURE — 83036 HEMOGLOBIN GLYCOSYLATED A1C: CPT | Performed by: NURSE PRACTITIONER

## 2019-12-03 PROCEDURE — 80053 COMPREHEN METABOLIC PANEL: CPT | Performed by: NURSE PRACTITIONER

## 2019-12-03 PROCEDURE — 96372 THER/PROPH/DIAG INJ SC/IM: CPT | Performed by: NURSE PRACTITIONER

## 2019-12-03 PROCEDURE — 80061 LIPID PANEL: CPT | Performed by: NURSE PRACTITIONER

## 2019-12-03 PROCEDURE — G0008 ADMIN INFLUENZA VIRUS VAC: HCPCS | Performed by: NURSE PRACTITIONER

## 2019-12-03 PROCEDURE — 99214 OFFICE O/P EST MOD 30 MIN: CPT | Performed by: NURSE PRACTITIONER

## 2019-12-03 PROCEDURE — 90653 IIV ADJUVANT VACCINE IM: CPT | Performed by: NURSE PRACTITIONER

## 2019-12-03 PROCEDURE — 36415 COLL VENOUS BLD VENIPUNCTURE: CPT | Performed by: NURSE PRACTITIONER

## 2019-12-03 PROCEDURE — 85025 COMPLETE CBC W/AUTO DIFF WBC: CPT | Performed by: NURSE PRACTITIONER

## 2019-12-03 RX ORDER — PANTOPRAZOLE SODIUM 40 MG/1
40 TABLET, DELAYED RELEASE ORAL 2 TIMES DAILY
Qty: 60 TABLET | Refills: 5 | Status: SHIPPED | OUTPATIENT
Start: 2019-12-03 | End: 2020-06-11 | Stop reason: SDUPTHER

## 2019-12-03 RX ORDER — KETOROLAC TROMETHAMINE 30 MG/ML
60 INJECTION, SOLUTION INTRAMUSCULAR; INTRAVENOUS ONCE
Status: COMPLETED | OUTPATIENT
Start: 2019-12-03 | End: 2019-12-03

## 2019-12-03 RX ORDER — LIDOCAINE 50 MG/G
1 PATCH TOPICAL EVERY 24 HOURS
Qty: 30 EACH | Refills: 5 | Status: SHIPPED | OUTPATIENT
Start: 2019-12-03 | End: 2019-12-09 | Stop reason: SDUPTHER

## 2019-12-03 RX ORDER — QUETIAPINE FUMARATE 300 MG/1
600 TABLET, FILM COATED ORAL NIGHTLY
Qty: 60 TABLET | Refills: 2 | Status: SHIPPED | OUTPATIENT
Start: 2019-12-03 | End: 2020-02-27 | Stop reason: SDUPTHER

## 2019-12-03 RX ADMIN — KETOROLAC TROMETHAMINE 60 MG: 30 INJECTION, SOLUTION INTRAMUSCULAR; INTRAVENOUS at 12:45

## 2019-12-03 NOTE — PROGRESS NOTES
Subjective   Mikey Allison is a 64 y.o. male who presents to the office for follow up.     History of Present Illness     Labs 3/21/19: Hep A, B, and C neg. Pt is going to do fasting labs today on 12/3/19.    Recent fall/headache/low back pain/bilateral hand pain-acute, new problem, uncontrolled with lortab and gabapentin through pain management.  -Fell sat before last, at six am, got up to get coffee and fell and hit head, states knot but going down some. Did not have cane in the kitchen, now taking cane with him everywhere. Pt cleanign stuff off tube and fell lost balance, hit back and bruised left upper arm. Going to hirer someone to help him clean. Pt states he has walker but at friends in [place] ordered 4-5 years ago, got cane but not with him all the time. Would like pain shot today. Pain with knot on head but no headache.   -POC: will xray areas of pain and call patient with results. toradol shot given in office.     Osteoarthritis and peripheral neuropathy/chronic neck pain-chronic, improving with lortab, zanaflex, and gabapentin Cymbalta and Celebrex (failed flexeril due to drowsiness, gabapentin and lortab through pain management)  -9/10/19: pt goes to [facility] pain clinic, wants pain pump, f/u [facility]th for nerve block. Pt states bilateral feet pain keeping him up at nightime and right fingers locking up, plus his normal pain that is chronic, pt is going to discuss with pain management about increasing gabapentin. POC: added cymbalta for nerve pain and mood, decreased seroquel to 400mg at hs, added amitriptyline 25mg 1-2 tab at hs for nerve pain/sleep, and lidocaine patches. F/u in two weeks.   -12/3/19: pt states amitriptyline making him sleepy. Lidocaine patches filled once and now not covering with changing insurances and wants to try with new insurance. Wants to go back on seroquel. States cymbalta does help with mood and pain somewhat.   -POC: Continue Cymbalta as this is helping with mood  and neuropathy pain during the daytime.  Discontinue amitriptyline as it is making patient too sleepy.  Increase Seroquel to 600 mg at bedtime, advised patient on side effects of this however patient has been on this dose chronically for years and tolerated with no side effects.  Continue to see pain management for pain control.  Will try lidocaine patches underneath other insurance with the PA when it gets changed over.  Follow-up in 2 weeks.    Anxiety/depression/insomnia-chronic, moderately controlled with hydroxyzine 25mg tid, Seroquel 600mg at hs and zanaflex at hs (failed buspar)  -9/10/19: pt states more anxiety  -12/3/19: pt states amitriptyline making him sleepy and he feels like he is drugged in the morning although he is still waking up at 4 AM.  Lidocaine patches filled once and now not covering with changing insurances and wants to try with new insurance. Wants to go back on seroquel. States cymbalta does help with mood and pain somewhat.   -POC: Continue Cymbalta as this is helping with mood and neuropathy pain during the daytime.  Discontinue amitriptyline as it is making patient too sleepy.  Increase Seroquel to 600 mg at bedtime, advised patient on side effects of this however patient has been on this dose chronically for years and tolerated with no side effects.  Continue to see pain management for pain control.  Will try lidocaine patches underneath other insurance with the PA when it gets changed over.  Follow-up in 2 weeks.      Type 2 diabetes/PN-chronic, uncontrolled with metformin 1,00mg bid and Januvia 100mg daily.  -eye appt 4/2, had DM foot exam with Dr. Amaral on 3/5/19, needs order signed for [employer] to give him diabetes shoes.   -9/10/19: pt states burning/aching of both feet on the bottom of them, was on gabapentin 600mg tid. Pt states 'cataracts and diabetes in eyes', going to see specialists in [place] 9/17/19 to get cataracts fixed. Sugars have been 200 in the am and 170s   During the day.    -12/3/2019: Fasting blood glucose today is 120, will call patient when we get the A1c to see if we should adjust.  -POC: get labs then alter meds if needed.     GERD-chronic, uncontrolled with protonix 40mg daily.   -pt states not working well enough, would like to increase  -POC: advised pt may not be covered by insurance but will try to increase to bid.     F/u in two weeks.     Flu shot given to patient today 12/3/19.     Uses a cane for ambulation.     PMH:  HTN-chronic, controlled with lisinopril-hctz  COPD-chronic, controlled with symbicort and albuterol (most likely copd from being previous smoker)  GERD-chronic, controlled with Protonix 40mg daily.  Hyperlipidemia-chronic, controlled with atorvastatin 20mg at hs    Past Surgical History:  -bilateral lens implants done by Geovani Trejo in [facility] in 2019    The following portions of the patient's history were reviewed and updated as appropriate: allergies, current medications, past family history, past medical history, past social history, past surgical history and problem list.    ISAIAH obtained and reviewed on 12/3/19: previously patient has history of being prescribed gabapentin and tramadol and Lortabs.  Gabapentin 600mg, 120 count and lortab 7.5mg 90 count,  last prescribed by Yuri Lance on 11/25/19.     Review of Systems   Constitutional: Negative for activity change, appetite change, chills, diaphoresis, fatigue and unexpected weight change.   HENT: Negative for congestion, ear discharge, ear pain, facial swelling, hearing loss, postnasal drip, rhinorrhea, sinus pressure, sinus pain, sneezing, sore throat and tinnitus.    Eyes: Negative.  Negative for discharge and visual disturbance.   Respiratory: Negative for cough, chest tightness, shortness of breath and wheezing.    Cardiovascular: Positive for leg swelling. Negative for chest pain and palpitations.   Gastrointestinal: Negative for abdominal distention, abdominal pain,  blood in stool, constipation, diarrhea, nausea and vomiting.   Genitourinary: Negative for decreased urine volume, difficulty urinating, discharge, dysuria, flank pain, frequency, hematuria, penile pain, penile swelling, scrotal swelling, testicular pain and urgency.   Musculoskeletal: Positive for arthralgias and back pain. Negative for joint swelling and myalgias.   Skin: Negative for color change and rash.   Allergic/Immunologic: Negative for food allergies.   Neurological: Negative for dizziness, tremors, seizures, syncope, light-headedness and headaches.   Hematological: Negative for adenopathy. Does not bruise/bleed easily.   Psychiatric/Behavioral: Positive for sleep disturbance. Negative for agitation, behavioral problems, confusion, decreased concentration, dysphoric mood, hallucinations, self-injury and suicidal ideas. The patient is not nervous/anxious.    All other systems reviewed and are negative.      Past Medical History:   Diagnosis Date   • Abdominal pain     left side   • Acute sinusitis    • Ankle joint pain    • Bipolar disorder (CMS/HCC)    • Bipolar disorder, unspecified (CMS/HCC)    • Cellulitis and abscess of trunk     axilla   • Chest pain    • Chronic anemia    • Degenerative joint disease involving multiple joints     back   • Depressive disorder    • Diabetes mellitus (CMS/HCC)    • Diabetes mellitus with no complication (CMS/HCC)     type 2 or unspecified type uncontrolled   • Diverticular disease    • Dyspnea    • Enlarged prostate     on [place]-on CT   • Essential hypertension    • Febrile convulsion (CMS/HCC)    • Gastroesophageal reflux disease    • Generalized anxiety disorder    • H/O echocardiogram 10/16/2007    Mild to moderate concentric LV hypertrophy with mild left atrial enlargement. LV appears to be hypodynamic with preserved LV systolic function with EF 55-60%. Pericardium appears to be normal with a small pericardial effusion    • Hemorrhoids    • Hyperlipidemia    •  "Hypertensive disorder    • Hypoglycemia    • Infectious disorder of kidney     kidney infection-treated by [facility]   • Left lower quadrant pain    • Loss of appetite    • Obesity    • Osteoarthritis    • Pain in joint involving ankle and foot    • Pain in limb    • Psoriasis    • Retention of urine     with cath-treated by the [facility]   • Screening for malignant neoplasm of colon    • Sepsis due to urinary tract infection (CMS/HCC)     urosepsis treated by [facility]   • Sinusitis    • Syncope    • Unspecified essential hypertension        Family History   Problem Relation Age of Onset   • Diabetes Other    • Heart disease Other    • Hypertension Other    • Kidney disease Other    • Hypertension Mother    • Clotting disorder Mother           Objective   Pulse 91   Temp 97.1 °F (36.2 °C) (Oral)   Resp 16   Ht 175.3 cm (69\")   Wt 103 kg (226 lb 12.8 oz)   SpO2 99%   BMI 33.49 kg/m²   Physical Exam   Constitutional: He is oriented to person, place, and time. He appears well-developed and well-nourished. He is cooperative. He does not appear ill.   HENT:   Head: Normocephalic.       Right Ear: Hearing, tympanic membrane, external ear and ear canal normal.   Left Ear: Hearing, tympanic membrane, external ear and ear canal normal.   Nose: Nose normal.   Mouth/Throat: Oropharynx is clear and moist. No oropharyngeal exudate.   Eyes: Pupils are equal, round, and reactive to light. EOM and lids are normal. Right eye exhibits no discharge. Left eye exhibits no discharge. Right conjunctiva is not injected. Left conjunctiva is not injected.   Neck: Normal range of motion. Neck supple.   Cardiovascular: Normal rate, regular rhythm, normal heart sounds and intact distal pulses. Exam reveals no gallop and no friction rub.   No murmur heard.  Pulmonary/Chest: Effort normal and breath sounds normal. No respiratory distress. He has no wheezes. He has no rales.   Abdominal: Soft. Normal appearance, normal aorta and bowel " sounds are normal. He exhibits no distension and no mass. There is no tenderness. There is no rebound and no guarding. No hernia.   Musculoskeletal: He exhibits tenderness. He exhibits no edema or deformity.        Right shoulder: He exhibits decreased range of motion, tenderness and pain. He exhibits no bony tenderness, no swelling, no effusion, no crepitus, no deformity, no laceration, no spasm, normal pulse and normal strength.        Cervical back: He exhibits decreased range of motion, tenderness and pain. He exhibits no bony tenderness, no swelling, no edema, no deformity, no laceration, no spasm and normal pulse.        Lumbar back: He exhibits decreased range of motion, tenderness, pain and spasm. He exhibits no bony tenderness, no swelling, no edema, no deformity, no laceration and normal pulse.        Right hand: He exhibits decreased range of motion and tenderness. He exhibits normal capillary refill and no swelling. Normal sensation noted.        Left hand: He exhibits tenderness. He exhibits normal range of motion, normal capillary refill and no swelling. Normal sensation noted.   Lymphadenopathy:     He has no cervical adenopathy.   Neurological: He is alert and oriented to person, place, and time. He has normal strength and normal reflexes. He is not disoriented. He displays normal reflexes. No cranial nerve deficit or sensory deficit. He exhibits normal muscle tone. He displays a negative Romberg sign. Coordination normal. GCS eye subscore is 4. GCS verbal subscore is 5. GCS motor subscore is 6.   Reflex Scores:       Patellar reflexes are 2+ on the right side and 2+ on the left side.  Skin: Skin is warm, dry and intact. No ecchymosis and no rash noted. He is not diaphoretic. No erythema. No pallor.   No bruising noted on areas of pain   Psychiatric: He has a normal mood and affect. His speech is normal and behavior is normal. Thought content normal. Cognition and memory are normal.   Patient is  dressed appropriately for weather and situation, makes eye contact, and engages in conversation.  He is attentive.   Nursing note and vitals reviewed.       PHQ-2/PHQ-9 Depression Screening 3/21/2019   Little interest or pleasure in doing things 0   Feeling down, depressed, or hopeless 0   Trouble falling or staying asleep, or sleeping too much -   Feeling tired or having little energy -   Poor appetite or overeating -   Feeling bad about yourself - or that you are a failure or have let yourself or your family down -   Trouble concentrating on things, such as reading the newspaper or watching television -   Moving or speaking so slowly that other people could have noticed. Or the opposite - being so fidgety or restless that you have been moving around a lot more than usual -   Thoughts that you would be better off dead, or of hurting yourself in some way -   Total Score 0   If you checked off any problems, how difficult have these problems made it for you to do your work, take care of things at home, or get along with other people? -         Assessment/Plan   Mikey was seen today for follow-up.    Diagnoses and all orders for this visit:    Fall, initial encounter  -     ketorolac (TORADOL) injection 60 mg  -     XR skull complete 4+ vw; Future  -     XR Spine Lumbar 4+ View; Future  -     XR sacrum and coccyx; Future  -     XR hand 3+ vw bilateral; Future    Neuropathy  Comments:  diabetic  Orders:  -     Discontinue: lidocaine (LIDODERM) 5 %; Place 1 patch on the skin as directed by provider Daily.    Gastroesophageal reflux disease without esophagitis  -     pantoprazole (PROTONIX) 40 MG EC tablet; Take 1 tablet by mouth 2 (Two) Times a Day.    Insomnia, unspecified type  -     QUEtiapine (SEROQUEL) 300 MG tablet; Take 2 tablets by mouth Every Night.    Acute bilateral low back pain without sciatica  -     XR Spine Lumbar 4+ View; Future    Injury of head, initial encounter  -     XR skull complete 4+ vw;  Future    Sacral pain  -     XR sacrum and coccyx; Future    Flu vaccine need  -     Fluad Tri 65yr ([id number])    Pain in both hands  -     XR hand 3+ vw bilateral; Future    Other diabetic neurological complication associated with type 2 diabetes mellitus (CMS/[facility])    Cervical pain    Anxiety    Depression, unspecified depression type    Type 2 diabetes mellitus without complication, without long-term current use of insulin (CMS/[facility])             Labs 3/21/19: Hep A, B, and C neg. Pt is going to do fasting labs today on 12/3/19.    Recent fall/headache/low back pain/bilateral hand pain-acute, new problem, uncontrolled with lortab and gabapentin through pain management.  -Fell sat before last, at six am, got up to get coffee and fell and hit head, states knot but going down some. Did not have cane in the kitchen, now taking cane with him everywhere. Pt cleanign stuff off tube and fell lost balance, hit back and bruised left upper arm. Going to hirer someone to help him clean. Pt states he has walker but at friends in [place] ordered 4-5 years ago, got cane but not with him all the time. Would like pain shot today. Pain with knot on head but no headache.   -POC: will xray areas of pain and call patient with results. toradol shot given in office. Discussed at length fall precautions and prevention.     Osteoarthritis and peripheral neuropathy/chronic neck pain-chronic, improving with lortab, zanaflex, and gabapentin Cymbalta and Celebrex (failed flexeril due to drowsiness, gabapentin and lortab through pain management)  -9/10/19: pt goes to [facility] pain clinic, wants pain pump, f/u [facility] for nerve block. Pt states bilateral feet pain keeping him up at nightime and right fingers locking up, plus his normal pain that is chronic, pt is going to discuss with pain management about increasing gabapentin. POC: added cymbalta for nerve pain and mood, decreased seroquel to 400mg at hs, added amitriptyline 25mg 1-2 tab at  hs for nerve pain/sleep, and lidocaine patches. F/u in two weeks.   -12/3/19: pt states amitriptyline making him sleepy. Lidocaine patches filled once and now not covering with changing insurances and wants to try with new insurance. Wants to go back on seroquel. States cymbalta does help with mood and pain somewhat.   -POC: Continue Cymbalta as this is helping with mood and neuropathy pain during the daytime.  Discontinue amitriptyline as it is making patient too sleepy.  Increase Seroquel to 600 mg at bedtime, advised patient on side effects of this however patient has been on this dose chronically for years and tolerated with no side effects.  Continue to see pain management for pain control.  Will try lidocaine patches underneath other insurance with the PA when it gets changed over.  Follow-up in 2 weeks.    Anxiety/depression/insomnia-chronic, moderately controlled with hydroxyzine 25mg tid, Seroquel 600mg at hs and zanaflex at hs (failed buspar)  -9/10/19: pt states more anxiety  -12/3/19: pt states amitriptyline making him sleepy and he feels like he is drugged in the morning although he is still waking up at 4 AM.  Lidocaine patches filled once and now not covering with changing insurances and wants to try with new insurance. Wants to go back on seroquel. States cymbalta does help with mood and pain somewhat.   -POC: Continue Cymbalta as this is helping with mood and neuropathy pain during the daytime.  Discontinue amitriptyline as it is making patient too sleepy.  Increase Seroquel to 600 mg at bedtime, advised patient on side effects of this however patient has been on this dose chronically for years and tolerated with no side effects.  Continue to see pain management for pain control.  Will try lidocaine patches underneath other insurance with the PA when it gets changed over.  Follow-up in 2 weeks.      Type 2 diabetes/PN-chronic, uncontrolled with metformin 1,00mg bid and Januvia 100mg daily.  -eye  appt 4/2, had DM foot exam with Dr. Amaral on 3/5/19, needs order signed for [employer] to give him diabetes shoes.   -9/10/19: pt states burning/aching of both feet on the bottom of them, was on gabapentin 600mg tid. Pt states 'cataracts and diabetes in eyes', going to see specialists in [place] 9/17/19 to get cataracts fixed. Sugars have been 200 in the am and 170s  During the day.    -12/3/2019: Fasting blood glucose today is 120, will call patient when we get the A1c to see if we should adjust.  -POC: get labs then alter meds if needed.     GERD-chronic, uncontrolled with protonix 40mg daily.   -pt states not working well enough, would like to increase  -POC: advised pt may not be covered by insurance but will try to increase to bid.     F/u in two weeks.     Patient educated to follow-up sooner than next scheduled appointment if condition(s) worse or do not improve. Patient states understanding and is in agreeance with plan of care. An After Visit Summary was printed and given to the patient.      SAMUEL Wiseman        This document has been electronically signed by SAMUEL Wiseman on December 22, 2019 10:44 PM      EMR/Transcription Dragon Disclaimer:  Some of this note may be an electronic dragon transcription/translation of spoken language to printed text. The electronic translation of spoken language may permit erroneous, or at times, nonsensical words or phrases to be inadvertently transcribed. Although I have reviewed the note for such errors, some may still exist.

## 2019-12-04 LAB — HBA1C MFR BLD: 7.2 % (ref 4.8–5.6)

## 2019-12-09 ENCOUNTER — OFFICE VISIT (OUTPATIENT)
Dept: FAMILY MEDICINE CLINIC | Facility: CLINIC | Age: 64
End: 2019-12-09

## 2019-12-09 VITALS
OXYGEN SATURATION: 99 % | BODY MASS INDEX: 33.62 KG/M2 | HEIGHT: 69 IN | RESPIRATION RATE: 18 BRPM | HEART RATE: 83 BPM | SYSTOLIC BLOOD PRESSURE: 124 MMHG | WEIGHT: 227 LBS | TEMPERATURE: 98.1 F | DIASTOLIC BLOOD PRESSURE: 70 MMHG

## 2019-12-09 DIAGNOSIS — K21.9 GASTROESOPHAGEAL REFLUX DISEASE WITHOUT ESOPHAGITIS: ICD-10-CM

## 2019-12-09 DIAGNOSIS — L84 CALLUS OF HEEL: ICD-10-CM

## 2019-12-09 DIAGNOSIS — F41.9 ANXIETY: ICD-10-CM

## 2019-12-09 DIAGNOSIS — E11.9 TYPE 2 DIABETES MELLITUS WITHOUT COMPLICATION, WITHOUT LONG-TERM CURRENT USE OF INSULIN (HCC): Primary | ICD-10-CM

## 2019-12-09 DIAGNOSIS — F32.A DEPRESSION, UNSPECIFIED DEPRESSION TYPE: ICD-10-CM

## 2019-12-09 DIAGNOSIS — E11.49 OTHER DIABETIC NEUROLOGICAL COMPLICATION ASSOCIATED WITH TYPE 2 DIABETES MELLITUS (HCC): ICD-10-CM

## 2019-12-09 DIAGNOSIS — M54.50 ACUTE BILATERAL LOW BACK PAIN WITHOUT SCIATICA: ICD-10-CM

## 2019-12-09 DIAGNOSIS — G62.9 NEUROPATHY: ICD-10-CM

## 2019-12-09 DIAGNOSIS — M19.90 ARTHRITIS: ICD-10-CM
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
## 2019-12-09 DIAGNOSIS — M54.2 CERVICAL PAIN: ICD-10-CM

## 2019-12-09 DIAGNOSIS — G47.00 INSOMNIA, UNSPECIFIED TYPE: ICD-10-CM

## 2019-12-09 PROCEDURE — 99214 OFFICE O/P EST MOD 30 MIN: CPT | Performed by: NURSE PRACTITIONER

## 2019-12-09 RX ORDER — AMITRIPTYLINE HYDROCHLORIDE 50 MG/1
50 TABLET, FILM COATED ORAL NIGHTLY PRN
Qty: 30 TABLET | Refills: 5 | Status: SHIPPED | OUTPATIENT
Start: 2019-12-09 | End: 2019-12-29

## 2019-12-09 RX ORDER — DULOXETIN HYDROCHLORIDE 30 MG/1
30 CAPSULE, DELAYED RELEASE ORAL DAILY
Qty: 30 CAPSULE | Refills: 0 | Status: SHIPPED | OUTPATIENT
Start: 2019-12-09 | End: 2020-01-03

## 2019-12-09 RX ORDER — BUSPIRONE HYDROCHLORIDE 15 MG/1
15 TABLET ORAL 3 TIMES DAILY
COMMUNITY
End: 2019-12-09

## 2019-12-09 RX ORDER — LIDOCAINE 50 MG/G
1 PATCH TOPICAL EVERY 24 HOURS
Qty: 30 EACH | Refills: 5 | Status: SHIPPED | OUTPATIENT
Start: 2019-12-09 | End: 2020-01-28 | Stop reason: SDUPTHER

## 2019-12-30 NOTE — PROGRESS NOTES
Subjective   Mikey Allison is a 64 y.o. male who presents to the office for refills and diabetic foot exam.    History of Present Illness     Labs 3/21/19: Hep A, B, and C neg. last labs done 12/3/2019.    Recent fall/headache/low back pain/bilateral hand pain-acute, new problem, improving with lortab and gabapentin through pain management.  -12/3/2019: Fell sat before last, at six am, got up to get coffee and fell and hit head, states knot but going down some. Did not have cane in the kitchen, now taking cane with him everywhere. Pt cleanign stuff off tube and fell lost balance, hit back and bruised left upper arm. Going to hirer someone to help him clean. Pt states he has walker but at friends in [place] ordered 4-5 years ago, got cane but not with him all the time. Would like pain shot today. Pain with knot on head but no headache. POC: will xray areas of pain and call patient with results. toradol shot given in office.   -12/9/2019: Patient states feeling a lot better.  Declines any significant pain from fall.  Does not want further work-up but will let me know if anything changes.    Osteoarthritis and peripheral neuropathy/chronic neck pain-chronic, improving with lortab, zanaflex, and gabapentin Cymbalta and Celebrex, and Seroquel 600 mg at bedtime.  (failed flexeril due to drowsiness, gabapentin and lortab through pain management)  -9/10/19: pt goes to [facility] pain clinic, wants pain pump, f/u 19th for nerve block. Pt states bilateral feet pain keeping him up at nightime and right fingers locking up, plus his normal pain that is chronic, pt is going to discuss with pain management about increasing gabapentin. POC: added cymbalta for nerve pain and mood, decreased seroquel to 400mg at hs, added amitriptyline 25mg 1-2 tab at hs for nerve pain/sleep, and lidocaine patches. F/u in two weeks.   -12/3/19: pt states amitriptyline making him sleepy. Lidocaine patches filled once and now not covering  with changing insurances and wants to try with new insurance. Wants to go back on seroquel. States cymbalta does help with mood and pain somewhat. POC: Continue Cymbalta as this is helping with mood and neuropathy pain during the daytime.  Discontinue amitriptyline as it is making patient too sleepy.  Increase Seroquel to 600 mg at bedtime, advised patient on side effects of this however patient has been on this dose chronically for years and tolerated with no side effects.  Continue to see pain management for pain control.  Will try lidocaine patches underneath other insurance with the PA when it gets changed over.  Follow-up in 2 weeks.  -12/9/2019: Patient states his insurance will not cover lidocaine patches.  Patient does have significant history of peripheral neuropathy and this would benefit it, will try and do PA on.  Patient states he is doing very well on the Seroquel states that he is taking Flexeril Seroquel gabapentin and pain pill at bedtime.  Denies any a.m. fatigue.  -Plan of care: Continue current plan of care will get PA on lidocaine patches for diabetic neuropathy.    Anxiety/depression/insomnia-chronic, moderately controlled with hydroxyzine 25mg tid, Cymbalta, Seroquel 600mg at hs and zanaflex at hs (failed buspar)  -9/10/19: pt states more anxiety  -12/3/19: pt states amitriptyline making him sleepy and he feels like he is drugged in the morning although he is still waking up at 4 AM.  Lidocaine patches filled once and now not covering with changing insurances and wants to try with new insurance. Wants to go back on seroquel. States cymbalta does help with mood and pain somewhat. POC: Continue Cymbalta as this is helping with mood and neuropathy pain during the daytime.  Discontinue amitriptyline as it is making patient too sleepy.  Increase Seroquel to 600 mg at bedtime, advised patient on side effects of this however patient has been on this dose chronically for years and tolerated with no  side effects.  Continue to see pain management for pain control.  Will try lidocaine patches underneath other insurance with the PA when it gets changed over.  Follow-up in 2 weeks.    -12/9/2019: See same note above.  Continue current plan of care.  Patient also states he was given buspirone 15 mg to take from pain management and this is what he thinks caused his fall so he is going to discontinue this.  Educated patient that if he decides to go back on this we will need to discontinue the hydroxyzine as he does not need to take both.  Patient states understanding.    Type 2 diabetes/PN-chronic, improving with metformin 1,00mg bid and Januvia 100mg daily.  -eye appt 4/2, had DM foot exam with Dr. Amaral on 3/5/19, needs order signed for [employer] to give him diabetes shoes.   -9/10/19: pt states burning/aching of both feet on the bottom of them, was on gabapentin 600mg tid. Pt states 'cataracts and diabetes in eyes', going to see specialists in [place] 9/17/19 to get cataracts fixed. Sugars have been 200 in the am and 170s  During the day.    -12/3/2019: Fasting blood glucose today is 120, will call patient when we get the A1c to see if we should adjust.  -12/9/2019: Patient needs diabetic foot exam today 12/9/2019: Feet are very sensitive to touch, patient has plantars wart on the bottom of his foot, he is going to make an appointment with Dr. Amaral to address this.  Patient has chronic history of peripheral neuropathy as well as callus this in the past and some bilaterally on the heels.  -Plan of care: Will order diabetic shoes from [employer], will follow up with patient when next labs are due.    GERD-chronic, improving with 40 mg twice daily.    Follow-up in 3 months with lab work prior.      Flu shot given to patient 12/3/19.     Uses a cane for ambulation.     PMH:  HTN-chronic, controlled with lisinopril-hctz  COPD-chronic, controlled with symbicort and albuterol (most likely copd from being previous  smoker)  GERD-chronic, controlled with Protonix 40mg daily.  Hyperlipidemia-chronic, controlled with atorvastatin 20mg at hs    Past Surgical History:  -bilateral lens implants done by Geovani Trejo in [facility] in 2019    The following portions of the patient's history were reviewed and updated as appropriate: allergies, current medications, past family history, past medical history, past social history, past surgical history and problem list.    ISAIAH obtained and reviewed on 12/3/19: previously patient has history of being prescribed gabapentin and tramadol and Lortabs.  Gabapentin 600mg, 120 count and lortab 7.5mg 90 count,  last prescribed by Yuri Lance on 11/25/19.     Review of Systems   Constitutional: Negative for activity change, appetite change, chills, diaphoresis, fatigue and unexpected weight change.   HENT: Negative for congestion, ear discharge, ear pain, facial swelling, hearing loss, postnasal drip, rhinorrhea, sinus pressure, sinus pain, sneezing, sore throat and tinnitus.    Eyes: Negative.  Negative for discharge and visual disturbance.   Respiratory: Negative for cough, chest tightness, shortness of breath and wheezing.    Cardiovascular: Positive for leg swelling. Negative for chest pain and palpitations.   Gastrointestinal: Negative for abdominal distention, abdominal pain, blood in stool, constipation, diarrhea, nausea and vomiting.   Genitourinary: Negative for decreased urine volume, difficulty urinating, discharge, dysuria, flank pain, frequency, hematuria, penile pain, penile swelling, scrotal swelling, testicular pain and urgency.   Musculoskeletal: Positive for arthralgias and back pain. Negative for joint swelling and myalgias.   Skin: Negative for color change and rash.   Allergic/Immunologic: Negative for food allergies.   Neurological: Negative for dizziness, tremors, seizures, syncope, light-headedness and headaches.   Hematological: Negative for adenopathy. Does not  bruise/bleed easily.   Psychiatric/Behavioral: Positive for sleep disturbance. Negative for agitation, behavioral problems, confusion, decreased concentration, dysphoric mood, hallucinations, self-injury and suicidal ideas. The patient is not nervous/anxious.    All other systems reviewed and are negative.      Past Medical History:   Diagnosis Date   • Abdominal pain     left side   • Acute sinusitis    • Ankle joint pain    • Bipolar disorder (CMS/[facility])    • Bipolar disorder, unspecified (CMS/[facility])    • Cellulitis and abscess of trunk     axilla   • Chest pain    • Chronic anemia    • Degenerative joint disease involving multiple joints     back   • Depressive disorder    • Diabetes mellitus (CMS/[facility])    • Diabetes mellitus with no complication (CMS/[facility])     type 2 or unspecified type uncontrolled   • Diverticular disease    • Dyspnea    • Enlarged prostate     on FL-on CT   • Essential hypertension    • Febrile convulsion (CMS/[facility])    • Gastroesophageal reflux disease    • Generalized anxiety disorder    • H/O echocardiogram 10/16/2007    Mild to moderate concentric LV hypertrophy with mild left atrial enlargement. LV appears to be hypodynamic with preserved LV systolic function with EF 55-60%. Pericardium appears to be normal with a small pericardial effusion    • Hemorrhoids    • Hyperlipidemia    • Hypertensive disorder    • Hypoglycemia    • Infectious disorder of kidney     kidney infection[facility]   • Left lower quadrant pain    • Loss of appetite    • Obesity    • Osteoarthritis    • Pain in joint involving ankle and foot    • Pain in limb    • Psoriasis    • Retention of urine     with cath-treated by [facility]   • Screening for malignant neoplasm of colon    • Sepsis due to urinary tract infection (CMS/[facility])     urosepsis treated by [facility]   • Sinusitis    • Syncope    • Unspecified essential hypertension        Family History   Problem Relation Age of Onset   • Diabetes Other    •  "Heart disease Other    • Hypertension Other    • Kidney disease Other    • Hypertension Mother    • Clotting disorder Mother           Objective   /70   Pulse 83   Temp 98.1 °F (36.7 °C) (Oral)   Resp 18   Ht 175.3 cm (69\")   Wt 103 kg (227 lb)   SpO2 99%   BMI 33.52 kg/m²   Physical Exam   Constitutional: He is oriented to person, place, and time. He appears well-developed and well-nourished. He is cooperative. He does not appear ill.   HENT:   Head: Normocephalic.       Right Ear: Hearing, tympanic membrane, external ear and ear canal normal.   Left Ear: Hearing, tympanic membrane, external ear and ear canal normal.   Nose: Nose normal.   Mouth/Throat: Oropharynx is clear and moist. No oropharyngeal exudate.   Eyes: Pupils are equal, round, and reactive to light. EOM and lids are normal. Right eye exhibits no discharge. Left eye exhibits no discharge. Right conjunctiva is not injected. Left conjunctiva is not injected.   Neck: Normal range of motion. Neck supple.   Cardiovascular: Normal rate, regular rhythm, normal heart sounds and intact distal pulses. Exam reveals no gallop and no friction rub.   No murmur heard.  Pulmonary/Chest: Effort normal and breath sounds normal. No respiratory distress. He has no wheezes. He has no rales.   Abdominal: Soft. Normal appearance, normal aorta and bowel sounds are normal. He exhibits no distension and no mass. There is no tenderness. There is no rebound and no guarding. No hernia.   Musculoskeletal: He exhibits tenderness. He exhibits no edema or deformity.        Right shoulder: He exhibits decreased range of motion, tenderness and pain. He exhibits no bony tenderness, no swelling, no effusion, no crepitus, no deformity, no laceration, no spasm, normal pulse and normal strength.        Cervical back: He exhibits decreased range of motion, tenderness and pain. He exhibits no bony tenderness, no swelling, no edema, no deformity, no laceration, no spasm and normal " pulse.        Lumbar back: He exhibits decreased range of motion, tenderness, pain and spasm. He exhibits no bony tenderness, no swelling, no edema, no deformity, no laceration and normal pulse.        Right hand: He exhibits decreased range of motion and tenderness. He exhibits normal capillary refill and no swelling. Normal sensation noted.        Left hand: He exhibits tenderness. He exhibits normal range of motion, normal capillary refill and no swelling. Normal sensation noted.    Mikey had a diabetic foot exam performed (callus of heel bilaterally, plantar warts on bottom of feet scattered bilaterally) today.   During the foot exam he had a monofilament test performed (normal).  Vascular Status -  His right foot exhibits normal foot vasculature  and no edema. His left foot exhibits normal foot vasculature  and no edema.  Skin Integrity  -  His right foot skin is not intact.  His left foot skin is not intact..  Lymphadenopathy:     He has no cervical adenopathy.   Neurological: He is alert and oriented to person, place, and time. He has normal strength and normal reflexes. He is not disoriented. He displays normal reflexes. No cranial nerve deficit or sensory deficit. He exhibits normal muscle tone. He displays a negative Romberg sign. Coordination normal. GCS eye subscore is 4. GCS verbal subscore is 5. GCS motor subscore is 6.   Reflex Scores:       Patellar reflexes are 2+ on the right side and 2+ on the left side.  Skin: Skin is warm, dry and intact. No ecchymosis and no rash noted. He is not diaphoretic. No erythema. No pallor.   No bruising noted on areas of pain   Psychiatric: He has a normal mood and affect. His speech is normal and behavior is normal. Thought content normal. Cognition and memory are normal.   Patient is dressed appropriately for weather and situation, makes eye contact, and engages in conversation.  He is attentive.   Nursing note and vitals reviewed.       PHQ-2/PHQ-9 Depression  Screening 3/21/2019   Little interest or pleasure in doing things 0   Feeling down, depressed, or hopeless 0   Trouble falling or staying asleep, or sleeping too much -   Feeling tired or having little energy -   Poor appetite or overeating -   Feeling bad about yourself - or that you are a failure or have let yourself or your family down -   Trouble concentrating on things, such as reading the newspaper or watching television -   Moving or speaking so slowly that other people could have noticed. Or the opposite - being so fidgety or restless that you have been moving around a lot more than usual -   Thoughts that you would be better off dead, or of hurting yourself in some way -   Total Score 0   If you checked off any problems, how difficult have these problems made it for you to do your work, take care of things at home, or get along with other people? -         Assessment/Plan   Diagnoses and all orders for this visit:    Type 2 diabetes mellitus without complication, without long-term current use of insulin (CMS/[facility])  -     metFORMIN (GLUCOPHAGE) 1000 MG tablet; Take 1 tablet by mouth 2 (Two) Times a Day With Meals.    Anxiety  -     DULoxetine (CYMBALTA) 30 MG capsule; Take 1 capsule by mouth Daily.    Acute bilateral low back pain without sciatica  -     Discontinue: amitriptyline (ELAVIL) 50 MG tablet; Take 1 tablet by mouth At Night As Needed (nerve pain).    Neuropathy  Comments:  diabetic  Orders:  -     lidocaine (LIDODERM) 5 %; Place 1 patch on the skin as directed by provider Daily.    Callus of heel    Gastroesophageal reflux disease without esophagitis    Fall, initial encounter    Depression, unspecified depression type    Insomnia, unspecified type    Arthritis    Cervical pain    Other diabetic neurological complication associated with type 2 diabetes mellitus (CMS/[facility])             Labs 3/21/19: Hep A, B, and C neg. last labs done 12/3/2019.    Recent fall/headache/low back pain/bilateral hand  pain-acute, new problem, improving with lortab and gabapentin through pain management.  -12/3/2019: Fell sat before last, at six am, got up to get coffee and fell and hit head, states knot but going down some. Did not have cane in the kitchen, now taking cane with him everywhere. Pt cleanign stuff off tube and fell lost balance, hit back and bruised left upper arm. Going to hirer someone to help him clean. Pt states he has walker but at friends in [place] ordered 4-5 years ago, got cane but not with him all the time. Would like pain shot today. Pain with knot on head but no headache. POC: will xray areas of pain and call patient with results. toradol shot given in office.   -12/9/2019: Patient states feeling a lot better.  Declines any significant pain from fall.  Does not want further work-up but will let me know if anything changes.    Osteoarthritis and peripheral neuropathy/chronic neck pain-chronic, improving with lortab, zanaflex, and gabapentin Cymbalta and Celebrex, and Seroquel 600 mg at bedtime.  (failed flexeril due to drowsiness, gabapentin and lortab through pain management)  -9/10/19: pt goes to [facility] pain clinic, wants pain pump, f/u [facility] for nerve block. Pt states bilateral feet pain keeping him up at nightime and right fingers locking up, plus his normal pain that is chronic, pt is going to discuss with pain management about increasing gabapentin. POC: added cymbalta for nerve pain and mood, decreased seroquel to 400mg at hs, added amitriptyline 25mg 1-2 tab at hs for nerve pain/sleep, and lidocaine patches. F/u in two weeks.   -12/3/19: pt states amitriptyline making him sleepy. Lidocaine patches filled once and now not covering with changing insurances and wants to try with new insurance. Wants to go back on seroquel. States cymbalta does help with mood and pain somewhat. POC: Continue Cymbalta as this is helping with mood and neuropathy pain during the daytime.  Discontinue amitriptyline  as it is making patient too sleepy.  Increase Seroquel to 600 mg at bedtime, advised patient on side effects of this however patient has been on this dose chronically for years and tolerated with no side effects.  Continue to see pain management for pain control.  Will try lidocaine patches underneath other insurance with the PA when it gets changed over.  Follow-up in 2 weeks.  -12/9/2019: Patient states his insurance will not cover lidocaine patches.  Patient does have significant history of peripheral neuropathy and this would benefit it, will try and do PA on.  Patient states he is doing very well on the Seroquel states that he is taking Flexeril Seroquel gabapentin and pain pill at bedtime.  Denies any a.m. fatigue.  -Plan of care: Continue current plan of care will get PA on lidocaine patches for diabetic neuropathy.    Anxiety/depression/insomnia-chronic, moderately controlled with hydroxyzine 25mg tid, Cymbalta, Seroquel 600mg at hs and zanaflex at hs (failed buspar)  -9/10/19: pt states more anxiety  -12/3/19: pt states amitriptyline making him sleepy and he feels like he is drugged in the morning although he is still waking up at 4 AM.  Lidocaine patches filled once and now not covering with changing insurances and wants to try with new insurance. Wants to go back on seroquel. States cymbalta does help with mood and pain somewhat. POC: Continue Cymbalta as this is helping with mood and neuropathy pain during the daytime.  Discontinue amitriptyline as it is making patient too sleepy.  Increase Seroquel to 600 mg at bedtime, advised patient on side effects of this however patient has been on this dose chronically for years and tolerated with no side effects.  Continue to see pain management for pain control.  Will try lidocaine patches underneath other insurance with the PA when it gets changed over.  Follow-up in 2 weeks.    -12/9/2019: See same note above.  Continue current plan of care.  Patient also  states he was given buspirone 15 mg to take from pain management and this is what he thinks caused his fall so he is going to discontinue this.  Educated patient that if he decides to go back on this we will need to discontinue the hydroxyzine as he does not need to take both.  Patient states understanding.    Type 2 diabetes/PN-chronic, improving with metformin 1,00mg bid and Januvia 100mg daily.  -eye appt 4/2, had DM foot exam with Dr. Amaral on 3/5/19, needs order signed for [employer] to give him diabetes shoes.   -9/10/19: pt states burning/aching of both feet on the bottom of them, was on gabapentin 600mg tid. Pt states 'cataracts and diabetes in eyes', going to see specialists in [place] 9/17/19 to get cataracts fixed. Sugars have been 200 in the am and 170s  During the day.    -12/3/2019: Fasting blood glucose today is 120, will call patient when we get the A1c to see if we should adjust.  -12/9/2019: Patient needs diabetic foot exam today 12/9/2019: Feet are very sensitive to touch, patient has plantars wart on the bottom of his foot, he is going to make an appointment with Dr. Amaral to address this.  Patient has chronic history of peripheral neuropathy as well as callus this in the past and some bilaterally on the heels.  -Plan of care: Will order diabetic shoes from [employer], will follow up with patient when next labs are due.    GERD-chronic, improving with 40 mg twice daily.    Follow-up in 3 months with lab work prior.      Patient educated to follow-up sooner than next scheduled appointment if condition(s) worse or do not improve. Patient states understanding and is in agreeance with plan of care. An After Visit Summary was printed and given to the patient.      SAMUEL Wiseman        This document has been electronically signed by SAMUEL Wiseman on December 29, 2019 9:57 PM      EMR/Transcription Dragon Disclaimer:  Some of this note may be an electronic dragon  transcription/translation of spoken language to printed text. The electronic translation of spoken language may permit erroneous, or at times, nonsensical words or phrases to be inadvertently transcribed. Although I have reviewed the note for such errors, some may still exist.

## 2020-01-03 DIAGNOSIS — F41.9 ANXIETY: ICD-10-CM

## 2020-01-03 RX ORDER — DULOXETIN HYDROCHLORIDE 30 MG/1
30 CAPSULE, DELAYED RELEASE ORAL DAILY
Qty: 30 CAPSULE | Refills: 0 | Status: SHIPPED | OUTPATIENT
Start: 2020-01-03 | End: 2020-04-21 | Stop reason: SDUPTHER

## 2020-01-27 ENCOUNTER — OFFICE VISIT (OUTPATIENT)
Dept: FAMILY MEDICINE CLINIC | Facility: CLINIC | Age: 65
End: 2020-01-27

## 2020-01-27 VITALS
OXYGEN SATURATION: 98 % | RESPIRATION RATE: 18 BRPM | HEART RATE: 91 BPM | DIASTOLIC BLOOD PRESSURE: 56 MMHG | BODY MASS INDEX: 35.55 KG/M2 | WEIGHT: 240 LBS | TEMPERATURE: 98.4 F | SYSTOLIC BLOOD PRESSURE: 130 MMHG | HEIGHT: 69 IN

## 2020-01-27 DIAGNOSIS — H57.11 PAIN AROUND RIGHT EYE: ICD-10-CM

## 2020-01-27 DIAGNOSIS — R13.10 DYSPHAGIA, UNSPECIFIED TYPE: ICD-10-CM

## 2020-01-27 DIAGNOSIS — L03.213 PERIORBITAL CELLULITIS OF RIGHT EYE: Primary | ICD-10-CM

## 2020-01-27 PROCEDURE — 96372 THER/PROPH/DIAG INJ SC/IM: CPT | Performed by: NURSE PRACTITIONER

## 2020-01-27 PROCEDURE — 99213 OFFICE O/P EST LOW 20 MIN: CPT | Performed by: NURSE PRACTITIONER

## 2020-01-27 RX ORDER — CEFDINIR 300 MG/1
300 CAPSULE ORAL 2 TIMES DAILY
Qty: 20 CAPSULE | Refills: 0 | Status: SHIPPED | OUTPATIENT
Start: 2020-01-27 | End: 2020-02-06

## 2020-01-27 RX ORDER — KETOROLAC TROMETHAMINE 30 MG/ML
60 INJECTION, SOLUTION INTRAMUSCULAR; INTRAVENOUS ONCE
Status: COMPLETED | OUTPATIENT
Start: 2020-01-27 | End: 2020-01-27

## 2020-01-27 RX ORDER — DOXYCYCLINE HYCLATE 100 MG/1
100 CAPSULE ORAL 2 TIMES DAILY
Qty: 20 CAPSULE | Refills: 0 | Status: SHIPPED | OUTPATIENT
Start: 2020-01-27 | End: 2020-04-21

## 2020-01-27 RX ORDER — CEFTRIAXONE 1 G/1
1 INJECTION, POWDER, FOR SOLUTION INTRAMUSCULAR; INTRAVENOUS ONCE
Status: COMPLETED | OUTPATIENT
Start: 2020-01-27 | End: 2020-01-27

## 2020-01-27 RX ADMIN — CEFTRIAXONE 1 G: 1 INJECTION, POWDER, FOR SOLUTION INTRAMUSCULAR; INTRAVENOUS at 17:57

## 2020-01-27 RX ADMIN — KETOROLAC TROMETHAMINE 60 MG: 30 INJECTION, SOLUTION INTRAMUSCULAR; INTRAVENOUS at 17:58

## 2020-01-28 ENCOUNTER — OFFICE VISIT (OUTPATIENT)
Dept: FAMILY MEDICINE CLINIC | Facility: CLINIC | Age: 65
End: 2020-01-28

## 2020-01-28 VITALS
RESPIRATION RATE: 16 BRPM | HEIGHT: 69 IN | TEMPERATURE: 97 F | DIASTOLIC BLOOD PRESSURE: 50 MMHG | HEART RATE: 86 BPM | SYSTOLIC BLOOD PRESSURE: 94 MMHG | BODY MASS INDEX: 34 KG/M2 | WEIGHT: 229.6 LBS | OXYGEN SATURATION: 99 %

## 2020-01-28 DIAGNOSIS — G62.9 NEUROPATHY: ICD-10-CM

## 2020-01-28 DIAGNOSIS — L03.90 WOUND CELLULITIS: Primary | ICD-10-CM

## 2020-01-28 PROCEDURE — 99214 OFFICE O/P EST MOD 30 MIN: CPT | Performed by: NURSE PRACTITIONER

## 2020-01-28 RX ORDER — LIDOCAINE AND PRILOCAINE 25; 25 MG/G; MG/G
CREAM TOPICAL DAILY
Qty: 30 G | Refills: 2 | Status: SHIPPED | OUTPATIENT
Start: 2020-01-28 | End: 2022-06-13

## 2020-01-28 RX ORDER — LIDOCAINE 50 MG/G
1 PATCH TOPICAL EVERY 24 HOURS
Qty: 30 EACH | Refills: 5 | Status: SHIPPED | OUTPATIENT
Start: 2020-01-28 | End: 2021-08-02 | Stop reason: SDUPTHER

## 2020-01-29 ENCOUNTER — LAB (OUTPATIENT)
Dept: LAB | Facility: OTHER | Age: 65
End: 2020-01-29

## 2020-01-29 DIAGNOSIS — L03.90 WOUND CELLULITIS: ICD-10-CM

## 2020-01-29 PROCEDURE — 87186 SC STD MICRODIL/AGAR DIL: CPT | Performed by: NURSE PRACTITIONER

## 2020-01-29 PROCEDURE — 87147 CULTURE TYPE IMMUNOLOGIC: CPT | Performed by: NURSE PRACTITIONER

## 2020-01-29 PROCEDURE — 87205 SMEAR GRAM STAIN: CPT | Performed by: NURSE PRACTITIONER

## 2020-01-29 PROCEDURE — 87070 CULTURE OTHR SPECIMN AEROBIC: CPT | Performed by: NURSE PRACTITIONER

## 2020-01-30 ENCOUNTER — OFFICE VISIT (OUTPATIENT)
Dept: FAMILY MEDICINE CLINIC | Facility: CLINIC | Age: 65
End: 2020-01-30

## 2020-01-30 VITALS
OXYGEN SATURATION: 98 % | WEIGHT: 229 LBS | HEART RATE: 90 BPM | TEMPERATURE: 98.5 F | BODY MASS INDEX: 33.92 KG/M2 | HEIGHT: 69 IN | RESPIRATION RATE: 18 BRPM

## 2020-01-30 DIAGNOSIS — Z91.89 AT RISK FOR MEDICATION NONCOMPLIANCE: ICD-10-CM

## 2020-01-30 DIAGNOSIS — R22.0 SWELLING AROUND BOTH EYES: ICD-10-CM

## 2020-01-30 DIAGNOSIS — H57.89 SWELLING OF RIGHT EYE: ICD-10-CM

## 2020-01-30 DIAGNOSIS — L03.213 PERIORBITAL CELLULITIS OF RIGHT EYE: Primary | ICD-10-CM

## 2020-01-30 DIAGNOSIS — R39.198 DIFFICULTY URINATING: ICD-10-CM

## 2020-01-30 DIAGNOSIS — R51.9 RIGHT-SIDED FACE PAIN: ICD-10-CM

## 2020-01-30 PROCEDURE — 99214 OFFICE O/P EST MOD 30 MIN: CPT | Performed by: NURSE PRACTITIONER

## 2020-01-30 PROCEDURE — 96372 THER/PROPH/DIAG INJ SC/IM: CPT | Performed by: NURSE PRACTITIONER

## 2020-01-30 RX ORDER — METHYLPREDNISOLONE ACETATE 80 MG/ML
80 INJECTION, SUSPENSION INTRA-ARTICULAR; INTRALESIONAL; INTRAMUSCULAR; SOFT TISSUE ONCE
Status: COMPLETED | OUTPATIENT
Start: 2020-01-30 | End: 2020-01-30

## 2020-01-30 RX ORDER — KETOROLAC TROMETHAMINE 30 MG/ML
30 INJECTION, SOLUTION INTRAMUSCULAR; INTRAVENOUS ONCE
Status: COMPLETED | OUTPATIENT
Start: 2020-01-30 | End: 2020-01-30

## 2020-01-30 RX ADMIN — METHYLPREDNISOLONE ACETATE 80 MG: 80 INJECTION, SUSPENSION INTRA-ARTICULAR; INTRALESIONAL; INTRAMUSCULAR; SOFT TISSUE at 17:40

## 2020-01-30 RX ADMIN — KETOROLAC TROMETHAMINE 30 MG: 30 INJECTION, SOLUTION INTRAMUSCULAR; INTRAVENOUS at 17:39

## 2020-01-31 LAB
BACTERIA SPEC AEROBE CULT: ABNORMAL
GRAM STN SPEC: ABNORMAL
GRAM STN SPEC: ABNORMAL

## 2020-02-01 ENCOUNTER — APPOINTMENT (OUTPATIENT)
Dept: CT IMAGING | Facility: HOSPITAL | Age: 65
End: 2020-02-01

## 2020-02-01 ENCOUNTER — HOSPITAL ENCOUNTER (EMERGENCY)
Facility: HOSPITAL | Age: 65
Discharge: HOME | End: 2020-02-01
Attending: FAMILY MEDICINE | Admitting: EMERGENCY MEDICINE

## 2020-02-01 VITALS
HEIGHT: 69 IN | WEIGHT: 231 LBS | RESPIRATION RATE: 20 BRPM | BODY MASS INDEX: 34.21 KG/M2 | DIASTOLIC BLOOD PRESSURE: 82 MMHG | HEART RATE: 77 BPM | OXYGEN SATURATION: 94 % | TEMPERATURE: 97.7 F | SYSTOLIC BLOOD PRESSURE: 158 MMHG

## 2020-02-01 DIAGNOSIS — L03.211 FACIAL CELLULITIS: Primary | ICD-10-CM

## 2020-02-01 LAB
ANION GAP SERPL CALCULATED.3IONS-SCNC: 11 MMOL/L (ref 5–15)
BASOPHILS # BLD AUTO: 0.05 10*3/MM3 (ref 0–0.2)
BASOPHILS NFR BLD AUTO: 0.8 % (ref 0–1.5)
BUN BLD-MCNC: 14 MG/DL (ref 8–23)
BUN/CREAT SERPL: 18.2 (ref 7–25)
CALCIUM SPEC-SCNC: 9.8 MG/DL (ref 8.6–10.5)
CHLORIDE SERPL-SCNC: 97 MMOL/L (ref 98–107)
CO2 SERPL-SCNC: 27 MMOL/L (ref 22–29)
CREAT BLD-MCNC: 0.77 MG/DL (ref 0.76–1.27)
DEPRECATED RDW RBC AUTO: 44.2 FL (ref 37–54)
EOSINOPHIL # BLD AUTO: 0.26 10*3/MM3 (ref 0–0.4)
EOSINOPHIL NFR BLD AUTO: 4.3 % (ref 0.3–6.2)
ERYTHROCYTE [DISTWIDTH] IN BLOOD BY AUTOMATED COUNT: 13.6 % (ref 12.3–15.4)
GFR SERPL CREATININE-BSD FRML MDRD: 101 ML/MIN/1.73
GLUCOSE BLD-MCNC: 85 MG/DL (ref 65–99)
HCT VFR BLD AUTO: 31.7 % (ref 37.5–51)
HGB BLD-MCNC: 10.5 G/DL (ref 13–17.7)
HOLD SPECIMEN: NORMAL
IMM GRANULOCYTES # BLD AUTO: 0.02 10*3/MM3 (ref 0–0.05)
IMM GRANULOCYTES NFR BLD AUTO: 0.3 % (ref 0–0.5)
LYMPHOCYTES # BLD AUTO: 1.54 10*3/MM3 (ref 0.7–3.1)
LYMPHOCYTES NFR BLD AUTO: 25.3 % (ref 19.6–45.3)
MCH RBC QN AUTO: 29.3 PG (ref 26.6–33)
MCHC RBC AUTO-ENTMCNC: 33.1 G/DL (ref 31.5–35.7)
MCV RBC AUTO: 88.5 FL (ref 79–97)
MONOCYTES # BLD AUTO: 0.6 10*3/MM3 (ref 0.1–0.9)
MONOCYTES NFR BLD AUTO: 9.9 % (ref 5–12)
NEUTROPHILS # BLD AUTO: 3.61 10*3/MM3 (ref 1.7–7)
NEUTROPHILS NFR BLD AUTO: 59.4 % (ref 42.7–76)
NRBC BLD AUTO-RTO: 0 /100 WBC (ref 0–0.2)
PLATELET # BLD AUTO: 321 10*3/MM3 (ref 140–450)
PMV BLD AUTO: 8.3 FL (ref 6–12)
POTASSIUM BLD-SCNC: 4.1 MMOL/L (ref 3.5–5.2)
RBC # BLD AUTO: 3.58 10*6/MM3 (ref 4.14–5.8)
SODIUM BLD-SCNC: 135 MMOL/L (ref 136–145)
WBC NRBC COR # BLD: 6.08 10*3/MM3 (ref 3.4–10.8)
WHOLE BLOOD HOLD SPECIMEN: NORMAL

## 2020-02-01 PROCEDURE — 85025 COMPLETE CBC W/AUTO DIFF WBC: CPT | Performed by: FAMILY MEDICINE

## 2020-02-01 PROCEDURE — 80048 BASIC METABOLIC PNL TOTAL CA: CPT | Performed by: FAMILY MEDICINE

## 2020-02-01 PROCEDURE — 99284 EMERGENCY DEPT VISIT MOD MDM: CPT

## 2020-02-01 PROCEDURE — 70487 CT MAXILLOFACIAL W/DYE: CPT

## 2020-02-01 PROCEDURE — 25010000002 IOPAMIDOL 61 % SOLUTION: Performed by: FAMILY MEDICINE

## 2020-02-01 RX ORDER — HYDROCODONE BITARTRATE AND ACETAMINOPHEN 10; 325 MG/1; MG/1
1 TABLET ORAL EVERY 6 HOURS PRN
COMMUNITY
End: 2023-03-06

## 2020-02-01 RX ORDER — HYDROCODONE BITARTRATE AND ACETAMINOPHEN 10; 325 MG/1; MG/1
1 TABLET ORAL ONCE
Status: COMPLETED | OUTPATIENT
Start: 2020-02-01 | End: 2020-02-01

## 2020-02-01 RX ADMIN — HYDROCODONE BITARTRATE AND ACETAMINOPHEN 1 TABLET: 10; 325 TABLET ORAL at 16:31

## 2020-02-01 RX ADMIN — IOPAMIDOL 90 ML: 612 INJECTION, SOLUTION INTRAVENOUS at 17:44

## 2020-02-01 NOTE — ED TRIAGE NOTES
Pt presents complaining of cellulitis to right periorbital area. Pt was rx amoxicillin and has received steroids. Pt still has swelling, redness, and pain to the area.

## 2020-02-01 NOTE — ED PROVIDER NOTES
Subjective   Patient has been on cefdinir and doxycycline for the past 3 days without any significant improvement of the right facial swelling and erythema.  He is a diabetic controlled with oral medications.      Cellulitis   Location:  Left fece  Quality:  Aching  Severity:  Moderate  Onset quality:  Gradual  Duration:  1 week  Timing:  Constant  Progression:  Unchanged  Chronicity:  New  Relieved by:  Nothing  Worsened by:  Nothing  Ineffective treatments:  Abx  Associated symptoms: no abdominal pain, no chest pain, no congestion, no cough, no diarrhea, no ear pain, no fatigue, no fever, no headaches, no myalgias, no nausea, no rash, no rhinorrhea, no shortness of breath, no sore throat, no vomiting and no wheezing        Review of Systems   Constitutional: Negative for appetite change, chills, diaphoresis, fatigue and fever.   HENT: Negative for congestion, ear discharge, ear pain, nosebleeds, rhinorrhea, sinus pressure, sore throat and trouble swallowing.    Eyes: Negative for discharge and redness.   Respiratory: Negative for apnea, cough, chest tightness, shortness of breath and wheezing.    Cardiovascular: Negative for chest pain.   Gastrointestinal: Negative for abdominal pain, diarrhea, nausea and vomiting.   Endocrine: Negative for polyuria.   Genitourinary: Negative for dysuria, frequency and urgency.   Musculoskeletal: Negative for myalgias and neck pain.   Skin: Positive for color change. Negative for rash.   Allergic/Immunologic: Negative for immunocompromised state.   Neurological: Negative for dizziness, seizures, syncope, weakness, light-headedness and headaches.   Hematological: Negative for adenopathy. Does not bruise/bleed easily.   Psychiatric/Behavioral: Negative for behavioral problems and confusion.   All other systems reviewed and are negative.      Past Medical History:   Diagnosis Date   • Abdominal pain     left side   • Acute sinusitis    • Ankle joint pain    • Bipolar disorder  (CMS/[facility])    • Bipolar disorder, unspecified (CMS/[facility])    • Cellulitis and abscess of trunk     axilla   • Chest pain    • Chronic anemia    • Degenerative joint disease involving multiple joints     back   • Depressive disorder    • Diabetes mellitus (CMS/[facility])    • Diabetes mellitus with no complication (CMS/[facility])     type 2 or unspecified type uncontrolled   • Diverticular disease    • Dyspnea    • Enlarged prostate     on [place]-on CT   • Essential hypertension    • Febrile convulsion (CMS/[facility])    • Gastroesophageal reflux disease    • Generalized anxiety disorder    • H/O echocardiogram 10/16/2007    Mild to moderate concentric LV hypertrophy with mild left atrial enlargement. LV appears to be hypodynamic with preserved LV systolic function with EF 55-60%. Pericardium appears to be normal with a small pericardial effusion    • Hemorrhoids    • Hyperlipidemia    • Hypertensive disorder    • Hypoglycemia    • Infectious disorder of kidney     kidney infection-treated by [facility]   • Left lower quadrant pain    • Loss of appetite    • Obesity    • Osteoarthritis    • Pain in joint involving ankle and foot    • Pain in limb    • Psoriasis    • Retention of urine     with cath-treated by the [facility]   • Screening for malignant neoplasm of colon    • Sepsis due to urinary tract infection (CMS/[facility])     urosepsis treated by [facility]   • Sinusitis    • Syncope    • Unspecified essential hypertension        Allergies   Allergen Reactions   • Sulfa Antibiotics Unknown - High Severity     unknown   • Codeine Itching and Nausea And Vomiting       Past Surgical History:   Procedure Laterality Date   • ANKLE SURGERY Left 11/11/2008    Removal of syndesmotic screws, left ankle. Painful syndesmotic screws, left ankle.)   • ANKLE SURGERY  10/19/2007    Open reduction-internal fixation with fluoroscopic control with final x-ray PA and lateral of the ankle. Trimalleolar fracture/subluxation, left ankle.)   • CIRCUMCISION  2016    • COLONOSCOPY  03/10/2015    Diverticulosis found in the sigmoid colon. hemorrhoids found in the anus. Normal cecum   • CYSTOSCOPY  2016    Cystoscopy, dilation, anchor of Tim catheter. Benign prostatic hypertrophy, urinary retention, urethral stricture history of.   • INJECTION OF MEDICATION      Kenalog (3)   • SHOULDER ARTHROSCOPY Right 10/18/2017    Procedure: SHOULDER ARTHROSCOPY WITH RAFAELA PROCEDURE, AND SUBACROMIAL DECOMPRESSION, AND POSSIBLE ROTATOR  CUFF REPAIR       SHOULDER ARTHROSCOPY WITH RAFAELA PROCEDURE, AND SUBACROMIAL DECOMPRESSION, NO ROTATOR CUFF REPAIR PERFORMED;  Surgeon: Maximus Schreiber MD;  Location: [facility] OR;  Service:    • STOMACH SURGERY     • TIBIA FRACTURE SURGERY         Family History   Problem Relation Age of Onset   • Diabetes Other    • Heart disease Other    • Hypertension Other    • Kidney disease Other    • Hypertension Mother    • Clotting disorder Mother        Social History     Socioeconomic History   • Marital status:      Spouse name: Not on file   • Number of children: Not on file   • Years of education: Not on file   • Highest education level: Not on file   Tobacco Use   • Smoking status: Former Smoker     Types: Cigarettes     Last attempt to quit: 1985     Years since quittin.1   • Smokeless tobacco: Never Used   • Tobacco comment: smoked 3 years   Substance and Sexual Activity   • Alcohol use: Yes     Alcohol/week: 3.0 - 6.0 standard drinks     Types: 3 - 6 Cans of beer per week     Comment: 1-2 every other day   • Drug use: No   • Sexual activity: Defer           Objective   Physical Exam   Constitutional: He is oriented to person, place, and time. He appears well-developed and well-nourished.   HENT:   Head: Normocephalic and atraumatic. Not macrocephalic and not microcephalic. Head is without raccoon's eyes, without Jasmine's sign and without abrasion.       Nose: Nose normal.   Mouth/Throat: Oropharynx is clear and moist.   Right  erythema, edema and induration noted over the right zygomatic area that extends infraorbitally.  No fluctuance, no current drainage.   Eyes: Pupils are equal, round, and reactive to light. Conjunctivae and EOM are normal. Right eye exhibits no discharge. Left eye exhibits no discharge. No scleral icterus.   Neck: Normal range of motion. Neck supple. No tracheal deviation present.   Cardiovascular: Normal rate, regular rhythm and normal heart sounds.   No murmur heard.  Pulmonary/Chest: Effort normal and breath sounds normal. No stridor. No respiratory distress. He has no wheezes. He has no rales.   Abdominal: Soft. Bowel sounds are normal. He exhibits no distension and no mass. There is no tenderness. There is no rebound and no guarding.   Musculoskeletal: He exhibits no edema.   Neurological: He is alert and oriented to person, place, and time. Coordination normal.   Skin: Skin is warm and dry. No rash noted. No erythema.   Psychiatric: He has a normal mood and affect. His behavior is normal. Thought content normal.   Nursing note and vitals reviewed.      Procedures  none         ED Course      Labs Reviewed   BASIC METABOLIC PANEL - Abnormal; Notable for the following components:       Result Value    Sodium 135 (*)     Chloride 97 (*)     All other components within normal limits    Narrative:     GFR Normal >60  Chronic Kidney Disease <60  Kidney Failure <15     CBC WITH AUTO DIFFERENTIAL - Abnormal; Notable for the following components:    RBC 3.58 (*)     Hemoglobin 10.5 (*)     Hematocrit 31.7 (*)     All other components within normal limits   CBC AND DIFFERENTIAL    Narrative:     The following orders were created for panel order CBC & Differential.  Procedure                               Abnormality         Status                     ---------                               -----------         ------                     CBC Auto Differential[[id number]]        Abnormal            Final result                  Please view results for these tests on the individual orders.   EXTRA TUBES    Narrative:     The following orders were created for panel order Extra Tubes.  Procedure                               Abnormality         Status                     ---------                               -----------         ------                     Light Blue Top[[id number]]                                   Final result               Gold Top - [facility][[id number]]                                   Final result                 Please view results for these tests on the individual orders.   LIGHT BLUE TOP   G[facility]     Ct Maxillofacial With Contrast    Result Date: 2/1/2020  Narrative: PROCEDURE: CT MAXILLOFACIAL W CONT INDICATION:  Right infraorbital mass, lump, or swelling COMPARISON:  None  FINDINGS:  A  CT examination of the facial bones was performed, with multiplanar reconstructions for more optimal evaluation of anatomy and alignment. 90 mL of Isovue-300 intravenous contrast material was administered. This exam was performed according to our departmental dose-optimization program, which includes automated exposure control, adjustment of the mA and/or kV according to patient size and/or use of iterative reconstruction technique. DLP is 780.6 FINDINGS: Examination is significantly limited by motion artifact, particularly limiting detection of nondisplaced fractures. Orbital walls:  Intact Zygomatic arch(es):  Intact Maxilla:  Intact Mandible:  Intact Teeth are in poor condition, with multiple missing, broken, and carious teeth. There is subcutaneous stranding and skin thickening inferior and lateral to the right lobe, overlying the zygomatic arch, and extending to the level of the maxilla. Multiple right submandibular mildly enlarged lymph nodes are present, measuring up to 1.4 cm in greatest dimension. Normal sized left submandibular lymph nodes are present. Sinuses:  - maxillary: Clear  - OMU: Grossly within normal limits   - ethmoid:  Clear  - frontal:  Clear  - sphenoid:  Clear Minimal chronic appearing leftward deviation of the nasal septum with a small leftward effacing nasal spur. The nasal bone is intact.     Impression: 1. Initial limited by motion artifact 2. No evidence of acute traumatic osseous injury. 3. Right infraorbital subcutaneous stranding and soft tissue swelling which extends anterior to the right maxillary sinus and the right zygomatic arch is present. No focal fluid collection to suggest rei abscess is seen. This may represent edema, cellulitis, or possibly early abscess formation. Teeth are in poor condition, and this could possibly be due to spread of dental infection. Clinical correlation needed 4. Mild submandibular lymphadenopathy, likely reactive in nature. 5. Mild chronic appearing leftward deviation of the nasal septum. Electronically signed by:  Christina Erazo MD  2/1/2020 7:49 PM [employer] Workstation: [id number]      Labs Reviewed   BASIC METABOLIC PANEL - Abnormal; Notable for the following components:       Result Value    Sodium 135 (*)     Chloride 97 (*)     All other components within normal limits    Narrative:     GFR Normal >60  Chronic Kidney Disease <60  Kidney Failure <15     CBC WITH AUTO DIFFERENTIAL - Abnormal; Notable for the following components:    RBC 3.58 (*)     Hemoglobin 10.5 (*)     Hematocrit 31.7 (*)     All other components within normal limits   CBC AND DIFFERENTIAL    Narrative:     The following orders were created for panel order CBC & Differential.  Procedure                               Abnormality         Status                     ---------                               -----------         ------                     CBC Auto Differential[[id number]]        Abnormal            Final result                 Please view results for these tests on the individual orders.   EXTRA TUBES    Narrative:     The following orders were created for panel order Extra Tubes.  Procedure                                Abnormality         Status                     ---------                               -----------         ------                     Light Blue Top[[id number]]                                   Final result               Gold Top - [facility][[id number]]                                   Final result                 Please view results for these tests on the individual orders.   LIGHT BLUE TOP   [facility]       CT Maxillofacial With Contrast   Final Result   1. Initial limited by motion artifact   2. No evidence of acute traumatic osseous injury.    3. Right infraorbital subcutaneous stranding and soft tissue   swelling which extends anterior to the right maxillary sinus and   the right zygomatic arch is present. No focal fluid collection to   suggest rei abscess is seen. This may represent edema,   cellulitis, or possibly early abscess formation. Teeth are in   poor condition, and this could possibly be due to spread of   dental infection. Clinical correlation needed   4. Mild submandibular lymphadenopathy, likely reactive in nature.   5. Mild chronic appearing leftward deviation of the nasal septum.      Electronically signed by:  Christina Erazo MD  2/1/2020 7:49 PM CST   Workstation: [id number]              MDM  Number of Diagnoses or Management Options  Facial cellulitis:      Amount and/or Complexity of Data Reviewed  Clinical lab tests: reviewed  Tests in the radiology section of CPT®: reviewed    Patient Progress  Patient progress: stable    1900 Patient was signed out to me at shift change by Dr. Burks.  Patient was awaiting results of CT of the facial bones.  Patient has already been being treated with 2 antibiotics for facial cellulitis by his primary care provider.  He presented this evening because he felt like the wound on his right cheek was not getting any better.  Laboratory studies unremarkable.  CT imaging reveals cellulitis without any definitive abscess.  Patient does have very  poor dentition and likely this is an extension from infectious process there.  He also had a wound culture by PCP that revealed MRSA.  Appropriate antibiotic coverage at this time.  Patient is only been on it for a couple of days.  Advised to complete antibiotic course and return with any worsening symptoms.  We will follow-up for reevaluation with primary care early next week.    Final diagnoses:   Facial cellulitis            Jay Oviedo DO  02/01/20 2005

## 2020-02-02 NOTE — DISCHARGE INSTRUCTIONS
Please return with new or worsening symptoms.  Finish complete antibiotic course.  Follow-up with primary care and to see a dentist as soon as possible.

## 2020-02-03 ENCOUNTER — LAB (OUTPATIENT)
Dept: LAB | Facility: OTHER | Age: 65
End: 2020-02-03

## 2020-02-03 ENCOUNTER — OFFICE VISIT (OUTPATIENT)
Dept: FAMILY MEDICINE CLINIC | Facility: CLINIC | Age: 65
End: 2020-02-03

## 2020-02-03 VITALS
HEART RATE: 92 BPM | RESPIRATION RATE: 16 BRPM | DIASTOLIC BLOOD PRESSURE: 88 MMHG | SYSTOLIC BLOOD PRESSURE: 122 MMHG | TEMPERATURE: 97.6 F | WEIGHT: 222.6 LBS | HEIGHT: 69 IN | OXYGEN SATURATION: 96 % | BODY MASS INDEX: 32.97 KG/M2

## 2020-02-03 DIAGNOSIS — K59.00 CONSTIPATION, UNSPECIFIED CONSTIPATION TYPE: ICD-10-CM

## 2020-02-03 DIAGNOSIS — R10.9 FLANK PAIN: ICD-10-CM

## 2020-02-03 DIAGNOSIS — M25.562 ACUTE PAIN OF LEFT KNEE: ICD-10-CM

## 2020-02-03 DIAGNOSIS — M25.572 ACUTE LEFT ANKLE PAIN: ICD-10-CM

## 2020-02-03 DIAGNOSIS — L03.213 PERIORBITAL CELLULITIS OF RIGHT EYE: Primary | ICD-10-CM

## 2020-02-03 DIAGNOSIS — R39.11 URINARY HESITANCY: ICD-10-CM

## 2020-02-03 DIAGNOSIS — R35.0 URINE FREQUENCY: ICD-10-CM

## 2020-02-03 LAB
BACTERIA UR QL AUTO: ABNORMAL /HPF
BILIRUB UR QL STRIP: NEGATIVE
CLARITY UR: CLEAR
COLOR UR: YELLOW
GLUCOSE UR STRIP-MCNC: NEGATIVE MG/DL
HGB UR QL STRIP.AUTO: NEGATIVE
HYALINE CASTS UR QL AUTO: ABNORMAL /LPF
KETONES UR QL STRIP: NEGATIVE
LEUKOCYTE ESTERASE UR QL STRIP.AUTO: ABNORMAL
NITRITE UR QL STRIP: NEGATIVE
PH UR STRIP.AUTO: 7 [PH] (ref 5.5–8)
PROT UR QL STRIP: NEGATIVE
RBC # UR: ABNORMAL /HPF
REF LAB TEST METHOD: ABNORMAL
SP GR UR STRIP: 1.02 (ref 1–1.03)
SQUAMOUS #/AREA URNS HPF: ABNORMAL /HPF
UROBILINOGEN UR QL STRIP: ABNORMAL
WBC UR QL AUTO: ABNORMAL /HPF

## 2020-02-03 PROCEDURE — 99214 OFFICE O/P EST MOD 30 MIN: CPT | Performed by: NURSE PRACTITIONER

## 2020-02-03 PROCEDURE — 96372 THER/PROPH/DIAG INJ SC/IM: CPT | Performed by: NURSE PRACTITIONER

## 2020-02-03 PROCEDURE — 87086 URINE CULTURE/COLONY COUNT: CPT | Performed by: NURSE PRACTITIONER

## 2020-02-03 PROCEDURE — 81001 URINALYSIS AUTO W/SCOPE: CPT | Performed by: NURSE PRACTITIONER

## 2020-02-03 RX ORDER — KETOROLAC TROMETHAMINE 30 MG/ML
60 INJECTION, SOLUTION INTRAMUSCULAR; INTRAVENOUS ONCE
Status: CANCELLED | OUTPATIENT
Start: 2020-02-03 | End: 2020-02-08

## 2020-02-03 RX ORDER — METHYLPREDNISOLONE ACETATE 80 MG/ML
80 INJECTION, SUSPENSION INTRA-ARTICULAR; INTRALESIONAL; INTRAMUSCULAR; SOFT TISSUE ONCE
Status: COMPLETED | OUTPATIENT
Start: 2020-02-03 | End: 2020-02-03

## 2020-02-03 RX ORDER — DOCUSATE SODIUM 100 MG/1
100 CAPSULE, LIQUID FILLED ORAL 2 TIMES DAILY
Qty: 60 CAPSULE | Refills: 5 | Status: SHIPPED | OUTPATIENT
Start: 2020-02-03 | End: 2021-08-02

## 2020-02-03 RX ORDER — TAMSULOSIN HYDROCHLORIDE 0.4 MG/1
1 CAPSULE ORAL DAILY
Qty: 30 CAPSULE | Refills: 0 | Status: SHIPPED | OUTPATIENT
Start: 2020-02-03 | End: 2020-03-03

## 2020-02-03 RX ADMIN — METHYLPREDNISOLONE ACETATE 80 MG: 80 INJECTION, SUSPENSION INTRA-ARTICULAR; INTRALESIONAL; INTRAMUSCULAR; SOFT TISSUE at 15:10

## 2020-02-03 NOTE — PROGRESS NOTES
"Subjective   Mikey Allison is a 64 y.o. male who presents to the office for f/u of cellulitis    Right Eye Cellulitis: Acute, improving with omnicef, doxycycline and warm compresses prn.  01/27/20: Patient reports redness and pain around right cheek/eye that began 1 day ago on Sunday the 26th. He was seen today by an eye doctor for routine appointment who suggested he come straight to his PCP for cellulitis and prescribed amoxacillin and eye drops of which the patient is unsure of a name or use.  He reports that he cannot see well out of his right eye because of the swelling though the vision is fine and verified by the eye doctor. He goes on to discuss fever, feeling extremely tired, and overall not feeling well. He reports the area started as a small bump and is now much larger and \"much more painful\". POC: Start Omnicef and Doxycycline as soon as possible. Rocephin shot today. Bactroban ointment to open areas. Keep the area clean and dry, do not touch it with unwashed hands or pick at the area.  Monitor for worsening symptoms and go to ER immediately if you experience confusion, loss of vision, or worsening symptoms of fatigue or a fever that will not break.   -01/28/20: Patient is not taking antibiotics perscribed at this time because they were not available at his pharmacy. He states they plan to deliver them, but it will be Wednesday morning before they are ready. He goes on to say that the area is now draining and that he \"has not been touching it or picking at the area\". He reports he is taking the medication prescribed by the eye doctor (amoxacillin). He goes on to say that the pain is slightly worse, still 10/10. He states fever has broken and his is no longer having chills and fever. Denies confusion, SOB, N/V, or lightheadedness/diziness. POC: Stop the amoxacillin as soon as doxy and Omnicef are received. Keep the area clean and dry. Warm compresses to help with drainage. Apply mupirocin ointment " "to open areas after cleaning. F/u Thursday to re evaluate. Culture sent to lab from clean area with new drainage.   -01/30/20: Patient reports drainage and swelling that \"may be a little better\" he is using warm compresses to the area 2-3 times a day. He received his antibiotics yesterday evening and has taken 3 doses thus far. He took two doses yesterday and one dose of each this morning. He thinks he may have taken and extra Doxycycline by accident and a pill count verifies that he has as there are only 16 compared to the 17 Omnicef left. He is also using the bactriban ointment to the area. Denies fever, changes in vision, chills, N/V. Reports the pain has significantly improved. POC: Continue taking Omnicef and Doxycycline as prescribed. Continue warm compresses and mupirocin ointment.  F/u Monday . If it worsens, go to the ER. Pill organizer ordered from pharmacy to assist with medication compliance. Waiting on culture to come back.  -02/03/20: Culture positive for staph and MRSA, covered well with current abx. Patient reports complete resolution of pain in right cheek. He denies eye drainage or drainage from open areas on cheek. Reports no fever or worsening symptoms.   ER visit 02/01/20 after f/u with me in clinic. He felt area was not improving and was painful. Went to ER where head CT was completed and he was provided one Lortab 10. They advised him to continue antibiotics and f/u here today.   -Reviewed ER visit at [facility], [place], operated by [facility] in [place] on 2/1/20: pt states not improving on doxy and ceftin. Ct maxillofacial with contrast showed: Impression: 1. Initial limited by motion artifact 2. No evidence of acute traumatic osseous injury. 3. Right infraorbital subcutaneous stranding and soft tissue swelling which extends anterior to the right maxillary sinus and the right zygomatic arch is present. No focal fluid collection to suggest rei abscess is seen. This may represent edema, cellulitis, or possibly early " "abscess formation. Teeth are in poor condition, and this could possibly be due to spread of dental infection. Clinical correlation needed 4. Mild submandibular lymphadenopathy, likely reactive in nature. 5. Mild chronic appearing leftward deviation of the nasal septum. CBC and CMP WNL. POC: states no abscess on ct, most likely extension from infectious process of the teeth secondary to poor dentition. Advised to keep up abx longer and f/u with pcp in next few days.   -POC: Continue antimitotics to completion, continue ointment and warm compresses. F/u if not completely resolved in one week or if symptoms worsen.      Difficulty urinating-acute new problem improving:    -1/30/20: Patient reports new onset difficulty urinating that began around the same time as the cellulitis. He denies flank pain, blood in urine, or urgency. States he is urinating 3-4 times a day with no pain, \"it just feels harder to go\". Patient states he has been drinking a little less on Monday and Tuesday but has now increased his intake. POC: he would like to monitor until Monday and see if it improves. Educated to increase water intake and drink only water to 8-10 glasses/day.  -2/3/20: Patient reports continuation of symptoms above, but now reports flank pain, urgency and frequency of urination. He states it does hurt when he urinates sometimes, but mostly it is difficult to get the stream started. He has a history of kidney failure and is concerned this could happen again.   -POC: UA-culture if indicated, Flomax. Last PSA 10 months ago, wnl. Will call pt with results, hoping abx coverage for his cellulitis will cover uti as well. If not uti, will do further work up. If uti, will continue abx til culture for urine comes back and change accordingly.     New problem today   Left knee and ankle pain after fall last week: acute, not improving.   -02/03/20: Patient reports severe pain in left ankle and knee after fall last week. He goes on to say " "that he did not want to be concerned about it until we got his face taken care of. He states that he fell beside of his bed and since has had pain in his left ankle and \"feels like it could be broken\". He also reports pain on the outer aspect of his knee that is not improving and makes it difficult to walk. He reports he has had pain in these areas previously for arthritis, but not as bad as it is now.   -POC: Xray left ankle, Xray left knee, steroid injection. F/u with pain management and after xrays     Constipation: chronic, not improving  Patient reports constipation and hard stools for several years that is \"driving him crazy\". He denies good fiber intake in diet, but reports increasing fluid intake. He is also a known patient of pain management on opioid pain medications.   -POC: Colace 100mg BID prescribed, f/u if not BM >3days, increase fluids more and add fiber.    F/u in one mtn or sooner on acute issues if not improving.     The following portions of the patient's history were reviewed and updated as appropriate: allergies, current medications, past family history, past medical history, past social history, past surgical history and problem list.    Review of Systems   Constitutional: Negative for activity change, appetite change, chills, diaphoresis, fatigue, fever and unexpected weight change.   HENT: Negative.  Negative for congestion, ear discharge, ear pain, facial swelling, hearing loss, postnasal drip, rhinorrhea, sinus pressure, sinus pain, sneezing, sore throat and tinnitus.    Eyes: Negative for pain (resolved), discharge, redness and visual disturbance.   Respiratory: Negative.  Negative for cough, chest tightness, shortness of breath and wheezing.    Cardiovascular: Negative.  Negative for chest pain and palpitations.   Gastrointestinal: Positive for constipation. Negative for abdominal distention, abdominal pain, blood in stool, diarrhea, nausea and vomiting.   Genitourinary: Positive for " "difficulty urinating (new onest, hard to get started), dysuria (occasionally), flank pain, frequency and urgency. Negative for decreased urine volume, discharge, hematuria, penile pain, penile swelling, scrotal swelling and testicular pain.   Musculoskeletal: Positive for arthralgias and back pain. Negative for joint swelling and myalgias.        Severe pain in left knee and ankle after fall one week ago     Skin: Positive for color change (right cheek) and wound (open area on right cheek). Negative for pallor and rash.   Allergic/Immunologic: Negative for food allergies.   Neurological: Positive for tremors (bilateral legs, chronic) and weakness (bilateral legs). Negative for dizziness, seizures, syncope, light-headedness and headaches.   Hematological: Negative for adenopathy. Does not bruise/bleed easily.   Psychiatric/Behavioral: Positive for sleep disturbance. Negative for agitation, behavioral problems, confusion, decreased concentration, dysphoric mood, hallucinations, self-injury and suicidal ideas. The patient is not nervous/anxious.    All other systems reviewed and are negative.        Objective   /88   Pulse 92   Temp 97.6 °F (36.4 °C) (Tympanic)   Resp 16   Ht 175.3 cm (69\")   Wt 101 kg (222 lb 9.6 oz)   SpO2 96%   BMI 32.87 kg/m²   Physical Exam   Constitutional: He is oriented to person, place, and time. He appears well-developed and well-nourished. He is cooperative. He does not appear ill. No distress.   HENT:   Head: Normocephalic.       Right Ear: Hearing, tympanic membrane, external ear and ear canal normal.   Left Ear: Hearing, tympanic membrane, external ear and ear canal normal.   Nose: Nose normal.   Mouth/Throat: Oropharynx is clear and moist. No oropharyngeal exudate.   Mild redness and swelling with two open areas scabbed over. Much improved from previous visit.      Eyes: Pupils are equal, round, and reactive to light. EOM and lids are normal. Right eye exhibits no discharge. " Left eye exhibits no discharge. Right conjunctiva is not injected. Left conjunctiva is not injected.   Neck: Normal range of motion. Neck supple.       Lesions on neck improving      Cardiovascular: Normal rate, regular rhythm, normal heart sounds and intact distal pulses. Exam reveals no gallop and no friction rub.   No murmur heard.  Pulmonary/Chest: Effort normal and breath sounds normal. No respiratory distress. He has no wheezes. He has no rales.   Abdominal: Soft. Normal appearance, normal aorta and bowel sounds are normal. He exhibits no distension and no mass. There is no tenderness. There is no rebound and no guarding. No hernia.   Musculoskeletal: He exhibits no edema or deformity.        Right shoulder: He exhibits decreased range of motion and pain. He exhibits no bony tenderness, no swelling, no effusion, no crepitus, no deformity, no laceration, no spasm, normal pulse and normal strength.        Left knee: He exhibits normal range of motion, no swelling, no ecchymosis, no deformity, no erythema, normal alignment, no LCL laxity, no bony tenderness, normal meniscus and no MCL laxity. Tenderness (generalized) found.        Left ankle: He exhibits normal range of motion, no swelling, no deformity and normal pulse. Tenderness. Achilles tendon exhibits no pain.        Cervical back: He exhibits decreased range of motion and pain. He exhibits no bony tenderness, no swelling, no edema, no deformity, no laceration, no spasm and normal pulse.        Lumbar back: He exhibits decreased range of motion, pain and spasm. He exhibits no bony tenderness, no swelling, no edema, no deformity, no laceration and normal pulse.        Right hand: He exhibits decreased range of motion. He exhibits normal capillary refill and no swelling. Normal sensation noted.        Left hand: He exhibits normal range of motion, normal capillary refill and no swelling. Normal sensation noted.   Pain on lateral aspect of left knee. Normal  ROM  Generalized pain of left ankle. Normal ROM. Wearing splint today      Lymphadenopathy:     He has no cervical adenopathy.   Neurological: He is alert and oriented to person, place, and time. He has normal strength and normal reflexes. He is not disoriented. He displays normal reflexes. No cranial nerve deficit or sensory deficit. He exhibits normal muscle tone. He displays a negative Romberg sign. Coordination and gait normal. GCS eye subscore is 4. GCS verbal subscore is 5. GCS motor subscore is 6.   Reflex Scores:       Patellar reflexes are 2+ on the right side and 2+ on the left side.  Skin: Skin is warm, dry and intact. No ecchymosis and no rash noted. He is not diaphoretic. No erythema. No pallor.   Psychiatric: He has a normal mood and affect. His speech is normal and behavior is normal. Thought content normal. He is not actively hallucinating. Cognition and memory are normal.   Patient is dressed appropriately for weather and situation, makes eye contact, and engages in conversation.  He is attentive.   Nursing note and vitals reviewed.       PHQ-2/PHQ-9 Depression Screening 3/21/2019   Little interest or pleasure in doing things 0   Feeling down, depressed, or hopeless 0   Trouble falling or staying asleep, or sleeping too much -   Feeling tired or having little energy -   Poor appetite or overeating -   Feeling bad about yourself - or that you are a failure or have let yourself or your family down -   Trouble concentrating on things, such as reading the newspaper or watching television -   Moving or speaking so slowly that other people could have noticed. Or the opposite - being so fidgety or restless that you have been moving around a lot more than usual -   Thoughts that you would be better off dead, or of hurting yourself in some way -   Total Score 0   If you checked off any problems, how difficult have these problems made it for you to do your work, take care of things at home, or get along with  other people? -         Assessment/Plan   Mikey was seen today for cellulitis.    Diagnoses and all orders for this visit:    Periorbital cellulitis of right eye    Constipation, unspecified constipation type  -     docusate sodium (COLACE) 100 MG capsule; Take 1 capsule by mouth 2 (Two) Times a Day.    Urine frequency  -     Urinalysis With Culture If Indicated -; Future    Flank pain  -     Urinalysis With Culture If Indicated -; Future    Urinary hesitancy  -     Urinalysis With Culture If Indicated -; Future  -     tamsulosin (FLOMAX) 0.4 MG capsule 24 hr capsule; Take 1 capsule by mouth Daily.    Acute left ankle pain  -     XR Ankle 3+ View Left; Future  -     methylPREDNISolone acetate (DEPO-medrol) injection 80 mg    Acute pain of left knee  -     XR Knee 1 or 2 View Left; Future  -     methylPREDNISolone acetate (DEPO-medrol) injection 80 mg             Plan of Care:    Please See History of Present Illness(HPI) above for Plan of Care (POC) for individualized diagnoses as well as when to follow up.     Patient educated to follow-up sooner than next scheduled appointment if condition(s) worse or do not improve. Patient states understanding and is in agreeance with plan of care. An After Visit Summary was printed and given to the patient.      SAMUEL Wiseman        This document has been electronically signed by SAMUEL Wiseman on February 16, 2020 10:17 PM      EMR/Transcription Dragon Disclaimer:  Some of this note may be an electronic dragon transcription/translation of spoken language to printed text. The electronic translation of spoken language may permit erroneous, or at times, nonsensical words or phrases to be inadvertently transcribed. Although I have reviewed the note for such errors, some may still exist.

## 2020-02-05 LAB — BACTERIA SPEC AEROBE CULT: NO GROWTH

## 2020-02-16 PROBLEM — R39.198 DIFFICULTY URINATING: Status: ACTIVE | Noted: 2020-02-16

## 2020-02-16 PROBLEM — L03.213 PERIORBITAL CELLULITIS OF RIGHT EYE: Status: ACTIVE | Noted: 2020-02-16

## 2020-02-27 ENCOUNTER — TELEPHONE (OUTPATIENT)
Dept: FAMILY MEDICINE CLINIC | Facility: CLINIC | Age: 65
End: 2020-02-27

## 2020-02-27 DIAGNOSIS — G47.00 INSOMNIA, UNSPECIFIED TYPE: ICD-10-CM

## 2020-02-27 RX ORDER — QUETIAPINE FUMARATE 300 MG/1
600 TABLET, FILM COATED ORAL NIGHTLY
Qty: 60 TABLET | Refills: 2 | Status: SHIPPED | OUTPATIENT
Start: 2020-02-27 | End: 2020-04-21 | Stop reason: SDUPTHER

## 2020-03-03 DIAGNOSIS — R39.11 URINARY HESITANCY: ICD-10-CM

## 2020-03-03 DIAGNOSIS — E11.9 TYPE 2 DIABETES MELLITUS WITHOUT COMPLICATION, WITHOUT LONG-TERM CURRENT USE OF INSULIN (HCC): ICD-10-CM

## 2020-03-03 RX ORDER — SITAGLIPTIN 100 MG/1
TABLET, FILM COATED ORAL
Qty: 30 TABLET | Refills: 0 | Status: SHIPPED | OUTPATIENT
Start: 2020-03-03 | End: 2020-04-06

## 2020-03-03 RX ORDER — TAMSULOSIN HYDROCHLORIDE 0.4 MG/1
1 CAPSULE ORAL DAILY
Qty: 30 CAPSULE | Refills: 0 | Status: SHIPPED | OUTPATIENT
Start: 2020-03-03 | End: 2020-04-21 | Stop reason: SDUPTHER

## 2020-03-04 RX ORDER — TIZANIDINE 4 MG/1
TABLET ORAL
Qty: 60 TABLET | Refills: 0 | Status: SHIPPED | OUTPATIENT
Start: 2020-03-04 | End: 2020-04-06

## 2020-04-06 DIAGNOSIS — E11.9 TYPE 2 DIABETES MELLITUS WITHOUT COMPLICATION, WITHOUT LONG-TERM CURRENT USE OF INSULIN (HCC): ICD-10-CM

## 2020-04-06 RX ORDER — SITAGLIPTIN 100 MG/1
TABLET, FILM COATED ORAL
Qty: 30 TABLET | Refills: 0 | Status: SHIPPED | OUTPATIENT
Start: 2020-04-06 | End: 2020-04-21 | Stop reason: SDUPTHER

## 2020-04-06 RX ORDER — TIZANIDINE 4 MG/1
TABLET ORAL
Qty: 60 TABLET | Refills: 0 | Status: SHIPPED | OUTPATIENT
Start: 2020-04-06 | End: 2020-04-21 | Stop reason: SDUPTHER

## 2020-04-21 ENCOUNTER — OFFICE VISIT (OUTPATIENT)
Dept: FAMILY MEDICINE CLINIC | Facility: CLINIC | Age: 65
End: 2020-04-21

## 2020-04-21 DIAGNOSIS — M19.041 ARTHRITIS OF RIGHT HAND: Primary | ICD-10-CM

## 2020-04-21 DIAGNOSIS — E78.5 HYPERLIPIDEMIA, UNSPECIFIED HYPERLIPIDEMIA TYPE: ICD-10-CM

## 2020-04-21 DIAGNOSIS — R21 RASH OF FACE: ICD-10-CM

## 2020-04-21 DIAGNOSIS — E11.9 TYPE 2 DIABETES MELLITUS WITHOUT COMPLICATION, WITHOUT LONG-TERM CURRENT USE OF INSULIN (HCC): ICD-10-CM

## 2020-04-21 DIAGNOSIS — R39.11 URINARY HESITANCY: ICD-10-CM

## 2020-04-21 DIAGNOSIS — I10 ESSENTIAL HYPERTENSION: ICD-10-CM

## 2020-04-21 DIAGNOSIS — L85.3 DRY SKIN: ICD-10-CM

## 2020-04-21 DIAGNOSIS — K21.9 GASTROESOPHAGEAL REFLUX DISEASE WITHOUT ESOPHAGITIS: ICD-10-CM

## 2020-04-21 DIAGNOSIS — G47.00 INSOMNIA, UNSPECIFIED TYPE: ICD-10-CM

## 2020-04-21 DIAGNOSIS — J44.9 CHRONIC OBSTRUCTIVE PULMONARY DISEASE, UNSPECIFIED COPD TYPE (HCC): ICD-10-CM

## 2020-04-21 DIAGNOSIS — F41.9 ANXIETY: ICD-10-CM

## 2020-04-21 DIAGNOSIS — E11.8 TYPE 2 DIABETES MELLITUS WITH COMPLICATION, WITHOUT LONG-TERM CURRENT USE OF INSULIN (HCC): ICD-10-CM

## 2020-04-21 PROCEDURE — 99214 OFFICE O/P EST MOD 30 MIN: CPT | Performed by: NURSE PRACTITIONER

## 2020-04-21 RX ORDER — ZINC OXIDE 20 %
OINTMENT (GRAM) TOPICAL AS NEEDED
Qty: 56 G | Refills: 5 | Status: SHIPPED | OUTPATIENT
Start: 2020-04-21 | End: 2021-08-02

## 2020-04-21 RX ORDER — BUDESONIDE AND FORMOTEROL FUMARATE DIHYDRATE 80; 4.5 UG/1; UG/1
2 AEROSOL RESPIRATORY (INHALATION) 2 TIMES DAILY
Qty: 10.2 G | Refills: 5 | Status: SHIPPED | OUTPATIENT
Start: 2020-04-21 | End: 2021-08-02 | Stop reason: SDUPTHER

## 2020-04-21 RX ORDER — PREDNISONE 1 MG/1
5 TABLET ORAL DAILY
Qty: 5 TABLET | Refills: 0 | Status: SHIPPED | OUTPATIENT
Start: 2020-04-21 | End: 2020-10-07 | Stop reason: HOSPADM

## 2020-04-21 RX ORDER — QUETIAPINE FUMARATE 300 MG/1
600 TABLET, FILM COATED ORAL NIGHTLY
Qty: 60 TABLET | Refills: 2 | Status: SHIPPED | OUTPATIENT
Start: 2020-04-21 | End: 2020-05-19 | Stop reason: SDUPTHER

## 2020-04-21 RX ORDER — FAMOTIDINE 20 MG/1
20 TABLET, FILM COATED ORAL 2 TIMES DAILY
Qty: 60 TABLET | Refills: 5 | Status: SHIPPED | OUTPATIENT
Start: 2020-04-21 | End: 2021-08-02

## 2020-04-21 RX ORDER — LISINOPRIL AND HYDROCHLOROTHIAZIDE 20; 12.5 MG/1; MG/1
1 TABLET ORAL DAILY
Qty: 30 TABLET | Refills: 5 | Status: SHIPPED | OUTPATIENT
Start: 2020-04-21 | End: 2020-10-29

## 2020-04-21 RX ORDER — ALBUTEROL SULFATE 90 UG/1
AEROSOL, METERED RESPIRATORY (INHALATION)
Qty: 18 G | Refills: 3 | Status: SHIPPED | OUTPATIENT
Start: 2020-04-21 | End: 2020-11-02

## 2020-04-21 RX ORDER — ATORVASTATIN CALCIUM 20 MG/1
20 TABLET, FILM COATED ORAL NIGHTLY
Qty: 30 TABLET | Refills: 5 | Status: SHIPPED | OUTPATIENT
Start: 2020-04-21 | End: 2020-06-11 | Stop reason: SDUPTHER

## 2020-04-21 RX ORDER — DULOXETIN HYDROCHLORIDE 30 MG/1
30 CAPSULE, DELAYED RELEASE ORAL DAILY
Qty: 30 CAPSULE | Refills: 5 | Status: SHIPPED | OUTPATIENT
Start: 2020-04-21 | End: 2020-06-11

## 2020-04-21 RX ORDER — TAMSULOSIN HYDROCHLORIDE 0.4 MG/1
1 CAPSULE ORAL DAILY
Qty: 30 CAPSULE | Refills: 5 | Status: SHIPPED | OUTPATIENT
Start: 2020-04-21 | End: 2021-04-12

## 2020-04-21 RX ORDER — TIZANIDINE 4 MG/1
4 TABLET ORAL 2 TIMES DAILY PRN
Qty: 60 TABLET | Refills: 5 | Status: SHIPPED | OUTPATIENT
Start: 2020-04-21 | End: 2020-06-04

## 2020-05-19 DIAGNOSIS — G47.00 INSOMNIA, UNSPECIFIED TYPE: ICD-10-CM

## 2020-05-19 RX ORDER — QUETIAPINE FUMARATE 300 MG/1
600 TABLET, FILM COATED ORAL NIGHTLY
Qty: 60 TABLET | Refills: 2 | Status: SHIPPED | OUTPATIENT
Start: 2020-05-19 | End: 2020-10-07 | Stop reason: HOSPADM

## 2020-06-04 RX ORDER — TIZANIDINE 4 MG/1
TABLET ORAL
Qty: 60 TABLET | Refills: 0 | Status: SHIPPED | OUTPATIENT
Start: 2020-06-04 | End: 2020-06-11 | Stop reason: SDUPTHER

## 2020-06-11 ENCOUNTER — OFFICE VISIT (OUTPATIENT)
Dept: FAMILY MEDICINE CLINIC | Facility: CLINIC | Age: 65
End: 2020-06-11

## 2020-06-11 DIAGNOSIS — M25.511 CHRONIC RIGHT SHOULDER PAIN: ICD-10-CM

## 2020-06-11 DIAGNOSIS — E11.49 OTHER DIABETIC NEUROLOGICAL COMPLICATION ASSOCIATED WITH TYPE 2 DIABETES MELLITUS (HCC): ICD-10-CM

## 2020-06-11 DIAGNOSIS — M19.90 ARTHRITIS: Primary | ICD-10-CM

## 2020-06-11 DIAGNOSIS — M54.2 CERVICAL PAIN: ICD-10-CM

## 2020-06-11 DIAGNOSIS — K21.9 GASTROESOPHAGEAL REFLUX DISEASE WITHOUT ESOPHAGITIS: ICD-10-CM

## 2020-06-11 DIAGNOSIS — F41.9 ANXIETY: ICD-10-CM

## 2020-06-11 DIAGNOSIS — E11.9 TYPE 2 DIABETES MELLITUS WITHOUT COMPLICATION, WITHOUT LONG-TERM CURRENT USE OF INSULIN (HCC): ICD-10-CM

## 2020-06-11 DIAGNOSIS — G89.29 CHRONIC RIGHT SHOULDER PAIN: ICD-10-CM

## 2020-06-11 DIAGNOSIS — E78.5 HYPERLIPIDEMIA, UNSPECIFIED HYPERLIPIDEMIA TYPE: ICD-10-CM

## 2020-06-11 PROCEDURE — 99214 OFFICE O/P EST MOD 30 MIN: CPT | Performed by: NURSE PRACTITIONER

## 2020-06-11 RX ORDER — ATORVASTATIN CALCIUM 20 MG/1
20 TABLET, FILM COATED ORAL NIGHTLY
Qty: 30 TABLET | Refills: 5 | Status: SHIPPED | OUTPATIENT
Start: 2020-06-11 | End: 2020-12-29

## 2020-06-11 RX ORDER — TIZANIDINE 4 MG/1
4 TABLET ORAL 2 TIMES DAILY PRN
Qty: 60 TABLET | Refills: 5 | Status: SHIPPED | OUTPATIENT
Start: 2020-06-11 | End: 2020-07-06

## 2020-06-11 RX ORDER — PANTOPRAZOLE SODIUM 40 MG/1
40 TABLET, DELAYED RELEASE ORAL 2 TIMES DAILY
Qty: 60 TABLET | Refills: 5 | Status: SHIPPED | OUTPATIENT
Start: 2020-06-11 | End: 2020-08-05

## 2020-06-11 NOTE — PROGRESS NOTES
Subjective   Mikey Allison is a 65 y.o. male who is doing a telephone visit due to covid-19 for refills and pain of right hand.     History of Present Illness     Call when things calm down to repeat labs.     Labs 3/21/19: Hep A, B, and C neg. last labs done 12/3/2019. Labs postponed due to covid-19 virus.    You have chosen to receive care through a telephone visit. Do you consent to use a telephone visit for your medical care today? Yes    Telephone visit lasted 15 minutes.    Anxiety/depression/insomnia-chronic, moderately controlled with hydroxyzine 25mg tid, Cymbalta, Seroquel 600mg at hs and zanaflex at hs (failed buspar)  -9/10/19: pt states more anxiety  -12/3/19: pt states amitriptyline making him sleepy and he feels like he is drugged in the morning although he is still waking up at 4 AM.   -12/9/2019: Patient also states he was given buspirone 15 mg to take from pain management and this is what he thinks caused his fall so he is going to discontinue this.    -4/21/20: Not sleeping as good with seroquel. Stopped taking nerve medication thanks hydroxyzine or hydralazine. Feeling antsy at home, deep cleaning the house.   -6/11/20: used to be on Klonopin 1mg, going to try to get back on that nerves. Dr baltazar going to talk to him about this.   Buspar/buspirone can't take feel awful.   -POC: pt will f/u with pain managemetn to see if he can go back on benzos, aware I cannot prescribe these.     Right hand swelling/pain-acute, not improving.    -6/11/20: Diclofenac gel-working well. Will call if you need pill arthrits medication.     PN/ingrown toenails/peripheral edema-acute, not improving.   -4/21/20: Pain of toes hurting-gabapentin helping but wants to increase at next pain visit. States swelling and red. Saw some doctor and put on him on medication that patient cannot take so he threw it away. Feet swelling. No breakdown but having some sores. Wants to see dr patel podiatry to get toenails cut.  Zinc oxide bid.   -6/11/20: But his feet are bad and legs are dark. Not swelling much. They are healing. Feet pain worse, new shoes trying to get. Send to [employer], working on them now. If medicare doesn't take care of it. Going to see dr patel first and get toenails clipped.big toe isand middle toe is just hanging on. 600mg for a good while. F/u with pain management July 17th.   -POC: swelling improving, f/u with pod for ingrown toenails. PN not improved but pt will discuss with pain management.     Gerd-chronic, controlled on protonix 40mg bid and pepcid 20mg bid.   -4/21/20: not controlled on just protonix, added pepcid.     Osteoarthritis and peripheral neuropathy/chronic neck pain-chronic, improving with lortab, zanaflex, and gabapentin Cymbalta and Celebrex, and Seroquel 600 mg at bedtime.  (failed flexeril due to drowsiness, gabapentin and lortab through pain management)  -9/10/19: pt goes to [facility] pain clinic, wants pain pump, f/u 19th for nerve block.   -12/3/19: amitriptyline makes him sleepy, lidocaine patches not covered.     Type 2 diabetes/PN-chronic, improving with metformin 1,00mg bid and Januvia 100mg daily.  -Pt states 'cataracts and diabetes in eyes', going to see specialists in [place] 9/17/19 to get cataracts fixed.   -6/11/20: pt states this am his sugar was 66 on Saturday. Sugars are doing really good, trying to stay off his feet. But has been walking some.   -POC: will f/u pending labs.     GERD-chronic, improving with 40 mg twice daily.    Follow-up in 3 months with lab work prior.    Uses a cane for ambulation.     ____________________________    PMH:  HTN-chronic, controlled with lisinopril-hctz  COPD-chronic, controlled with symbicort and albuterol (most likely copd from being previous smoker)  GERD-chronic, controlled with Protonix 40mg daily.  Hyperlipidemia-chronic, controlled with atorvastatin 20mg at hs    Past Surgical History:  -bilateral lens implants done by Geovani Trejo  in [facility] in 2019    The following portions of the patient's history were reviewed and updated as appropriate: allergies, current medications, past family history, past medical history, past social history, past surgical history and problem list.    Review of Systems   Constitutional: Negative for activity change, appetite change, chills, diaphoresis, fatigue and unexpected weight change.   HENT: Negative for congestion, ear discharge, ear pain, facial swelling, hearing loss, postnasal drip, rhinorrhea, sinus pressure, sinus pain, sneezing, sore throat and tinnitus.    Eyes: Negative.  Negative for discharge and visual disturbance.   Respiratory: Negative for cough, chest tightness, shortness of breath and wheezing.    Cardiovascular: Positive for leg swelling. Negative for chest pain and palpitations.   Gastrointestinal: Negative for abdominal distention, abdominal pain, blood in stool, constipation, diarrhea, nausea and vomiting.   Genitourinary: Negative for decreased urine volume, difficulty urinating, discharge, dysuria, flank pain, frequency, hematuria, penile pain, penile swelling, scrotal swelling, testicular pain and urgency.   Musculoskeletal: Positive for arthralgias and back pain. Negative for joint swelling and myalgias.   Skin: Negative for color change and rash.   Allergic/Immunologic: Negative for food allergies.   Neurological: Negative for dizziness, tremors, seizures, syncope, light-headedness and headaches.   Hematological: Negative for adenopathy. Does not bruise/bleed easily.   Psychiatric/Behavioral: Positive for sleep disturbance. Negative for agitation, behavioral problems, confusion, decreased concentration, dysphoric mood, hallucinations, self-injury and suicidal ideas. The patient is not nervous/anxious.    All other systems reviewed and are negative.        Objective   There were no vitals taken for this visit.  Unable to do physical exam due to telephone visit.     Assessment/Plan    Diagnoses and all orders for this visit:    Arthritis    Hyperlipidemia, unspecified hyperlipidemia type  -     atorvastatin (LIPITOR) 20 MG tablet; Take 1 tablet by mouth Every Night.    Gastroesophageal reflux disease without esophagitis  -     pantoprazole (PROTONIX) 40 MG EC tablet; Take 1 tablet by mouth 2 (Two) Times a Day.    Type 2 diabetes mellitus without complication, without long-term current use of insulin (CMS/[facility])    Other diabetic neurological complication associated with type 2 diabetes mellitus (CMS/[facility])    Chronic right shoulder pain    Cervical pain    Anxiety    Other orders  -     tiZANidine (ZANAFLEX) 4 MG tablet; Take 1 tablet by mouth 2 (Two) Times a Day As Needed for Muscle Spasms.             Plan of Care:    Please See History of Present Illness(HPI) above for Plan of Care (POC) for individualized diagnoses as well as when to follow up.       Patient educated to follow-up sooner than next scheduled appointment if condition(s) worse or do not improve. Patient states understanding and is in agreeance with plan of care. An After Visit Summary was printed and given to the patient.      SAMUEL Wiseman        This document has been electronically signed by SAMUEL Wiseman on June 28, 2020 22:11      EMR/Transcription Dragon Disclaimer:  Some of this note may be an electronic dragon transcription/translation of spoken language to printed text. The electronic translation of spoken language may permit erroneous, or at times, nonsensical words or phrases to be inadvertently transcribed. Although I have reviewed the note for such errors, some may still exist.

## 2020-06-15 ENCOUNTER — TELEPHONE (OUTPATIENT)
Dept: OBSTETRICS AND GYNECOLOGY | Facility: CLINIC | Age: 65
End: 2020-06-15

## 2020-06-15 NOTE — TELEPHONE ENCOUNTER
Resend all meds to [facility] that you sent on 6/11/20 to [facility] is closed down. CAN YOU SEND IN PILLS FOR Arthritis too.

## 2020-07-06 RX ORDER — TIZANIDINE 4 MG/1
TABLET ORAL
Qty: 60 TABLET | Refills: 0 | Status: SHIPPED | OUTPATIENT
Start: 2020-07-06 | End: 2020-08-07

## 2020-08-05 DIAGNOSIS — M19.041 ARTHRITIS OF RIGHT HAND: ICD-10-CM

## 2020-08-05 DIAGNOSIS — K21.9 GASTROESOPHAGEAL REFLUX DISEASE WITHOUT ESOPHAGITIS: ICD-10-CM

## 2020-08-05 RX ORDER — PANTOPRAZOLE SODIUM 40 MG/1
TABLET, DELAYED RELEASE ORAL
Qty: 60 TABLET | Refills: 3 | Status: SHIPPED | OUTPATIENT
Start: 2020-08-05 | End: 2020-12-29

## 2020-08-07 RX ORDER — TIZANIDINE 4 MG/1
TABLET ORAL
Qty: 60 TABLET | Refills: 0 | Status: SHIPPED | OUTPATIENT
Start: 2020-08-07 | End: 2020-09-29

## 2020-09-29 DIAGNOSIS — E11.9 TYPE 2 DIABETES MELLITUS WITHOUT COMPLICATION, WITHOUT LONG-TERM CURRENT USE OF INSULIN (HCC): Primary | ICD-10-CM

## 2020-09-29 DIAGNOSIS — Z12.5 PROSTATE CANCER SCREENING: ICD-10-CM

## 2020-09-29 RX ORDER — TIZANIDINE 4 MG/1
TABLET ORAL
Qty: 60 TABLET | Refills: 1 | Status: SHIPPED | OUTPATIENT
Start: 2020-09-29 | End: 2020-11-02

## 2020-10-01 ENCOUNTER — HOSPITAL ENCOUNTER (EMERGENCY)
Facility: HOSPITAL | Age: 65
Discharge: PSYCHIATRIC OR SUBSTANCE USE FACILITY | End: 2020-10-01
Attending: FAMILY MEDICINE | Admitting: EMERGENCY MEDICINE

## 2020-10-01 ENCOUNTER — APPOINTMENT (OUTPATIENT)
Dept: CT IMAGING | Facility: HOSPITAL | Age: 65
End: 2020-10-01

## 2020-10-01 ENCOUNTER — APPOINTMENT (OUTPATIENT)
Dept: MRI IMAGING | Facility: HOSPITAL | Age: 65
End: 2020-10-01

## 2020-10-01 ENCOUNTER — HOSPITAL ENCOUNTER (INPATIENT)
Facility: HOSPITAL | Age: 65
LOS: 6 days | Discharge: HOME | End: 2020-10-07
Attending: PSYCHIATRY & NEUROLOGY | Admitting: PSYCHIATRY & NEUROLOGY

## 2020-10-01 VITALS
SYSTOLIC BLOOD PRESSURE: 153 MMHG | OXYGEN SATURATION: 98 % | HEIGHT: 69 IN | RESPIRATION RATE: 18 BRPM | WEIGHT: 206 LBS | BODY MASS INDEX: 30.51 KG/M2 | TEMPERATURE: 98.2 F | DIASTOLIC BLOOD PRESSURE: 70 MMHG | HEART RATE: 68 BPM

## 2020-10-01 DIAGNOSIS — R44.3 HALLUCINATIONS: Primary | ICD-10-CM

## 2020-10-01 DIAGNOSIS — E83.42 HYPOMAGNESEMIA: ICD-10-CM

## 2020-10-01 DIAGNOSIS — N39.0 ACUTE UTI: ICD-10-CM

## 2020-10-01 DIAGNOSIS — J39.2 NASOPHARYNGEAL MASS: Primary | ICD-10-CM

## 2020-10-01 LAB
ALBUMIN SERPL-MCNC: 4.1 G/DL (ref 3.5–5.2)
ALBUMIN/GLOB SERPL: 1.2 G/DL
ALP SERPL-CCNC: 105 U/L (ref 39–117)
ALT SERPL W P-5'-P-CCNC: 11 U/L (ref 1–41)
AMORPH URATE CRY URNS QL MICRO: ABNORMAL /HPF
AMPHET+METHAMPHET UR QL: NEGATIVE
AMPHETAMINES UR QL: NEGATIVE
ANION GAP SERPL CALCULATED.3IONS-SCNC: 10 MMOL/L (ref 5–15)
AST SERPL-CCNC: 17 U/L (ref 1–40)
BACTERIA UR QL AUTO: ABNORMAL /HPF
BARBITURATES UR QL SCN: NEGATIVE
BASOPHILS # BLD AUTO: 0.03 10*3/MM3 (ref 0–0.2)
BASOPHILS NFR BLD AUTO: 0.3 % (ref 0–1.5)
BENZODIAZ UR QL SCN: NEGATIVE
BILIRUB SERPL-MCNC: 0.4 MG/DL (ref 0–1.2)
BILIRUB UR QL STRIP: NEGATIVE
BUN SERPL-MCNC: 13 MG/DL (ref 8–23)
BUN/CREAT SERPL: 16 (ref 7–25)
BUPRENORPHINE SERPL-MCNC: NEGATIVE NG/ML
CALCIUM SPEC-SCNC: 9.8 MG/DL (ref 8.6–10.5)
CANNABINOIDS SERPL QL: NEGATIVE
CHLORIDE SERPL-SCNC: 108 MMOL/L (ref 98–107)
CK SERPL-CCNC: 66 U/L (ref 20–200)
CLARITY UR: ABNORMAL
CO2 SERPL-SCNC: 21 MMOL/L (ref 22–29)
COCAINE UR QL: NEGATIVE
COLOR UR: YELLOW
CREAT SERPL-MCNC: 0.81 MG/DL (ref 0.76–1.27)
DEPRECATED RDW RBC AUTO: 43.5 FL (ref 37–54)
EOSINOPHIL # BLD AUTO: 0.05 10*3/MM3 (ref 0–0.4)
EOSINOPHIL NFR BLD AUTO: 0.5 % (ref 0.3–6.2)
ERYTHROCYTE [DISTWIDTH] IN BLOOD BY AUTOMATED COUNT: 13.5 % (ref 12.3–15.4)
ETHANOL BLD-MCNC: <10 MG/DL (ref 0–10)
ETHANOL UR QL: <0.01 %
GFR SERPL CREATININE-BSD FRML MDRD: 95 ML/MIN/1.73
GLOBULIN UR ELPH-MCNC: 3.3 GM/DL
GLUCOSE SERPL-MCNC: 178 MG/DL (ref 65–99)
GLUCOSE UR STRIP-MCNC: ABNORMAL MG/DL
HCT VFR BLD AUTO: 39.9 % (ref 37.5–51)
HGB BLD-MCNC: 13.8 G/DL (ref 13–17.7)
HGB UR QL STRIP.AUTO: NEGATIVE
HOLD SPECIMEN: NORMAL
HOLD SPECIMEN: NORMAL
HYALINE CASTS UR QL AUTO: ABNORMAL /LPF
IMM GRANULOCYTES # BLD AUTO: 0.04 10*3/MM3 (ref 0–0.05)
IMM GRANULOCYTES NFR BLD AUTO: 0.4 % (ref 0–0.5)
INR PPP: 0.93 (ref 0.8–1.2)
KETONES UR QL STRIP: ABNORMAL
LEUKOCYTE ESTERASE UR QL STRIP.AUTO: ABNORMAL
LYMPHOCYTES # BLD AUTO: 1.36 10*3/MM3 (ref 0.7–3.1)
LYMPHOCYTES NFR BLD AUTO: 14.3 % (ref 19.6–45.3)
MAGNESIUM SERPL-MCNC: 1.4 MG/DL (ref 1.6–2.4)
MCH RBC QN AUTO: 30.5 PG (ref 26.6–33)
MCHC RBC AUTO-ENTMCNC: 34.6 G/DL (ref 31.5–35.7)
MCV RBC AUTO: 88.3 FL (ref 79–97)
METHADONE UR QL SCN: NEGATIVE
MONOCYTES # BLD AUTO: 0.72 10*3/MM3 (ref 0.1–0.9)
MONOCYTES NFR BLD AUTO: 7.6 % (ref 5–12)
NEUTROPHILS NFR BLD AUTO: 7.32 10*3/MM3 (ref 1.7–7)
NEUTROPHILS NFR BLD AUTO: 76.9 % (ref 42.7–76)
NITRITE UR QL STRIP: NEGATIVE
NRBC BLD AUTO-RTO: 0 /100 WBC (ref 0–0.2)
OPIATES UR QL: POSITIVE
OXYCODONE UR QL SCN: NEGATIVE
PCP UR QL SCN: NEGATIVE
PH UR STRIP.AUTO: 8.5 [PH] (ref 5–9)
PLATELET # BLD AUTO: 332 10*3/MM3 (ref 140–450)
PMV BLD AUTO: 9.1 FL (ref 6–12)
POTASSIUM SERPL-SCNC: 3.6 MMOL/L (ref 3.5–5.2)
PROPOXYPH UR QL: NEGATIVE
PROT SERPL-MCNC: 7.4 G/DL (ref 6–8.5)
PROT UR QL STRIP: ABNORMAL
PROTHROMBIN TIME: 12.8 SECONDS (ref 11.1–15.3)
RBC # BLD AUTO: 4.52 10*6/MM3 (ref 4.14–5.8)
RBC # UR: ABNORMAL /HPF
REF LAB TEST METHOD: ABNORMAL
SODIUM SERPL-SCNC: 139 MMOL/L (ref 136–145)
SP GR UR STRIP: 1.02 (ref 1–1.03)
SQUAMOUS #/AREA URNS HPF: ABNORMAL /HPF
TRICYCLICS UR QL SCN: POSITIVE
TROPONIN T SERPL-MCNC: <0.01 NG/ML (ref 0–0.03)
UROBILINOGEN UR QL STRIP: ABNORMAL
WBC # BLD AUTO: 9.52 10*3/MM3 (ref 3.4–10.8)
WBC UR QL AUTO: ABNORMAL /HPF
WHOLE BLOOD HOLD SPECIMEN: NORMAL
WHOLE BLOOD HOLD SPECIMEN: NORMAL

## 2020-10-01 PROCEDURE — 81001 URINALYSIS AUTO W/SCOPE: CPT | Performed by: FAMILY MEDICINE

## 2020-10-01 PROCEDURE — 0 IOPAMIDOL PER 1 ML: Performed by: FAMILY MEDICINE

## 2020-10-01 PROCEDURE — 25010000002 CEFTRIAXONE: Performed by: EMERGENCY MEDICINE

## 2020-10-01 PROCEDURE — 87147 CULTURE TYPE IMMUNOLOGIC: CPT | Performed by: FAMILY MEDICINE

## 2020-10-01 PROCEDURE — 93010 ELECTROCARDIOGRAM REPORT: CPT | Performed by: INTERNAL MEDICINE

## 2020-10-01 PROCEDURE — 82550 ASSAY OF CK (CPK): CPT | Performed by: FAMILY MEDICINE

## 2020-10-01 PROCEDURE — 83735 ASSAY OF MAGNESIUM: CPT | Performed by: FAMILY MEDICINE

## 2020-10-01 PROCEDURE — 70498 CT ANGIOGRAPHY NECK: CPT

## 2020-10-01 PROCEDURE — 99285 EMERGENCY DEPT VISIT HI MDM: CPT

## 2020-10-01 PROCEDURE — 84484 ASSAY OF TROPONIN QUANT: CPT | Performed by: FAMILY MEDICINE

## 2020-10-01 PROCEDURE — 93005 ELECTROCARDIOGRAM TRACING: CPT | Performed by: FAMILY MEDICINE

## 2020-10-01 PROCEDURE — 87086 URINE CULTURE/COLONY COUNT: CPT | Performed by: FAMILY MEDICINE

## 2020-10-01 PROCEDURE — 96365 THER/PROPH/DIAG IV INF INIT: CPT

## 2020-10-01 PROCEDURE — 96367 TX/PROPH/DG ADDL SEQ IV INF: CPT

## 2020-10-01 PROCEDURE — 80053 COMPREHEN METABOLIC PANEL: CPT | Performed by: FAMILY MEDICINE

## 2020-10-01 PROCEDURE — 70496 CT ANGIOGRAPHY HEAD: CPT

## 2020-10-01 PROCEDURE — 85610 PROTHROMBIN TIME: CPT | Performed by: FAMILY MEDICINE

## 2020-10-01 PROCEDURE — 85025 COMPLETE CBC W/AUTO DIFF WBC: CPT | Performed by: FAMILY MEDICINE

## 2020-10-01 PROCEDURE — 70551 MRI BRAIN STEM W/O DYE: CPT

## 2020-10-01 PROCEDURE — 74176 CT ABD & PELVIS W/O CONTRAST: CPT

## 2020-10-01 PROCEDURE — 25010000002 MAGNESIUM SULFATE 2 GM/50ML SOLUTION: Performed by: FAMILY MEDICINE

## 2020-10-01 PROCEDURE — 80307 DRUG TEST PRSMV CHEM ANLYZR: CPT | Performed by: FAMILY MEDICINE

## 2020-10-01 PROCEDURE — 70450 CT HEAD/BRAIN W/O DYE: CPT

## 2020-10-01 RX ORDER — TRAZODONE HYDROCHLORIDE 50 MG/1
50 TABLET ORAL NIGHTLY PRN
Status: DISCONTINUED | OUTPATIENT
Start: 2020-10-01 | End: 2020-10-04

## 2020-10-01 RX ORDER — CLONIDINE HYDROCHLORIDE 0.1 MG/1
0.1 TABLET ORAL EVERY 4 HOURS PRN
Status: DISCONTINUED | OUTPATIENT
Start: 2020-10-01 | End: 2020-10-02

## 2020-10-01 RX ORDER — ACETAMINOPHEN 325 MG/1
650 TABLET ORAL EVERY 4 HOURS PRN
Status: DISCONTINUED | OUTPATIENT
Start: 2020-10-01 | End: 2020-10-07 | Stop reason: HOSPADM

## 2020-10-01 RX ORDER — MAGNESIUM SULFATE HEPTAHYDRATE 40 MG/ML
2 INJECTION, SOLUTION INTRAVENOUS ONCE
Status: COMPLETED | OUTPATIENT
Start: 2020-10-01 | End: 2020-10-01

## 2020-10-01 RX ORDER — HYDROXYZINE PAMOATE 50 MG/1
50 CAPSULE ORAL EVERY 6 HOURS PRN
Status: DISCONTINUED | OUTPATIENT
Start: 2020-10-01 | End: 2020-10-07 | Stop reason: HOSPADM

## 2020-10-01 RX ORDER — ALUMINA, MAGNESIA, AND SIMETHICONE 2400; 2400; 240 MG/30ML; MG/30ML; MG/30ML
15 SUSPENSION ORAL EVERY 6 HOURS PRN
Status: DISCONTINUED | OUTPATIENT
Start: 2020-10-01 | End: 2020-10-07 | Stop reason: HOSPADM

## 2020-10-01 RX ORDER — LOPERAMIDE HYDROCHLORIDE 2 MG/1
2 CAPSULE ORAL
Status: DISCONTINUED | OUTPATIENT
Start: 2020-10-01 | End: 2020-10-07 | Stop reason: HOSPADM

## 2020-10-01 RX ADMIN — CEFTRIAXONE 1 G: 1 INJECTION, POWDER, FOR SOLUTION INTRAMUSCULAR; INTRAVENOUS at 19:10

## 2020-10-01 RX ADMIN — CLONIDINE HYDROCHLORIDE 0.1 MG: 0.1 TABLET ORAL at 21:52

## 2020-10-01 RX ADMIN — HYDROXYZINE PAMOATE 50 MG: 50 CAPSULE ORAL at 22:37

## 2020-10-01 RX ADMIN — ACETAMINOPHEN 650 MG: 325 TABLET, FILM COATED ORAL at 23:36

## 2020-10-01 RX ADMIN — IOPAMIDOL 90 ML: 755 INJECTION, SOLUTION INTRAVENOUS at 15:48

## 2020-10-01 RX ADMIN — MAGNESIUM SULFATE IN WATER 2 G: 40 INJECTION, SOLUTION INTRAVENOUS at 16:09

## 2020-10-01 NOTE — ED PROVIDER NOTES
"Subjective   Patient presents emergency department with a chief complaint of chest pain and headache.  He initially stated that the chest pain began 2 days ago, and then stated that his chest pain began this morning.  At several instances during the history taking, the patient had difficulty completing his sentences.  He continuously told odd jokes and demonstrated some inappropriate behavior and did not really appear to be concerned about his current situation.  The patient's brother, Tejas Allison, was contacted and states that the patient's confusion is new and that he has been hallucinating at home for the past 4 weeks.  He states that the patient has been seeing things that do not exist, such as \" busting through the neighbors apartment, and gun fights.\"  Family is concerned because the patient's girlfriend was recently discharged from [facility] and they believe patient may be using illicit street drugs.      Chest Pain  Pain location:  Substernal area  Pain quality: pressure    Pain radiates to:  Does not radiate  Pain severity:  Mild  Duration:  1 day  Timing:  Constant  Progression:  Waxing and waning  Chronicity:  New  Relieved by:  Nothing  Worsened by:  Nothing  Ineffective treatments:  None tried  Associated symptoms: abdominal pain, headache, nausea and vomiting    Associated symptoms: no cough, no diaphoresis, no dizziness, no dysphagia, no fatigue, no fever, no shortness of breath and no weakness    Risk factors: diabetes mellitus, high cholesterol, hypertension, male sex and obesity    Risk factors: no smoking    Headache  Associated symptoms: abdominal pain, nausea and vomiting    Associated symptoms: no congestion, no cough, no diarrhea, no dizziness, no ear pain, no fatigue, no fever, no myalgias, no neck pain, no seizures, no sinus pressure, no sore throat and no weakness        Review of Systems   Constitutional: Negative for appetite change, chills, diaphoresis, fatigue and fever.   HENT: " Negative for congestion, ear discharge, ear pain, nosebleeds, rhinorrhea, sinus pressure, sore throat and trouble swallowing.    Eyes: Negative for discharge and redness.   Respiratory: Negative for apnea, cough, chest tightness, shortness of breath and wheezing.    Cardiovascular: Positive for chest pain.   Gastrointestinal: Positive for abdominal pain, nausea and vomiting. Negative for diarrhea.   Endocrine: Negative for polyuria.   Genitourinary: Negative for dysuria, frequency and urgency.   Musculoskeletal: Negative for myalgias and neck pain.   Skin: Negative for color change and rash.   Allergic/Immunologic: Negative for immunocompromised state.   Neurological: Positive for headaches. Negative for dizziness, seizures, syncope, weakness and light-headedness.   Hematological: Negative for adenopathy. Does not bruise/bleed easily.   Psychiatric/Behavioral: Negative for behavioral problems and confusion.   All other systems reviewed and are negative.      Past Medical History:   Diagnosis Date   • Abdominal pain     left side   • Acute sinusitis    • Ankle joint pain    • Bipolar disorder (CMS/HCC)    • Bipolar disorder, unspecified (CMS/HCC)    • Cellulitis and abscess of trunk     axilla   • Chest pain    • Chronic anemia    • Degenerative joint disease involving multiple joints     back   • Depressive disorder    • Diabetes mellitus (CMS/HCC)    • Diabetes mellitus with no complication (CMS/HCC)     type 2 or unspecified type uncontrolled   • Diverticular disease    • Dyspnea    • Enlarged prostate     on [place]-on CT   • Essential hypertension    • Febrile convulsion (CMS/HCC)    • Gastroesophageal reflux disease    • Generalized anxiety disorder    • H/O echocardiogram 10/16/2007    Mild to moderate concentric LV hypertrophy with mild left atrial enlargement. LV appears to be hypodynamic with preserved LV systolic function with EF 55-60%. Pericardium appears to be normal with a small pericardial effusion    •  Hemorrhoids    • Hyperlipidemia    • Hypertensive disorder    • Hypoglycemia    • Infectious disorder of kidney     kidney infection-treated by [facility]   • Left lower quadrant pain    • Loss of appetite    • Obesity    • Osteoarthritis    • Pain in joint involving ankle and foot    • Pain in limb    • Psoriasis    • Retention of urine     with cath-treated by the [facility]   • Screening for malignant neoplasm of colon    • Sepsis due to urinary tract infection (CMS/HCC)     urosepsis treated by [facility]   • Sinusitis    • Syncope    • Unspecified essential hypertension        Allergies   Allergen Reactions   • Sulfa Antibiotics Unknown - High Severity     unknown   • Codeine Itching and Nausea And Vomiting       Past Surgical History:   Procedure Laterality Date   • ANKLE SURGERY Left 11/11/2008    Removal of syndesmotic screws, left ankle. Painful syndesmotic screws, left ankle.)   • ANKLE SURGERY  10/19/2007    Open reduction-internal fixation with fluoroscopic control with final x-ray PA and lateral of the ankle. Trimalleolar fracture/subluxation, left ankle.)   • CIRCUMCISION  2016   • COLONOSCOPY  03/10/2015    Diverticulosis found in the sigmoid colon. hemorrhoids found in the anus. Normal cecum   • CYSTOSCOPY  06/22/2016    Cystoscopy, dilation, anchor of Tim catheter. Benign prostatic hypertrophy, urinary retention, urethral stricture history of.   • INJECTION OF MEDICATION      Kenalog (3)   • SHOULDER ARTHROSCOPY Right 10/18/2017    Procedure: SHOULDER ARTHROSCOPY WITH RAFAELA PROCEDURE, AND SUBACROMIAL DECOMPRESSION, AND POSSIBLE ROTATOR  CUFF REPAIR       SHOULDER ARTHROSCOPY WITH RAFAELA PROCEDURE, AND SUBACROMIAL DECOMPRESSION, NO ROTATOR CUFF REPAIR PERFORMED;  Surgeon: Maximus Schreiber MD;  Location: [facility] OR;  Service:    • STOMACH SURGERY     • TIBIA FRACTURE SURGERY         Family History   Problem Relation Age of Onset   • Diabetes Other    • Heart disease Other    •  Hypertension Other    • Kidney disease Other    • Hypertension Mother    • Clotting disorder Mother        Social History     Socioeconomic History   • Marital status:      Spouse name: Not on file   • Number of children: Not on file   • Years of education: Not on file   • Highest education level: Not on file   Tobacco Use   • Smoking status: Former Smoker     Types: Cigarettes     Quit date:      Years since quittin.7   • Smokeless tobacco: Never Used   • Tobacco comment: smoked 3 years   Substance and Sexual Activity   • Alcohol use: Yes     Alcohol/week: 3.0 - 6.0 standard drinks     Types: 3 - 6 Cans of beer per week     Comment: 1-2 every other day   • Drug use: No   • Sexual activity: Defer           Objective   Physical Exam  Vitals signs and nursing note reviewed.   Constitutional:       Appearance: He is well-developed.   HENT:      Head: Normocephalic and atraumatic.      Nose: Nose normal.   Eyes:      General: No scleral icterus.        Right eye: No discharge.         Left eye: No discharge.      Conjunctiva/sclera: Conjunctivae normal.      Pupils: Pupils are equal, round, and reactive to light.   Neck:      Musculoskeletal: Normal range of motion and neck supple.      Trachea: No tracheal deviation.   Cardiovascular:      Rate and Rhythm: Normal rate and regular rhythm.      Heart sounds: Normal heart sounds. No murmur.   Pulmonary:      Effort: Pulmonary effort is normal. No respiratory distress.      Breath sounds: Normal breath sounds. No stridor. No wheezing or rales.   Abdominal:      General: Bowel sounds are normal. There is no distension.      Palpations: Abdomen is soft. There is no mass.      Tenderness: There is no abdominal tenderness. There is no guarding or rebound.   Skin:     General: Skin is warm and dry.      Findings: No erythema or rash.   Neurological:      Mental Status: He is alert and oriented to person, place, and time.      Coordination: Coordination normal.    Psychiatric:         Behavior: Behavior normal.         Thought Content: Thought content normal.         ECG 12 Lead      Date/Time: 10/1/2020 6:51 PM  Performed by: Lucho Burks MD  Authorized by: Lucho Burks MD   Interpreted by physician  Rhythm: sinus rhythm  Rate: normal  BPM: 76  ST Segments: ST segments normal                   ED Course  ED Course as of Oct 01 2030   Thu Oct 01, 2020   1850 Findings were discussed with Dr. Granda, who recommends MRI of the patient's brain and to call him after the MRI has been done.    [CB]   1905 Patient signed out to me at shift change at 1900 by Dr. Burks.  He requested I follow-up on the MRI of the brain and neurology consultation with Dr. Kuhn.  Dr. Burks had medically cleared the patient otherwise and recommended behavioral health evaluation.    []   1908 MRI results reviewed with Dr. Kuhn.  He recommended psychiatry consultation.    []   1910 Patient is alert and resting comfortably in no acute distress.  I reviewed the results of his evaluation with him.  Awaiting behavioral health evaluation.    [DR]   2030 Patient accepted for admission to the behavioral health unit.    [DR]      ED Course User Index  [CB] Lucho Burks MD  [DR] Isaias Painter MD                   Labs Reviewed   COMPREHENSIVE METABOLIC PANEL - Abnormal; Notable for the following components:       Result Value    Glucose 178 (*)     Chloride 108 (*)     CO2 21.0 (*)     All other components within normal limits    Narrative:     GFR Normal >60  Chronic Kidney Disease <60  Kidney Failure <15     URINALYSIS W/ CULTURE IF INDICATED - Abnormal; Notable for the following components:    Appearance, UA Cloudy (*)     Glucose,  mg/dL (Trace) (*)     Ketones, UA Trace (*)     Protein, UA 30 mg/dL (1+) (*)     Leuk Esterase, UA Moderate (2+) (*)     All other components within normal limits   MAGNESIUM - Abnormal; Notable for the following components:    Magnesium 1.4 (*)      All other components within normal limits   URINE DRUG SCREEN - Abnormal; Notable for the following components:    Opiate Screen Positive (*)     Tricyclic Antidepressants Screen Positive (*)     All other components within normal limits    Narrative:     Cutoff For Drugs Screened:    Amphetamines               500 ng/ml  Barbiturates               200 ng/ml  Benzodiazepines            150 ng/ml  Cocaine                    150 ng/ml  Methadone                  200 ng/ml  Opiates                    100 ng/ml  Phencyclidine               25 ng/ml  THC                            50 ng/ml  Methamphetamine            500 ng/ml  Tricyclic Antidepressants  300 ng/ml  Oxycodone                  100 ng/ml  Propoxyphene               300 ng/ml  Buprenorphine               10 ng/ml    The normal value for all drugs tested is negative. This report includes unconfirmed screening results, with the cutoff values listed, to be used for medical treatment purposes only.  Unconfirmed results must not be used for non-medical purposes such as employment or legal testing.  Clinical consideration should be applied to any drug of abuse test, particularly when unconfirmed results are used.     CBC WITH AUTO DIFFERENTIAL - Abnormal; Notable for the following components:    Neutrophil % 76.9 (*)     Lymphocyte % 14.3 (*)     Neutrophils, Absolute 7.32 (*)     All other components within normal limits   URINALYSIS, MICROSCOPIC ONLY - Abnormal; Notable for the following components:    WBC, UA 31-50 (*)     Squamous Epithelial Cells, UA 3-5 (*)     All other components within normal limits   PROTIME-INR - Normal    Narrative:     Therapeutic range for most indications is 2.0-3.0 INR,  or 2.5-3.5 for mechanical heart valves.   TROPONIN (IN-HOUSE) - Normal    Narrative:     Troponin T Reference Range:  <= 0.03 ng/mL-   Negative for AMI  >0.03 ng/mL-     Abnormal for myocardial necrosis.  Clinicians would have to utilize clinical acumen, EKG,  Troponin and serial changes to determine if it is an Acute Myocardial Infarction or myocardial injury due to an underlying chronic condition.       Results may be falsely decreased if patient taking Biotin.     CK - Normal   URINE CULTURE   RAINBOW DRAW    Narrative:     The following orders were created for panel order [place] Draw.  Procedure                               Abnormality         Status                     ---------                               -----------         ------                     Light Blue Top[[id number]]                                   Final result               Green Top (Gel)[[id number]]                                  Final result               Lavender Top[[id number]]                                     Final result               Gold Top - SST[[id number]]                                   Final result                 Please view results for these tests on the individual orders.   ETHANOL   LIGHT BLUE TOP   GREEN TOP   LAVENDER TOP   GOLD TOP - SST   CBC AND DIFFERENTIAL    Narrative:     The following orders were created for panel order CBC & Differential.  Procedure                               Abnormality         Status                     ---------                               -----------         ------                     CBC Auto Differential[[id number]]        Abnormal            Final result                 Please view results for these tests on the individual orders.       MRI Brain Without Contrast   Final Result   CONCLUSION:   No acute process.   Cerebral and cerebellar atrophy.   Minimal to moderate small vessel disease.      [id number]      Electronically signed by:  Ino Wilhelm MD  10/1/2020 7:05 PM CDT   Workstation: [id number]      CT Angiogram Head   Final Result   1.  CTA findings as described above.   2.  Asymmetric right nasopharynx soft tissue prominence. This is   of uncertain clinical significance. This may represent prominent   lymphoid tissue although cannot  exclude mass. Recommend direct   visualization to further evaluate.         Electronically signed by:  Brett Rodrigues MD  10/1/2020 4:55 PM CDT   Workstation: [id number]      CT Angiogram Neck   Final Result   1.  CTA findings as described above.   2.  Asymmetric right nasopharynx soft tissue prominence. This is   of uncertain clinical significance. This may represent prominent   lymphoid tissue although cannot exclude mass. Recommend direct   visualization to further evaluate.         Electronically signed by:  Brett Rodrigues MD  10/1/2020 4:55 PM CDT   Workstation: [id number]      CT Abdomen Pelvis Without Contrast   Final Result   1. Stable evidence of old granulomatous disease.   2.Stable left lower lobe lateral two noncalcified nodules with   the largest measuring 6 mm in greatest diameter. This prolonged   interval stability suggests benign etiology such as noncalcified   granulomas or benign inflammatory nodules.    3.Stable right kidney mid zone 2 cm simple peripelvic cyst.    4. Small 1 cm left bladder diverticula. Mild anterior bladder   wall thickening with the bladder being partially decompressed.   This wall thickening most likely represents pseudothickening from   partially decompressed state. Less likely etiology could be focal   cystitis or neoplastic involvement. Recommend clinical   correlation.   5. Remainder CT abdomen pelvis study unremarkable.      Electronically signed by:  Brett Rodrigues MD  10/1/2020 4:07 PM CDT   Workstation: [id number]      CT Head Without Contrast   Final Result   1.  No acute intracranial abnormality.   2.  Mild cerebral involutional changes.   3.  Left basal ganglia old lacunar infarct.      Electronically signed by:  Brett Rodrigues MD  10/1/2020 3:56 PM CDT   Workstation: [id number]                                       [facility]    Final diagnoses:   Hallucinations   Hypomagnesemia   Acute UTI            Isaias Painter MD  10/01/20 2030

## 2020-10-02 ENCOUNTER — APPOINTMENT (OUTPATIENT)
Dept: ULTRASOUND IMAGING | Facility: HOSPITAL | Age: 65
End: 2020-10-02

## 2020-10-02 PROBLEM — J39.2 NASOPHARYNGEAL MASS: Status: ACTIVE | Noted: 2020-10-02

## 2020-10-02 PROBLEM — N30.00 ACUTE CYSTITIS WITHOUT HEMATURIA: Status: ACTIVE | Noted: 2020-10-02

## 2020-10-02 PROBLEM — N32.89 BLADDER WALL THICKENING: Status: ACTIVE | Noted: 2020-10-02

## 2020-10-02 PROBLEM — N40.0 BPH WITHOUT OBSTRUCTION/LOWER URINARY TRACT SYMPTOMS: Status: ACTIVE | Noted: 2020-10-02

## 2020-10-02 LAB
BACTERIA SPEC AEROBE CULT: ABNORMAL
CHOLEST SERPL-MCNC: 177 MG/DL (ref 0–200)
GLUCOSE BLDC GLUCOMTR-MCNC: 125 MG/DL (ref 70–130)
GLUCOSE BLDC GLUCOMTR-MCNC: 162 MG/DL (ref 70–130)
GLUCOSE P FAST SERPL-MCNC: 173 MG/DL (ref 74–106)
HDLC SERPL-MCNC: 47 MG/DL (ref 40–60)
LDLC SERPL CALC-MCNC: 108 MG/DL (ref 0–100)
LDLC/HDLC SERPL: 2.29 {RATIO}
PSA SERPL-MCNC: 0.14 NG/ML (ref 0–4)
TRIGL SERPL-MCNC: 111 MG/DL (ref 0–150)
VLDLC SERPL-MCNC: 22.2 MG/DL

## 2020-10-02 PROCEDURE — 84153 ASSAY OF PSA TOTAL: CPT | Performed by: NURSE PRACTITIONER

## 2020-10-02 PROCEDURE — 99223 1ST HOSP IP/OBS HIGH 75: CPT | Performed by: PSYCHIATRY & NEUROLOGY

## 2020-10-02 PROCEDURE — 76775 US EXAM ABDO BACK WALL LIM: CPT

## 2020-10-02 PROCEDURE — 82947 ASSAY GLUCOSE BLOOD QUANT: CPT | Performed by: PSYCHIATRY & NEUROLOGY

## 2020-10-02 PROCEDURE — 80061 LIPID PANEL: CPT | Performed by: PSYCHIATRY & NEUROLOGY

## 2020-10-02 PROCEDURE — 94640 AIRWAY INHALATION TREATMENT: CPT

## 2020-10-02 PROCEDURE — 82962 GLUCOSE BLOOD TEST: CPT

## 2020-10-02 PROCEDURE — 88112 CYTOPATH CELL ENHANCE TECH: CPT

## 2020-10-02 RX ORDER — LORAZEPAM 0.5 MG/1
0.5 TABLET ORAL
Status: DISCONTINUED | OUTPATIENT
Start: 2020-10-02 | End: 2020-10-05

## 2020-10-02 RX ORDER — NICOTINE POLACRILEX 4 MG
15 LOZENGE BUCCAL
Status: DISCONTINUED | OUTPATIENT
Start: 2020-10-02 | End: 2020-10-07 | Stop reason: HOSPADM

## 2020-10-02 RX ORDER — GABAPENTIN 400 MG/1
400 CAPSULE ORAL 3 TIMES DAILY
Status: DISCONTINUED | OUTPATIENT
Start: 2020-10-02 | End: 2020-10-04

## 2020-10-02 RX ORDER — BUDESONIDE AND FORMOTEROL FUMARATE DIHYDRATE 80; 4.5 UG/1; UG/1
2 AEROSOL RESPIRATORY (INHALATION) 2 TIMES DAILY
Status: DISCONTINUED | OUTPATIENT
Start: 2020-10-02 | End: 2020-10-07 | Stop reason: HOSPADM

## 2020-10-02 RX ORDER — DOCUSATE SODIUM 100 MG/1
100 CAPSULE, LIQUID FILLED ORAL 2 TIMES DAILY
Status: DISCONTINUED | OUTPATIENT
Start: 2020-10-02 | End: 2020-10-07 | Stop reason: HOSPADM

## 2020-10-02 RX ORDER — TAMSULOSIN HYDROCHLORIDE 0.4 MG/1
0.4 CAPSULE ORAL DAILY
Status: DISCONTINUED | OUTPATIENT
Start: 2020-10-02 | End: 2020-10-07 | Stop reason: HOSPADM

## 2020-10-02 RX ORDER — ATORVASTATIN CALCIUM 20 MG/1
20 TABLET, FILM COATED ORAL NIGHTLY
Status: DISCONTINUED | OUTPATIENT
Start: 2020-10-02 | End: 2020-10-07 | Stop reason: HOSPADM

## 2020-10-02 RX ORDER — HYDROCODONE BITARTRATE AND ACETAMINOPHEN 10; 325 MG/1; MG/1
1 TABLET ORAL EVERY 8 HOURS PRN
Status: DISCONTINUED | OUTPATIENT
Start: 2020-10-02 | End: 2020-10-07 | Stop reason: HOSPADM

## 2020-10-02 RX ORDER — PANTOPRAZOLE SODIUM 40 MG/1
40 TABLET, DELAYED RELEASE ORAL 2 TIMES DAILY
Status: DISCONTINUED | OUTPATIENT
Start: 2020-10-02 | End: 2020-10-02

## 2020-10-02 RX ORDER — FAMOTIDINE 20 MG/1
20 TABLET, FILM COATED ORAL 2 TIMES DAILY
Status: DISCONTINUED | OUTPATIENT
Start: 2020-10-02 | End: 2020-10-07 | Stop reason: HOSPADM

## 2020-10-02 RX ORDER — ALBUTEROL SULFATE 2.5 MG/3ML
2.5 SOLUTION RESPIRATORY (INHALATION) EVERY 6 HOURS PRN
Status: DISCONTINUED | OUTPATIENT
Start: 2020-10-02 | End: 2020-10-07 | Stop reason: HOSPADM

## 2020-10-02 RX ORDER — LORAZEPAM 1 MG/1
1 TABLET ORAL
Status: DISCONTINUED | OUTPATIENT
Start: 2020-10-02 | End: 2020-10-05

## 2020-10-02 RX ORDER — CLONIDINE HYDROCHLORIDE 0.1 MG/1
0.1 TABLET ORAL EVERY 4 HOURS PRN
Status: DISCONTINUED | OUTPATIENT
Start: 2020-10-02 | End: 2020-10-07 | Stop reason: HOSPADM

## 2020-10-02 RX ORDER — DEXTROSE MONOHYDRATE 25 G/50ML
25 INJECTION, SOLUTION INTRAVENOUS
Status: DISCONTINUED | OUTPATIENT
Start: 2020-10-02 | End: 2020-10-07 | Stop reason: HOSPADM

## 2020-10-02 RX ORDER — CEPHALEXIN 500 MG/1
500 CAPSULE ORAL EVERY 12 HOURS SCHEDULED
Status: DISCONTINUED | OUTPATIENT
Start: 2020-10-02 | End: 2020-10-07 | Stop reason: HOSPADM

## 2020-10-02 RX ORDER — LORAZEPAM 2 MG/ML
1 INJECTION INTRAMUSCULAR
Status: DISCONTINUED | OUTPATIENT
Start: 2020-10-02 | End: 2020-10-05

## 2020-10-02 RX ADMIN — DOCUSATE SODIUM 100 MG: 100 CAPSULE, LIQUID FILLED ORAL at 21:11

## 2020-10-02 RX ADMIN — HYDROCHLOROTHIAZIDE: 12.5 TABLET ORAL at 10:32

## 2020-10-02 RX ADMIN — FAMOTIDINE 20 MG: 20 TABLET, FILM COATED ORAL at 10:31

## 2020-10-02 RX ADMIN — CEPHALEXIN 500 MG: 500 CAPSULE ORAL at 14:45

## 2020-10-02 RX ADMIN — DOCUSATE SODIUM 100 MG: 100 CAPSULE, LIQUID FILLED ORAL at 10:32

## 2020-10-02 RX ADMIN — ATORVASTATIN CALCIUM 20 MG: 20 TABLET, FILM COATED ORAL at 21:11

## 2020-10-02 RX ADMIN — TRAZODONE HYDROCHLORIDE 50 MG: 50 TABLET ORAL at 21:11

## 2020-10-02 RX ADMIN — LINAGLIPTIN 5 MG: 5 TABLET, FILM COATED ORAL at 10:31

## 2020-10-02 RX ADMIN — BUDESONIDE AND FORMOTEROL FUMARATE DIHYDRATE 2 PUFF: 80; 4.5 AEROSOL RESPIRATORY (INHALATION) at 21:58

## 2020-10-02 RX ADMIN — GABAPENTIN 400 MG: 400 CAPSULE ORAL at 16:00

## 2020-10-02 RX ADMIN — HYDROCODONE BITARTRATE AND ACETAMINOPHEN 1 TABLET: 10; 325 TABLET ORAL at 17:58

## 2020-10-02 RX ADMIN — HYDROXYZINE PAMOATE 50 MG: 50 CAPSULE ORAL at 21:11

## 2020-10-02 RX ADMIN — FAMOTIDINE 20 MG: 20 TABLET, FILM COATED ORAL at 21:11

## 2020-10-02 RX ADMIN — CEPHALEXIN 500 MG: 500 CAPSULE ORAL at 21:16

## 2020-10-02 RX ADMIN — GABAPENTIN 400 MG: 400 CAPSULE ORAL at 21:11

## 2020-10-02 RX ADMIN — GABAPENTIN 400 MG: 400 CAPSULE ORAL at 10:32

## 2020-10-02 RX ADMIN — TAMSULOSIN HYDROCHLORIDE 0.4 MG: 0.4 CAPSULE ORAL at 10:31

## 2020-10-02 RX ADMIN — HYDROCODONE BITARTRATE AND ACETAMINOPHEN 1 TABLET: 10; 325 TABLET ORAL at 10:41

## 2020-10-02 NOTE — PROGRESS NOTES
PSYCH ED NOTE     Notified of pt in ED.  I have reviewed tech, nursing & provider documentation, as available at time of note.      Briefly, 66 y/o WM who came in for chest pain and headache.  Patient had CTA and MRI.  Patient's brother reports hallucinations and confusion for the past 4 weeks.       O -  UA with moderate leukocytes and 31-50 WBC.  UDS positive for opiates and TCA.  MRI with atrophy.    A&P)  --Psychosis     --> Will admit for psychosis.   --> Discussed with nurse Curran and instructed to admit with routine orders.      Linette Slade MD  10/01/20 @ 20:21 CDT

## 2020-10-02 NOTE — PLAN OF CARE
Goal Outcome Evaluation:  Plan of Care Reviewed With: patient  Progress: no change  Outcome Summary: Pt is compliant and on task.

## 2020-10-02 NOTE — H&P
"Psychiatric & Behavioral Health History & Physical  10/2/2020    --> Source of History: the patient; staff    --> Chief Complaint: Gross Disorganization   --> \"I don't know.\"    History of Present Illness:  Mr. Mikey Allison is a 65 y.o. male with a concurrent neuropsychiatric history notable for depression    Presents with psychosis. Onset of symptoms was gradual starting unknown days ago.  Symptoms have been present on an increasingly more frequent basis. Symptoms are associated with insomnia and depressed mood.  Symptoms are aggravated by problems with health.   Symptoms improve with none identified.  Patient's symptom severity is severe.   Patient reports that level of hopefulness is worsening.  Patient's symptoms occur in the context of chronic illness.    Mr. Allison presents to the [facility] with disorganization. He says he was brought here after coming to the hospital for chest pain. He denies auditory or visual hallucinations and thoughts of suicide. He knew his name, date of birth, and that he was at [facility]. He was unable to recall the year and believes the President to be President Juan Antonio.     He immediately went from laughing to weeping and asking to speak to his brother at the end of the interview. Mr. Allison did not wish to speak about mental health at the time.     He becomes highly tearful at times.  Affective lability noted.      Per CSW Uvaldo's note:  Re of admission ; \" I came to ER for chest pain and they told me that I needed help because of confusion\" CSW inquired if pt has trouble remembering things , he said \" somewhat. Pt was not able to explain the reason of admission in linear, he seems to be quite cheerful. I felt good after opening up in the treatment team meeting\" \"they said they wanted to to go to the meeting\" \" doctor said you did the right thing coming here\"      Pt was tearful in the treatment team meeting when CSW inquired about he said \" my wife  12 year ago and " "I do not want her to die\". Pt  his wife 30 years ago \" She thought I was messing up with her but I was a good cynthia\" \" I thought I would step ahead and divorce her\"      Pt reported that he hears his x wife almost daily saying that she hates him, pt was not sure it would be be categorized as auditory hallucination. And her name was same as his Nurse here at [facility] Melina SHIN. CSW inquired how his x-wife death has affected his mood and inquired if he has any symptoms of depression .Pt denied any thoughts of Suicide or any prior suicide attempts but nodded his head and said sometime he does not feel like going out of the house. Pt reports that he has been on pain med's after he had ankle surgery in the past and reported taking Gebapentin in the past      PT does report THC and alcohol use between age [age]. He lives by himself and has a brother who help him if he wants to go anywhere.    Plan is for MD to meet with pt to gather further information,  CSW explained the treatment regimen,  encouraged pt to actively participate  in individual and group tx. Tx team will meet and develop plan. CSW to follow up accordingly.        Psychiatric Review Of Systems:  --Depression: +SIGECAPS  --Anxiety: worry about situation  --Psychosis: +hallucinations and psychosis  --Nidhi: Denies any history consistent with nidhi or hypomania, including no periods of time with increased energy, goal directed activities in the context of decreased need for sleep, impulsivity, grandiosity that is a discreet change from baseline and life altering during this time.         Concurrent Psychiatric History:  --Past neuropsychiatric history: Depression    --Psychiatric Hospitalizations:   Previously hospitalized here on the [facility] per patient    --Suicide Attempts:   Denies any prior suicide attempts.    --Firearm Access: Denies. He says a friend is looking after his guns.    --Prior Treatment:  --Outpatient: denies  --Rehab: Patient says he has been " "to rehab for \"divorce and new job stress\"     --Prior Medications Trials:  • He is uncertain    --History of violence or legal issues:   Reports drug or alcohol related charges including possession of marijuana a long time ago..  Denies any history of significant violence.    -Abuse/Trauma/Neglect/Exploitation: denies      Substance Use:   --Nicotine: Does not use   --Caffeine: Uses   --EtOH: Does not use currently   --THC: Does not use currently   --Illicits: Uses Lortab for pain control.       Social History:  --> Divorces; living by self; has high school degree  Social History     Socioeconomic History   • Marital status:      Spouse name: Not on file   • Number of children: None   • Years of education: Not on file   • Highest education level: High School   Tobacco Use   • Smoking status: Former Smoker     Types: Cigarettes     Quit date:      Years since quittin.7   • Smokeless tobacco: Never Used   • Tobacco comment: smoked 3 years   Substance and Sexual Activity   • Alcohol use: Yes     Alcohol/week: 3.0 - 6.0 standard drinks     Types: 3 - 6 Cans of beer per week     Comment: 1-2 every other day   • Drug use: No   • Sexual activity: Defer         Family History:  Family History   Problem Relation Age of Onset   • Diabetes Other    • Heart disease Other    • Hypertension Other    • Kidney disease Other    • Hypertension Mother    • Clotting disorder Mother      -->Further details: Family Suicides: denies      Past Medical and Surgical History:  Past Medical History:   Diagnosis Date   • Abdominal pain     left side   • Acute sinusitis    • Ankle joint pain    • Bipolar disorder (CMS/HCC)    • Bipolar disorder, unspecified (CMS/HCC)    • Cellulitis and abscess of trunk     axilla   • Chest pain    • Chronic anemia    • Degenerative joint disease involving multiple joints     back   • Depressive disorder    • Diabetes mellitus (CMS/HCC)    • Diabetes mellitus with no complication (CMS/HCC)     " type 2 or unspecified type uncontrolled   • Diverticular disease    • Dyspnea    • Enlarged prostate     on PA-on CT   • Essential hypertension    • Febrile convulsion (CMS/HCC)    • Gastroesophageal reflux disease    • Generalized anxiety disorder    • H/O echocardiogram 10/16/2007    Mild to moderate concentric LV hypertrophy with mild left atrial enlargement. LV appears to be hypodynamic with preserved LV systolic function with EF 55-60%. Pericardium appears to be normal with a small pericardial effusion    • Hemorrhoids    • Hyperlipidemia    • Hypertensive disorder    • Hypoglycemia    • Infectious disorder of kidney     kidney infection-treated by [facility]   • Left lower quadrant pain    • Loss of appetite    • Obesity    • Osteoarthritis    • Pain in joint involving ankle and foot    • Pain in limb    • Psoriasis    • Retention of urine     with cath-treated by the [facility]   • Screening for malignant neoplasm of colon    • Sepsis due to urinary tract infection (CMS/HCC)     urosepsis treated by [facility]   • Sinusitis    • Syncope    • Unspecified essential hypertension      --> Seizure Hx: denies  --> Head Injury w/ LOC: uncertain       Past Surgical History:   Procedure Laterality Date   • ANKLE SURGERY Left 11/11/2008    Removal of syndesmotic screws, left ankle. Painful syndesmotic screws, left ankle.)   • ANKLE SURGERY  10/19/2007    Open reduction-internal fixation with fluoroscopic control with final x-ray PA and lateral of the ankle. Trimalleolar fracture/subluxation, left ankle.)   • CIRCUMCISION  2016   • COLONOSCOPY  03/10/2015    Diverticulosis found in the sigmoid colon. hemorrhoids found in the anus. Normal cecum   • CYSTOSCOPY  06/22/2016    Cystoscopy, dilation, anchor of Tim catheter. Benign prostatic hypertrophy, urinary retention, urethral stricture history of.   • INJECTION OF MEDICATION      Kenalog (3)   • SHOULDER ARTHROSCOPY Right 10/18/2017    Procedure: SHOULDER  ARTHROSCOPY WITH RAFAELA PROCEDURE, AND SUBACROMIAL DECOMPRESSION, AND POSSIBLE ROTATOR  CUFF REPAIR       SHOULDER ARTHROSCOPY WITH RAFAELA PROCEDURE, AND SUBACROMIAL DECOMPRESSION, NO ROTATOR CUFF REPAIR PERFORMED;  Surgeon: Maximus Schreiber MD;  Location: [facility];  Service:    • STOMACH SURGERY     • TIBIA FRACTURE SURGERY         Allergies:  Sulfa antibiotics and Codeine    Medications Prior to Admission   Medication Sig Dispense Refill Last Dose   • albuterol sulfate  (90 Base) MCG/ACT inhaler 2 puffs every 4-6 hours until cough/wheezing improve, than taper off of 18 g 3    • atorvastatin (LIPITOR) 20 MG tablet Take 1 tablet by mouth Every Night. 30 tablet 5    • budesonide-formoterol (Symbicort) 80-4.5 MCG/ACT inhaler Inhale 2 puffs 2 (Two) Times a Day. 10.2 g 5    • Canagliflozin (Invokana) 100 MG tablet Take 100 mg by mouth Daily. 30 tablet 5    • diclofenac (VOLTAREN) 1 % gel gel APPLY 4 G TOPICALLY TO THE APPROPRIATE AREA AS DIRECTED 2 (TWO) TIMES A DAY. FOR ARTHRITIC PAIN OF THE HAND 100 g 5    • diphenhydrAMINE (BENADRYL) 2 % cream Apply  topically to the appropriate area as directed 3 (Three) Times a Day As Needed for Itching. 30 g 5    • docusate sodium (COLACE) 100 MG capsule Take 1 capsule by mouth 2 (Two) Times a Day. 60 capsule 5    • famotidine (Pepcid) 20 MG tablet Take 1 tablet by mouth 2 (Two) Times a Day. For heartburn 60 tablet 5    • gabapentin (NEURONTIN) 400 MG capsule Take 1 capsule by mouth 3 (Three) Times a Day. 90 capsule 2    • glucose blood test strip tid 200 each 6    • glucose monitor monitoring kit 1 each Daily. 1 each 0    • HYDROcodone-acetaminophen (NORCO)  MG per tablet Take 1 tablet by mouth Every 6 (Six) Hours As Needed for Moderate Pain .      • lidocaine (LIDODERM) 5 % Place 1 patch on the skin as directed by provider Daily. 30 each 5    • lidocaine-prilocaine (EMLA) 2.5-2.5 % cream Apply  topically to the appropriate area as directed Daily. 30 g 2     • lisinopril-hydrochlorothiazide (PRINZIDE,ZESTORETIC) 20-12.5 MG per tablet Take 1 tablet by mouth Daily. 30 tablet 5    • metFORMIN (GLUCOPHAGE) 1000 MG tablet Take 1 tablet by mouth 2 (Two) Times a Day With Meals. 60 tablet 5    • mupirocin (BACTROBAN) 2 % ointment Apply  topically to the appropriate area as directed 2 (Two) Times a Day. 15 g 1    • ONE TOUCH LANCETS misc 1 each 3 (Three) Times a Day. 200 each 6    • pantoprazole (PROTONIX) 40 MG EC tablet TAKE ONE TABLET BY MOUTH TWO TIMES A DAY 60 tablet 3    • predniSONE (DELTASONE) 5 MG tablet Take 1 tablet by mouth Daily. For arthritis of hand 5 tablet 0    • QUEtiapine (SEROquel) 300 MG tablet Take 2 tablets by mouth Every Night. 60 tablet 2    • SITagliptin (Januvia) 100 MG tablet Take 1 tablet by mouth Daily. 30 tablet 5    • tamsulosin (FLOMAX) 0.4 MG capsule 24 hr capsule Take 1 capsule by mouth Daily. 30 capsule 5    • tiZANidine (ZANAFLEX) 4 MG tablet TAKE ONE TABLET BY MOUTH TWO TIMES A DAY AS NEEDED 60 tablet 1    • traMADol (ULTRAM) 50 MG tablet Take 50 mg by mouth Every 6 (Six) Hours As Needed for Moderate Pain . 1 - 2  Tablets po q  8 hours prn pain      • zinc oxide 20 % ointment Apply  topically to the appropriate area as directed As Needed for Irritation or Dry Skin. For bilateral feet 56 g 5      --> Reviewed; pt is uncertain what medicine he is on      Medical Review Of Systems:  Reviewed review of systems from NP Dilan's note from today. Reviewed with additions made:  Constitutional: Negative for appetite change, chills, fatigue and fever.   HENT: Negative for congestion.    Eyes: Negative for visual disturbance.   Respiratory: Negative for cough, chest tightness, shortness of breath and wheezing.    Cardiovascular: Negative for chest pain, palpitations and leg swelling.   Gastrointestinal: Negative for abdominal distention, abdominal pain, diarrhea, nausea and vomiting.   Genitourinary: Positive for difficulty urinating and  dysuria.   Musculoskeletal: Positive for arthralgias and back pain. Negative for myalgias and neck pain.   Skin: Negative for rash and wound.   Neurological: Negative for dizziness, syncope, weakness, light-headedness, numbness and headaches.   Psychiatric: +Psychosis, depression; negative for HI; others as above.         Objective   Objective --    Vital Signs:  Temp:  [97.3 °F (36.3 °C)-98.2 °F (36.8 °C)] 98 °F (36.7 °C)  Heart Rate:  [67-97] 73  Resp:  [16-20] 18  BP: (141-188)/() 143/78    Physical Exam:   -General Appearance:  normal general appearance and in no apparent distress  -Hygiene:  Limited   -Gait & Station:  Blank multiple: Normal  -Musculoskeletal:  No tremors or abnormal involuntary movements and No Cog [place] or Rigidity and No atrophy noted  -Pulm: unlaboured     Mental Status Exam:   --Cooperation:  Cooperative  --Eye Contact:  Non-sustained  --Psychomotor Behavior:  Slow  --Mood:  Sad/Depressed and Anxious/Nervous  --Affect:  blunted, mood-congruent and dysphoric  --Speech:  Slow and Soft  --Thought Process:  Sluggish and Disorganized  --Associations: Goal Directed and Circumstantial  --Themes:  Worthlessness, Helplessness, Hopelessness and Failure  --Thought Content:     --Mood congruent   --Suicidal:  Suicidal Ideation    --Homicidal:  Denies   --Hallucinations:  Cannot rule out   --Delusion:  Cannot rule out Paranoia  --Cognitive Functioning:  -Consciousness:  awake and alert  -Orientation:  Person, Place and Time  -Attention:  Distractible   -Concentration:  Distractible  -Language:  Average based on interaction; Intact  -Vocabulary:  Below Average based on interaction; Intact  -Short Term Memory: Deficits  -Long Term Memory: Deficits  -Fund of Knowledge:  Below Average based on interaction  -Abstraction:  [place] and Poor  --Reliability:  impaired  --Insight:  Impaired  --Judgment:  Impaired  --Impulse Control:  Impaired      Diagnostic Data:  --> EKG: I reviewed the EKG, as  below.      ECG/EMG Results (all)     None        -----------------------------------------------------        --> Lab Work  Recent Results (from the past 72 hour(s))   Light Blue Top    Collection Time: 10/01/20  2:12 PM   Result Value Ref Range    Extra Tube hold for add-on    Green Top (Gel)    Collection Time: 10/01/20  2:12 PM   Result Value Ref Range    Extra Tube Hold for add-ons.    Lavender Top    Collection Time: 10/01/20  2:12 PM   Result Value Ref Range    Extra Tube hold for add-on    Gold Top - SST    Collection Time: 10/01/20  2:12 PM   Result Value Ref Range    Extra Tube Hold for add-ons.    Comprehensive Metabolic Panel    Collection Time: 10/01/20  2:12 PM    Specimen: Arm, Right; Blood   Result Value Ref Range    Glucose 178 (H) 65 - 99 mg/dL    BUN 13 8 - 23 mg/dL    Creatinine 0.81 0.76 - 1.27 mg/dL    Sodium 139 136 - 145 mmol/L    Potassium 3.6 3.5 - 5.2 mmol/L    Chloride 108 (H) 98 - 107 mmol/L    CO2 21.0 (L) 22.0 - 29.0 mmol/L    Calcium 9.8 8.6 - 10.5 mg/dL    Total Protein 7.4 6.0 - 8.5 g/dL    Albumin 4.10 3.50 - 5.20 g/dL    ALT (SGPT) 11 1 - 41 U/L    AST (SGOT) 17 1 - 40 U/L    Alkaline Phosphatase 105 39 - 117 U/L    Total Bilirubin 0.4 0.0 - 1.2 mg/dL    eGFR Non African Amer 95 >60 mL/min/1.73    Globulin 3.3 gm/dL    A/G Ratio 1.2 g/dL    BUN/Creatinine Ratio 16.0 7.0 - 25.0    Anion Gap 10.0 5.0 - 15.0 mmol/L   Protime-INR    Collection Time: 10/01/20  2:12 PM    Specimen: Arm, Right; Blood   Result Value Ref Range    Protime 12.8 11.1 - 15.3 Seconds    INR 0.93 0.80 - 1.20   Troponin    Collection Time: 10/01/20  2:12 PM    Specimen: Arm, Right; Blood   Result Value Ref Range    Troponin T <0.010 0.000 - 0.030 ng/mL   CK    Collection Time: 10/01/20  2:12 PM    Specimen: Arm, Right; Blood   Result Value Ref Range    Creatine Kinase 66 20 - 200 U/L   Magnesium    Collection Time: 10/01/20  2:12 PM    Specimen: Arm, Right; Blood   Result Value Ref Range    Magnesium 1.4 (L) 1.6  - 2.4 mg/dL   Ethanol    Collection Time: 10/01/20  2:12 PM    Specimen: Arm, Right; Blood   Result Value Ref Range    Ethanol <10 0 - 10 mg/dL    Ethanol % <0.010 %   CBC Auto Differential    Collection Time: 10/01/20  2:12 PM    Specimen: Arm, Right; Blood   Result Value Ref Range    WBC 9.52 3.40 - 10.80 10*3/mm3    RBC 4.52 4.14 - 5.80 10*6/mm3    Hemoglobin 13.8 13.0 - 17.7 g/dL    Hematocrit 39.9 37.5 - 51.0 %    MCV 88.3 79.0 - 97.0 fL    MCH 30.5 26.6 - 33.0 pg    MCHC 34.6 31.5 - 35.7 g/dL    RDW 13.5 12.3 - 15.4 %    RDW-SD 43.5 37.0 - 54.0 fl    MPV 9.1 6.0 - 12.0 fL    Platelets 332 140 - 450 10*3/mm3    Neutrophil % 76.9 (H) 42.7 - 76.0 %    Lymphocyte % 14.3 (L) 19.6 - 45.3 %    Monocyte % 7.6 5.0 - 12.0 %    Eosinophil % 0.5 0.3 - 6.2 %    Basophil % 0.3 0.0 - 1.5 %    Immature Grans % 0.4 0.0 - 0.5 %    Neutrophils, Absolute 7.32 (H) 1.70 - 7.00 10*3/mm3    Lymphocytes, Absolute 1.36 0.70 - 3.10 10*3/mm3    Monocytes, Absolute 0.72 0.10 - 0.90 10*3/mm3    Eosinophils, Absolute 0.05 0.00 - 0.40 10*3/mm3    Basophils, Absolute 0.03 0.00 - 0.20 10*3/mm3    Immature Grans, Absolute 0.04 0.00 - 0.05 10*3/mm3    nRBC 0.0 0.0 - 0.2 /100 WBC   Urinalysis With Culture If Indicated - Urine, Clean Catch    Collection Time: 10/01/20  2:42 PM    Specimen: Urine, Clean Catch   Result Value Ref Range    Color, UA Yellow Yellow, Straw, Dark Yellow, [place]    Appearance, UA Cloudy (A) Clear    pH, UA 8.5 5.0 - 9.0    Specific Gravity, UA 1.021 1.003 - 1.030    Glucose,  mg/dL (Trace) (A) Negative    Ketones, UA Trace (A) Negative    Bilirubin, UA Negative Negative    Blood, UA Negative Negative    Protein, UA 30 mg/dL (1+) (A) Negative    Leuk Esterase, UA Moderate (2+) (A) Negative    Nitrite, UA Negative Negative    Urobilinogen, UA 1.0 E.U./dL 0.2 - 1.0 E.U./dL   Urine Drug Screen - Urine, Clean Catch    Collection Time: 10/01/20  2:42 PM    Specimen: Urine, Clean Catch   Result Value Ref Range    THC,  Screen, Urine Negative Negative    Phencyclidine (PCP), Urine Negative Negative    Cocaine Screen, Urine Negative Negative    Methamphetamine, Ur Negative Negative    Opiate Screen Positive (A) Negative    Amphetamine Screen, Urine Negative Negative    Benzodiazepine Screen, Urine Negative Negative    Tricyclic Antidepressants Screen Positive (A) Negative    Methadone Screen, Urine Negative Negative    Barbiturates Screen, Urine Negative Negative    Oxycodone Screen, Urine Negative Negative    Propoxyphene Screen Negative Negative    Buprenorphine, Screen, Urine Negative Negative   Urinalysis, Microscopic Only - Urine, Clean Catch    Collection Time: 10/01/20  2:42 PM    Specimen: Urine, Clean Catch   Result Value Ref Range    RBC, UA None Seen None Seen /HPF    WBC, UA 31-50 (A) None Seen, 0-2, 3-5 /HPF    Bacteria, UA None Seen None Seen /HPF    Squamous Epithelial Cells, UA 3-5 (A) None Seen, 0-2 /HPF    Hyaline Casts, UA 3-6 None Seen /LPF    Amorphous Crystals, UA Moderate/2+ None Seen /HPF    Methodology Manual Light Microscopy    Glucose, Fasting    Collection Time: 10/02/20  5:57 AM    Specimen: Blood   Result Value Ref Range    Glucose, Fasting 173 (H) 74 - 106 mg/dL   Lipid Panel    Collection Time: 10/02/20  5:57 AM    Specimen: Blood   Result Value Ref Range    Total Cholesterol 177 0 - 200 mg/dL    Triglycerides 111 0 - 150 mg/dL    HDL Cholesterol 47 40 - 60 mg/dL    LDL Cholesterol  108 (H) 0 - 100 mg/dL    VLDL Cholesterol 22.2 mg/dL    LDL/HDL Ratio 2.29        Ct Abdomen Pelvis Without Contrast    Result Date: 10/1/2020  Narrative: PROCEDURE: Ct abdomen and pelvis without contrast REASON FOR EXAM: Abdominal pain, acute, nonlocalized FINDINGS: Comparison study dated  June 20, 2016. Axial computer tomography sequential imaging was performed from the diaphragms through the symphysis pubis without IV contrast administration. Sagittal and coronal reformates was performed. This exam was performed  according to our departmental dose optimization program, which includes automated exposure control, adjustment of the mA and/or KV according to patient size and/or use of iterative reconstruction technique.  Stable left lung base small calcified lung parenchymal granulomas consistent with old granulomatous disease. Stable left lower lobe lateral two noncalcified nodules with the largest measuring 6 mm in greatest diameter. This prolonged interval stability suggests benign etiology such as noncalcified granulomas or benign inflammatory nodules. Otherwise lung bases are unremarkable. The liver is normal. The gallbladder is normal. The biliary system is normal. The pancreas is normal. Multiple small calcified granulomas involving the spleen consistent with old granulomatous disease. The spleen is otherwise unremarkable. Bilateral adrenal glands are normal. Stable right kidney mid zone 2 cm simple peripelvic cyst. The right kidney and ureter are otherwise normal. The left kidney and ureter are normal. Mild anterior bladder wall thickening. The bladder is partially decompressed. Small 1 cm left bladder diverticula. The prostate gland appears within normal limits. The hollow viscera is normal. The appendix is identified appears normal. No lymphadenopathy in the abdomen or the pelvis. No acute osseous abnormality.     Impression: 1. Stable evidence of old granulomatous disease. 2.Stable left lower lobe lateral two noncalcified nodules with the largest measuring 6 mm in greatest diameter. This prolonged interval stability suggests benign etiology such as noncalcified granulomas or benign inflammatory nodules. 3.Stable right kidney mid zone 2 cm simple peripelvic cyst. 4. Small 1 cm left bladder diverticula. Mild anterior bladder wall thickening with the bladder being partially decompressed. This wall thickening most likely represents pseudothickening from partially decompressed state. Less likely etiology could be focal  cystitis or neoplastic involvement. Recommend clinical correlation. 5. Remainder CT abdomen pelvis study unremarkable. Electronically signed by:  Brett Rodrigues MD  10/1/2020 4:07 PM CDT Workstation: [id number]    Ct Angiogram Head    Result Date: 10/1/2020  Narrative: Procedure: CT angiogram head and neck with contrast Reason for exam: Headache and confusion. FINDINGS: Axial computer tomography sequential imaging was performed from the superior head through the thoracic inlet after IV contrast administration. Sagittal and coronal reformates was performed. 3-D vascular reconstruction of the carotid systems of the neck and head was performed. This exam was performed according to our departmental dose optimization program, which includes automated exposure control, adjustment of the mA and/or KV according to patient size and/or use of iterative reconstruction technique. Patient motion during imaging limits study. CTA findings: The thoracic aorta is normal. The left subclavian artery is normal. Distal left common carotid artery small sidewall soft atherosclerotic plaque causing 30% diameter stenosis. The left common carotid artery is otherwise normal. The left external carotid artery is normal. The left internal carotid artery is normal. The brachiocephalic trunk is normal. The right subclavian artery is normal. The right common carotid artery is normal. The right external carotid artery is normal. Tortuous course of the right internal carotid artery with the right internal carotid artery otherwise appearing normal. The right vertebral artery is normal. The left vertebral artery is normal. The basilar artery is normal. Bilateral posterior cerebral arteries are normal. Bilateral middle cerebral arteries are normal. Bilateral anterior cerebral arteries are normal. Other CT findings: No abnormal intracranial enhancement. Bilateral intraorbital structures are normal. Paranasal sinuses and bilateral mastoid air cells are  well-aerated. Asymmetric nasopharynx right soft tissue prominence. The oral pharynx is normal. The larynx is normal. The thyroid gland is normal. No lymphadenopathy in the region of neck. No acute osseous abnormality. Lung apices are unremarkable.     Impression: 1.  CTA findings as described above. 2.  Asymmetric right nasopharynx soft tissue prominence. This is of uncertain clinical significance. This may represent prominent lymphoid tissue although cannot exclude mass. Recommend direct visualization to further evaluate. Electronically signed by:  Brett Rodrigues MD  10/1/2020 4:55 PM CDT Workstation: [id number]    Ct Head Without Contrast    Result Date: 10/1/2020  Narrative: PROCEDURE: CT head without contrast REASON FOR EXAM: Mental status change, unknown cause This exam was performed according to our departmental dose-optimization program, which includes automated exposure control, adjustment of the mA and/or kV according to patient size and/or use of iterative reconstruction technique. FINDINGS: Axial computer tomography sequential imaging of the head was performed from the vertex to the base of the skull. .Sagittal and coronal reformation was performed . The skull vault is intact. Paranasal sinuses and bilateral mastoid air cells are well aerated. Mild cerebral involutional changes. Left basal ganglia circular 3 mm CSF attenuating lesion consistent with old lacunar infarct. Cerebral and cerebellar parenchymal are otherwise normal. Ventricular system and subarachnoid spaces are normal.     Impression: 1.  No acute intracranial abnormality. 2.  Mild cerebral involutional changes. 3.  Left basal ganglia old lacunar infarct. Electronically signed by:  Brett Rodrigues MD  10/1/2020 3:56 PM CDT Workstation: [id number]    Ct Angiogram Neck    Result Date: 10/1/2020  Narrative: Procedure: CT angiogram head and neck with contrast Reason for exam: Headache and confusion. FINDINGS: Axial computer tomography sequential  imaging was performed from the superior head through the thoracic inlet after IV contrast administration. Sagittal and coronal reformates was performed. 3-D vascular reconstruction of the carotid systems of the neck and head was performed. This exam was performed according to our departmental dose optimization program, which includes automated exposure control, adjustment of the mA and/or KV according to patient size and/or use of iterative reconstruction technique. Patient motion during imaging limits study. CTA findings: The thoracic aorta is normal. The left subclavian artery is normal. Distal left common carotid artery small sidewall soft atherosclerotic plaque causing 30% diameter stenosis. The left common carotid artery is otherwise normal. The left external carotid artery is normal. The left internal carotid artery is normal. The brachiocephalic trunk is normal. The right subclavian artery is normal. The right common carotid artery is normal. The right external carotid artery is normal. Tortuous course of the right internal carotid artery with the right internal carotid artery otherwise appearing normal. The right vertebral artery is normal. The left vertebral artery is normal. The basilar artery is normal. Bilateral posterior cerebral arteries are normal. Bilateral middle cerebral arteries are normal. Bilateral anterior cerebral arteries are normal. Other CT findings: No abnormal intracranial enhancement. Bilateral intraorbital structures are normal. Paranasal sinuses and bilateral mastoid air cells are well-aerated. Asymmetric nasopharynx right soft tissue prominence. The oral pharynx is normal. The larynx is normal. The thyroid gland is normal. No lymphadenopathy in the region of neck. No acute osseous abnormality. Lung apices are unremarkable.     Impression: 1.  CTA findings as described above. 2.  Asymmetric right nasopharynx soft tissue prominence. This is of uncertain clinical significance. This may  represent prominent lymphoid tissue although cannot exclude mass. Recommend direct visualization to further evaluate. Electronically signed by:  Brett Rodrigues MD  10/1/2020 4:55 PM CDT Workstation: [id number]    Mri Brain Without Contrast    Result Date: 10/1/2020  Narrative: MRI of the brain without contrast HISTORY: Stroke. Headache. Confusion. Mental status change. Multisequence multiplanar images of the brain were obtained without intravenous contrast. COMPARISON: None. Correlation earlier CT. FINDINGS: The paranasal sinuses are unremarkable. No acute process. Cerebral and cerebellar atrophy. Minimal to moderate small vessel disease. Diffusion images do not demonstrate an acute infarct. No hemorrhage. No mass. No midline shift and no abnormal extra-axial fluid collections. Normal signal flow voids are present in the major vessels and venous sinuses.     Impression: CONCLUSION: No acute process. Cerebral and cerebellar atrophy. Minimal to moderate small vessel disease. [id number] Electronically signed by:  Ino Wilhelm MD  10/1/2020 7:05 PM CDT Workstation: [id number]      Lab Results   Component Value Date    GLUF 173 (H) 10/02/2020        Lab Results   Component Value Date    HGBA1C 7.20 (H) 12/03/2019       Lab Results   Component Value Date    CHOL 177 10/02/2020    TRIG 111 10/02/2020    HDL 47 10/02/2020     (H) 10/02/2020    VLDL 22.2 10/02/2020    LDLHDL 2.29 10/02/2020        TSH   Date Value Ref Range Status   03/21/2019 2.600 0.460 - 4.680 mIU/mL Final       No results found for: VDHX62GZ, BWCKYELE26, FOLATE    No results found for: IRON, TIBC, FERRITIN      --> Neuroimaging: as above      --> ISAIAH:   ISAIAH Patient Controlled Substance Report (from 10/4/2019 to 10/2/2020)    Dispensed  Strength Quantity Days Supply Provider Pharmacy   09/24/2020 Gabapentin 600MG 120 each 30 [facility]   09/24/2020 Hydrocodone/Acetaminophen 325MG/10MG 90 each 30 [facility] [place]  [facility]   08/24/2020 Hydrocodone/Acetaminophen 325MG/10MG 90 each 30 [facility]   08/10/2020 Gabapentin 600MG 120 each 30 [facility]   07/23/2020 Hydrocodone/Acetaminophen 325MG/10MG 90 each 30 [facility]   07/10/2020 Gabapentin 600MG 120 each 30 [facility]   06/22/2020 Hydrocodone/Acetaminophen 325MG/10MG 90 each 30 [facility]   06/10/2020 Gabapentin 600MG 120 each 30 [facility]   05/22/2020 Hydrocodone/Acetaminophen 325MG/10MG 90 each 30 [facility]   05/11/2020 Gabapentin 600MG 120 each 30 [facility]   04/23/2020 Hydrocodone/Acetaminophen 325MG/10MG 90 each 30 [facility]   04/09/2020 Gabapentin 600MG 120 each 30 [facility]   03/24/2020 Hydrocodone/Acetaminophen 325MG/10MG 90 each 30 [facility]   03/09/2020 Gabapentin 600MG 120 each 30 [facility]   02/24/2020 Hydrocodone/Acetaminophen 325MG/10MG 90 each 30 [facility]   02/07/2020 Gabapentin 600MG 120 each 30 [facility]   01/27/2020 Gabapentin 600MG 120 each 30 [facility]   01/23/2020 Hydrocodone/Acetaminophen 325MG/10MG 90 each 30 [facility]   12/26/2019 Gabapentin 600MG 120 each 30 [facility]   12/23/2019 Hydrocodone/Acetaminophen 325MG/7.5MG 90 each 30 [facility]   11/25/2019 Gabapentin 600MG 120 each 30 [facility]   11/25/2019 Hydrocodone/Acetaminophen 325MG/7.5MG 90 each 30 [facility]   10/24/2019 Gabapentin 600MG 120 each 30 [facility]   10/24/2019  Hydrocodone/Acetaminophen 325MG/7.5MG 90 each 30 [facility],                Assessment/Plan   --Patient Strengths: ability for insight, motivation for treatment/growth   --Patient Barriers: low self esteem, self-care deficit      --Diagnostic Impression: Mr. Allison  is a 65 y.o. male admitted for gross psychosis and psychotic disorganization.    Diagnostically primary psychotic spectrum disorder.  Cannot fully rule bipolar spectrum disorder at this time given his symptomatology, and affective lability.  Additionally cannot rule out major depression with psychosis.          Assessment:  --  Acute psychosis (CMS/[facility])    Essential hypertension    Gastroesophageal reflux disease without esophagitis    Type 2 diabetes mellitus without complication, without long-term current use of insulin (CMS/[facility])    Hyperlipidemia    Hallucinations    BPH without obstruction/lower urinary tract symptoms    Acute cystitis without hematuria    Bladder wall thickening    Nasopharyngeal mass    Rule Out / In Bipolar affective disorder, mixed     Rule In / Out MDD (major depressive disorder), recurrent, severe, with psychosis      Non-Psychiatric Medical Conditions Include:  --UTI: Cont  Keflex 500 mg PO BID for 7 days  --Bladder wall findings: Renal ultrasound, urine cytology, PSA, Urology consultation  --BPH: Cont Flomax  --HTN/BP control: cont lisinopril, HCTZ; Catapres PRN  --T2DM/Glucose control: cont tradjenta, Novolog SSI  --> NP Dilan discussed nasopharyngeal finding with on call ENT.  Will arrange office referral for direct          Treatment Plan:  1) Will admit patient to the behavioral health unit at [facility], adult behavioral health unit, as a voluntary admission to ensure patient safety given  imminent risk status and need for emergent inpatient stabilization and treatment.    2) Patient will be provided treatment with the unit milieu, activities, therapies and psychopharmacological management.    3)  Patient placed on  Q15 minute checks and Suicide precautions.    4) Hospitalist consulted for assistance in management of medical comorbidities.    5) Will order following labs:   --RPR, HIV, TSH, Folate, B12, Vitamin D to evaluate for any contributing etiologies.   --Fastling lipids & glucose to establish baseline for any anti-d2 agent therapy    6) Will restart patient on the following psychiatric home meds:   --Not clear what medications pt on at home.      7) Will make the following medication changes, and proceed with the following treatment planning:   --Risperdal 1 mg qHS for psychosis    8) Will begin discharge planning as appropriate for patient.    9) Psychotherapy provided: supportive for < 15 min.     All questions answered for the patient.  Treatment plan and medication risks and benefits discussed with: Patient.  EPS, TD, metabolic sequalae.  Moving forward with treatment on grounds of beneficence and non-malfeasance.      Estimated Length of Stay: 7 days  Prognosis: Kirk Martínez II, MD   10/02/20 @ 08:30 CDT

## 2020-10-02 NOTE — NURSING NOTE
"Loud crying was heard coming from the hallway. This nurse went to check and found patient walking in the hallway crying and saying \"Where am I?\" Patient reoriented and offered PRN vistaril for anxiety. Patient took medication and went back to his room.  "

## 2020-10-02 NOTE — CONSULTS
Inpatient Urology Consult  Consult performed by: Renny Barber APRN  Consult ordered by: Gianni Kong APRN          Patient Care Team:  Cyndie Young APRN as PCP - General (Nurse Practitioner)  Michelle Carrillo MD as PCP - Claims Attributed  Prince Boone APRN as Nurse Practitioner (Orthopedic Surgery)  Yuri Amaral DPM as Consulting Physician (Podiatry)    Chief complaint: bladder wall thickening on imaging    Subjective     Mikey Allison is a 65-year-old male consulted to Urology for bladder wall thickening. He is admitted to Dr. Slade for hallucinations. Hospitalist team has been consulted to assist with medical management. He is known to Dr. Reynolds, history of BPH, urethral stricture, he takes Flomax daily. Is being treated now for UTI cystitis without rei hematuria. He reports chronic lower urinary tract symptoms of hesitancy, straining to start flow, wakes up to twice at night to empty his bladder. Acute symptoms of dysuria and strong odor to urine. No incontinence, no hematuria, no feelings of retention. Positive tobacco history.       Review of Systems   Constitutional: Negative.    Eyes: Negative.    Respiratory: Negative.    Cardiovascular: Negative.    Gastrointestinal: Negative.    Endocrine: Negative.    Genitourinary: Positive for difficulty urinating and dysuria. Negative for decreased urine volume, flank pain, frequency, hematuria and urgency.   Musculoskeletal: Positive for arthralgias and back pain. Negative for gait problem, myalgias and neck pain.   Allergic/Immunologic: Negative.    Neurological: Negative.    Hematological: Negative.    Psychiatric/Behavioral: Positive for hallucinations.        Past Medical History:   Diagnosis Date   • Abdominal pain     left side   • Acute sinusitis    • Ankle joint pain    • Bipolar disorder (CMS/HCC)    • Bipolar disorder, unspecified (CMS/HCC)    • Cellulitis and abscess of trunk     axilla   • Chest pain    •  Chronic anemia    • Degenerative joint disease involving multiple joints     back   • Depressive disorder    • Diabetes mellitus (CMS/[facility])    • Diabetes mellitus with no complication (CMS/[facility])     type 2 or unspecified type uncontrolled   • Diverticular disease    • Dyspnea    • Enlarged prostate     on UT-on CT   • Essential hypertension    • Febrile convulsion (CMS/[facility])    • Gastroesophageal reflux disease    • Generalized anxiety disorder    • H/O echocardiogram 10/16/2007    Mild to moderate concentric LV hypertrophy with mild left atrial enlargement. LV appears to be hypodynamic with preserved LV systolic function with EF 55-60%. Pericardium appears to be normal with a small pericardial effusion    • Hemorrhoids    • Hyperlipidemia    • Hypertensive disorder    • Hypoglycemia    • Infectious disorder of kidney     kidney infection-treated by [facility]   • Left lower quadrant pain    • Loss of appetite    • Obesity    • Osteoarthritis    • Pain in joint involving ankle and foot    • Pain in limb    • Psoriasis    • Retention of urine     with cath-treated by the [facility]   • Screening for malignant neoplasm of colon    • Sepsis due to urinary tract infection (CMS/[facility])     urosepsis treated by [facility]   • Sinusitis    • Syncope    • Unspecified essential hypertension    ,   Past Surgical History:   Procedure Laterality Date   • ANKLE SURGERY Left 11/11/2008    Removal of syndesmotic screws, left ankle. Painful syndesmotic screws, left ankle.)   • ANKLE SURGERY  10/19/2007    Open reduction-internal fixation with fluoroscopic control with final x-ray PA and lateral of the ankle. Trimalleolar fracture/subluxation, left ankle.)   • CIRCUMCISION  2016   • COLONOSCOPY  03/10/2015    Diverticulosis found in the sigmoid colon. hemorrhoids found in the anus. Normal cecum   • CYSTOSCOPY  06/22/2016    Cystoscopy, dilation, anchor of Tim catheter. Benign prostatic hypertrophy, urinary retention, urethral  stricture history of.   • INJECTION OF MEDICATION      Kenalog (3)   • SHOULDER ARTHROSCOPY Right 10/18/2017    Procedure: SHOULDER ARTHROSCOPY WITH RAFAELA PROCEDURE, AND SUBACROMIAL DECOMPRESSION, AND POSSIBLE ROTATOR  CUFF REPAIR       SHOULDER ARTHROSCOPY WITH RAFAELA PROCEDURE, AND SUBACROMIAL DECOMPRESSION, NO ROTATOR CUFF REPAIR PERFORMED;  Surgeon: Maximus Schreiber MD;  Location: [facility];  Service:    • STOMACH SURGERY     • TIBIA FRACTURE SURGERY     ,   Family History   Problem Relation Age of Onset   • Diabetes Other    • Heart disease Other    • Hypertension Other    • Kidney disease Other    • Hypertension Mother    • Clotting disorder Mother    ,   Social History     Tobacco Use   • Smoking status: Former Smoker     Types: Cigarettes     Quit date:      Years since quittin.7   • Smokeless tobacco: Never Used   • Tobacco comment: smoked 3 years   Substance Use Topics   • Alcohol use: Yes     Alcohol/week: 3.0 - 6.0 standard drinks     Types: 3 - 6 Cans of beer per week     Comment: 1-2 every other day   • Drug use: No   ,   Medications Prior to Admission   Medication Sig Dispense Refill Last Dose   • albuterol sulfate  (90 Base) MCG/ACT inhaler 2 puffs every 4-6 hours until cough/wheezing improve, than taper off of 18 g 3    • atorvastatin (LIPITOR) 20 MG tablet Take 1 tablet by mouth Every Night. 30 tablet 5    • budesonide-formoterol (Symbicort) 80-4.5 MCG/ACT inhaler Inhale 2 puffs 2 (Two) Times a Day. 10.2 g 5    • Canagliflozin (Invokana) 100 MG tablet Take 100 mg by mouth Daily. 30 tablet 5    • diclofenac (VOLTAREN) 1 % gel gel APPLY 4 G TOPICALLY TO THE APPROPRIATE AREA AS DIRECTED 2 (TWO) TIMES A DAY. FOR ARTHRITIC PAIN OF THE HAND 100 g 5    • diphenhydrAMINE (BENADRYL) 2 % cream Apply  topically to the appropriate area as directed 3 (Three) Times a Day As Needed for Itching. 30 g 5    • docusate sodium (COLACE) 100 MG capsule Take 1 capsule by mouth 2 (Two) Times a  Day. 60 capsule 5    • famotidine (Pepcid) 20 MG tablet Take 1 tablet by mouth 2 (Two) Times a Day. For heartburn 60 tablet 5    • gabapentin (NEURONTIN) 400 MG capsule Take 1 capsule by mouth 3 (Three) Times a Day. 90 capsule 2    • glucose blood test strip tid 200 each 6    • glucose monitor monitoring kit 1 each Daily. 1 each 0    • HYDROcodone-acetaminophen (NORCO)  MG per tablet Take 1 tablet by mouth Every 6 (Six) Hours As Needed for Moderate Pain .      • lidocaine (LIDODERM) 5 % Place 1 patch on the skin as directed by provider Daily. 30 each 5    • lidocaine-prilocaine (EMLA) 2.5-2.5 % cream Apply  topically to the appropriate area as directed Daily. 30 g 2    • lisinopril-hydrochlorothiazide (PRINZIDE,ZESTORETIC) 20-12.5 MG per tablet Take 1 tablet by mouth Daily. 30 tablet 5    • metFORMIN (GLUCOPHAGE) 1000 MG tablet Take 1 tablet by mouth 2 (Two) Times a Day With Meals. 60 tablet 5    • mupirocin (BACTROBAN) 2 % ointment Apply  topically to the appropriate area as directed 2 (Two) Times a Day. 15 g 1    • ONE TOUCH LANCETS misc 1 each 3 (Three) Times a Day. 200 each 6    • pantoprazole (PROTONIX) 40 MG EC tablet TAKE ONE TABLET BY MOUTH TWO TIMES A DAY 60 tablet 3    • predniSONE (DELTASONE) 5 MG tablet Take 1 tablet by mouth Daily. For arthritis of hand 5 tablet 0    • QUEtiapine (SEROquel) 300 MG tablet Take 2 tablets by mouth Every Night. 60 tablet 2    • SITagliptin (Januvia) 100 MG tablet Take 1 tablet by mouth Daily. 30 tablet 5    • tamsulosin (FLOMAX) 0.4 MG capsule 24 hr capsule Take 1 capsule by mouth Daily. 30 capsule 5    • tiZANidine (ZANAFLEX) 4 MG tablet TAKE ONE TABLET BY MOUTH TWO TIMES A DAY AS NEEDED 60 tablet 1    • traMADol (ULTRAM) 50 MG tablet Take 50 mg by mouth Every 6 (Six) Hours As Needed for Moderate Pain . 1 - 2  Tablets po q  8 hours prn pain      • zinc oxide 20 % ointment Apply  topically to the appropriate area as directed As Needed for Irritation or Dry Skin. For  bilateral feet 56 g 5         Allergies:  Sulfa antibiotics and Codeine    Objective      Vital Signs  Temp:  [97.3 °F (36.3 °C)-98 °F (36.7 °C)] 98 °F (36.7 °C)  Heart Rate:  [67-80] 73  Resp:  [18-20] 18  BP: (141-188)/() 143/78    Physical Exam  Constitutional:       Appearance: Normal appearance. He is normal weight.   HENT:      Head: Normocephalic and atraumatic.      Mouth/Throat:      Mouth: Mucous membranes are moist.   Eyes:      General: No scleral icterus.        Right eye: No discharge.         Left eye: No discharge.   Neck:      Musculoskeletal: Neck supple.   Cardiovascular:      Rate and Rhythm: Normal rate.   Abdominal:      General: There is no distension.      Tenderness: There is no right CVA tenderness or left CVA tenderness.   Skin:     General: Skin is warm and dry.      Capillary Refill: Capillary refill takes less than 2 seconds.      Nails: There is no clubbing.     Neurological:      General: No focal deficit present.      Mental Status: He is alert. Mental status is at baseline.   Psychiatric:         Attention and Perception: Attention normal.         Mood and Affect: Mood normal.         Speech: Speech normal.         Behavior: Behavior is cooperative.         Cognition and Memory: Memory normal.         Results Review:   Lab Results (last 24 hours)     Procedure Component Value Units Date/Time    PSA DIAGNOSTIC [[id number]] Collected: 10/02/20 0557    Specimen: Blood Updated: 10/02/20 1412    Glucose, Fasting [[id number]]  (Abnormal) Collected: 10/02/20 0557    Specimen: Blood Updated: 10/02/20 0642     Glucose, Fasting 173 mg/dL     Lipid Panel [[id number]]  (Abnormal) Collected: 10/02/20 0557    Specimen: Blood Updated: 10/02/20 0642     Total Cholesterol 177 mg/dL      Triglycerides 111 mg/dL      HDL Cholesterol 47 mg/dL      LDL Cholesterol  108 mg/dL      VLDL Cholesterol 22.2 mg/dL      LDL/HDL Ratio 2.29    Narrative:      Cholesterol Reference Ranges  (U.S. Department of  Health and Human Services ATP III Classifications)    Desirable          <200 mg/dL  Borderline High    200-239 mg/dL  High Risk          >240 mg/dL      Triglyceride Reference Ranges  (U.S. Department of Health and Human Services ATP III Classifications)    Normal           <150 mg/dL  Borderline High  150-199 mg/dL  High             200-499 mg/dL  Very High        >500 mg/dL    HDL Reference Ranges  (U.S. Department of Health and Human Services ATP III Classifcations)    Low     <40 mg/dl (major risk factor for CHD)  High    >60 mg/dl ('negative' risk factor for CHD)        LDL Reference Ranges  (U.S. Department of Health and Human Services ATP III Classifcations)    Optimal          <100 mg/dL  Near Optimal     100-129 mg/dL  Borderline High  130-159 mg/dL  High             160-189 mg/dL  Very High        >189 mg/dL         Imaging Results (Last 24 Hours)     ** No results found for the last 24 hours. **           I reviewed the patient's new clinical results.  I reviewed the patient's new imaging results and agree with the interpretation.  I reviewed the patient's other test results and agree with the interpretation        Assessment/Plan       Hallucinations    Essential hypertension    Gastroesophageal reflux disease without esophagitis    Type 2 diabetes mellitus without complication, without long-term current use of insulin (CMS/HCC)    Hyperlipidemia    BPH without obstruction/lower urinary tract symptoms    Acute cystitis without hematuria    Bladder wall thickening    Nasopharyngeal mass      Assessment & Plan    Consulted to Urology for mild anterior bladder wall thickening and 1 cm left bladder wall diverticula in a partially decompressed bladder confirmed on 10/1/20 CT abdomen/pelvis wo contrast. He has history of BPH and urethral stricture. UA on 10/1/20 suggestive of cystitis, culture is IP.   -WBC 9.52  -Estimated Creatinine Clearance: 104.6 mL/min (by C-G formula based on SCr of 0.81  mg/dL).    Plan:  Continue antibiotic, continue Flomax.   I did speak with SAMUEL Torres. He needs UTI treatment and repeat imaging (renal ultrasound). Urine cytology and PSA can be performed. If performed now and abnormal, the results will need to be repeated as UTI can alter the results.  Further recommendations to follow, likely will need cystoscopy but goal is to perform that outpatient.     I discussed the patient's findings and my recommendations with patient, nursing staff, consulting provider and DrSAMUEL Preciado  10/02/20  15:08 CDT

## 2020-10-02 NOTE — PLAN OF CARE
"Mental Status Exam:    Hygiene:   fair  Cooperation:  Guarded  Eye Contact:  Good  Psychomotor Behavior:  Appropriate  Affect:  Appropriate  Speech:  Normal  Thought Progress:  Circum  Thought Content:  Mood incongruent  Suicidal:  None  Homicidal:  None  Hallucinations:  Auditory  Delusion:  None  Memory:  Intact  Orientation:  Person, Place, Time, and Situation  Reliability:  fair  Insight:  Poor  Judgement:  Poor  Impulse Control:  Fair     CSW met with pt 1:1 and completed psychosocial assessment. Pt presented in scrubs in the day room  , casually dressed, alert and oriented x3. Mood is fair, was joking during the assessment               , affect is appropriate to the situation.  Eye contact is good, thought process is somewhat tangential      Re of admission ; \" I came to ER for chest pain and they told me that I needed help because of confusion\" CSW inquired if pt has trouble remembering things , he said \" somewhat. Pt was not able to explain the reason of admission in linear, he seems to be quite cheerful. I felt good after opening up in the treatment team meeting\" \"they said they wanted to to go to the meeting\" \" doctor said you did the right thing coming here\"     Pt was tearful in the treatment team meeting when CSW inquired about he said \" my wife  12 year ago and I do not want her to die\". Pt  his wife 30 years ago \" She thought I was messing up with her but I was a good cynthia\" \" I thought I would step ahead and divorce her\"     Pt reported that he hears his x wife almost daily saying that she hates him, pt was not sure it would be be categorized as auditory hallucination. And her name was same as his Nurse here at [facility]. CSW inquired how his x-wife death has affected his mood and inquired if he has any symptoms of depression .Pt denied any thoughts of Suicide or any prior suicide attempts but nodded his head and said sometime he does not feel like going out of the house. Pt reports " "that he has been on pain med's after he had ankle surgery in the past and reported taking Gebapentin in the past     PT does report THC and alcohol use between age [age]. He lives by himself and has a brother who help him if he wants to go anywhere.    Plan is for MD to meet with pt to gather further information,  CSW explained the treatment regimen,  encouraged pt to actively participate  in individual and group tx. Tx team will meet and develop plan. CSW to follow up accordingly.      Goals for treatment: \" to get help and leave this place in high spirits \"    Prior Hospitalizations / Dates    1. None   2.  3.    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 \"     Suicide Attempts: None       Sexual: heterosexual, Marital Status: , and Living situation: alone    Further details: No significant trauma reported     high school diploma/GED    Access to firearms: No     Past Medical History:   Diagnosis Date   • Abdominal pain     left side   • Acute sinusitis    • Ankle joint pain    • Bipolar disorder (CMS/HCC)    • Bipolar disorder, unspecified (CMS/HCC)    • Cellulitis and abscess of trunk     axilla   • Chest pain    • Chronic anemia    • Degenerative joint disease involving multiple joints     back   • Depressive disorder    • Diabetes mellitus (CMS/HCC)    • Diabetes mellitus with no complication (CMS/HCC)     type 2 or unspecified type uncontrolled   • Diverticular disease    • Dyspnea    • Enlarged prostate     on SD-on CT   • Essential hypertension    • Febrile convulsion (CMS/HCC)    • Gastroesophageal reflux disease    • Generalized anxiety disorder    • H/O echocardiogram 10/16/2007    Mild to moderate concentric LV hypertrophy with mild left atrial enlargement. LV appears to be hypodynamic with preserved LV systolic function with EF 55-60%. Pericardium appears to be normal with a small pericardial effusion    • Hemorrhoids    • Hyperlipidemia    • Hypertensive disorder    • Hypoglycemia    • Infectious " disorder of kidney     kidney infection-treated by [facility]   • Left lower quadrant pain    • Loss of appetite    • Obesity    • Osteoarthritis    • Pain in joint involving ankle and foot    • Pain in limb    • Psoriasis    • Retention of urine     with cath-treated by the [facility]   • Screening for malignant neoplasm of colon    • Sepsis due to urinary tract infection (CMS/HCC)     urosepsis treated by [facility]   • Sinusitis    • Syncope    • Unspecified essential hypertension         Problem: Adult Behavioral Health Plan of Care  Goal: Patient-Specific Goal (Individualization)  Recent Flowsheet Documentation  Taken 10/2/2020 1100 by Martine Bailon CSW  Patient Personal Strengths: expressive of emotions  Goal: Optimized Coping Skills in Response to Life Stressors  Intervention: Promote Effective Coping Strategies  Flowsheets (Taken 10/2/2020 1206)  Supportive Measures:  • active listening utilized  • goal setting facilitated  • self-reflection promoted  • relaxation techniques promoted  Goal: Develops/Participates in Therapeutic [place] to Support Successful Transition  Intervention: Foster Therapeutic [place]  Recent Flowsheet Documentation  Taken 10/2/2020 1206 by Martine Bailon CSW  Trust Relationship/Rapport:  • care explained  • choices provided  • questions answered  • empathic listening provided  • reassurance provided  Intervention: Mutually Develop Transition Plan  Recent Flowsheet Documentation  Taken 10/2/2020 1100 by Martine Bailon CSW  Outpatient/Agency/Support Group Needs: (none) other (see comments)  Transportation Anticipated: family or friend will provide  Anticipated Discharge Disposition: home or self-care  Readmission Within the Last 30 Days: no previous admission in last 30 days  Patient/Family Anticipated Services at Transition: mental health services  Patient/Family Anticipates Transition to: home     Problem: Cognitive Impairment (Psychotic Signs/Symptoms)  Goal: Optimal  Cognitive Function (Psychotic Signs/Symptoms)  Intervention: Support and Promote Cognitive Ability  Flowsheets (Taken 10/2/2020 1206)  Trust Relationship/Rapport:  • care explained  • choices provided  • questions answered  • empathic listening provided  • reassurance provided     Problem: Mood Impairment (Psychotic Signs/Symptoms)  Goal: Improved Mood Symptoms (Psychotic Signs/Symptoms)  Intervention: Optimize Emotion and Mood  Flowsheets (Taken 10/2/2020 1206)  Supportive Measures:  • active listening utilized  • goal setting facilitated  • self-reflection promoted  • relaxation techniques promoted     Problem: Fall Injury Risk  Goal: Absence of Fall and Fall-Related Injury  Intervention: Identify and Manage Contributors to Fall Injury Risk  Flowsheets (Taken 10/2/2020 1206)  Self-Care Promotion: independence encouraged

## 2020-10-02 NOTE — NURSING NOTE
"Inpatient Psychiatry Initial Intake    10/1/2020    Mikey Allison, a 65 y.o. male, for initial evaluation visit.  Patient is referred by Sea    Patient information was obtained from patient and relative(s).  Patient presented voluntarily to the Emergency Department.  Precipitant and chief complaint: According to He,  Came in with chest pain and headach, according to brother he has been AVH  Patient presents with visual hallucinations    Onset of symptoms was \"been seeing things for awhile, I don't know how long\"  Patient states symptoms have been exacerbated by UTI      Current Medications:  Scheduled Meds: cefTRIAXone, 1 g, Intravenous, Once      PRN Meds:     History:     Past Psychiatric History:   Previous therapy: no  Previous psychiatric treatment and medication trials:  no  Previous psychiatric hospitalizations:  yes - [facility]  Previous diagnoses:     can not remember  Previous suicide attempts:    no  Previous homicide attempts:    no  Family history of suicide:    no  Previous history of abuse:     no         no        no  History of legal issues and violence: unknown pt refused to answer  Currently in treatment with primary physcian  Education: high school diploma/GED  Other pertinent history: patient refused to answer    Current Evaluation:     Mental Status Evaluation:  Appearance:  disheveled   Behavior:  refused to answer any more questions   Speech:  normal volume   Mood:  irritable   Affect:  normal   Thought Process:  normal   Thought Content:  normal   Sensorium:  person, place, time/date, situation, day of week and month of year   Cognition:  grossly intact   Insight:  age appropriate   Judgment:  fair         Psychiatric Review Of Systems:  Sleep: no \"takes something for sleep at home that doctor gives me\"  Appetite changes: no  Weight changes: no  Energy: no  Interest/pleasure/anhedonia: no  Libido: no  Sexual orientation:  declined to answer  Anxiety/panic: no  Guilty/hopeless: " "no  Self-injurious behavior/risky behavior: no    Stressors: none    Substance Abuse History:     Substance Abuse History:  Tobacco use: no  Use of alcohol: no  Recreational drugs: \"just drugs prescribed\"  Use of OTC medications: no  Hx of Overdose:  no  Hx of Blackouts: no  Past attempts to quit or limit use?:no  Patient feels he has mental, emotional, or medical problems co-occurred or worsened as a result of alcohol and/or drug use: no    Suicide Risk Screening:     Suicidal Ideation:  Current thoughts of suicide?no  Recent thoughts of suicide?no    Suicide Planning:  Specific Plan?no  Thought Content/Patients own words:n/a.  Potentially lethal means?no  Access to gun?no  Access to other lethal means?no    Suicide Attempt:  Recent attempt?no  Past attempt?no  Cutting/burning/self-mutilation?no      Protective Factors:  Family and friends    Homicide Risk Screening:     Homicidal Ideation:  Current thoughts of homicide:  no  Recent thoughts of homicide:  no    Homicidal Planning:  Specific Plan?  n/a  Thought Content/Patients own words:n/a.  Access/Means?  no    Perceptual Disturbances     Auditory:  nopatient denies but brother stated that he hears things  Hallucinations continued:  visual    Hypnopompic hallucinations? no.  Hypnagogic hallucinations?  no .    Delusions? no  Patients own words:n/a    Shared Delusion no        Recommendations:     Reviewed with: Dr Slade  Medically cleared by: Dr Painter  Admit to Inpatient Behavioral Health Unit: yes - admit with hallucination to room 661  If admitted to unit please perform Review of Current Symptoms for Dr. Yañez.      ( giovanni ) note    Princess ASHLIE Curran RN    "

## 2020-10-02 NOTE — ED NOTES
BH notified patient is back from [facility]     Sammie Cisneros, SANDI  10/01/20 [id number]

## 2020-10-02 NOTE — NURSING NOTE
Behavior   Note any precipitants to event or behavior   Describe level and action of any aggressive behavior or speech and associated interventions.     Anxiety: Excess Worry  Depression: depressed mood  Pain  0  AVH   no  S/I   no  Plan  no  H/I   no  Plan  no    Affect   flat      Note:Pt is alert, oriented to person and place. Tearful this morning.  allowed pt to call his brother which made him feel better. Compliant and cooperative. Will continue to monitor and provide a safe environment.       Intervention    PRN medication utilized:  no    Instructed in medication usage and effects  Medications administered as ordered  Encouraged to verbalize needs      Response    Verbalized understanding   Did patient take medications as ordered yes   Did patient interact with assessment?  yes     Plan    Will monitor for safety  Will monitor every 15 minutes as ordered  Will evaluate and promote the plan of care    Last BM:  unknown date  (Please chart in I/O as well)

## 2020-10-02 NOTE — PLAN OF CARE
Goal Outcome Evaluation:  Plan of Care Reviewed With: patient  Progress: no change  Outcome Summary: new admission, pt appears to have slept well since around 0000

## 2020-10-02 NOTE — NURSING NOTE
"Patient arrived to unit via wheelchair escorted by security and ER staff to room 661 at 2119. Patient was wanded at the door and checked for contraband. Changed into paper scrubs and oriented to unit rules and expectations. Patient is calm and cooperative with staff at this time. Patient states that he is here at the hospital because he was having chest pains and the Dr in the ER wanted to admit him. According to report from the ER the patients brother reported that he has been having hallucinations over the past 4 weeks. Patient does admit to \"seeing things\" but will not go into further detail. Patient is intermittently confused and will be happy one minute and then start crying with unknown reason. Patient states that he knows being on the BHU will help him but he wants to go home. Patient is agreeable to treatment. Denies chest pain at this time. Denies SI/HI/AVH at this time. Care plans to be initiated. Monitor as ordered to maintain patient safety.    Patient is unreliable with recalling current medications. Unable to complete PTA med list at this time.  "

## 2020-10-02 NOTE — CONSULTS
[facility] Medicine Consult  Inpatient Hospitalist Consult  Consult performed by: Gianni Kong APRN  Consult ordered by: Teddy Martínez II, MD          Date of Admission: 10/1/2020  Date of Consult: 10/02/20    Primary Care Physician: Cyndie Young APRN  Referring Physician:  Linette Slade MD      Chief Complaint/Reason for Consultation: Medical co-management, hallucinations    HPI:  This is a 65 year old male with a history of HTN, DMII, chronic back pain, HLD, and BPH who is admitted to U for hallucinations.  Evaluation in the ED revealed bladder wall thickening and bladder diverticula.  Neoplasm could not be ruled out and clinical correlation is recommended.  Also noted was asymmetric right nasopharynx soft tissue prominence and mass could not be excluded.  Direct visualization was recommended. He notes dysuria and foul smelling urine.  Urine culture grew coagulase negative Staphylococcus.     Past Medical History:   Past Medical History:   Diagnosis Date   • Abdominal pain     left side   • Acute sinusitis    • Ankle joint pain    • Bipolar disorder (CMS/HCC)    • Bipolar disorder, unspecified (CMS/HCC)    • Cellulitis and abscess of trunk     axilla   • Chest pain    • Chronic anemia    • Degenerative joint disease involving multiple joints     back   • Depressive disorder    • Diabetes mellitus (CMS/HCC)    • Diabetes mellitus with no complication (CMS/HCC)     type 2 or unspecified type uncontrolled   • Diverticular disease    • Dyspnea    • Enlarged prostate     on [place]-on CT   • Essential hypertension    • Febrile convulsion (CMS/HCC)    • Gastroesophageal reflux disease    • Generalized anxiety disorder    • H/O echocardiogram 10/16/2007    Mild to moderate concentric LV hypertrophy with mild left atrial enlargement. LV appears to be hypodynamic with preserved LV systolic function with EF 55-60%. Pericardium appears to be normal  with a small pericardial effusion    • Hemorrhoids    • Hyperlipidemia    • Hypertensive disorder    • Hypoglycemia    • Infectious disorder of kidney     kidney infection-treated by [facility]   • Left lower quadrant pain    • Loss of appetite    • Obesity    • Osteoarthritis    • Pain in joint involving ankle and foot    • Pain in limb    • Psoriasis    • Retention of urine     with cath-treated by the [facility]   • Screening for malignant neoplasm of colon    • Sepsis due to urinary tract infection (CMS/HCC)     urosepsis treated by [facility]   • Sinusitis    • Syncope    • Unspecified essential hypertension        Past Surgical History:   Past Surgical History:   Procedure Laterality Date   • ANKLE SURGERY Left 11/11/2008    Removal of syndesmotic screws, left ankle. Painful syndesmotic screws, left ankle.)   • ANKLE SURGERY  10/19/2007    Open reduction-internal fixation with fluoroscopic control with final x-ray PA and lateral of the ankle. Trimalleolar fracture/subluxation, left ankle.)   • CIRCUMCISION  2016   • COLONOSCOPY  03/10/2015    Diverticulosis found in the sigmoid colon. hemorrhoids found in the anus. Normal cecum   • CYSTOSCOPY  06/22/2016    Cystoscopy, dilation, anchor of Tim catheter. Benign prostatic hypertrophy, urinary retention, urethral stricture history of.   • INJECTION OF MEDICATION      Kenalog (3)   • SHOULDER ARTHROSCOPY Right 10/18/2017    Procedure: SHOULDER ARTHROSCOPY WITH RAFAELA PROCEDURE, AND SUBACROMIAL DECOMPRESSION, AND POSSIBLE ROTATOR  CUFF REPAIR       SHOULDER ARTHROSCOPY WITH RAFAELA PROCEDURE, AND SUBACROMIAL DECOMPRESSION, NO ROTATOR CUFF REPAIR PERFORMED;  Surgeon: Maximus Schreiber MD;  Location: [facility];  Service:    • STOMACH SURGERY     • TIBIA FRACTURE SURGERY         Family History:   Family History   Problem Relation Age of Onset   • Diabetes Other    • Heart disease Other    • Hypertension Other    • Kidney disease Other    • Hypertension  Mother    • Clotting disorder Mother        Social History:   Social History     Socioeconomic History   • Marital status:      Spouse name: Not on file   • Number of children: Not on file   • Years of education: Not on file   • Highest education level: Not on file   Tobacco Use   • Smoking status: Former Smoker     Types: Cigarettes     Quit date:      Years since quittin.7   • Smokeless tobacco: Never Used   • Tobacco comment: smoked 3 years   Substance and Sexual Activity   • Alcohol use: Yes     Alcohol/week: 3.0 - 6.0 standard drinks     Types: 3 - 6 Cans of beer per week     Comment: 1-2 every other day   • Drug use: No   • Sexual activity: Defer       Allergies:   Allergies   Allergen Reactions   • Sulfa Antibiotics Unknown - High Severity     unknown   • Codeine Itching and Nausea And Vomiting       Medications:   Current Facility-Administered Medications:   •  acetaminophen (TYLENOL) tablet 650 mg, 650 mg, Oral, Q4H PRN, Teddy Martínez II, MD, 650 mg at 10/01/20 2336  •  albuterol (PROVENTIL) nebulizer solution 0.083% 2.5 mg/3mL, 2.5 mg, Nebulization, Q6H PRN, Teddy Martínez II, MD  •  aluminum-magnesium hydroxide-simethicone (MAALOX MAX) 400-400-40 MG/5ML suspension 15 mL, 15 mL, Oral, Q6H PRN, Teddy Martínez II, MD  •  atorvastatin (LIPITOR) tablet 20 mg, 20 mg, Oral, Nightly, Teddy Martínez II, MD  •  budesonide-formoterol (SYMBICORT) 80-4.5 MCG/ACT inhaler 2 puff, 2 puff, Inhalation, BID, Teddy Martínez II, MD  •  cephalexin (KEFLEX) capsule 500 mg, 500 mg, Oral, Q12H, Gianni Kong, SAMUEL  •  cloNIDine (CATAPRES) tablet 0.1 mg, 0.1 mg, Oral, Q4H PRN, Teddy Martínez II, MD  •  docusate sodium (COLACE) capsule 100 mg, 100 mg, Oral, BID, Teddy Martínez II, MD, 100 mg at 10/02/20 1032  •  famotidine (PEPCID) tablet 20 mg, 20 mg, Oral, BID, Teddy Martínez II, MD, 20 mg at 10/02/20 1031  •  gabapentin (NEURONTIN) capsule 400  mg, 400 mg, Oral, TID, Teddy Martínez II, MD, 400 mg at 10/02/20 1032  •  HYDROcodone-acetaminophen (NORCO)  MG per tablet 1 tablet, 1 tablet, Oral, Q8H PRN, Teddy Martínez II, MD, 1 tablet at 10/02/20 1041  •  hydrOXYzine pamoate (VISTARIL) capsule 50 mg, 50 mg, Oral, Q6H PRN, Teddy Martínez II, MD, 50 mg at 10/01/20 2237  •  linagliptin (TRADJENTA) tablet 5 mg, 5 mg, Oral, Daily, Teddy Martínez II, MD, 5 mg at 10/02/20 1031  •  lisinopril (PRINIVIL,ZESTRIL) 20 mg, hydroCHLOROthiazide (HYDRODIURIL) 12.5 mg for ZESTORETIC 20-12.5, , Oral, Q24H, Teddy Martínez II, MD  •  loperamide (IMODIUM) capsule 2 mg, 2 mg, Oral, Q2H PRN, Teddy Martínez II, MD  •  LORazepam (ATIVAN) tablet 0.5 mg, 0.5 mg, Oral, Q2H PRN **OR** LORazepam (ATIVAN) tablet 1 mg, 1 mg, Oral, Q1H PRN **OR** LORazepam (ATIVAN) injection 1 mg, 1 mg, Intramuscular, Q15 Min PRN, Teddy Martínez II, MD  •  magnesium hydroxide (MILK OF MAGNESIA) suspension 2400 mg/10mL 10 mL, 10 mL, Oral, Daily PRN, Teddy Martínez II, MD  •  tamsulosin (FLOMAX) 24 hr capsule 0.4 mg, 0.4 mg, Oral, Daily, Teddy Martínez II, MD, 0.4 mg at 10/02/20 1031  •  traZODone (DESYREL) tablet 50 mg, 50 mg, Oral, Nightly PRN, Teddy Martínez II, MD    Review of Systems:  Review of Systems   Constitutional: Negative for appetite change, chills, fatigue and fever.   HENT: Negative for congestion.    Eyes: Negative for visual disturbance.   Respiratory: Negative for cough, chest tightness, shortness of breath and wheezing.    Cardiovascular: Negative for chest pain, palpitations and leg swelling.   Gastrointestinal: Negative for abdominal distention, abdominal pain, diarrhea, nausea and vomiting.   Genitourinary: Positive for difficulty urinating and dysuria.   Musculoskeletal: Positive for arthralgias and back pain. Negative for myalgias and neck pain.   Skin: Negative for rash and wound.   Neurological: Negative for  dizziness, syncope, weakness, light-headedness, numbness and headaches.   All other systems reviewed and are negative.     Otherwise complete ROS is negative except as mentioned above.    Physical Exam:   Temp:  [97.3 °F (36.3 °C)-98.2 °F (36.8 °C)] 98 °F (36.7 °C)  Heart Rate:  [67-97] 73  Resp:  [16-20] 18  BP: (141-188)/() 143/78  Physical Exam  Vitals signs reviewed.   Constitutional:       General: He is not in acute distress.     Appearance: He is well-developed. He is not diaphoretic.   HENT:      Head: Normocephalic and atraumatic.   Eyes:      Conjunctiva/sclera: Conjunctivae normal.   Neck:      Musculoskeletal: Normal range of motion and neck supple.   Cardiovascular:      Rate and Rhythm: Normal rate and regular rhythm.      Heart sounds: No murmur. No friction rub. No gallop.    Pulmonary:      Effort: Pulmonary effort is normal. No respiratory distress.      Breath sounds: Normal breath sounds. No wheezing or rales.   Chest:      Chest wall: No tenderness.   Abdominal:      General: Bowel sounds are normal. There is no distension.      Palpations: Abdomen is soft.      Tenderness: There is no abdominal tenderness.   Musculoskeletal: Normal range of motion.         General: No deformity.   Skin:     General: Skin is warm and dry.      Findings: No erythema.   Neurological:      General: No focal deficit present.      Mental Status: He is alert and oriented to person, place, and time.      Cranial Nerves: Cranial nerves are intact.      Sensory: Sensation is intact.      Motor: Motor function is intact.      Coordination: Coordination is intact.      Comments: CN I: Sense of smell intact  CN II: Visual fields intact  CN III,IV,VI: extraocular movements intact  CN V: Masseter strength and sensation in all three divisions intact  CN VII: Smile and eyelid closure symmetrical  CN VIII: Hearing intact  CN IX and X: Voice and palate movement intact  CN XI: Shoulder shrug intact  CN XII: Tongue protrusion  and movement intact             Results Reviewed:  I have personally reviewed current lab, radiology, and data and agree with results.  Lab Results (last 24 hours)     Procedure Component Value Units Date/Time    Glucose, Fasting [[id number]]  (Abnormal) Collected: 10/02/20 0557    Specimen: Blood Updated: 10/02/20 0642     Glucose, Fasting 173 mg/dL     Lipid Panel [[id number]]  (Abnormal) Collected: 10/02/20 0557    Specimen: Blood Updated: 10/02/20 0642     Total Cholesterol 177 mg/dL      Triglycerides 111 mg/dL      HDL Cholesterol 47 mg/dL      LDL Cholesterol  108 mg/dL      VLDL Cholesterol 22.2 mg/dL      LDL/HDL Ratio 2.29    Narrative:      Cholesterol Reference Ranges  (U.S. Department of Health and Human Services ATP III Classifications)    Desirable          <200 mg/dL  Borderline High    200-239 mg/dL  High Risk          >240 mg/dL      Triglyceride Reference Ranges  (U.S. Department of Health and Human Services ATP III Classifications)    Normal           <150 mg/dL  Borderline High  150-199 mg/dL  High             200-499 mg/dL  Very High        >500 mg/dL    HDL Reference Ranges  (U.S. Department of Health and Human Services ATP III Classifcations)    Low     <40 mg/dl (major risk factor for CHD)  High    >60 mg/dl ('negative' risk factor for CHD)        LDL Reference Ranges  (U.S. Department of Health and Human Services ATP III Classifcations)    Optimal          <100 mg/dL  Near Optimal     100-129 mg/dL  Borderline High  130-159 mg/dL  High             160-189 mg/dL  Very High        >189 mg/dL        Imaging Results (Last 24 Hours)     ** No results found for the last 24 hours. **          Assessment:    Hallucinations    Essential hypertension    Gastroesophageal reflux disease without esophagitis    Type 2 diabetes mellitus without complication, without long-term current use of insulin (CMS/[facility])    Hyperlipidemia    BPH without obstruction/lower urinary tract symptoms    Acute cystitis without  hematuria    Bladder wall thickening    Nasopharyngeal mass            Recommendations:  1. Psychiatric management per primary team  2. Keflex 500 mg PO BID for 7 days  3. Renal ultrasound, urine cytology, PSA, Urology consultation  4. Flomax  5. BP control: lisinopril, HCTZ  6. Glucose control: tradjenta, Novolog SSI  7. Discussed nasopharyngeal finding with on call ENT.  Will arrange office referral for direct visualization by nasopharyngoscope.    Will continue to follow.    I discussed the patients findings and my recommendations with: Dr. Tanya Kong, APRN  10/02/20  13:55 CDT

## 2020-10-03 PROBLEM — F33.3 MDD (MAJOR DEPRESSIVE DISORDER), RECURRENT, SEVERE, WITH PSYCHOSIS: Status: ACTIVE | Noted: 2020-10-03

## 2020-10-03 PROBLEM — F31.60 BIPOLAR AFFECTIVE DISORDER, MIXED: Status: ACTIVE | Noted: 2020-10-03

## 2020-10-03 PROBLEM — F23 ACUTE PSYCHOSIS: Status: ACTIVE | Noted: 2020-10-03

## 2020-10-03 LAB
GLUCOSE BLDC GLUCOMTR-MCNC: 164 MG/DL (ref 70–130)
GLUCOSE BLDC GLUCOMTR-MCNC: 171 MG/DL (ref 70–130)
GLUCOSE BLDC GLUCOMTR-MCNC: 184 MG/DL (ref 70–130)

## 2020-10-03 PROCEDURE — 82962 GLUCOSE BLOOD TEST: CPT

## 2020-10-03 PROCEDURE — 63710000001 INSULIN ASPART PER 5 UNITS: Performed by: NURSE PRACTITIONER

## 2020-10-03 PROCEDURE — 94799 UNLISTED PULMONARY SVC/PX: CPT

## 2020-10-03 PROCEDURE — 94760 N-INVAS EAR/PLS OXIMETRY 1: CPT

## 2020-10-03 PROCEDURE — 99232 SBSQ HOSP IP/OBS MODERATE 35: CPT | Performed by: PSYCHIATRY & NEUROLOGY

## 2020-10-03 RX ORDER — RISPERIDONE 0.25 MG/1
0.25 TABLET ORAL DAILY
Status: DISCONTINUED | OUTPATIENT
Start: 2020-10-03 | End: 2020-10-04

## 2020-10-03 RX ORDER — RISPERIDONE 1 MG/1
1 TABLET ORAL NIGHTLY
Status: DISCONTINUED | OUTPATIENT
Start: 2020-10-03 | End: 2020-10-04

## 2020-10-03 RX ADMIN — DOCUSATE SODIUM 100 MG: 100 CAPSULE, LIQUID FILLED ORAL at 08:21

## 2020-10-03 RX ADMIN — INSULIN ASPART 2 UNITS: 100 INJECTION, SOLUTION INTRAVENOUS; SUBCUTANEOUS at 16:47

## 2020-10-03 RX ADMIN — GABAPENTIN 400 MG: 400 CAPSULE ORAL at 20:17

## 2020-10-03 RX ADMIN — CEPHALEXIN 500 MG: 500 CAPSULE ORAL at 20:18

## 2020-10-03 RX ADMIN — DOCUSATE SODIUM 100 MG: 100 CAPSULE, LIQUID FILLED ORAL at 20:17

## 2020-10-03 RX ADMIN — GABAPENTIN 400 MG: 400 CAPSULE ORAL at 16:47

## 2020-10-03 RX ADMIN — INSULIN ASPART 2 UNITS: 100 INJECTION, SOLUTION INTRAVENOUS; SUBCUTANEOUS at 12:13

## 2020-10-03 RX ADMIN — FAMOTIDINE 20 MG: 20 TABLET, FILM COATED ORAL at 08:21

## 2020-10-03 RX ADMIN — BUDESONIDE AND FORMOTEROL FUMARATE DIHYDRATE 2 PUFF: 80; 4.5 AEROSOL RESPIRATORY (INHALATION) at 07:06

## 2020-10-03 RX ADMIN — BUDESONIDE AND FORMOTEROL FUMARATE DIHYDRATE 2 PUFF: 80; 4.5 AEROSOL RESPIRATORY (INHALATION) at 20:25

## 2020-10-03 RX ADMIN — TRAZODONE HYDROCHLORIDE 50 MG: 50 TABLET ORAL at 20:57

## 2020-10-03 RX ADMIN — GABAPENTIN 400 MG: 400 CAPSULE ORAL at 08:21

## 2020-10-03 RX ADMIN — ATORVASTATIN CALCIUM 20 MG: 20 TABLET, FILM COATED ORAL at 20:17

## 2020-10-03 RX ADMIN — LINAGLIPTIN 5 MG: 5 TABLET, FILM COATED ORAL at 08:21

## 2020-10-03 RX ADMIN — RISPERIDONE 1 MG: 1 TABLET ORAL at 20:18

## 2020-10-03 RX ADMIN — CEPHALEXIN 500 MG: 500 CAPSULE ORAL at 08:21

## 2020-10-03 RX ADMIN — HYDROCHLOROTHIAZIDE: 12.5 TABLET ORAL at 08:20

## 2020-10-03 RX ADMIN — HYDROCODONE BITARTRATE AND ACETAMINOPHEN 1 TABLET: 10; 325 TABLET ORAL at 02:46

## 2020-10-03 RX ADMIN — HYDROCODONE BITARTRATE AND ACETAMINOPHEN 1 TABLET: 10; 325 TABLET ORAL at 10:35

## 2020-10-03 RX ADMIN — RISPERIDONE 0.25 MG: 0.25 TABLET ORAL at 12:13

## 2020-10-03 RX ADMIN — FAMOTIDINE 20 MG: 20 TABLET, FILM COATED ORAL at 20:17

## 2020-10-03 RX ADMIN — TAMSULOSIN HYDROCHLORIDE 0.4 MG: 0.4 CAPSULE ORAL at 08:21

## 2020-10-03 RX ADMIN — HYDROCODONE BITARTRATE AND ACETAMINOPHEN 1 TABLET: 10; 325 TABLET ORAL at 18:37

## 2020-10-03 NOTE — PROGRESS NOTES
[facility] Medicine Services  INPATIENT PROGRESS NOTE    Length of Stay: 2  Date of Admission: 10/1/2020  Primary Care Physician: Cyndie Young APRN    Subjective   Chief Complaint: Medical co-management, hallucinations  HPI:  65 year old male with a history of HTN, DMII, chronic back pain, HLD, and BPH who is admitted to U for hallucinations.  Evaluation in the ED revealed bladder wall thickening and bladder diverticula.  Neoplasm could not be ruled out and clinical correlation is recommended.  Also noted was asymmetric right nasopharynx soft tissue prominence and mass could not be excluded.  Direct visualization was recommended. UA was consistent with UTI and culture grew coagulase negative staph. He was initiated on Keflex.  Urology consulted and recommended UTI treatment, repeat imaging, and outpatient follow-up for likely cystoscopy.     Review of Systems   Constitutional: Negative for chills and fever.   Respiratory: Negative for shortness of breath.    Cardiovascular: Negative for chest pain.   Gastrointestinal: Negative for abdominal pain, nausea and vomiting.        All pertinent negatives and positives are as above. All other systems have been reviewed and are negative unless otherwise stated.     Objective    Temp:  [97.6 °F (36.4 °C)-98 °F (36.7 °C)] 97.6 °F (36.4 °C)  Heart Rate:  [78-82] 78  Resp:  [17-18] 17  BP: (110-147)/(68-81) 110/68    Physical Exam  Constitutional:       Appearance: Normal appearance.   HENT:      Head: Normocephalic and atraumatic.   Cardiovascular:      Rate and Rhythm: Normal rate and regular rhythm.   Pulmonary:      Effort: Pulmonary effort is normal.      Breath sounds: Normal breath sounds.   Abdominal:      General: There is no distension.      Palpations: Abdomen is soft.   Musculoskeletal:         General: No swelling or tenderness.   Skin:     General: Skin is warm and dry.   Neurological:      General: No focal  deficit present.      Mental Status: He is alert and oriented to person, place, and time.             Results Review:  I have reviewed the labs, radiology results, and diagnostic studies.    Laboratory Data:   Results from last 7 days   Lab Units 10/01/20  1412   SODIUM mmol/L 139   POTASSIUM mmol/L 3.6   CHLORIDE mmol/L 108*   CO2 mmol/L 21.0*   BUN mg/dL 13   CREATININE mg/dL 0.81   GLUCOSE mg/dL 178*   CALCIUM mg/dL 9.8   BILIRUBIN mg/dL 0.4   ALK PHOS U/L 105   ALT (SGPT) U/L 11   AST (SGOT) U/L 17   ANION GAP mmol/L 10.0     Estimated Creatinine Clearance: 104.6 mL/min (by C-G formula based on SCr of 0.81 mg/dL).  Results from last 7 days   Lab Units 10/01/20  1412   MAGNESIUM mg/dL 1.4*         Results from last 7 days   Lab Units 10/01/20  1412   WBC 10*3/mm3 9.52   HEMOGLOBIN g/dL 13.8   HEMATOCRIT % 39.9   PLATELETS 10*3/mm3 332     Results from last 7 days   Lab Units 10/01/20  1412   INR  0.93       Culture Data:   No results found for: BLOODCX  Urine Culture   Date Value Ref Range Status   10/01/2020 >100,000 CFU/mL Staphylococcus, coagulase negative (A)  Final     Comment:     Call if work-up needed.     No results found for: RESPCX  No results found for: WOUNDCX  No results found for: STOOLCX  No components found for: BODYFLD    Radiology Data:   Imaging Results (Last 24 Hours)     Procedure Component Value Units Date/Time    US Renal Bilateral [[id number]] Collected: 10/02/20 1608     Updated: 10/02/20 1641    Narrative:      Ultrasound renal complete    HISTORY:  Bladder thickening    Ultrasound examination of the kidneys and urinary bladder was  performed.    COMPARISON: None. Correlation CT October 1, 2020.    FINDINGS:  The right kidney measures 12.99 cm in length by 6.80 cm x 6.13 cm  transverse.  The left kidney measures 10.82 cm in length by 6.22 cm x 5.40 cm  transverse.  The kidneys are of normal echotexture.  No hydronephrosis.  2.9 cm cyst mid right kidney.  No solid renal mass.    1.7 cm  bladder diverticulum arising from the dome of the bladder  on the right.  Circumferential thickening urinary bladder wall which may be  related to lack of distention, cystitis, outlet obstruction or  less likely neoplasm.  The prostate is minimally enlarged.      Impression:      CONCLUSION:  2.9 cm cyst mid right kidney.  1.7 cm bladder diverticulum arising from the dome of the bladder  on the right.  Circumferential thickening urinary bladder wall which may be  related to lack of distention, cystitis, outlet obstruction or  less likely neoplasm.  The prostate is minimally enlarged.    [id number]    Electronically signed by:  Ino Wilhelm MD  10/2/2020 4:40 PM CDT  Workstation: [id number]          I have reviewed the patient's current medications.     Assessment/Plan     Active Hospital Problems    Diagnosis   • **Hallucinations   • BPH without obstruction/lower urinary tract symptoms   • Acute cystitis without hematuria     Coag negative staph     • Bladder wall thickening   • Nasopharyngeal mass   • Hyperlipidemia   • Type 2 diabetes mellitus without complication, without long-term current use of insulin (CMS/[facility])   • Gastroesophageal reflux disease without esophagitis   • Essential hypertension       Plan:   Psychiatric management per primary team  Keflex 500 mg PO BID for 7 days  Flomax  BP control: lisinopril, HCTZ  Glucose control: tradjenta, Novolog SSI  Referral sent for ENT at discharge  Outpatient Urology follow-up    Will sign off.  Please do not hesitate to call with questions or changes in the patients condition. Thank you.        This document has been electronically signed by SAMUEL Crespo on October 3, 2020 12:04 CDT

## 2020-10-03 NOTE — PLAN OF CARE
Goal Outcome Evaluation:  Plan of Care Reviewed With: patient  Progress: improving  Outcome Summary: Pt has been calm and cooperative this shift. Denies adarsh, aramis, hi

## 2020-10-03 NOTE — PLAN OF CARE
"  Problem: Adult Behavioral Health Plan of Care  Goal: Plan of Care Review  Outcome: Ongoing, Progressing  Flowsheets  Taken 10/3/2020 0353 by Jasmin Almaguer, RN  Outcome Summary: :Pt is alert, oriented to person and place.  Pt states he feels much better after talking to the Doctor today and pt states, \"I let everything out and I feel so much better.\" Compliant and cooperative. Will continue to monitor and provide a safe environment. Urine sample sent to lab. Pt denies SI/HI/AVH anxiety and depression. Vss. Will continue to monitor  Taken 10/2/2020 2006 by Jasmin Almaguer, RN  Plan of Care Reviewed With: patient  Patient Agreement with Plan of Care: agrees  Taken 10/2/2020 1228 by Melina Perez, RN  Progress: no change    "

## 2020-10-03 NOTE — NURSING NOTE
Behavior   Note any precipitants to event or behavior   Describe level and action of any aggressive behavior or speech and associated interventions.     Anxiety: Patient denies at this time  Depression: Patient denies at this time  Pain  5  AVH   denies  S/I   no  Plan  no  H/I   no  Plan  no    Affect   euthymic/normal      Note: Pt seen in the common area. He is greeting staff with a smile and is pleasant to others. He denies and hallucinations. He is taking meds as ordered.       Intervention    PRN medication utilized:  no    Instructed in medication usage and effects  Medications administered as ordered  Encouraged to verbalize needs      Response    Verbalized understanding   Did patient take medications as ordered yes   Did patient interact with assessment?  yes     Plan    Will monitor for safety  Will monitor every 15 minutes as ordered  Will evaluate and promote the plan of care    Last BM:  unknown date  (Please chart in I/O as well)

## 2020-10-03 NOTE — PROGRESS NOTES
Psychiatry Progress Note   10/3/2020      HD: #2  Legal: Vol    Chief Complaint: Gross Psychosis      Subjective --  Mr. Mikey Allison is a 65 y.o. male who was seen on the Adult unit.    Patient remains disorganized and psychotic.  Noted ongoing affect ability, tearful at times inappropriate.  It is impairing constant.  No adverse effects of medicines.  No headache or stomachache or muscle aches.        Objective   Objective --      Vital Signs:  Temp:  [97.6 °F (36.4 °C)-98 °F (36.7 °C)] 97.6 °F (36.4 °C)  Heart Rate:  [78-82] 78  Resp:  [17-18] 17  BP: (110-147)/(68-81) 110/68    Patient Vitals for the past 24 hrs:   BP Temp Temp src Pulse Resp SpO2   10/03/20 0706 110/68 97.6 °F (36.4 °C) Tympanic 78 17 98 %   10/02/20 2158 -- -- -- 80 18 98 %   10/02/20 2027 147/81 98 °F (36.7 °C) Tympanic 82 18 99 %         Physical Exam:   -General Appearance:  normal general appearance and in no apparent distress  -Hygiene:  Limited   -Gait & Station:  Blank multiple: Normal  -Musculoskeletal:  No tremors or abnormal involuntary movements and No Cog [place] or Rigidity and No atrophy noted  -Pulm: unlaboured     Mental Status Exam:   --Cooperation:  Cooperative  --Eye Contact:  Non-sustained  --Psychomotor Behavior:  Slow  --Mood:  Sad/Depressed and Anxious/Nervous  --Affect:  blunted, mood-congruent and dysphoric  --Speech:  Slow and Soft  --Thought Process:  Sluggish and Disorganized  --Associations: Goal Directed and Circumstantial  --Themes:  Worthlessness, Helplessness, Hopelessness and Failure  --Thought Content:                --Mood congruent              --Suicidal:  Suicidal Ideation               --Homicidal:  Denies              --Hallucinations:  Cannot rule out              --Delusion:  Cannot rule out Paranoia  --Cognitive Functioning:  -Consciousness:  awake and alert  -Orientation:  Person, Place and Time  -Attention:  Distractible             -Concentration:  Distractible  -Language:  Average based  on interaction; Intact  -Vocabulary:  Below Average based on interaction; Intact  -Short Term Memory: Deficits  -Long Term Memory: Deficits  -Fund of Knowledge:  Below Average based on interaction  -Abstraction:  [place] and Poor  --Reliability:  impaired  --Insight:  Impaired  --Judgment:  Impaired  --Impulse Control:  Impaired    Lab Results (last 24 hours)     Procedure Component Value Units Date/Time    POC Glucose Once [[id number]]  (Abnormal) Collected: 10/03/20 1639    Specimen: Blood Updated: 10/03/20 1651     Glucose 171 mg/dL     POC Glucose Once [[id number]]  (Abnormal) Collected: 10/03/20 1110    Specimen: Blood Updated: 10/03/20 1155     Glucose 184 mg/dL     POC Glucose Once [[id number]]  (Abnormal) Collected: 10/03/20 0628    Specimen: Blood Updated: 10/03/20 0647     Glucose 164 mg/dL     Cytology, Urine [[id number]] Collected: 10/02/20 2201    Specimen: Urine, Clean Catch Updated: 10/02/20 2202    PSA DIAGNOSTIC [[id number]]  (Normal) Collected: 10/02/20 0557    Specimen: Blood Updated: 10/02/20 2036     PSA 0.144 ng/mL     Narrative:      Results may be falsely decreased if patient taking Biotin.      POC Glucose Once [[id number]]  (Normal) Collected: 10/02/20 1936    Specimen: Blood Updated: 10/02/20 1959     Glucose 125 mg/dL           Imaging Results (Last 24 Hours)     ** No results found for the last 24 hours. **            Medications:   Scheduled Meds:atorvastatin, 20 mg, Oral, Nightly  budesonide-formoterol, 2 puff, Inhalation, BID  cephalexin, 500 mg, Oral, Q12H  docusate sodium, 100 mg, Oral, BID  famotidine, 20 mg, Oral, BID  gabapentin, 400 mg, Oral, TID  insulin aspart, 0-9 Units, Subcutaneous, TID AC  linagliptin, 5 mg, Oral, Daily  lisinopril-hydroCHLOROthiazide (ZESTORTIC) 20-12.5 mg combo dose, , Oral, Q24H  risperiDONE, 0.25 mg, Oral, Daily    And  risperiDONE, 1 mg, Oral, Nightly  tamsulosin, 0.4 mg, Oral, Daily      Continuous Infusions:   PRN Meds:.•  acetaminophen  •  albuterol  •   aluminum-magnesium hydroxide-simethicone  •  cloNIDine  •  dextrose  •  dextrose  •  glucagon (human recombinant)  •  HYDROcodone-acetaminophen  •  hydrOXYzine pamoate  •  loperamide  •  LORazepam **OR** LORazepam **OR** LORazepam  •  magnesium hydroxide  •  traZODone      Assessment:     Acute psychosis (CMS/HCC)    Essential hypertension    Gastroesophageal reflux disease without esophagitis    Type 2 diabetes mellitus without complication, without long-term current use of insulin (CMS/HCC)    Hyperlipidemia    Hallucinations    BPH without obstruction/lower urinary tract symptoms    Acute cystitis without hematuria    Bladder wall thickening    Nasopharyngeal mass    Rule Out / In Bipolar affective disorder, mixed     Rule In / Out MDD (major depressive disorder), recurrent, severe, with psychosis             --> IMPRESSION: Ongoing psychosis, will plan to further optimize treatment.      Treatment Plan:  1) Will continue care for the patient on the behavioral health unit at [facility] to ensure patient safety given dermatology.    2) Will continue to provide treatment with the unit milieu, activities, therapies and psychopharmacological management.    3) Patient to be placed on or continued on  Q15 minute checks  and Suicide precautions.    4) Pertinent Concurrent Non-Psychiatric Medical Issues:   --UTI: Cont  Keflex 500 mg PO BID for 7 days  --Bladder wall findings: Renal ultrasound, urine cytology, PSA, Urology consultation  --BPH: Cont Flomax  --HTN/BP control: cont lisinopril, HCTZ; Catapres PRN  --T2DM/Glucose control: cont tradjenta, Novolog SSI  --> NP Dilan discussed nasopharyngeal finding with on call ENT.  Will arrange office referral for direct     5) Will order following labs: none    6) Behavioral Health Treatment Planning:  --Risperdal 1 mg qHS for psychosis  --Add daytime dose for disorganization and psychosis    7) Psychotherapy provided: Supportive and CBT/cognitive for  <15  minutes to target To Build and Strengthen Rapport.     --> Dispostion Planning: To be determined after further improvement likely in the next 6 to 9 days    Treatment plan and medication risks and benefits discussed with: Patient and Treatment Team.    Teddy Martínez II, MD  10/03/20 @ 17:58 CDT  Dictated using Dragon.

## 2020-10-03 NOTE — NURSING NOTE
"Behavior   Note any precipitants to event or behavior   Describe level and action of any aggressive behavior or speech and associated interventions.     Anxiety: Patient denies at this time  Depression: Pt denies  Pain  0  AVH   no  S/I   no  Plan  no  H/I   no  Plan  no    Affect   flat      Note:Pt is alert, oriented to person and place.  Pt states he feels much better after talking to the Doctor today and pt states, \"I let everything out and I feel so much better.\" Compliant and cooperative. Will continue to monitor and provide a safe environment. Urine sample sent to lab. Pt denies SI/HI/AVH anxiety and depression. Vss. Will continue to monitor        Intervention    PRN medication utilized:  yes - Norco 10 mg     Instructed in medication usage and effects  Medications administered as ordered  Encouraged to verbalize needs      Response    Verbalized understanding   Did patient take medications as ordered yes   Did patient interact with assessment?  yes     Plan    Will monitor for safety  Will monitor every 15 minutes as ordered  Will evaluate and promote the plan of care    Last BM:  unknown date  (Please chart in I/O as well)  "

## 2020-10-04 LAB
25(OH)D3 SERPL-MCNC: 22.5 NG/ML (ref 30–100)
FOLATE SERPL-MCNC: 13.6 NG/ML (ref 4.78–24.2)
GLUCOSE BLDC GLUCOMTR-MCNC: 125 MG/DL (ref 70–130)
GLUCOSE BLDC GLUCOMTR-MCNC: 145 MG/DL (ref 70–130)
GLUCOSE BLDC GLUCOMTR-MCNC: 153 MG/DL (ref 70–130)
GLUCOSE BLDC GLUCOMTR-MCNC: 164 MG/DL (ref 70–130)
GLUCOSE BLDC GLUCOMTR-MCNC: 193 MG/DL (ref 70–130)
HIV1+2 AB SER QL: NORMAL
TSH SERPL DL<=0.05 MIU/L-ACNC: 4.29 UIU/ML (ref 0.27–4.2)
VIT B12 BLD-MCNC: 238 PG/ML (ref 211–946)

## 2020-10-04 PROCEDURE — 63710000001 INSULIN ASPART PER 5 UNITS: Performed by: NURSE PRACTITIONER

## 2020-10-04 PROCEDURE — 84443 ASSAY THYROID STIM HORMONE: CPT | Performed by: PSYCHIATRY & NEUROLOGY

## 2020-10-04 PROCEDURE — 82746 ASSAY OF FOLIC ACID SERUM: CPT | Performed by: PSYCHIATRY & NEUROLOGY

## 2020-10-04 PROCEDURE — G0432 EIA HIV-1/HIV-2 SCREEN: HCPCS | Performed by: PSYCHIATRY & NEUROLOGY

## 2020-10-04 PROCEDURE — 82607 VITAMIN B-12: CPT | Performed by: PSYCHIATRY & NEUROLOGY

## 2020-10-04 PROCEDURE — 86592 SYPHILIS TEST NON-TREP QUAL: CPT | Performed by: PSYCHIATRY & NEUROLOGY

## 2020-10-04 PROCEDURE — 82962 GLUCOSE BLOOD TEST: CPT

## 2020-10-04 PROCEDURE — 82306 VITAMIN D 25 HYDROXY: CPT | Performed by: PSYCHIATRY & NEUROLOGY

## 2020-10-04 PROCEDURE — 94760 N-INVAS EAR/PLS OXIMETRY 1: CPT

## 2020-10-04 PROCEDURE — 94799 UNLISTED PULMONARY SVC/PX: CPT

## 2020-10-04 PROCEDURE — 99232 SBSQ HOSP IP/OBS MODERATE 35: CPT | Performed by: PSYCHIATRY & NEUROLOGY

## 2020-10-04 RX ORDER — METHOCARBAMOL 750 MG/1
750 TABLET, FILM COATED ORAL EVERY 8 HOURS PRN
Status: DISCONTINUED | OUTPATIENT
Start: 2020-10-04 | End: 2020-10-07 | Stop reason: HOSPADM

## 2020-10-04 RX ORDER — LANOLIN ALCOHOL/MO/W.PET/CERES
0.75 CREAM (GRAM) TOPICAL NIGHTLY
Status: DISCONTINUED | OUTPATIENT
Start: 2020-10-04 | End: 2020-10-07 | Stop reason: HOSPADM

## 2020-10-04 RX ORDER — RISPERIDONE 0.5 MG/1
0.5 TABLET ORAL DAILY
Status: DISCONTINUED | OUTPATIENT
Start: 2020-10-05 | End: 2020-10-05

## 2020-10-04 RX ORDER — GABAPENTIN 300 MG/1
600 CAPSULE ORAL 3 TIMES DAILY
Status: DISCONTINUED | OUTPATIENT
Start: 2020-10-04 | End: 2020-10-07 | Stop reason: HOSPADM

## 2020-10-04 RX ORDER — RISPERIDONE 1 MG/1
2 TABLET ORAL NIGHTLY
Status: DISCONTINUED | OUTPATIENT
Start: 2020-10-04 | End: 2020-10-05

## 2020-10-04 RX ORDER — TRAZODONE HYDROCHLORIDE 100 MG/1
100 TABLET ORAL NIGHTLY PRN
Status: DISCONTINUED | OUTPATIENT
Start: 2020-10-04 | End: 2020-10-07 | Stop reason: HOSPADM

## 2020-10-04 RX ADMIN — DOCUSATE SODIUM 100 MG: 100 CAPSULE, LIQUID FILLED ORAL at 20:11

## 2020-10-04 RX ADMIN — GABAPENTIN 600 MG: 300 CAPSULE ORAL at 16:14

## 2020-10-04 RX ADMIN — FAMOTIDINE 20 MG: 20 TABLET, FILM COATED ORAL at 08:27

## 2020-10-04 RX ADMIN — RISPERIDONE 0.25 MG: 0.25 TABLET ORAL at 08:27

## 2020-10-04 RX ADMIN — BUDESONIDE AND FORMOTEROL FUMARATE DIHYDRATE 2 PUFF: 80; 4.5 AEROSOL RESPIRATORY (INHALATION) at 07:03

## 2020-10-04 RX ADMIN — HYDROCHLOROTHIAZIDE: 12.5 TABLET ORAL at 08:26

## 2020-10-04 RX ADMIN — CEPHALEXIN 500 MG: 500 CAPSULE ORAL at 08:27

## 2020-10-04 RX ADMIN — HYDROCODONE BITARTRATE AND ACETAMINOPHEN 1 TABLET: 10; 325 TABLET ORAL at 11:49

## 2020-10-04 RX ADMIN — INSULIN ASPART 2 UNITS: 100 INJECTION, SOLUTION INTRAVENOUS; SUBCUTANEOUS at 17:00

## 2020-10-04 RX ADMIN — MELATONIN 0.75 MG: 3 TAB ORAL at 20:11

## 2020-10-04 RX ADMIN — FAMOTIDINE 20 MG: 20 TABLET, FILM COATED ORAL at 20:10

## 2020-10-04 RX ADMIN — LINAGLIPTIN 5 MG: 5 TABLET, FILM COATED ORAL at 08:27

## 2020-10-04 RX ADMIN — GABAPENTIN 600 MG: 300 CAPSULE ORAL at 20:10

## 2020-10-04 RX ADMIN — HYDROCODONE BITARTRATE AND ACETAMINOPHEN 1 TABLET: 10; 325 TABLET ORAL at 02:36

## 2020-10-04 RX ADMIN — DOCUSATE SODIUM 100 MG: 100 CAPSULE, LIQUID FILLED ORAL at 08:27

## 2020-10-04 RX ADMIN — INSULIN ASPART 2 UNITS: 100 INJECTION, SOLUTION INTRAVENOUS; SUBCUTANEOUS at 12:13

## 2020-10-04 RX ADMIN — CEPHALEXIN 500 MG: 500 CAPSULE ORAL at 20:11

## 2020-10-04 RX ADMIN — TAMSULOSIN HYDROCHLORIDE 0.4 MG: 0.4 CAPSULE ORAL at 08:26

## 2020-10-04 RX ADMIN — RISPERIDONE 2 MG: 1 TABLET ORAL at 20:11

## 2020-10-04 RX ADMIN — HYDROCODONE BITARTRATE AND ACETAMINOPHEN 1 TABLET: 10; 325 TABLET ORAL at 20:12

## 2020-10-04 RX ADMIN — ATORVASTATIN CALCIUM 20 MG: 20 TABLET, FILM COATED ORAL at 20:11

## 2020-10-04 RX ADMIN — GABAPENTIN 400 MG: 400 CAPSULE ORAL at 08:27

## 2020-10-04 NOTE — PROGRESS NOTES
Psychiatry Progress Note   10/4/2020      HD: #3  Legal: Vol    Chief Complaint: Gross Psychosis      Subjective --  Mr. Mikey Allison is a 65 y.o. male who was seen on the Adult unit.      Focused on sleep & history of seroquel use.  We discussed is lack of efficacy for the patient and his [facility]  [facility].      He is less labile today, no overt periods of tearfulness.  Cognitive organization is improving, though is still somewhat hyperverbal.  Mildly intrusive.  Presenting a bit more as a mixed picture.    No headaches or stomachaches or muscle aches today.  Tolerating medication well.        Objective   Objective --      Vital Signs:  Temp:  [97 °F (36.1 °C)-97.3 °F (36.3 °C)] 97.3 °F (36.3 °C)  Heart Rate:  [68-73] 73  Resp:  [15-18] 18  BP: (112-157)/(57-69) 112/57    Patient Vitals for the past 24 hrs:   BP Temp Temp src Pulse Resp SpO2   10/04/20 0727 112/57 97.3 °F (36.3 °C) Tympanic 73 18 100 %   10/04/20 0703 -- 97.3 °F (36.3 °C) -- 72 15 98 %   10/03/20 2031 157/69 97 °F (36.1 °C) Tympanic 68 18 97 %   10/03/20 2025 -- -- -- 68 18 97 %         Physical Exam:   -General Appearance:  normal general appearance and in no apparent distress  -Hygiene:  Limited   -Gait & Station:  Blank multiple: Normal  -Musculoskeletal:  No tremors or abnormal involuntary movements and No Cog [place] or Rigidity and No atrophy noted  -Pulm: unlaboured     Mental Status Exam:   --Cooperation:  Cooperative  --Eye Contact:   More sustained  --Psychomotor Behavior:  Slow, improving  --Mood:  Sad/Depressed and Anxious/Nervous, improving  --Affect:  blunted, mood-congruent and dysphoric  --Speech:  Slow and Soft  --Thought Process:  Sluggish and Disorganized, improving  --Associations: Goal Directed and Circumstantial  --Themes:  Worthlessness  --Thought Content:                --Mood congruent              --Suicidal:   Denies              --Homicidal:  Denies              --Hallucinations:   Denies and none overt               --Delusion:  Cannot rule out  hyper religiosity  --Cognitive Functioning:  -Consciousness:  awake and alert  -Orientation:  Person, Place and Time  -Attention:  Distractible           , slowly improving  -Concentration:  Distractible  -Language:  Average based on interaction; Intact  -Vocabulary:  Below Average based on interaction; Intact  -Short Term Memory: Deficits  -Long Term Memory: Deficits  -Fund of Knowledge:  Below Average based on interaction  -Abstraction:  [place] and Poor  --Reliability:  impaired  --Insight:  Impaired  --Judgment:  Impaired, slowly improving  --Impulse Control:  Impaired    Lab Results (last 24 hours)     Procedure Component Value Units Date/Time    POC Glucose Once [[id number]]  (Abnormal) Collected: 10/04/20 1110    Specimen: Blood Updated: 10/04/20 1122     Glucose 164 mg/dL     HIV-1 & HIV-2 Antibodies [[id number]]  (Normal) Collected: 10/04/20 0548    Specimen: Blood Updated: 10/04/20 0657    Narrative:      The following orders were created for panel order HIV-1 & HIV-2 Antibodies.  Procedure                               Abnormality         Status                     ---------                               -----------         ------                     HIV-1 / O / 2 Ag / Antib...[[id number]]  Normal              Final result                 Please view results for these tests on the individual orders.    HIV-1 / O / 2 Ag / Antibody 4th Generation [[id number]]  (Normal) Collected: 10/04/20 0548    Specimen: Blood Updated: 10/04/20 0657     HIV-1/ HIV-2 Non-Reactive    Narrative:      The HIV antibody/antigen combo assay is a qualitative assay for HIV that includes the p24 antigen as well as antibodies to HIV types 1 and 2. This test is intended to be used as a screening assay in the diagnosis of HIV infection in patients over the age of 2.  Results may be falsely decreased if patient taking Biotin.      TSH [[id number]]  (Abnormal) Collected: 10/04/20 0548    Specimen: Blood  Updated: 10/04/20 0651     TSH 4.290 uIU/mL     Vitamin B12 [[id number]] Collected: 10/04/20 0548    Specimen: Blood Updated: 10/04/20 0617    Vitamin D 25 Hydroxy [[id number]] Collected: 10/04/20 0548    Specimen: Blood Updated: 10/04/20 0617    Folate [[id number]] Collected: 10/04/20 0548    Specimen: Blood Updated: 10/04/20 0617    RPR [[id number]] Collected: 10/04/20 0548    Specimen: Blood Updated: 10/04/20 0617    POC Glucose Once [[id number]]  (Abnormal) Collected: 10/04/20 0600    Specimen: Blood Updated: 10/04/20 0612     Glucose 145 mg/dL     POC Glucose Once [[id number]]  (Abnormal) Collected: 10/03/20 1920    Specimen: Blood Updated: 10/04/20 0342     Glucose 153 mg/dL     POC Glucose Once [[id number]]  (Abnormal) Collected: 10/03/20 1639    Specimen: Blood Updated: 10/03/20 1651     Glucose 171 mg/dL     POC Glucose Once [[id number]]  (Abnormal) Collected: 10/03/20 1110    Specimen: Blood Updated: 10/03/20 1155     Glucose 184 mg/dL           Imaging Results (Last 24 Hours)     ** No results found for the last 24 hours. **            Medications:   Scheduled Meds:atorvastatin, 20 mg, Oral, Nightly  budesonide-formoterol, 2 puff, Inhalation, BID  cephalexin, 500 mg, Oral, Q12H  docusate sodium, 100 mg, Oral, BID  famotidine, 20 mg, Oral, BID  gabapentin, 400 mg, Oral, TID  insulin aspart, 0-9 Units, Subcutaneous, TID AC  linagliptin, 5 mg, Oral, Daily  lisinopril-hydroCHLOROthiazide (ZESTORTIC) 20-12.5 mg combo dose, , Oral, Q24H  risperiDONE, 0.25 mg, Oral, Daily    And  risperiDONE, 1 mg, Oral, Nightly  tamsulosin, 0.4 mg, Oral, Daily      Continuous Infusions:   PRN Meds:.•  acetaminophen  •  albuterol  •  aluminum-magnesium hydroxide-simethicone  •  cloNIDine  •  dextrose  •  dextrose  •  glucagon (human recombinant)  •  HYDROcodone-acetaminophen  •  hydrOXYzine pamoate  •  loperamide  •  LORazepam **OR** LORazepam **OR** LORazepam  •  magnesium hydroxide  •  traZODone      Assessment:     Acute psychosis  (CMS/HCC)    Essential hypertension    Gastroesophageal reflux disease without esophagitis    Type 2 diabetes mellitus without complication, without long-term current use of insulin (CMS/HCC)    Hyperlipidemia    Hallucinations    BPH without obstruction/lower urinary tract symptoms    Acute cystitis without hematuria    Bladder wall thickening    Nasopharyngeal mass    Rule Out / In Bipolar affective disorder, mixed     Rule In / Out MDD (major depressive disorder), recurrent, severe, with psychosis             --> IMPRESSION: Ongoing psychosis, will plan to further optimize treatment.      Treatment Plan:  1) Will continue care for the patient on the behavioral health unit at [facility] to ensure patient safety given dermatology.    2) Will continue to provide treatment with the unit milieu, activities, therapies and psychopharmacological management.    3) Patient to be placed on or continued on  Q15 minute checks  and Suicide precautions.    4) Pertinent Concurrent Non-Psychiatric Medical Issues:   --UTI: Cont  Keflex 500 mg PO BID for 7 days  --Bladder wall findings: Renal ultrasound, urine cytology, PSA, Urology consultation  --BPH: Cont Flomax  --HTN/BP control: cont lisinopril, HCTZ; Catapres PRN  --T2DM/Glucose control: cont tradjenta, Novolog SSI  --> NP Dilan discussed nasopharyngeal finding with on call ENT.  Will arrange office referral for direct     5) Will order following labs: none    6) Behavioral Health Treatment Planning:  --Titrate Risperdal to 2 mg qHS for psychosis  --Titrate Risperdal daytime dose to 0.5mg qDay for disorganization and psychosis  -Consider PERKINS    --Titrate Neurontin to 600mg TID to outpt dosing, per pt request      7) Psychotherapy provided: Supportive and CBT/cognitive for <15  minutes to target To Build and Strengthen Rapport.     --> Dispostion Planning: To be determined after further improvement likely in the next 5-8 days    Treatment plan and  medication risks and benefits discussed with: Patient and Treatment Team.    Teddy Martínez II, MD  10/04/20 @ 11:28 CDT  Dictated using Dragon.

## 2020-10-04 NOTE — PLAN OF CARE
Goal Outcome Evaluation:  Plan of Care Reviewed With: patient  Progress: improving  Outcome Summary: Mikey has been calm and cooeprative this shift. He is forgetful at times. He remains focused on pain medications.

## 2020-10-04 NOTE — NURSING NOTE
Behavior   Note any precipitants to event or behavior   Describe level and action of any aggressive behavior or speech and associated interventions.     Anxiety: Patient denies at this time  Depression: Patient denies at this time  Pain  7  AVH   no  S/I   no  Plan  no  H/I   no  Plan  no    Affect   euthymic/normal      Note: Pt seen in his room this morning. He went back to bed after breakfast. He was reported to have been up several times asking if pain medication was due. He is focused on pain medication during the day as well. He is pleasant and cooperative with staff. He denies hallucinations. He is taking meds as ordered.       Intervention    PRN medication utilized:  no    Instructed in medication usage and effects  Medications administered as ordered  Encouraged to verbalize needs      Response    Verbalized understanding   Did patient take medications as ordered yes   Did patient interact with assessment?  yes     Plan    Will monitor for safety  Will monitor every 15 minutes as ordered  Will evaluate and promote the plan of care    Last BM:  unknown date  (Please chart in I/O as well)

## 2020-10-04 NOTE — PLAN OF CARE
Goal Outcome Evaluation:  Plan of Care Reviewed With: patient  Progress: improving  Outcome Summary: Mikey has slept off and on throughout the night with prn Norco 10 mg given per his request at 0236 with good results related to pain in his left ankle and pain in his right shoulder and hand. Tramadol given for sleep per his request with fair results.

## 2020-10-04 NOTE — NURSING NOTE
"Behavior   Note any precipitants to event or behavior   Describe level and action of any aggressive behavior or speech and associated interventions.     Anxiety: Patient denies at this time  Depression: Patient denies at this time  Pain  4  AVH   no  S/I   no  Plan  no  H/I   no  Plan  no    Affect   euthymic/normal      Note:Patient seen in common room and in own room. He is alert and oriented, stated\"doing so much better and the people out there said I look better, I finally opened up the the doctor and told him everything and I feel so much better\" He denies hallucination, able to make needs known. Will continue to monitor. He requested tramadol for sleep and asked if the doctor would write him a scrip for it when he leaves.       Intervention    PRN medication utilized:  yes - trazodone for sleep    Instructed in medication usage and effects  Medications administered as ordered  Encouraged to verbalize needs      Response    Verbalized understanding   Did patient take medications as ordered yes   Did patient interact with assessment?  yes     Plan    Will monitor for safety  Will monitor every 15 minutes as ordered  Will evaluate and promote the plan of care    Last BM:  unknown date  (Please chart in I/O as well)    "

## 2020-10-04 NOTE — PROGRESS NOTES
"   LOS: 3 days   Patient Care Team:  Cyndie Young APRN as PCP - General (Nurse Practitioner)  Michelle Carrillo MD as PCP - Claims Attributed  Prince Boone APRN as Nurse Practitioner (Orthopedic Surgery)  Yuri Amaral DPM as Consulting Physician (Podiatry)    Subjective     Subjective:  Symptoms:  Improved.  (Dysuria reduced, feels like he does have a slightly stronger stream but his chronic LUTS persist. ).    Diet:  No nausea or vomiting.    Pain:  He reports pain is unchanged.        History taken from: patient chart RN    Objective     Vital Signs  Temp:  [97 °F (36.1 °C)-97.3 °F (36.3 °C)] 97.3 °F (36.3 °C)  Heart Rate:  [68-73] 73  Resp:  [15-18] 18  BP: (112-157)/(57-69) 112/57    Objective:  General Appearance:  In no acute distress.    Vital signs: (most recent): Blood pressure 112/57, pulse 73, temperature 97.3 °F (36.3 °C), temperature source Tympanic, resp. rate 18, height 175.3 cm (69\"), weight 100 kg (221 lb), SpO2 100 %.  Vital signs are normal.  No fever.    Output: Producing urine.    Lungs:  Normal effort.  He is not in respiratory distress.    Chest: Symmetric chest wall expansion.   Abdomen: Abdomen is non-distended.    Neurological: Patient is alert and oriented to person, place and time.    Pupils:  Pupils are equal, round, and reactive to light.    Skin:  Warm and dry.              Results Review:    Lab Results (last 24 hours)     Procedure Component Value Units Date/Time    HIV-1 & HIV-2 Antibodies [[id number]]  (Normal) Collected: 10/04/20 0548    Specimen: Blood Updated: 10/04/20 0657    Narrative:      The following orders were created for panel order HIV-1 & HIV-2 Antibodies.  Procedure                               Abnormality         Status                     ---------                               -----------         ------                     HIV-1 / O / 2 Ag / Antib...[[id number]]  Normal              Final result                 Please view results for these tests on " the individual orders.    HIV-1 / O / 2 Ag / Antibody 4th Generation [[id number]]  (Normal) Collected: 10/04/20 0548    Specimen: Blood Updated: 10/04/20 0657     HIV-1/ HIV-2 Non-Reactive    Narrative:      The HIV antibody/antigen combo assay is a qualitative assay for HIV that includes the p24 antigen as well as antibodies to HIV types 1 and 2. This test is intended to be used as a screening assay in the diagnosis of HIV infection in patients over the age of 2.  Results may be falsely decreased if patient taking Biotin.      TSH [[id number]]  (Abnormal) Collected: 10/04/20 0548    Specimen: Blood Updated: 10/04/20 0651     TSH 4.290 uIU/mL     Vitamin B12 [[id number]] Collected: 10/04/20 0548    Specimen: Blood Updated: 10/04/20 0617    Vitamin D 25 Hydroxy [[id number]] Collected: 10/04/20 0548    Specimen: Blood Updated: 10/04/20 0617    Folate [[id number]] Collected: 10/04/20 0548    Specimen: Blood Updated: 10/04/20 0617    RPR [[id number]] Collected: 10/04/20 0548    Specimen: Blood Updated: 10/04/20 0617    POC Glucose Once [[id number]]  (Abnormal) Collected: 10/04/20 0600    Specimen: Blood Updated: 10/04/20 0612     Glucose 145 mg/dL     POC Glucose Once [[id number]]  (Abnormal) Collected: 10/03/20 1920    Specimen: Blood Updated: 10/04/20 0342     Glucose 153 mg/dL     POC Glucose Once [[id number]]  (Abnormal) Collected: 10/03/20 1639    Specimen: Blood Updated: 10/03/20 1651     Glucose 171 mg/dL     POC Glucose Once [[id number]]  (Abnormal) Collected: 10/03/20 1110    Specimen: Blood Updated: 10/03/20 1155     Glucose 184 mg/dL          Imaging Results (Last 24 Hours)     ** No results found for the last 24 hours. **           I reviewed the patient's new clinical results.  I reviewed the patient's new imaging results and agree with the interpretation.  I reviewed the patient's other test results and agree with the interpretation      Assessment/Plan       Acute psychosis (CMS/HCC)    Essential hypertension     Gastroesophageal reflux disease without esophagitis    Type 2 diabetes mellitus without complication, without long-term current use of insulin (CMS/[facility])    Hyperlipidemia    Hallucinations    BPH without obstruction/lower urinary tract symptoms    Acute cystitis without hematuria    Bladder wall thickening    Nasopharyngeal mass    Rule Out / In Bipolar affective disorder, mixed     Rule In / Out MDD (major depressive disorder), recurrent, severe, with psychosis       Assessment & Plan    Consulted to Urology for mild anterior bladder wall thickening and 1 cm left bladder wall diverticula in a partially decompressed bladder confirmed on 10/1/20 CT abdomen/pelvis wo contrast. He has history of BPH and urethral stricture. UA on 10/1/20 suggestive of cystitis, culture 10/1/20 Staph coag neg. On Keflex and seems to responding well, reduced dysuria, mildly stronger stream. His chronic LUTS of hesitancy, nocturia, straining to initiate flow persist.   -WBC 9.52  -Estimated Creatinine Clearance: 104.6 mL/min (by C-G formula based on SCr of 0.81 mg/dL).    PSAs:  -0.144 on 10/2/20  -0.178 on 3/21/19  -0.227 on 12/21/17    Plan:  Continue antibiotic, continue Flomax.   Continue UTI treatment.   Urine cytology is IP.   Plan cystoscopy outpatient.      I discussed the patient's findings and my recommendations with patient, nursing staff, consulting provider and SAMUEL Adair  10/04/20  10:51 CDT

## 2020-10-05 LAB
GLUCOSE BLDC GLUCOMTR-MCNC: 125 MG/DL (ref 70–130)
GLUCOSE BLDC GLUCOMTR-MCNC: 143 MG/DL (ref 70–130)
GLUCOSE BLDC GLUCOMTR-MCNC: 144 MG/DL (ref 70–130)
GLUCOSE BLDC GLUCOMTR-MCNC: 178 MG/DL (ref 70–130)

## 2020-10-05 PROCEDURE — 82962 GLUCOSE BLOOD TEST: CPT

## 2020-10-05 PROCEDURE — 94799 UNLISTED PULMONARY SVC/PX: CPT

## 2020-10-05 PROCEDURE — 99232 SBSQ HOSP IP/OBS MODERATE 35: CPT | Performed by: PSYCHIATRY & NEUROLOGY

## 2020-10-05 PROCEDURE — 94760 N-INVAS EAR/PLS OXIMETRY 1: CPT

## 2020-10-05 RX ORDER — IBUPROFEN 200 MG
TABLET ORAL 2 TIMES DAILY
Status: DISCONTINUED | OUTPATIENT
Start: 2020-10-05 | End: 2020-10-07 | Stop reason: HOSPADM

## 2020-10-05 RX ORDER — RISPERIDONE 0.5 MG/1
0.5 TABLET ORAL DAILY
Status: DISCONTINUED | OUTPATIENT
Start: 2020-10-06 | End: 2020-10-06

## 2020-10-05 RX ORDER — RISPERIDONE 1 MG/1
3 TABLET ORAL NIGHTLY
Status: DISCONTINUED | OUTPATIENT
Start: 2020-10-05 | End: 2020-10-07 | Stop reason: HOSPADM

## 2020-10-05 RX ADMIN — HYDROCODONE BITARTRATE AND ACETAMINOPHEN 1 TABLET: 10; 325 TABLET ORAL at 14:00

## 2020-10-05 RX ADMIN — TAMSULOSIN HYDROCHLORIDE 0.4 MG: 0.4 CAPSULE ORAL at 08:11

## 2020-10-05 RX ADMIN — GABAPENTIN 600 MG: 300 CAPSULE ORAL at 20:17

## 2020-10-05 RX ADMIN — BUDESONIDE AND FORMOTEROL FUMARATE DIHYDRATE 2 PUFF: 80; 4.5 AEROSOL RESPIRATORY (INHALATION) at 20:35

## 2020-10-05 RX ADMIN — GABAPENTIN 600 MG: 300 CAPSULE ORAL at 08:11

## 2020-10-05 RX ADMIN — HYDROCHLOROTHIAZIDE: 12.5 TABLET ORAL at 08:11

## 2020-10-05 RX ADMIN — METHOCARBAMOL 750 MG: 750 TABLET, FILM COATED ORAL at 16:43

## 2020-10-05 RX ADMIN — GABAPENTIN 600 MG: 300 CAPSULE ORAL at 16:43

## 2020-10-05 RX ADMIN — ATORVASTATIN CALCIUM 20 MG: 20 TABLET, FILM COATED ORAL at 20:17

## 2020-10-05 RX ADMIN — BUDESONIDE AND FORMOTEROL FUMARATE DIHYDRATE 2 PUFF: 80; 4.5 AEROSOL RESPIRATORY (INHALATION) at 10:28

## 2020-10-05 RX ADMIN — FAMOTIDINE 20 MG: 20 TABLET, FILM COATED ORAL at 08:11

## 2020-10-05 RX ADMIN — DOCUSATE SODIUM 100 MG: 100 CAPSULE, LIQUID FILLED ORAL at 20:17

## 2020-10-05 RX ADMIN — POLYMYXIN B SULFATE, BACITRACIN ZINC, NEOMYCIN SULFATE: 5000; 3.5; 4 OINTMENT TOPICAL at 20:26

## 2020-10-05 RX ADMIN — POLYMYXIN B SULFATE, BACITRACIN ZINC, NEOMYCIN SULFATE 1 APPLICATION: 5000; 3.5; 4 OINTMENT TOPICAL at 12:33

## 2020-10-05 RX ADMIN — CEPHALEXIN 500 MG: 500 CAPSULE ORAL at 08:11

## 2020-10-05 RX ADMIN — RISPERIDONE 0.5 MG: 0.5 TABLET ORAL at 08:11

## 2020-10-05 RX ADMIN — LINAGLIPTIN 5 MG: 5 TABLET, FILM COATED ORAL at 08:11

## 2020-10-05 RX ADMIN — DOCUSATE SODIUM 100 MG: 100 CAPSULE, LIQUID FILLED ORAL at 08:11

## 2020-10-05 RX ADMIN — HYDROCODONE BITARTRATE AND ACETAMINOPHEN 1 TABLET: 10; 325 TABLET ORAL at 06:05

## 2020-10-05 RX ADMIN — HYDROCODONE BITARTRATE AND ACETAMINOPHEN 1 TABLET: 10; 325 TABLET ORAL at 23:07

## 2020-10-05 RX ADMIN — FAMOTIDINE 20 MG: 20 TABLET, FILM COATED ORAL at 20:17

## 2020-10-05 RX ADMIN — CEPHALEXIN 500 MG: 500 CAPSULE ORAL at 20:17

## 2020-10-05 RX ADMIN — MELATONIN 0.75 MG: 3 TAB ORAL at 20:18

## 2020-10-05 RX ADMIN — RISPERIDONE 3 MG: 1 TABLET ORAL at 20:25

## 2020-10-05 NOTE — NURSING NOTE
Behavior   Note any precipitants to event or behavior   Describe level and action of any aggressive behavior or speech and associated interventions.     Anxiety: Patient denies at this time  Depression: Patient denies at this time  Pain  6  AVH   no  S/I   no  Plan  no  H/I   no  Plan  no    Affect   euthymic/normal      Note:Patient seen in common room and in own room. He is alert and oriented, doing better, given melatonin for sleep with good results thus far. FSBS this evening was this evening was 125. Able to make needs known, Good results from pain medication.       Intervention    PRN medication utilized:  yes - trazodone for sleep, norco for pain    Instructed in medication usage and effects  Medications administered as ordered  Encouraged to verbalize needs      Response    Verbalized understanding   Did patient take medications as ordered yes   Did patient interact with assessment?  yes     Plan    Will monitor for safety  Will monitor every 15 minutes as ordered  Will evaluate and promote the plan of care    Last BM:  unknown date  (Please chart in I/O as well)

## 2020-10-05 NOTE — PLAN OF CARE
Goal Outcome Evaluation:  Plan of Care Reviewed With: patient  Progress: improving  Outcome Summary: Mikey has slept throughout the night, he has had good results from the melatonin given last night, no falls or concerns voiced.

## 2020-10-05 NOTE — NURSING NOTE
"Behavior   Note any precipitants to event or behavior   Describe level and action of any aggressive behavior or speech and associated interventions.     Anxiety: Patient denies at this time  Depression: Patient denies at this time  Pain  0  AVH   no  S/I   no  Plan  no  H/I   no  Plan  no    Affect   euthymic/normal      Note: Pt in common area sitting alone at table eating breakfast. Pt denies SI/HI/AVH.. Pt reports sleeping,\"very good\" last night. Pt reports dreaming of his uncle buying him a bike when he was a little boy and pt reports but he did buy me a bike when I was a little boy. Pt reports it was a nice dream.No psychotic behaviors noted.      Intervention    PRN medication utilized:  no    Instructed in medication usage and effects  Medications administered as ordered  Encouraged to verbalize needs      Response    Verbalized understanding   Did patient take medications as ordered yes   Did patient interact with assessment?  yes     Plan    Will monitor for safety  Will monitor every 15 minutes as ordered  Will evaluate and promote the plan of care    Last BM:  unknown date  (Please chart in I/O as well)    "

## 2020-10-05 NOTE — PLAN OF CARE
Goal Outcome Evaluation:  Plan of Care Reviewed With: patient  Progress: improving  Outcome Summary: Pt has been observed in common area interacting with peers and attended groups. Took prn pain medication for c/o. No psychotic behaviors noted.

## 2020-10-06 PROBLEM — R44.3 HALLUCINATIONS: Status: RESOLVED | Noted: 2020-10-01 | Resolved: 2020-10-06

## 2020-10-06 PROBLEM — F23 ACUTE PSYCHOSIS (HCC): Status: RESOLVED | Noted: 2020-10-03 | Resolved: 2020-10-06

## 2020-10-06 PROBLEM — F31.64 BIPOLAR DISORD, CRNT EPISODE MIXED, SEVERE, W PSYCH FEATURES: Status: ACTIVE | Noted: 2020-10-03

## 2020-10-06 PROBLEM — F33.3 MDD (MAJOR DEPRESSIVE DISORDER), RECURRENT, SEVERE, WITH PSYCHOSIS: Status: RESOLVED | Noted: 2020-10-03 | Resolved: 2020-10-06

## 2020-10-06 LAB
GLUCOSE BLDC GLUCOMTR-MCNC: 130 MG/DL (ref 70–130)
GLUCOSE BLDC GLUCOMTR-MCNC: 137 MG/DL (ref 70–130)
GLUCOSE BLDC GLUCOMTR-MCNC: 163 MG/DL (ref 70–130)
GLUCOSE BLDC GLUCOMTR-MCNC: 262 MG/DL (ref 70–130)
LAB AP CASE REPORT: NORMAL
PATH REPORT.FINAL DX SPEC: NORMAL
RPR SER QL: NORMAL

## 2020-10-06 PROCEDURE — 25010000002 PALIPERIDONE PALMITATE 234 MG/1.5ML SUSPENSION PREFILLED SYRINGE: Performed by: PSYCHIATRY & NEUROLOGY

## 2020-10-06 PROCEDURE — 94760 N-INVAS EAR/PLS OXIMETRY 1: CPT

## 2020-10-06 PROCEDURE — 82962 GLUCOSE BLOOD TEST: CPT

## 2020-10-06 PROCEDURE — 63710000001 INSULIN ASPART PER 5 UNITS: Performed by: NURSE PRACTITIONER

## 2020-10-06 PROCEDURE — 94799 UNLISTED PULMONARY SVC/PX: CPT

## 2020-10-06 PROCEDURE — 99232 SBSQ HOSP IP/OBS MODERATE 35: CPT | Performed by: PSYCHIATRY & NEUROLOGY

## 2020-10-06 RX ADMIN — BUDESONIDE AND FORMOTEROL FUMARATE DIHYDRATE 2 PUFF: 80; 4.5 AEROSOL RESPIRATORY (INHALATION) at 19:17

## 2020-10-06 RX ADMIN — HYDROCODONE BITARTRATE AND ACETAMINOPHEN 1 TABLET: 10; 325 TABLET ORAL at 16:55

## 2020-10-06 RX ADMIN — FAMOTIDINE 20 MG: 20 TABLET, FILM COATED ORAL at 08:01

## 2020-10-06 RX ADMIN — ATORVASTATIN CALCIUM 20 MG: 20 TABLET, FILM COATED ORAL at 20:15

## 2020-10-06 RX ADMIN — HYDROCHLOROTHIAZIDE: 12.5 TABLET ORAL at 08:01

## 2020-10-06 RX ADMIN — HYDROCODONE BITARTRATE AND ACETAMINOPHEN 1 TABLET: 10; 325 TABLET ORAL at 07:57

## 2020-10-06 RX ADMIN — DOCUSATE SODIUM 100 MG: 100 CAPSULE, LIQUID FILLED ORAL at 08:02

## 2020-10-06 RX ADMIN — GABAPENTIN 600 MG: 300 CAPSULE ORAL at 15:55

## 2020-10-06 RX ADMIN — LINAGLIPTIN 5 MG: 5 TABLET, FILM COATED ORAL at 08:02

## 2020-10-06 RX ADMIN — DOCUSATE SODIUM 100 MG: 100 CAPSULE, LIQUID FILLED ORAL at 20:15

## 2020-10-06 RX ADMIN — POLYMYXIN B SULFATE, BACITRACIN ZINC, NEOMYCIN SULFATE: 5000; 3.5; 4 OINTMENT TOPICAL at 15:00

## 2020-10-06 RX ADMIN — MELATONIN 0.75 MG: 3 TAB ORAL at 20:15

## 2020-10-06 RX ADMIN — RISPERIDONE 0.5 MG: 0.5 TABLET ORAL at 08:02

## 2020-10-06 RX ADMIN — INSULIN ASPART 2 UNITS: 100 INJECTION, SOLUTION INTRAVENOUS; SUBCUTANEOUS at 07:58

## 2020-10-06 RX ADMIN — CEPHALEXIN 500 MG: 500 CAPSULE ORAL at 08:02

## 2020-10-06 RX ADMIN — BUDESONIDE AND FORMOTEROL FUMARATE DIHYDRATE 2 PUFF: 80; 4.5 AEROSOL RESPIRATORY (INHALATION) at 08:00

## 2020-10-06 RX ADMIN — PALIPERIDONE PALMITATE 234 MG: 234 INJECTION INTRAMUSCULAR at 08:02

## 2020-10-06 RX ADMIN — CEPHALEXIN 500 MG: 500 CAPSULE ORAL at 20:14

## 2020-10-06 RX ADMIN — GABAPENTIN 600 MG: 300 CAPSULE ORAL at 08:02

## 2020-10-06 RX ADMIN — GABAPENTIN 600 MG: 300 CAPSULE ORAL at 20:14

## 2020-10-06 RX ADMIN — TAMSULOSIN HYDROCHLORIDE 0.4 MG: 0.4 CAPSULE ORAL at 08:02

## 2020-10-06 RX ADMIN — INSULIN ASPART 6 UNITS: 100 INJECTION, SOLUTION INTRAVENOUS; SUBCUTANEOUS at 16:54

## 2020-10-06 RX ADMIN — POLYMYXIN B SULFATE, BACITRACIN ZINC, NEOMYCIN SULFATE: 5000; 3.5; 4 OINTMENT TOPICAL at 20:16

## 2020-10-06 RX ADMIN — RISPERIDONE 3 MG: 1 TABLET ORAL at 20:30

## 2020-10-06 RX ADMIN — FAMOTIDINE 20 MG: 20 TABLET, FILM COATED ORAL at 20:15

## 2020-10-06 NOTE — PLAN OF CARE
Goal Outcome Evaluation:  Plan of Care Reviewed With: patient  Progress: improving  Outcome Summary: Mikey has been calm and cooperative this shift. Less focused on pain medication.

## 2020-10-06 NOTE — NURSING NOTE
"Behavior   Note any precipitants to event or behavior   Describe level and action of any aggressive behavior or speech and associated interventions.     Anxiety: Patient denies at this time  Depression: Patient denies at this time  Pain  3  AVH   no  S/I   no  Plan  no  H/I   no  Plan  no    Affect   euthymic/normal      Note:Patient seen in common room and in own room. He is alert and oriented, doing better, given melatonin for sleep. Able to make needs known, Mikey said he may be leaving Wednesday but will miss everyone on the unit \"everyone has taken such good care of me and I feel so much better\"      Intervention    PRN medication utilized:  no    Instructed in medication usage and effects  Medications administered as ordered  Encouraged to verbalize needs      Response    Verbalized understanding   Did patient take medications as ordered yes   Did patient interact with assessment?  yes     Plan    Will monitor for safety  Will monitor every 15 minutes as ordered  Will evaluate and promote the plan of care    Last BM:  unknown date  (Please chart in I/O as well)    "

## 2020-10-06 NOTE — NURSING NOTE
Behavior   Note any precipitants to event or behavior   Describe level and action of any aggressive behavior or speech and associated interventions.     Anxiety: Excess Worry  Depression: Patient denies at this time  Pain  6  AVH   no  S/I   no  Plan  no  H/I   no  Plan  no    Affect   euthymic/normal      Note: Pt seen in the common area. Pt received invega injection this am. Tolerated well. Pt is focused on the side effects of medications. Education provided. Pt noted to have forgetfulness at times. Pt is eager to be discharged but agrees with treatment plan. Pt is taking meds as ordered.       Intervention    PRN medication utilized:  yes - norco    Instructed in medication usage and effects  Medications administered as ordered  Encouraged to verbalize needs      Response    Verbalized understanding   Did patient take medications as ordered yes   Did patient interact with assessment?  yes     Plan    Will monitor for safety  Will monitor every 15 minutes as ordered  Will evaluate and promote the plan of care    Last BM:  unknown date  (Please chart in I/O as well)

## 2020-10-06 NOTE — PLAN OF CARE
Goal Outcome Evaluation:  Plan of Care Reviewed With: patient  Progress: improving  Outcome Summary: Has slept throughout the night, prn Norco given for ankle and shoulder pain with good results, no falls reported or observed.

## 2020-10-07 VITALS
DIASTOLIC BLOOD PRESSURE: 80 MMHG | RESPIRATION RATE: 18 BRPM | BODY MASS INDEX: 32.73 KG/M2 | TEMPERATURE: 97.5 F | HEIGHT: 69 IN | WEIGHT: 221 LBS | SYSTOLIC BLOOD PRESSURE: 127 MMHG | HEART RATE: 83 BPM | OXYGEN SATURATION: 97 %

## 2020-10-07 PROBLEM — F81.9 LEARNING DISABILITIES: Status: ACTIVE | Noted: 2020-10-07

## 2020-10-07 LAB
GLUCOSE BLDC GLUCOMTR-MCNC: 162 MG/DL (ref 70–130)
GLUCOSE BLDC GLUCOMTR-MCNC: 197 MG/DL (ref 70–130)

## 2020-10-07 PROCEDURE — 82962 GLUCOSE BLOOD TEST: CPT

## 2020-10-07 PROCEDURE — 94760 N-INVAS EAR/PLS OXIMETRY 1: CPT

## 2020-10-07 PROCEDURE — 94799 UNLISTED PULMONARY SVC/PX: CPT

## 2020-10-07 PROCEDURE — 99239 HOSP IP/OBS DSCHRG MGMT >30: CPT | Performed by: PSYCHIATRY & NEUROLOGY

## 2020-10-07 PROCEDURE — 63710000001 INSULIN ASPART PER 5 UNITS: Performed by: NURSE PRACTITIONER

## 2020-10-07 RX ORDER — RISPERIDONE 3 MG/1
3 TABLET ORAL NIGHTLY
Qty: 10 TABLET | Refills: 0 | Status: SHIPPED | OUTPATIENT
Start: 2020-10-07 | End: 2020-11-13

## 2020-10-07 RX ORDER — LANOLIN ALCOHOL/MO/W.PET/CERES
0.75 CREAM (GRAM) TOPICAL NIGHTLY
Qty: 15 TABLET | Refills: 0 | Status: SHIPPED | OUTPATIENT
Start: 2020-10-07 | End: 2021-08-02

## 2020-10-07 RX ORDER — NALOXONE HYDROCHLORIDE 4 MG/.1ML
1 SPRAY NASAL AS NEEDED
Qty: 1 EACH | Refills: 1 | Status: SHIPPED | OUTPATIENT
Start: 2020-10-07

## 2020-10-07 RX ORDER — CEPHALEXIN 500 MG/1
500 CAPSULE ORAL EVERY 12 HOURS SCHEDULED
Qty: 3 CAPSULE | Refills: 0 | Status: SHIPPED | OUTPATIENT
Start: 2020-10-07 | End: 2020-10-09

## 2020-10-07 RX ADMIN — LINAGLIPTIN 5 MG: 5 TABLET, FILM COATED ORAL at 08:21

## 2020-10-07 RX ADMIN — HYDROCODONE BITARTRATE AND ACETAMINOPHEN 1 TABLET: 10; 325 TABLET ORAL at 06:29

## 2020-10-07 RX ADMIN — BUDESONIDE AND FORMOTEROL FUMARATE DIHYDRATE 2 PUFF: 80; 4.5 AEROSOL RESPIRATORY (INHALATION) at 06:16

## 2020-10-07 RX ADMIN — TAMSULOSIN HYDROCHLORIDE 0.4 MG: 0.4 CAPSULE ORAL at 08:21

## 2020-10-07 RX ADMIN — POLYMYXIN B SULFATE, BACITRACIN ZINC, NEOMYCIN SULFATE: 5000; 3.5; 4 OINTMENT TOPICAL at 08:21

## 2020-10-07 RX ADMIN — HYDROCHLOROTHIAZIDE: 12.5 TABLET ORAL at 08:22

## 2020-10-07 RX ADMIN — CEPHALEXIN 500 MG: 500 CAPSULE ORAL at 08:22

## 2020-10-07 RX ADMIN — INSULIN ASPART 2 UNITS: 100 INJECTION, SOLUTION INTRAVENOUS; SUBCUTANEOUS at 12:15

## 2020-10-07 RX ADMIN — DOCUSATE SODIUM 100 MG: 100 CAPSULE, LIQUID FILLED ORAL at 08:21

## 2020-10-07 RX ADMIN — FAMOTIDINE 20 MG: 20 TABLET, FILM COATED ORAL at 08:21

## 2020-10-07 RX ADMIN — GABAPENTIN 600 MG: 300 CAPSULE ORAL at 08:21

## 2020-10-07 RX ADMIN — INSULIN ASPART 2 UNITS: 100 INJECTION, SOLUTION INTRAVENOUS; SUBCUTANEOUS at 08:22

## 2020-10-07 NOTE — PLAN OF CARE
Goal Outcome Evaluation:  Plan of Care Reviewed With: patient  Progress: improving  Outcome Summary: slept throughout the night, good results form melatonin, discharge tomorrow, no falls

## 2020-10-07 NOTE — CONSULTS
Adult Nutrition  Assessment    Patient Name:  Mikey Allison  YOB: 1955  MRN: [id number]  Admit Date:  10/1/2020    Assessment Date:  10/7/2020    Comments:  Pt reports good appetite.  Indicates sometimes he only eats 2 meals/day.  Indicates his wt will fluctuate 8 lb.  Intake 100% - 9x plus 100% for 2 snacks.  Blood glucose is elevated.  Tradjenta, sliding scale novolog prescribed.  Pt ween per Length of Stay.  Pt has discharge orders.  Pt encouraged to eat 3 meals/day.    Reason for Assessment     Row Name 10/07/20 1142          Reason for Assessment    Reason For Assessment  other (see comments) Length of Stay             Labs/Tests/Procedures/Meds     Row Name 10/07/20 1143          Labs/Procedures/Meds    Lab Results Reviewed  reviewed, pertinent        Medications    Pertinent Medications Reviewed  reviewed, pertinent           Estimated/Assessed Needs     Row Name 10/07/20 1146          Calculation Measurements    Weight Used For Calculations  79.6 kg (175 lb 7.8 oz)        Estimated/Assessed Needs    Additional Documentation  Calorie Requirements (Group);Fluid Requirements (Group);Bowie-St. Jeor Equation (Group);Protein Requirements (Group)        Calorie Requirements    Estimated Calorie Requirement (kcal/day)  2036        Bowie-St. Jeor Equation    RMR (Bowie-St. Jeor Equation)  1566.375        Protein Requirements    Weight Used For Protein Calculations  79.6 kg (175 lb 7.8 oz)     Est Protein Requirement Amount (gms/kg)  1.2 gm protein     Estimated Protein Requirements (gms/day)  95.52        Fluid Requirements    Fluid Requirements (mL/day)  1991     RDA Method (mL)  1991         Nutrition Prescription Ordered     Row Name 10/07/20 1147          Nutrition Prescription PO    Current PO Diet  Regular     Common Modifiers  Consistent Carbohydrate         Evaluation of Received Nutrient/Fluid Intake     Row Name 10/07/20 1147          PO Evaluation    Number of Meals  9 plus 2  snacks     % PO Intake  100% - 7x plus 100% for 2 snacks               Electronically signed by:  Melina Aguilar, CARLOS  10/07/20 11:48 CDT

## 2020-10-07 NOTE — DISCHARGE INSTRUCTIONS
--Continue medications as currently prescribed.  --Continue follow-up with outpatient providers.  --Please contact our Behavioral Health Unit with any questions or concerns at [phone].  --Return to the ER with any suicidal or homicidal ideation, or worsening symptoms.    --The number for the National Suicide & Crisis Hotline is 1-408.192.8797.  The National Crisis Text number is 072276.  These may be contacted at any time, if needed.

## 2020-10-07 NOTE — PROGRESS NOTES
CSW met with pt 1:1. Pt presented to the dayroom, casually dressed, alert and oriented x4. Mood is good, affect is bright. Pt makes good eye contact, speech is normal. Pt is pleasant and cooperative, engages well in conversation. PT denies SI/HI, AVH.  Plan is for pt to follow up outpt at [facility]. Pt educated on Crisis Hotline, advised to call as needed. Pt has participated well in individual and group tx, has been med compliant. PT appears to be at baseline and stable for dc.There is no apparent imminent risk to self or others and this time.

## 2020-10-07 NOTE — PROGRESS NOTES
Psychiatry Progress Note     10.5.2020    HD: #4  Legal: Vol    Chief Complaint: Gross Psychosis      Subjective --  Mr. Mikey Allison is a 65 y.o. male who was seen on the Adult unit.      Improving mood sx today.  Improving organization.  More future focused.      No headaches or stomachaches or muscle aches today.  Tolerating medication well.    Agreeable to PERKINS.        Objective   Objective --      Vital Signs:  Temp:  [97.7 °F (36.5 °C)-98.4 °F (36.9 °C)] 98.4 °F (36.9 °C)  Heart Rate:  [72-84] 84  Resp:  [16-20] 16  BP: (130-152)/(72-83) 130/72    Patient Vitals for the past 24 hrs:   BP Temp Temp src Pulse Resp SpO2   10/06/20 2100 130/72 98.4 °F (36.9 °C) Tympanic 84 16 97 %   10/06/20 1917 -- -- -- 81 18 96 %   10/06/20 0855 152/83 97.7 °F (36.5 °C) Oral 73 18 98 %   10/06/20 0800 -- -- -- 72 20 98 %         Physical Exam:   -General Appearance:  normal general appearance and in no apparent distress  -Hygiene:  Limited   -Gait & Station:  Blank multiple: Normal  -Musculoskeletal:  No tremors or abnormal involuntary movements and No Cog [place] or Rigidity and No atrophy noted  -Pulm: unlaboured     Mental Status Exam:   --Cooperation:  Cooperative  --Eye Contact:    sustained  --Psychomotor Behavior:  improving  --Mood:  improving  --Affect:  mood-congruent and improving; not labile  --Speech:  Slow and Soft  --Thought Process:  improving  --Associations: Goal Directed and less Circumstantial  --Themes:  Worthlessness  --Thought Content:                --Mood congruent              --Suicidal:   Denies              --Homicidal:  Denies              --Hallucinations:   Denies and none overt              --Delusion:  Cannot rule out  hyper religiosity  --Cognitive Functioning:  -Consciousness:  awake and alert  -Orientation:  Person, Place and Time  -Attention:  slowly improving  -Concentration:  improving  -Language:  Average based on interaction; Intact  -Vocabulary:  Below Average based on  interaction; Intact  -Short Term Memory: Deficits  -Long Term Memory: Deficits  -Fund of Knowledge:  Below Average based on interaction  -Abstraction:  improving  --Reliability:  impaired  --Insight:  Impaired & improving  --Judgment:  improving  --Impulse Control:  improving    Lab Results (last 24 hours)     Procedure Component Value Units Date/Time    POC Glucose Once [[id number]]  (Normal) Collected: 10/06/20 1947    Specimen: Blood Updated: 10/06/20 2000     Glucose 130 mg/dL     POC Glucose Once [[id number]]  (Abnormal) Collected: 10/06/20 1615    Specimen: Blood Updated: 10/06/20 1638     Glucose 262 mg/dL     RPR [[id number]]  (Normal) Collected: 10/04/20 0548    Specimen: Blood Updated: 10/06/20 1317     RPR Non-Reactive    POC Glucose Once [[id number]]  (Abnormal) Collected: 10/06/20 1147    Specimen: Blood Updated: 10/06/20 1159     Glucose 137 mg/dL     Cytology, Urine [[id number]] Collected: 10/02/20 2201    Specimen: Urine, Clean Catch Updated: 10/06/20 0920     Case Report --     Non-gynecologic Cytology                          Case: [id number]                                  Authorizing Provider:  Gianni Kong,  Collected:           10/02/2020 10:01 PM                                 APRAUDRA                                                                         Ordering Location:     [facility]             Received:            10/02/2020 10:02 PM                                 [facility]                                                     Specimen:    Urine, Clean Catch                                                                          Final Diagnosis --     See Scanned Report        POC Glucose Once [[id number]]  (Abnormal) Collected: 10/06/20 0525    Specimen: Blood Updated: 10/06/20 0547     Glucose 163 mg/dL           Imaging Results (Last 24 Hours)     ** No results found for the last 24 hours. **            Medications:   Scheduled Meds:atorvastatin, 20 mg, Oral,  Nightly  budesonide-formoterol, 2 puff, Inhalation, BID  cephalexin, 500 mg, Oral, Q12H  docusate sodium, 100 mg, Oral, BID  famotidine, 20 mg, Oral, BID  gabapentin, 600 mg, Oral, TID  insulin aspart, 0-9 Units, Subcutaneous, TID AC  linagliptin, 5 mg, Oral, Daily  lisinopril-hydroCHLOROthiazide (ZESTORTIC) 20-12.5 mg combo dose, , Oral, Q24H  melatonin, 0.75 mg, Oral, Nightly  neomycin-bacitracin-polymyxin, , Topical, BID  risperiDONE, 3 mg, Oral, Nightly    And  risperiDONE, 0.5 mg, Oral, Daily  tamsulosin, 0.4 mg, Oral, Daily      Continuous Infusions:   PRN Meds:.•  acetaminophen  •  albuterol  •  aluminum-magnesium hydroxide-simethicone  •  cloNIDine  •  dextrose  •  dextrose  •  glucagon (human recombinant)  •  HYDROcodone-acetaminophen  •  hydrOXYzine pamoate  •  loperamide  •  magnesium hydroxide  •  methocarbamol  •  traZODone      Assessment:     Acute psychosis (CMS/HCC)    Essential hypertension    Gastroesophageal reflux disease without esophagitis    Type 2 diabetes mellitus without complication, without long-term current use of insulin (CMS/HCC)    Hyperlipidemia    Hallucinations    BPH without obstruction/lower urinary tract symptoms    Acute cystitis without hematuria    Bladder wall thickening    Nasopharyngeal mass    Rule Out / In Bipolar affective disorder, mixed     Rule In / Out MDD (major depressive disorder), recurrent, severe, with psychosis             --> IMPRESSION: slowly improvingpsychosis, will plan to further optimize treatment.      Treatment Plan:  1) Will continue care for the patient on the behavioral health unit at [facility] to ensure patient safety given dermatology.    2) Will continue to provide treatment with the unit milieu, activities, therapies and psychopharmacological management.    3) Patient to be placed on or continued on  Q15 minute checks  and Suicide precautions.    4) Pertinent Concurrent Non-Psychiatric Medical Issues:   --UTI: Cont  Keflex  500 mg PO BID for 7 days  --Bladder wall findings: Renal ultrasound, urine cytology, PSA, Urology consultation  --BPH: Cont Flomax  --HTN/BP control: cont lisinopril, HCTZ; Catapres PRN  --T2DM/Glucose control: cont tradjenta, Novolog SSI  --> NP Dilan discussed nasopharyngeal finding with on call ENT.  Will arrange office referral for direct     5) Will order following labs: none    6) Behavioral Health Treatment Planning:  --Titrate Risperdal to 3 mg qHS for psychosis  --Cont Risperdal daytime dose 0.5mg qDay for disorganization and psychosis  -PERKINS, Invega Sustenna 234 mg tomorrow    --Cont Neurontin 600mg TID to outpt dosing      7) Psychotherapy provided: Supportive and CBT/cognitive for <15  minutes to target To Build and Strengthen Rapport.     --> Dispostion Planning: To be determined after further improvement likely in the next 2-4 days    Treatment plan and medication risks and benefits discussed with: Patient and Treatment Team.    Teddy Martínez II, MD  10/06/20 @ 22:40 CDT  Dictated using Dragon.

## 2020-10-07 NOTE — PROGRESS NOTES
Psychiatry Progress Note     10/06/20     HD: #5   Legal: Vol    Chief Complaint: Gross Psychosis      Subjective --  Mr. Mikey Allison is a 65 y.o. male who was seen on the Adult unit.      Improving mood sx today.  Improving organization.  More future focused.  Significant improvement in coherency of thought.        No headaches or stomachaches or muscle aches today.  Tolerating medication well.    Agreeable to PERKINS.  No AE after getting it today.  Hx non-compliance and does report hx of Bipolar D/O      Objective   Objective --      Vital Signs:  Temp:  [97.7 °F (36.5 °C)-98.4 °F (36.9 °C)] 98.4 °F (36.9 °C)  Heart Rate:  [72-84] 84  Resp:  [16-20] 16  BP: (130-152)/(72-83) 130/72    Patient Vitals for the past 24 hrs:   BP Temp Temp src Pulse Resp SpO2   10/06/20 2100 130/72 98.4 °F (36.9 °C) Tympanic 84 16 97 %   10/06/20 1917 -- -- -- 81 18 96 %   10/06/20 0855 152/83 97.7 °F (36.5 °C) Oral 73 18 98 %   10/06/20 0800 -- -- -- 72 20 98 %         Physical Exam:   -General Appearance:  normal general appearance and in no apparent distress  -Hygiene:  Limited   -Gait & Station:  Blank multiple: Normal  -Musculoskeletal:  No tremors or abnormal involuntary movements and No Cog [place] or Rigidity and No atrophy noted  -Pulm: unlaboured     Mental Status Exam:   --Cooperation:  Cooperative  --Eye Contact:    sustained  --Psychomotor Behavior:  improving  --Mood:  improving  --Affect:  mood-congruent and improving; not labile  --Speech:  Slow and Soft  --Thought Process:  improving  --Associations: Goal Directed and less Circumstantial  --Themes:  Worthlessness  --Thought Content:                --Mood congruent              --Suicidal:   Denies              --Homicidal:  Denies              --Hallucinations:   Denies and none overt              --Delusion:  Cannot rule out  hyper religiosity  --Cognitive Functioning:  -Consciousness:  awake and alert  -Orientation:  Person, Place and Time  -Attention:   slowly improving  -Concentration:  improving  -Language:  Average based on interaction; Intact  -Vocabulary:  Below Average based on interaction; Intact  -Short Term Memory: Deficits  -Long Term Memory: Deficits  -Fund of Knowledge:  Below Average based on interaction  -Abstraction:  improving  --Reliability:  impaired  --Insight:  Impaired & improving  --Judgment:  improving  --Impulse Control:  improving    Lab Results (last 24 hours)     Procedure Component Value Units Date/Time    POC Glucose Once [[id number]]  (Normal) Collected: 10/06/20 1947    Specimen: Blood Updated: 10/06/20 2000     Glucose 130 mg/dL     POC Glucose Once [[id number]]  (Abnormal) Collected: 10/06/20 1615    Specimen: Blood Updated: 10/06/20 1638     Glucose 262 mg/dL     RPR [[id number]]  (Normal) Collected: 10/04/20 0548    Specimen: Blood Updated: 10/06/20 1317     RPR Non-Reactive    POC Glucose Once [[id number]]  (Abnormal) Collected: 10/06/20 1147    Specimen: Blood Updated: 10/06/20 1159     Glucose 137 mg/dL     Cytology, Urine [[id number]] Collected: 10/02/20 2201    Specimen: Urine, Clean Catch Updated: 10/06/20 0920     Case Report --     Non-gynecologic Cytology                          Case: [id number]                                  Authorizing Provider:  Gianni Kong,  Collected:           10/02/2020 10:01 PM                                 SAMUEL                                                                         Ordering Location:     [facility]             Received:            10/02/2020 10:02 PM                                 [facility]                                                     Specimen:    Urine, Clean Catch                                                                          Final Diagnosis --     See Scanned Report        POC Glucose Once [[id number]]  (Abnormal) Collected: 10/06/20 0525    Specimen: Blood Updated: 10/06/20 0547     Glucose 163 mg/dL           Imaging Results  (Last 24 Hours)     ** No results found for the last 24 hours. **            Medications:   Scheduled Meds:atorvastatin, 20 mg, Oral, Nightly  budesonide-formoterol, 2 puff, Inhalation, BID  cephalexin, 500 mg, Oral, Q12H  docusate sodium, 100 mg, Oral, BID  famotidine, 20 mg, Oral, BID  gabapentin, 600 mg, Oral, TID  insulin aspart, 0-9 Units, Subcutaneous, TID AC  linagliptin, 5 mg, Oral, Daily  lisinopril-hydroCHLOROthiazide (ZESTORTIC) 20-12.5 mg combo dose, , Oral, Q24H  melatonin, 0.75 mg, Oral, Nightly  neomycin-bacitracin-polymyxin, , Topical, BID  risperiDONE, 3 mg, Oral, Nightly    And  risperiDONE, 0.5 mg, Oral, Daily  tamsulosin, 0.4 mg, Oral, Daily      Continuous Infusions:   PRN Meds:.•  acetaminophen  •  albuterol  •  aluminum-magnesium hydroxide-simethicone  •  cloNIDine  •  dextrose  •  dextrose  •  glucagon (human recombinant)  •  HYDROcodone-acetaminophen  •  hydrOXYzine pamoate  •  loperamide  •  magnesium hydroxide  •  methocarbamol  •  traZODone      Assessment:     Acute psychosis (CMS/HCC)    Essential hypertension    Gastroesophageal reflux disease without esophagitis    Type 2 diabetes mellitus without complication, without long-term current use of insulin (CMS/HCC)    Hyperlipidemia    Hallucinations    BPH without obstruction/lower urinary tract symptoms    Acute cystitis without hematuria    Bladder wall thickening    Nasopharyngeal mass    Rule Out / In Bipolar affective disorder, mixed     Rule In / Out MDD (major depressive disorder), recurrent, severe, with psychosis             --> IMPRESSION: improving psychosis, will plan to further optimize treatment.      Treatment Plan:  1) Will continue care for the patient on the behavioral health unit at [facility] to ensure patient safety given dermatology.    2) Will continue to provide treatment with the unit milieu, activities, therapies and psychopharmacological management.    3) Patient to be placed on or continued on   Q15 minute checks  and Suicide precautions.    4) Pertinent Concurrent Non-Psychiatric Medical Issues:   --UTI: Cont  Keflex 500 mg PO BID for 7 days  --Bladder wall findings: Renal ultrasound, urine cytology, PSA, Urology consultation  --BPH: Cont Flomax  --HTN/BP control: cont lisinopril, HCTZ; Catapres PRN  --T2DM/Glucose control: cont tradjenta, Novolog SSI  --> NP Dilan discussed nasopharyngeal finding with on call ENT.  Will arrange office referral for direct     5) Will order following labs: none    6) Behavioral Health Treatment Planning:  --Cont Risperdal 3 mg qHS for psychosis  --Stop Risperdal daytime dose given PERKINS  -PERKINS, Invega Sustenna 234 mg today   --Next dose, 156mg in 4-11 days, as outpt    --Cont Neurontin 600mg TID to outpt dosing    --If continuing to do well, consider d/c tomorrow      7) Psychotherapy provided: Supportive and CBT/cognitive for <15  minutes to target To Build and Strengthen Rapport.     --> Dispostion Planning: To be determined after further improvement likely in the next 1-2 days    Treatment plan and medication risks and benefits discussed with: Patient and Treatment Team.    Teddy Martínez II, MD  10/06/20 @ 22:47 CDT  Dictated using Dragon.

## 2020-10-07 NOTE — NURSING NOTE
Behavior   Note any precipitants to event or behavior   Describe level and action of any aggressive behavior or speech and associated interventions.     Anxiety: Patient denies at this time  Depression: Patient denies at this time  Pain  0  AVH   no  S/I   no  Plan  no  H/I   no  Plan  no    Affect   euthymic/normal      Note:Patient seen in common room. He is alert and oriented, doing better, given melatonin for sleep. Able to make needs known, Mikey said he is looking forward to going home tomorrow.      Intervention    PRN medication utilized:  no    Instructed in medication usage and effects  Medications administered as ordered  Encouraged to verbalize needs      Response    Verbalized understanding   Did patient take medications as ordered yes   Did patient interact with assessment?  yes     Plan    Will monitor for safety  Will monitor every 15 minutes as ordered  Will evaluate and promote the plan of care    Last BM:  unknown date  (Please chart in I/O as well)

## 2020-10-07 NOTE — DISCHARGE SUMMARY
"Admission Date: 10/1/2020    Discharge Date: 10/7/2020      Psychiatric History & Reason for Hospitalization:   Chief Complaint: Gross Disorganization              --> \"I don't know.\"     History of Present Illness:  Mr. Mikey Allison is a 65 y.o. male with a concurrent neuropsychiatric history notable for depression     Presents with psychosis. Onset of symptoms was gradual starting unknown days ago.  Symptoms have been present on an increasingly more frequent basis. Symptoms are associated with insomnia and depressed mood.  Symptoms are aggravated by problems with health.   Symptoms improve with none identified.  Patient's symptom severity is severe.   Patient reports that level of hopefulness is worsening.  Patient's symptoms occur in the context of chronic illness.     Mr. Allison presents to the [facility] with disorganization. He says he was brought here after coming to the hospital for chest pain. He denies auditory or visual hallucinations and thoughts of suicide. He knew his name, date of birth, and that he was at [facility]. He was unable to recall the year and believes the President to be President Juan Antonio.      He immediately went from laughing to weeping and asking to speak to his brother at the end of the interview. Mr. Allison did not wish to speak about mental health at the time.      He becomes highly tearful at times.  Affective lability noted.       Per CSW Uvaldo's note:  Re of admission ; \" I came to ER for chest pain and they told me that I needed help because of confusion\" CSW inquired if pt has trouble remembering things , he said \" somewhat. Pt was not able to explain the reason of admission in linear, he seems to be quite cheerful. I felt good after opening up in the treatment team meeting\" \"they said they wanted to to go to the meeting\" \" doctor said you did the right thing coming here\"      Pt was tearful in the treatment team meeting when CSW inquired about he said \" my wife  " "12 year ago and I do not want her to die\". Pt  his wife 30 years ago \" She thought I was messing up with her but I was a good cynthia\" \" I thought I would step ahead and divorce her\"      Pt reported that he hears his x wife almost daily saying that she hates him, pt was not sure it would be be categorized as auditory hallucination. And her name was same as his Nurse here at [facility] Melina ORLANDO CSW inquired how his x-wife death has affected his mood and inquired if he has any symptoms of depression .Pt denied any thoughts of Suicide or any prior suicide attempts but nodded his head and said sometime he does not feel like going out of the house. Pt reports that he has been on pain med's after he had ankle surgery in the past and reported taking Gebapentin in the past      PT does report THC and alcohol use between age [age]. He lives by himself and has a brother who help him if he wants to go anywhere.    Plan is for MD to meet with pt to gather further information,  CSW explained the treatment regimen,  encouraged pt to actively participate  in individual and group tx. Tx team will meet and develop plan. CSW to follow up accordingly.           Psychiatric Review Of Systems:  --Depression: +SIGECAPS  --Anxiety: worry about situation  --Psychosis: +hallucinations and psychosis  --Carolina: Denies any history consistent with carolina or hypomania, including no periods of time with increased energy, goal directed activities in the context of decreased need for sleep, impulsivity, grandiosity that is a discreet change from baseline and life altering during this time.           Concurrent Psychiatric History:  --Past neuropsychiatric history: Depression     --Psychiatric Hospitalizations:   Previously hospitalized here on the [facility] per patient     --Suicide Attempts:   Denies any prior suicide attempts.     --Firearm Access: Denies. He says a friend is looking after his guns.     --Prior Treatment:  --Outpatient: denies  --Rehab: " "Patient says he has been to rehab for \"divorce and new job stress\"      --Prior Medications Trials:  · He is uncertain     --History of violence or legal issues:   Reports drug or alcohol related charges including possession of marijuana a long time ago..  Denies any history of significant violence.     -Abuse/Trauma/Neglect/Exploitation: denies        Substance Use:   --Nicotine: Does not use   --Caffeine: Uses   --EtOH: Does not use currently   --THC: Does not use currently   --Illicits: Uses Lortab for pain control.         Social History:  --> Divorces; living by self; has high school degree  Social History            Socioeconomic History   • Marital status:        Spouse name: Not on file   • Number of children: None   • Years of education: Not on file   • Highest education level: High School   Tobacco Use   • Smoking status: Former Smoker       Types: Cigarettes       Quit date:        Years since quittin.7   • Smokeless tobacco: Never Used   • Tobacco comment: smoked 3 years   Substance and Sexual Activity   • Alcohol use: Yes       Alcohol/week: 3.0 - 6.0 standard drinks       Types: 3 - 6 Cans of beer per week       Comment: 1-2 every other day   • Drug use: No   • Sexual activity: Defer            Family History:        Family History   Problem Relation Age of Onset   • Diabetes Other     • Heart disease Other     • Hypertension Other     • Kidney disease Other     • Hypertension Mother     • Clotting disorder Mother        -->Further details: Family Suicides: denies        Past Medical and Surgical History:  Medical History        Past Medical History:   Diagnosis Date   • Abdominal pain       left side   • Acute sinusitis     • Ankle joint pain     • Bipolar disorder (CMS/HCC)     • Bipolar disorder, unspecified (CMS/HCC)     • Cellulitis and abscess of trunk       axilla   • Chest pain     • Chronic anemia     • Degenerative joint disease involving multiple joints       back   • " Depressive disorder     • Diabetes mellitus (CMS/HCC)     • Diabetes mellitus with no complication (CMS/[facility])       type 2 or unspecified type uncontrolled   • Diverticular disease     • Dyspnea     • Enlarged prostate       on [place]-on CT   • Essential hypertension     • Febrile convulsion (CMS/HCC)     • Gastroesophageal reflux disease     • Generalized anxiety disorder     • H/O echocardiogram 10/16/2007     Mild to moderate concentric LV hypertrophy with mild left atrial enlargement. LV appears to be hypodynamic with preserved LV systolic function with EF 55-60%. Pericardium appears to be normal with a small pericardial effusion    • Hemorrhoids     • Hyperlipidemia     • Hypertensive disorder     • Hypoglycemia     • Infectious disorder of kidney       kidney infection-treated by [facility]   • Left lower quadrant pain     • Loss of appetite     • Obesity     • Osteoarthritis     • Pain in joint involving ankle and foot     • Pain in limb     • Psoriasis     • Retention of urine       with cath-treated by the [facility]   • Screening for malignant neoplasm of colon     • Sepsis due to urinary tract infection (CMS/[facility])       urosepsis treated by [facility]   • Sinusitis     • Syncope     • Unspecified essential hypertension          --> Seizure Hx: denies  --> Head Injury w/ LOC: uncertain        Surgical History         Past Surgical History:   Procedure Laterality Date   • ANKLE SURGERY Left 11/11/2008     Removal of syndesmotic screws, left ankle. Painful syndesmotic screws, left ankle.)   • ANKLE SURGERY   10/19/2007     Open reduction-internal fixation with fluoroscopic control with final x-ray PA and lateral of the ankle. Trimalleolar fracture/subluxation, left ankle.)   • CIRCUMCISION   2016   • COLONOSCOPY   03/10/2015     Diverticulosis found in the sigmoid colon. hemorrhoids found in the anus. Normal cecum   • CYSTOSCOPY   06/22/2016     Cystoscopy, dilation, anchor of Tim catheter. Benign prostatic  hypertrophy, urinary retention, urethral stricture history of.   • INJECTION OF MEDICATION         Kenalog (3)   • SHOULDER ARTHROSCOPY Right 10/18/2017     Procedure: SHOULDER ARTHROSCOPY WITH RAFAELA PROCEDURE, AND SUBACROMIAL DECOMPRESSION, AND POSSIBLE ROTATOR  CUFF REPAIR       SHOULDER ARTHROSCOPY WITH RAFAELA PROCEDURE, AND SUBACROMIAL DECOMPRESSION, NO ROTATOR CUFF REPAIR PERFORMED;  Surgeon: Maximus Schreiber MD;  Location: [facility];  Service:    • STOMACH SURGERY       • TIBIA FRACTURE SURGERY               Allergies:  Sulfa antibiotics and Codeine     Prescriptions Prior to Admission           Medications Prior to Admission   Medication Sig Dispense Refill Last Dose   • albuterol sulfate  (90 Base) MCG/ACT inhaler 2 puffs every 4-6 hours until cough/wheezing improve, than taper off of 18 g 3     • atorvastatin (LIPITOR) 20 MG tablet Take 1 tablet by mouth Every Night. 30 tablet 5     • budesonide-formoterol (Symbicort) 80-4.5 MCG/ACT inhaler Inhale 2 puffs 2 (Two) Times a Day. 10.2 g 5     • Canagliflozin (Invokana) 100 MG tablet Take 100 mg by mouth Daily. 30 tablet 5     • diclofenac (VOLTAREN) 1 % gel gel APPLY 4 G TOPICALLY TO THE APPROPRIATE AREA AS DIRECTED 2 (TWO) TIMES A DAY. FOR ARTHRITIC PAIN OF THE HAND 100 g 5     • diphenhydrAMINE (BENADRYL) 2 % cream Apply  topically to the appropriate area as directed 3 (Three) Times a Day As Needed for Itching. 30 g 5     • docusate sodium (COLACE) 100 MG capsule Take 1 capsule by mouth 2 (Two) Times a Day. 60 capsule 5     • famotidine (Pepcid) 20 MG tablet Take 1 tablet by mouth 2 (Two) Times a Day. For heartburn 60 tablet 5     • gabapentin (NEURONTIN) 400 MG capsule Take 1 capsule by mouth 3 (Three) Times a Day. 90 capsule 2     • glucose blood test strip tid 200 each 6     • glucose monitor monitoring kit 1 each Daily. 1 each 0     • HYDROcodone-acetaminophen (NORCO)  MG per tablet Take 1 tablet by mouth Every 6 (Six) Hours As  Needed for Moderate Pain .         • lidocaine (LIDODERM) 5 % Place 1 patch on the skin as directed by provider Daily. 30 each 5     • lidocaine-prilocaine (EMLA) 2.5-2.5 % cream Apply  topically to the appropriate area as directed Daily. 30 g 2     • lisinopril-hydrochlorothiazide (PRINZIDE,ZESTORETIC) 20-12.5 MG per tablet Take 1 tablet by mouth Daily. 30 tablet 5     • metFORMIN (GLUCOPHAGE) 1000 MG tablet Take 1 tablet by mouth 2 (Two) Times a Day With Meals. 60 tablet 5     • mupirocin (BACTROBAN) 2 % ointment Apply  topically to the appropriate area as directed 2 (Two) Times a Day. 15 g 1     • ONE TOUCH LANCETS misc 1 each 3 (Three) Times a Day. 200 each 6     • pantoprazole (PROTONIX) 40 MG EC tablet TAKE ONE TABLET BY MOUTH TWO TIMES A DAY 60 tablet 3     • predniSONE (DELTASONE) 5 MG tablet Take 1 tablet by mouth Daily. For arthritis of hand 5 tablet 0     • QUEtiapine (SEROquel) 300 MG tablet Take 2 tablets by mouth Every Night. 60 tablet 2     • SITagliptin (Januvia) 100 MG tablet Take 1 tablet by mouth Daily. 30 tablet 5     • tamsulosin (FLOMAX) 0.4 MG capsule 24 hr capsule Take 1 capsule by mouth Daily. 30 capsule 5     • tiZANidine (ZANAFLEX) 4 MG tablet TAKE ONE TABLET BY MOUTH TWO TIMES A DAY AS NEEDED 60 tablet 1     • traMADol (ULTRAM) 50 MG tablet Take 50 mg by mouth Every 6 (Six) Hours As Needed for Moderate Pain . 1 - 2  Tablets po q  8 hours prn pain         • zinc oxide 20 % ointment Apply  topically to the appropriate area as directed As Needed for Irritation or Dry Skin. For bilateral feet 56 g 5          --> Reviewed; pt is uncertain what medicine he is on          Diagnostic Data:    --Labs:   Recent Results (from the past 168 hour(s))   Light Blue Top    Collection Time: 10/01/20  2:12 PM   Result Value Ref Range    Extra Tube hold for add-on    Green Top (Gel)    Collection Time: 10/01/20  2:12 PM   Result Value Ref Range    Extra Tube Hold for add-ons.    Lavender Top    Collection  Time: 10/01/20  2:12 PM   Result Value Ref Range    Extra Tube hold for add-on    Gold Top - SST    Collection Time: 10/01/20  2:12 PM   Result Value Ref Range    Extra Tube Hold for add-ons.    Comprehensive Metabolic Panel    Collection Time: 10/01/20  2:12 PM    Specimen: Arm, Right; Blood   Result Value Ref Range    Glucose 178 (H) 65 - 99 mg/dL    BUN 13 8 - 23 mg/dL    Creatinine 0.81 0.76 - 1.27 mg/dL    Sodium 139 136 - 145 mmol/L    Potassium 3.6 3.5 - 5.2 mmol/L    Chloride 108 (H) 98 - 107 mmol/L    CO2 21.0 (L) 22.0 - 29.0 mmol/L    Calcium 9.8 8.6 - 10.5 mg/dL    Total Protein 7.4 6.0 - 8.5 g/dL    Albumin 4.10 3.50 - 5.20 g/dL    ALT (SGPT) 11 1 - 41 U/L    AST (SGOT) 17 1 - 40 U/L    Alkaline Phosphatase 105 39 - 117 U/L    Total Bilirubin 0.4 0.0 - 1.2 mg/dL    eGFR Non African Amer 95 >60 mL/min/1.73    Globulin 3.3 gm/dL    A/G Ratio 1.2 g/dL    BUN/Creatinine Ratio 16.0 7.0 - 25.0    Anion Gap 10.0 5.0 - 15.0 mmol/L   Protime-INR    Collection Time: 10/01/20  2:12 PM    Specimen: Arm, Right; Blood   Result Value Ref Range    Protime 12.8 11.1 - 15.3 Seconds    INR 0.93 0.80 - 1.20   Troponin    Collection Time: 10/01/20  2:12 PM    Specimen: Arm, Right; Blood   Result Value Ref Range    Troponin T <0.010 0.000 - 0.030 ng/mL   CK    Collection Time: 10/01/20  2:12 PM    Specimen: Arm, Right; Blood   Result Value Ref Range    Creatine Kinase 66 20 - 200 U/L   Magnesium    Collection Time: 10/01/20  2:12 PM    Specimen: Arm, Right; Blood   Result Value Ref Range    Magnesium 1.4 (L) 1.6 - 2.4 mg/dL   Ethanol    Collection Time: 10/01/20  2:12 PM    Specimen: Arm, Right; Blood   Result Value Ref Range    Ethanol <10 0 - 10 mg/dL    Ethanol % <0.010 %   CBC Auto Differential    Collection Time: 10/01/20  2:12 PM    Specimen: Arm, Right; Blood   Result Value Ref Range    WBC 9.52 3.40 - 10.80 10*3/mm3    RBC 4.52 4.14 - 5.80 10*6/mm3    Hemoglobin 13.8 13.0 - 17.7 g/dL    Hematocrit 39.9 37.5 - 51.0 %     MCV 88.3 79.0 - 97.0 fL    MCH 30.5 26.6 - 33.0 pg    MCHC 34.6 31.5 - 35.7 g/dL    RDW 13.5 12.3 - 15.4 %    RDW-SD 43.5 37.0 - 54.0 fl    MPV 9.1 6.0 - 12.0 fL    Platelets 332 140 - 450 10*3/mm3    Neutrophil % 76.9 (H) 42.7 - 76.0 %    Lymphocyte % 14.3 (L) 19.6 - 45.3 %    Monocyte % 7.6 5.0 - 12.0 %    Eosinophil % 0.5 0.3 - 6.2 %    Basophil % 0.3 0.0 - 1.5 %    Immature Grans % 0.4 0.0 - 0.5 %    Neutrophils, Absolute 7.32 (H) 1.70 - 7.00 10*3/mm3    Lymphocytes, Absolute 1.36 0.70 - 3.10 10*3/mm3    Monocytes, Absolute 0.72 0.10 - 0.90 10*3/mm3    Eosinophils, Absolute 0.05 0.00 - 0.40 10*3/mm3    Basophils, Absolute 0.03 0.00 - 0.20 10*3/mm3    Immature Grans, Absolute 0.04 0.00 - 0.05 10*3/mm3    nRBC 0.0 0.0 - 0.2 /100 WBC   Urinalysis With Culture If Indicated - Urine, Clean Catch    Collection Time: 10/01/20  2:42 PM    Specimen: Urine, Clean Catch   Result Value Ref Range    Color, UA Yellow Yellow, Straw, Dark Yellow, Mariama    Appearance, UA Cloudy (A) Clear    pH, UA 8.5 5.0 - 9.0    Specific Gravity, UA 1.021 1.003 - 1.030    Glucose,  mg/dL (Trace) (A) Negative    Ketones, UA Trace (A) Negative    Bilirubin, UA Negative Negative    Blood, UA Negative Negative    Protein, UA 30 mg/dL (1+) (A) Negative    Leuk Esterase, UA Moderate (2+) (A) Negative    Nitrite, UA Negative Negative    Urobilinogen, UA 1.0 E.U./dL 0.2 - 1.0 E.U./dL   Urine Drug Screen - Urine, Clean Catch    Collection Time: 10/01/20  2:42 PM    Specimen: Urine, Clean Catch   Result Value Ref Range    THC, Screen, Urine Negative Negative    Phencyclidine (PCP), Urine Negative Negative    Cocaine Screen, Urine Negative Negative    Methamphetamine, Ur Negative Negative    Opiate Screen Positive (A) Negative    Amphetamine Screen, Urine Negative Negative    Benzodiazepine Screen, Urine Negative Negative    Tricyclic Antidepressants Screen Positive (A) Negative    Methadone Screen, Urine Negative Negative    Barbiturates Screen,  Urine Negative Negative    Oxycodone Screen, Urine Negative Negative    Propoxyphene Screen Negative Negative    Buprenorphine, Screen, Urine Negative Negative   Urinalysis, Microscopic Only - Urine, Clean Catch    Collection Time: 10/01/20  2:42 PM    Specimen: Urine, Clean Catch   Result Value Ref Range    RBC, UA None Seen None Seen /HPF    WBC, UA 31-50 (A) None Seen, 0-2, 3-5 /HPF    Bacteria, UA None Seen None Seen /HPF    Squamous Epithelial Cells, UA 3-5 (A) None Seen, 0-2 /HPF    Hyaline Casts, UA 3-6 None Seen /LPF    Amorphous Crystals, UA Moderate/2+ None Seen /HPF    Methodology Manual Light Microscopy    Urine Culture - Urine, Urine, Clean Catch    Collection Time: 10/01/20  2:42 PM    Specimen: Urine, Clean Catch   Result Value Ref Range    Urine Culture >100,000 CFU/mL Staphylococcus, coagulase negative (A)    Glucose, Fasting    Collection Time: 10/02/20  5:57 AM    Specimen: Blood   Result Value Ref Range    Glucose, Fasting 173 (H) 74 - 106 mg/dL   Lipid Panel    Collection Time: 10/02/20  5:57 AM    Specimen: Blood   Result Value Ref Range    Total Cholesterol 177 0 - 200 mg/dL    Triglycerides 111 0 - 150 mg/dL    HDL Cholesterol 47 40 - 60 mg/dL    LDL Cholesterol  108 (H) 0 - 100 mg/dL    VLDL Cholesterol 22.2 mg/dL    LDL/HDL Ratio 2.29    PSA DIAGNOSTIC    Collection Time: 10/02/20  5:57 AM    Specimen: Blood   Result Value Ref Range    PSA 0.144 0.000 - 4.000 ng/mL   POC Glucose Once    Collection Time: 10/02/20  5:09 PM    Specimen: Blood   Result Value Ref Range    Glucose 162 (H) 70 - 130 mg/dL   POC Glucose Once    Collection Time: 10/02/20  7:36 PM    Specimen: Blood   Result Value Ref Range    Glucose 125 70 - 130 mg/dL   Cytology, Urine    Collection Time: 10/02/20 10:01 PM    Specimen: Urine, Clean Catch   Result Value Ref Range    Case Report       Non-gynecologic Cytology                          Case: [id number]                                  Authorizing Provider:  Dilan  Gianni Desmond,  Collected:           10/02/2020 10:01 PM                                 APRN                                                                         Ordering Location:     [facility]             Received:            10/02/2020 10:02 PM                                 [facility]                                                     Specimen:    Urine, Clean Catch                                                                         Final Diagnosis       See Scanned Report       POC Glucose Once    Collection Time: 10/03/20  6:28 AM    Specimen: Blood   Result Value Ref Range    Glucose 164 (H) 70 - 130 mg/dL   POC Glucose Once    Collection Time: 10/03/20 11:10 AM    Specimen: Blood   Result Value Ref Range    Glucose 184 (H) 70 - 130 mg/dL   POC Glucose Once    Collection Time: 10/03/20  4:39 PM    Specimen: Blood   Result Value Ref Range    Glucose 171 (H) 70 - 130 mg/dL   POC Glucose Once    Collection Time: 10/03/20  7:20 PM    Specimen: Blood   Result Value Ref Range    Glucose 153 (H) 70 - 130 mg/dL   TSH    Collection Time: 10/04/20  5:48 AM    Specimen: Blood   Result Value Ref Range    TSH 4.290 (H) 0.270 - 4.200 uIU/mL   Vitamin B12    Collection Time: 10/04/20  5:48 AM    Specimen: Blood   Result Value Ref Range    Vitamin B-12 238 211 - 946 pg/mL   Vitamin D 25 Hydroxy    Collection Time: 10/04/20  5:48 AM    Specimen: Blood   Result Value Ref Range    25 Hydroxy, Vitamin D 22.5 (L) 30.0 - 100.0 ng/ml   Folate    Collection Time: 10/04/20  5:48 AM    Specimen: Blood   Result Value Ref Range    Folate 13.60 4.78 - 24.20 ng/mL   RPR    Collection Time: 10/04/20  5:48 AM    Specimen: Blood   Result Value Ref Range    RPR Non-Reactive Non-Reactive   HIV-1 / O / 2 Ag / Antibody 4th Generation    Collection Time: 10/04/20  5:48 AM    Specimen: Blood   Result Value Ref Range    HIV-1/ HIV-2 Non-Reactive Non-Reactive   POC Glucose Once    Collection Time: 10/04/20  6:00 AM     Specimen: Blood   Result Value Ref Range    Glucose 145 (H) 70 - 130 mg/dL   POC Glucose Once    Collection Time: 10/04/20 11:10 AM    Specimen: Blood   Result Value Ref Range    Glucose 164 (H) 70 - 130 mg/dL   POC Glucose Once    Collection Time: 10/04/20  4:43 PM    Specimen: Blood   Result Value Ref Range    Glucose 193 (H) 70 - 130 mg/dL   POC Glucose Once    Collection Time: 10/04/20  6:40 PM    Specimen: Blood   Result Value Ref Range    Glucose 125 70 - 130 mg/dL   POC Glucose Once    Collection Time: 10/05/20  5:46 AM    Specimen: Blood   Result Value Ref Range    Glucose 143 (H) 70 - 130 mg/dL   POC Glucose Once    Collection Time: 10/05/20 11:57 AM    Specimen: Blood   Result Value Ref Range    Glucose 125 70 - 130 mg/dL   POC Glucose Once    Collection Time: 10/05/20  4:32 PM    Specimen: Blood   Result Value Ref Range    Glucose 144 (H) 70 - 130 mg/dL   POC Glucose Once    Collection Time: 10/05/20  7:00 PM    Specimen: Blood   Result Value Ref Range    Glucose 178 (H) 70 - 130 mg/dL   POC Glucose Once    Collection Time: 10/06/20  5:25 AM    Specimen: Blood   Result Value Ref Range    Glucose 163 (H) 70 - 130 mg/dL   POC Glucose Once    Collection Time: 10/06/20 11:47 AM    Specimen: Blood   Result Value Ref Range    Glucose 137 (H) 70 - 130 mg/dL   POC Glucose Once    Collection Time: 10/06/20  4:15 PM    Specimen: Blood   Result Value Ref Range    Glucose 262 (H) 70 - 130 mg/dL   POC Glucose Once    Collection Time: 10/06/20  7:47 PM    Specimen: Blood   Result Value Ref Range    Glucose 130 70 - 130 mg/dL   POC Glucose Once    Collection Time: 10/07/20  6:21 AM    Specimen: Blood   Result Value Ref Range    Glucose 162 (H) 70 - 130 mg/dL       Lab Results   Component Value Date    UFLG61PR 22.5 (L) 10/04/2020    CCVGOEKQ17 238 10/04/2020    FOLATE 13.60 10/04/2020       Lab Results   Component Value Date    GLUF 173 (H) 10/02/2020        Lab Results   Component Value Date    CHOL 177 10/02/2020     TRIG 111 10/02/2020    HDL 47 10/02/2020     (H) 10/02/2020    VLDL 22.2 10/02/2020    LDLHDL 2.29 10/02/2020        TSH   Date Value Ref Range Status   10/04/2020 4.290 (H) 0.270 - 4.200 uIU/mL Final         --Imaging:  Ct Abdomen Pelvis Without Contrast    Result Date: 10/1/2020  Narrative: PROCEDURE: Ct abdomen and pelvis without contrast REASON FOR EXAM: Abdominal pain, acute, nonlocalized FINDINGS: Comparison study dated  June 20, 2016. Axial computer tomography sequential imaging was performed from the diaphragms through the symphysis pubis without IV contrast administration. Sagittal and coronal reformates was performed. This exam was performed according to our departmental dose optimization program, which includes automated exposure control, adjustment of the mA and/or KV according to patient size and/or use of iterative reconstruction technique.  Stable left lung base small calcified lung parenchymal granulomas consistent with old granulomatous disease. Stable left lower lobe lateral two noncalcified nodules with the largest measuring 6 mm in greatest diameter. This prolonged interval stability suggests benign etiology such as noncalcified granulomas or benign inflammatory nodules. Otherwise lung bases are unremarkable. The liver is normal. The gallbladder is normal. The biliary system is normal. The pancreas is normal. Multiple small calcified granulomas involving the spleen consistent with old granulomatous disease. The spleen is otherwise unremarkable. Bilateral adrenal glands are normal. Stable right kidney mid zone 2 cm simple peripelvic cyst. The right kidney and ureter are otherwise normal. The left kidney and ureter are normal. Mild anterior bladder wall thickening. The bladder is partially decompressed. Small 1 cm left bladder diverticula. The prostate gland appears within normal limits. The hollow viscera is normal. The appendix is identified appears normal. No lymphadenopathy in the  abdomen or the pelvis. No acute osseous abnormality.     Impression: 1. Stable evidence of old granulomatous disease. 2.Stable left lower lobe lateral two noncalcified nodules with the largest measuring 6 mm in greatest diameter. This prolonged interval stability suggests benign etiology such as noncalcified granulomas or benign inflammatory nodules. 3.Stable right kidney mid zone 2 cm simple peripelvic cyst. 4. Small 1 cm left bladder diverticula. Mild anterior bladder wall thickening with the bladder being partially decompressed. This wall thickening most likely represents pseudothickening from partially decompressed state. Less likely etiology could be focal cystitis or neoplastic involvement. Recommend clinical correlation. 5. Remainder CT abdomen pelvis study unremarkable. Electronically signed by:  Brett Rodrigues MD  10/1/2020 4:07 PM CDT Workstation: [id number]    Ct Angiogram Head    Result Date: 10/1/2020  Narrative: Procedure: CT angiogram head and neck with contrast Reason for exam: Headache and confusion. FINDINGS: Axial computer tomography sequential imaging was performed from the superior head through the thoracic inlet after IV contrast administration. Sagittal and coronal reformates was performed. 3-D vascular reconstruction of the carotid systems of the neck and head was performed. This exam was performed according to our departmental dose optimization program, which includes automated exposure control, adjustment of the mA and/or KV according to patient size and/or use of iterative reconstruction technique. Patient motion during imaging limits study. CTA findings: The thoracic aorta is normal. The left subclavian artery is normal. Distal left common carotid artery small sidewall soft atherosclerotic plaque causing 30% diameter stenosis. The left common carotid artery is otherwise normal. The left external carotid artery is normal. The left internal carotid artery is normal. The brachiocephalic trunk  is normal. The right subclavian artery is normal. The right common carotid artery is normal. The right external carotid artery is normal. Tortuous course of the right internal carotid artery with the right internal carotid artery otherwise appearing normal. The right vertebral artery is normal. The left vertebral artery is normal. The basilar artery is normal. Bilateral posterior cerebral arteries are normal. Bilateral middle cerebral arteries are normal. Bilateral anterior cerebral arteries are normal. Other CT findings: No abnormal intracranial enhancement. Bilateral intraorbital structures are normal. Paranasal sinuses and bilateral mastoid air cells are well-aerated. Asymmetric nasopharynx right soft tissue prominence. The oral pharynx is normal. The larynx is normal. The thyroid gland is normal. No lymphadenopathy in the region of neck. No acute osseous abnormality. Lung apices are unremarkable.     Impression: 1.  CTA findings as described above. 2.  Asymmetric right nasopharynx soft tissue prominence. This is of uncertain clinical significance. This may represent prominent lymphoid tissue although cannot exclude mass. Recommend direct visualization to further evaluate. Electronically signed by:  Brett Rodrigues MD  10/1/2020 4:55 PM CDT Workstation: [id number]    Ct Head Without Contrast    Result Date: 10/1/2020  Narrative: PROCEDURE: CT head without contrast REASON FOR EXAM: Mental status change, unknown cause This exam was performed according to our departmental dose-optimization program, which includes automated exposure control, adjustment of the mA and/or kV according to patient size and/or use of iterative reconstruction technique. FINDINGS: Axial computer tomography sequential imaging of the head was performed from the vertex to the base of the skull. .Sagittal and coronal reformation was performed . The skull vault is intact. Paranasal sinuses and bilateral mastoid air cells are well aerated. Mild  cerebral involutional changes. Left basal ganglia circular 3 mm CSF attenuating lesion consistent with old lacunar infarct. Cerebral and cerebellar parenchymal are otherwise normal. Ventricular system and subarachnoid spaces are normal.     Impression: 1.  No acute intracranial abnormality. 2.  Mild cerebral involutional changes. 3.  Left basal ganglia old lacunar infarct. Electronically signed by:  Brett Rodrigues MD  10/1/2020 3:56 PM CDT Workstation: [id number]    Ct Angiogram Neck    Result Date: 10/1/2020  Narrative: Procedure: CT angiogram head and neck with contrast Reason for exam: Headache and confusion. FINDINGS: Axial computer tomography sequential imaging was performed from the superior head through the thoracic inlet after IV contrast administration. Sagittal and coronal reformates was performed. 3-D vascular reconstruction of the carotid systems of the neck and head was performed. This exam was performed according to our departmental dose optimization program, which includes automated exposure control, adjustment of the mA and/or KV according to patient size and/or use of iterative reconstruction technique. Patient motion during imaging limits study. CTA findings: The thoracic aorta is normal. The left subclavian artery is normal. Distal left common carotid artery small sidewall soft atherosclerotic plaque causing 30% diameter stenosis. The left common carotid artery is otherwise normal. The left external carotid artery is normal. The left internal carotid artery is normal. The brachiocephalic trunk is normal. The right subclavian artery is normal. The right common carotid artery is normal. The right external carotid artery is normal. Tortuous course of the right internal carotid artery with the right internal carotid artery otherwise appearing normal. The right vertebral artery is normal. The left vertebral artery is normal. The basilar artery is normal. Bilateral posterior cerebral arteries are normal.  Bilateral middle cerebral arteries are normal. Bilateral anterior cerebral arteries are normal. Other CT findings: No abnormal intracranial enhancement. Bilateral intraorbital structures are normal. Paranasal sinuses and bilateral mastoid air cells are well-aerated. Asymmetric nasopharynx right soft tissue prominence. The oral pharynx is normal. The larynx is normal. The thyroid gland is normal. No lymphadenopathy in the region of neck. No acute osseous abnormality. Lung apices are unremarkable.     Impression: 1.  CTA findings as described above. 2.  Asymmetric right nasopharynx soft tissue prominence. This is of uncertain clinical significance. This may represent prominent lymphoid tissue although cannot exclude mass. Recommend direct visualization to further evaluate. Electronically signed by:  Brett Rodrigues MD  10/1/2020 4:55 PM CDT Workstation: [id number]    Mri Brain Without Contrast    Result Date: 10/1/2020  Narrative: MRI of the brain without contrast HISTORY: Stroke. Headache. Confusion. Mental status change. Multisequence multiplanar images of the brain were obtained without intravenous contrast. COMPARISON: None. Correlation earlier CT. FINDINGS: The paranasal sinuses are unremarkable. No acute process. Cerebral and cerebellar atrophy. Minimal to moderate small vessel disease. Diffusion images do not demonstrate an acute infarct. No hemorrhage. No mass. No midline shift and no abnormal extra-axial fluid collections. Normal signal flow voids are present in the major vessels and venous sinuses.     Impression: CONCLUSION: No acute process. Cerebral and cerebellar atrophy. Minimal to moderate small vessel disease. [id number] Electronically signed by:  Ino Wilhelm MD  10/1/2020 7:05 PM CDT Workstation: [id number]    Us Renal Bilateral    Result Date: 10/2/2020  Narrative: Ultrasound renal complete HISTORY:  Bladder thickening Ultrasound examination of the kidneys and urinary bladder was performed. COMPARISON:  None. Correlation CT October 1, 2020. FINDINGS: The right kidney measures 12.99 cm in length by 6.80 cm x 6.13 cm transverse. The left kidney measures 10.82 cm in length by 6.22 cm x 5.40 cm transverse. The kidneys are of normal echotexture. No hydronephrosis. 2.9 cm cyst mid right kidney. No solid renal mass. 1.7 cm bladder diverticulum arising from the dome of the bladder on the right. Circumferential thickening urinary bladder wall which may be related to lack of distention, cystitis, outlet obstruction or less likely neoplasm. The prostate is minimally enlarged.     Impression: CONCLUSION: 2.9 cm cyst mid right kidney. 1.7 cm bladder diverticulum arising from the dome of the bladder on the right. Circumferential thickening urinary bladder wall which may be related to lack of distention, cystitis, outlet obstruction or less likely neoplasm. The prostate is minimally enlarged. [id number] Electronically signed by:  Ino Wilhelm MD  10/2/2020 4:40 PM CDT Workstation: [id number]        Summary of Hospital Course:     Patient was admitted to the behavioral health unit at [facility] to ensure patient safety.  Patient was provided treatment with the unit milieu, activities, therapies and psychopharmacological management.  Patient was placed on Q15 minute checks and Suicide.     Hospitalist was consulted for management of medical co-morbidities.      Patient was restarted on the following psychiatric medications:   --None; Not clear what medications pt on at home.        The following medication changes were made during the hospital stay:   --Risperdal 3 mg qHS for psychosis, to take for 10 days to transition to PERKINS  -PERKINS, Invega Sustenna 234 mg today              --Next dose, 156mg in 4-11 days, as outpt; script faxed to [facility]      Patient had improvement over the course of the hospital stay and tolerated medications well.  Psychosis resolved and these gains were maintained during stay.  No  "behavioral issues.       Patient had family involvement during stay.      Substance abuse issues were not present.       At time of interview, patient adamantly denies any thoughts of death or dying.  The patient also adamantly denies any suicidal ideations, intentions or plans.  Denies any homicidal ideations, intentions or plans.  Denies any auditory visual hallucinations.  Denies paranoia.  Not grossly psychotic.  The patient does not constitute an imminent risk of harm to self or others, at time of the interview.  Therefore, patient does not meet commitment criteria at this time.      Patients Condition at Discharge:   Patient is stable for discharge and is not an imminent threat to self or others.         Discharging Exam:    Targeted PE:   --MS:  No tremors or abnormal involuntary movements and No Cog [place] or Rigidity and No atrophy noted  --Gait & Station:  Blank multiple: Normal  --HEENT: No Nystagmus or Opthalmoplegia.     --Pulm: unlaboured    MSE:  --Cooperation:  Cooperative  --Eye Contact:  Good  --Psychomotor Behavior:  Appropriate  --Mood:  \"Good\"  --Affect:  mood-congruent and No overt dysphoria noted  --Speech:  Normal r/r/v, Not Pressured and Not Rapid  --Thought Process:  Coherent, No Flight of Ideas and Connected to affect  --Associations: Goal Directed and No Looseness of Associations  --Themes:  Hope for Future and Self Improvement  --Thought Content:     --Normal and Mood congruent   --Suicidal:  Denies    --Homicidal:  Denies   --Hallucinations:  Denies and Not appearing to respond to internal stimuli at time of my interview   --Delusion:  None noted/overt and No overt psychotic overlay  --Cognitive Functioning:   -Consciousness: awake and alert   -Orientation:  Person, Place, Time and Situation   -Attention:  Adequate    -Concentration:  Adequate and Improving   -Fund of Knowledge:  Below Average based on interaction  --Reliability:  fair  --Insight:  Improving  --Judgment:  Improving " and Without Gross Impairment  --Impulse Control:  Improving and Without Gross Impairment      AIMS: 0      Discharging Diagnoses:    Bipolar disord, crnt episode mixed, severe, w psych features (CMS/[facility])    Essential hypertension    Gastroesophageal reflux disease without esophagitis    Type 2 diabetes mellitus without complication, without long-term current use of insulin (CMS/[facility])    Hyperlipidemia    BPH without obstruction/lower urinary tract symptoms    Acute cystitis without hematuria    Bladder wall thickening    Nasopharyngeal mass    Learning disabilities        Discharge Medications:      Your medication list      START taking these medications      Instructions Last Dose Given Next Dose Due   cephalexin 500 MG capsule  Commonly known as: KEFLEX      Take 1 capsule by mouth Every 12 (Twelve) Hours for 3 doses. Indications: Urinary Tract Infection       melatonin 3 MG tablet      Take 0.25 tablets by mouth Every Night. Indications: Trouble Sleeping       naloxone 4 MG/0.1ML nasal spray  Commonly known as: Narcan      1 spray into the nostril(s) as directed by provider As Needed (opioid overdose) for up to 2 doses. Indications: Opioid Overdose       risperiDONE 3 MG tablet  Commonly known as: risperDAL      Take 1 tablet by mouth Every Night. Indications: MIXED BIPOLAR AFFECTIVE DISORDER          CONTINUE taking these medications      Instructions Last Dose Given Next Dose Due   albuterol sulfate  (90 Base) MCG/ACT inhaler  Commonly known as: PROVENTIL HFA;VENTOLIN HFA;PROAIR HFA      2 puffs every 4-6 hours until cough/wheezing improve, than taper off of       atorvastatin 20 MG tablet  Commonly known as: LIPITOR      Take 1 tablet by mouth Every Night.       budesonide-formoterol 80-4.5 MCG/ACT inhaler  Commonly known as: Symbicort      Inhale 2 puffs 2 (Two) Times a Day.       Canagliflozin 100 MG tablet  Commonly known as: Invokana      Take 100 mg by mouth Daily.       diclofenac 1 % gel  gel  Commonly known as: VOLTAREN      APPLY 4 G TOPICALLY TO THE APPROPRIATE AREA AS DIRECTED 2 (TWO) TIMES A DAY. FOR ARTHRITIC PAIN OF THE HAND       diphenhydrAMINE 2 % cream  Commonly known as: BENADRYL      Apply  topically to the appropriate area as directed 3 (Three) Times a Day As Needed for Itching.       docusate sodium 100 MG capsule  Commonly known as: COLACE      Take 1 capsule by mouth 2 (Two) Times a Day.       famotidine 20 MG tablet  Commonly known as: Pepcid      Take 1 tablet by mouth 2 (Two) Times a Day. For heartburn       gabapentin 400 MG capsule  Commonly known as: NEURONTIN      Take 1 capsule by mouth 3 (Three) Times a Day.       glucose blood test strip      tid       glucose monitor monitoring kit      1 each Daily.       HYDROcodone-acetaminophen  MG per tablet  Commonly known as: NORCO      Take 1 tablet by mouth Every 6 (Six) Hours As Needed for Moderate Pain .       lidocaine 5 %  Commonly known as: LIDODERM      Place 1 patch on the skin as directed by provider Daily.       lidocaine-prilocaine 2.5-2.5 % cream  Commonly known as: EMLA      Apply  topically to the appropriate area as directed Daily.       lisinopril-hydrochlorothiazide 20-12.5 MG per tablet  Commonly known as: PRINZIDE,ZESTORETIC      Take 1 tablet by mouth Daily.       metFORMIN 1000 MG tablet  Commonly known as: GLUCOPHAGE      Take 1 tablet by mouth 2 (Two) Times a Day With Meals.       mupirocin 2 % ointment  Commonly known as: Bactroban      Apply  topically to the appropriate area as directed 2 (Two) Times a Day.       ONE TOUCH LANCETS misc      1 each 3 (Three) Times a Day.       pantoprazole 40 MG EC tablet  Commonly known as: PROTONIX      TAKE ONE TABLET BY MOUTH TWO TIMES A DAY       SITagliptin 100 MG tablet  Commonly known as: Januvia      Take 1 tablet by mouth Daily.       tamsulosin 0.4 MG capsule 24 hr capsule  Commonly known as: FLOMAX      Take 1 capsule by mouth Daily.       tiZANidine 4 MG  tablet  Commonly known as: ZANAFLEX      TAKE ONE TABLET BY MOUTH TWO TIMES A DAY AS NEEDED       zinc oxide 20 % ointment      Apply  topically to the appropriate area as directed As Needed for Irritation or Dry Skin. For bilateral feet          STOP taking these medications    predniSONE 5 MG tablet  Commonly known as: DELTASONE        QUEtiapine 300 MG tablet  Commonly known as: SEROquel        traMADol 50 MG tablet  Commonly known as: ULTRAM              Where to Get Your Medications      These medications were sent to [facility] - [place] [place] - [address] [place] [address]  - [phone]  - [phone] [address] [place] [address]    Phone: [phone]   · cephalexin 500 MG capsule  · melatonin 3 MG tablet  · naloxone 4 MG/0.1ML nasal spray  · risperiDONE 3 MG tablet     --Second dose of Invega in one week at [facility]; Rx faxed for 156mg PERKINS    Justification for multiple antipsychotic medications at discharge:    --Not Applicable.  Medication for smoking cessation:   --Patient does not smoke and medication is not indicated.  Medication for substance abuse:   --Patient does not have a substance use diagnosis and medication is not indicated.    Discharge Diet:   As per pre-admission.  Activity Level:   As per pre-admission.    Disposition: Patient was discharged home with family.    Outpatient Follow-Up:  Additional Instructions for the Follow-ups that You Need to Schedule     Discharge Follow-up with Specified Provider: Dr. Reynolds; 1 Week   As directed      To: Dr. Reynolds    Follow Up: 1 Week    Follow Up Details: within 1 week after discharge           Follow-up Information     Cyndie Young, SAMUEL .    Specialty: Nurse Practitioner  Contact information:  [address] [place] [address] [address] [place] [address]  [phone]             Nicolas Hernández MD .    Specialty: Otolaryngology  Contact information:  [address] [place] [address] [address]  [place] [address] [phone]             [place]  [facility] Outpatient Follow up on 10/23/2020.    Why:  Pt's appointment is at 9:00 am with Therapist  /intake   Bring Your ID, SS, insurance card and med bottles to follow up appointment   Please ask the Therapist to schedule you for medication appointment   Call Crisis hotline as needed [phone]  Contact information:   [address], [place] [address]                  Time Spent: More than 30 minutes.  I spent 32 minutes on this discharge activity which included: face to face encounter with the patient, reviewing the data in the system, coordination of the care with the nursing staff as well as consultants, documentation, and entering orders.      Teddy Martínez II, MD  10/07/20  10:29 CDT

## 2020-10-07 NOTE — NURSING NOTE
Pt discharged from [facility] home with friend Polina Wallace via private vehicle. Pt is stable without any s/s of distress. Pt signed and understands discharge instructions. Personal belongings returned to the Pt.

## 2020-10-13 ENCOUNTER — OFFICE VISIT (OUTPATIENT)
Dept: FAMILY MEDICINE CLINIC | Facility: CLINIC | Age: 65
End: 2020-10-13

## 2020-10-13 DIAGNOSIS — F41.9 ANXIETY: Primary | ICD-10-CM

## 2020-10-13 DIAGNOSIS — F32.A DEPRESSION, UNSPECIFIED DEPRESSION TYPE: ICD-10-CM

## 2020-10-13 DIAGNOSIS — F31.64 BIPOLAR DISORD, CRNT EPISODE MIXED, SEVERE, W PSYCH FEATURES (HCC): ICD-10-CM

## 2020-10-13 PROCEDURE — 99443 PR PHYS/QHP TELEPHONE EVALUATION 21-30 MIN: CPT | Performed by: NURSE PRACTITIONER

## 2020-10-29 DIAGNOSIS — I10 ESSENTIAL HYPERTENSION: ICD-10-CM

## 2020-10-29 DIAGNOSIS — E11.9 TYPE 2 DIABETES MELLITUS WITHOUT COMPLICATION, WITHOUT LONG-TERM CURRENT USE OF INSULIN (HCC): ICD-10-CM

## 2020-10-29 RX ORDER — SITAGLIPTIN 100 MG/1
TABLET, FILM COATED ORAL
Qty: 90 TABLET | Refills: 1 | Status: SHIPPED | OUTPATIENT
Start: 2020-10-29 | End: 2021-03-05

## 2020-10-29 RX ORDER — LISINOPRIL AND HYDROCHLOROTHIAZIDE 20; 12.5 MG/1; MG/1
1 TABLET ORAL DAILY
Qty: 90 TABLET | Refills: 1 | Status: SHIPPED | OUTPATIENT
Start: 2020-10-29 | End: 2021-03-29

## 2020-11-02 DIAGNOSIS — J44.9 CHRONIC OBSTRUCTIVE PULMONARY DISEASE, UNSPECIFIED COPD TYPE (HCC): ICD-10-CM

## 2020-11-02 DIAGNOSIS — E11.9 TYPE 2 DIABETES MELLITUS WITHOUT COMPLICATION, WITHOUT LONG-TERM CURRENT USE OF INSULIN (HCC): ICD-10-CM

## 2020-11-02 RX ORDER — TIZANIDINE 4 MG/1
TABLET ORAL
Qty: 60 TABLET | Refills: 1 | Status: SHIPPED | OUTPATIENT
Start: 2020-11-02 | End: 2021-01-28

## 2020-11-02 RX ORDER — ALBUTEROL SULFATE 90 UG/1
AEROSOL, METERED RESPIRATORY (INHALATION)
Qty: 18 G | Refills: 3 | Status: SHIPPED | OUTPATIENT
Start: 2020-11-02 | End: 2021-03-05

## 2020-11-13 ENCOUNTER — TELEPHONE (OUTPATIENT)
Dept: FAMILY MEDICINE CLINIC | Facility: CLINIC | Age: 65
End: 2020-11-13

## 2020-11-13 RX ORDER — QUETIAPINE FUMARATE 300 MG/1
600 TABLET, FILM COATED ORAL NIGHTLY
Qty: 60 TABLET | Refills: 2 | Status: SHIPPED | OUTPATIENT
Start: 2020-11-13 | End: 2021-01-07

## 2020-11-13 NOTE — TELEPHONE ENCOUNTER
NEEDS A REFILL ON SEROQUEL AND AMITRIPTYLINE. MCP. HE THINKS THEY MAY OF TOOK IT OFF OF MED LIST WHEN HE WAS IN HOSPITAL.

## 2020-12-28 DIAGNOSIS — E78.5 HYPERLIPIDEMIA, UNSPECIFIED HYPERLIPIDEMIA TYPE: ICD-10-CM

## 2020-12-28 DIAGNOSIS — K21.9 GASTROESOPHAGEAL REFLUX DISEASE WITHOUT ESOPHAGITIS: ICD-10-CM

## 2020-12-29 RX ORDER — ATORVASTATIN CALCIUM 20 MG/1
20 TABLET, FILM COATED ORAL NIGHTLY
Qty: 30 TABLET | Refills: 5 | Status: SHIPPED | OUTPATIENT
Start: 2020-12-29 | End: 2021-06-24

## 2020-12-29 RX ORDER — PANTOPRAZOLE SODIUM 40 MG/1
TABLET, DELAYED RELEASE ORAL
Qty: 60 TABLET | Refills: 5 | Status: SHIPPED | OUTPATIENT
Start: 2020-12-29 | End: 2021-06-24

## 2021-01-07 RX ORDER — QUETIAPINE FUMARATE 300 MG/1
600 TABLET, FILM COATED ORAL NIGHTLY
Qty: 60 TABLET | Refills: 2 | Status: SHIPPED | OUTPATIENT
Start: 2021-01-07 | End: 2021-04-12

## 2021-01-28 RX ORDER — TIZANIDINE 4 MG/1
TABLET ORAL
Qty: 60 TABLET | Refills: 1 | Status: SHIPPED | OUTPATIENT
Start: 2021-01-28 | End: 2021-03-29

## 2021-03-05 DIAGNOSIS — E11.9 TYPE 2 DIABETES MELLITUS WITHOUT COMPLICATION, WITHOUT LONG-TERM CURRENT USE OF INSULIN (HCC): ICD-10-CM

## 2021-03-05 DIAGNOSIS — J44.9 CHRONIC OBSTRUCTIVE PULMONARY DISEASE, UNSPECIFIED COPD TYPE (HCC): ICD-10-CM

## 2021-03-05 RX ORDER — SITAGLIPTIN 100 MG/1
TABLET, FILM COATED ORAL
Qty: 90 TABLET | Refills: 1 | Status: SHIPPED | OUTPATIENT
Start: 2021-03-05 | End: 2021-08-02 | Stop reason: SDUPTHER

## 2021-03-29 DIAGNOSIS — I10 ESSENTIAL HYPERTENSION: ICD-10-CM

## 2021-03-29 RX ORDER — LISINOPRIL AND HYDROCHLOROTHIAZIDE 20; 12.5 MG/1; MG/1
1 TABLET ORAL DAILY
Qty: 90 TABLET | Refills: 1 | Status: SHIPPED | OUTPATIENT
Start: 2021-03-29 | End: 2021-08-02 | Stop reason: SDUPTHER

## 2021-03-29 RX ORDER — TIZANIDINE 4 MG/1
TABLET ORAL
Qty: 60 TABLET | Refills: 1 | Status: SHIPPED | OUTPATIENT
Start: 2021-03-29 | End: 2021-04-27

## 2021-04-05 DIAGNOSIS — M19.041 ARTHRITIS OF RIGHT HAND: ICD-10-CM

## 2021-04-12 DIAGNOSIS — R39.11 URINARY HESITANCY: ICD-10-CM

## 2021-04-12 RX ORDER — QUETIAPINE FUMARATE 300 MG/1
600 TABLET, FILM COATED ORAL NIGHTLY
Qty: 60 TABLET | Refills: 2 | Status: SHIPPED | OUTPATIENT
Start: 2021-04-12 | End: 2021-06-30

## 2021-04-12 RX ORDER — TAMSULOSIN HYDROCHLORIDE 0.4 MG/1
1 CAPSULE ORAL DAILY
Qty: 30 CAPSULE | Refills: 5 | Status: SHIPPED | OUTPATIENT
Start: 2021-04-12 | End: 2021-09-13

## 2021-04-27 DIAGNOSIS — E11.9 TYPE 2 DIABETES MELLITUS WITHOUT COMPLICATION, WITHOUT LONG-TERM CURRENT USE OF INSULIN (HCC): ICD-10-CM

## 2021-04-27 RX ORDER — TIZANIDINE 4 MG/1
TABLET ORAL
Qty: 60 TABLET | Refills: 1 | Status: SHIPPED | OUTPATIENT
Start: 2021-04-27 | End: 2021-06-30

## 2021-05-17 ENCOUNTER — DOCUMENTATION (OUTPATIENT)
Dept: FAMILY MEDICINE CLINIC | Facility: CLINIC | Age: 66
End: 2021-05-17

## 2021-05-17 NOTE — PROGRESS NOTES
Pt called once I saw appt tomorrow for black toe to prevent lsoing toe as pt is diabetic and this is common. Pt cannot get ride til tomorrow when my recpetionist called this am to ask.

## 2021-05-18 ENCOUNTER — TELEPHONE (OUTPATIENT)
Dept: FAMILY MEDICINE CLINIC | Facility: CLINIC | Age: 66
End: 2021-05-18

## 2021-05-18 NOTE — TELEPHONE ENCOUNTER
----- Message from Marli Renuka SAMUEL Young sent at 5/18/2021 10:42 AM CDT -----  Please call pt, make telephone encounter, tell pt marli is concerned about you having a diabetic ulcer and losing his toe and/or having osteomyelitis and losing his entire foot due to the fact that he is a diabetic and he is telling me his toe is black.  He could also have a blood clot that is preventing in blood flow to that foot and if not corrected it could travel somewhere cells and cause a heart attack, stroke, pulmonary embolism etc.  Which could lead to death.  So tell him I think is very important and he needs to be seen we will try to get him in earlier yesterday but did have a ride.  So tell him that he needs to come in this afternoon or he needs to go to the ER, this does not need to wait.    Please make sure this is documented in a telephone encounter that you get a hold of the patient etc. etc. thanks

## 2021-05-18 NOTE — TELEPHONE ENCOUNTER
PATIENT WAS INFORMED AND COULD NOT FIND A RIDE HERE TODAY BUT SAID HE WOULD GO TO THE ER AND GET IT CHECKED OUT AS SOON AS HE COULD GET THERE.

## 2021-06-24 DIAGNOSIS — E78.5 HYPERLIPIDEMIA, UNSPECIFIED HYPERLIPIDEMIA TYPE: ICD-10-CM

## 2021-06-24 DIAGNOSIS — K21.9 GASTROESOPHAGEAL REFLUX DISEASE WITHOUT ESOPHAGITIS: ICD-10-CM

## 2021-06-24 RX ORDER — PANTOPRAZOLE SODIUM 40 MG/1
TABLET, DELAYED RELEASE ORAL
Qty: 60 TABLET | Refills: 5 | Status: SHIPPED | OUTPATIENT
Start: 2021-06-24 | End: 2021-08-02 | Stop reason: SDUPTHER

## 2021-06-24 RX ORDER — ATORVASTATIN CALCIUM 20 MG/1
20 TABLET, FILM COATED ORAL NIGHTLY
Qty: 30 TABLET | Refills: 5 | Status: SHIPPED | OUTPATIENT
Start: 2021-06-24 | End: 2021-08-02 | Stop reason: SDUPTHER

## 2021-06-30 RX ORDER — TIZANIDINE 4 MG/1
TABLET ORAL
Qty: 60 TABLET | Refills: 1 | Status: SHIPPED | OUTPATIENT
Start: 2021-06-30 | End: 2021-10-25

## 2021-06-30 RX ORDER — QUETIAPINE FUMARATE 300 MG/1
600 TABLET, FILM COATED ORAL NIGHTLY
Qty: 60 TABLET | Refills: 0 | Status: SHIPPED | OUTPATIENT
Start: 2021-06-30 | End: 2021-08-02 | Stop reason: SDUPTHER

## 2021-07-26 DIAGNOSIS — E11.9 TYPE 2 DIABETES MELLITUS WITHOUT COMPLICATION, WITHOUT LONG-TERM CURRENT USE OF INSULIN (HCC): ICD-10-CM

## 2021-07-26 DIAGNOSIS — I10 ESSENTIAL HYPERTENSION: ICD-10-CM

## 2021-07-26 RX ORDER — LISINOPRIL AND HYDROCHLOROTHIAZIDE 20; 12.5 MG/1; MG/1
1 TABLET ORAL DAILY
Qty: 90 TABLET | Refills: 0 | OUTPATIENT
Start: 2021-07-26

## 2021-07-26 RX ORDER — SITAGLIPTIN 100 MG/1
TABLET, FILM COATED ORAL
Qty: 90 TABLET | Refills: 0 | OUTPATIENT
Start: 2021-07-26

## 2021-07-26 RX ORDER — TIZANIDINE 4 MG/1
TABLET ORAL
Qty: 60 TABLET | Refills: 1 | OUTPATIENT
Start: 2021-07-26

## 2021-07-26 NOTE — TELEPHONE ENCOUNTER
Last appt with Hannah was on 10/13/2020, has no showed last 2 appt. These are advanced refills last filled on 07/26/2021.

## 2021-08-02 ENCOUNTER — LAB (OUTPATIENT)
Dept: LAB | Facility: OTHER | Age: 66
End: 2021-08-02

## 2021-08-02 ENCOUNTER — OFFICE VISIT (OUTPATIENT)
Dept: FAMILY MEDICINE CLINIC | Facility: CLINIC | Age: 66
End: 2021-08-02

## 2021-08-02 VITALS
OXYGEN SATURATION: 99 % | SYSTOLIC BLOOD PRESSURE: 138 MMHG | DIASTOLIC BLOOD PRESSURE: 76 MMHG | RESPIRATION RATE: 18 BRPM | HEIGHT: 69 IN | WEIGHT: 226 LBS | TEMPERATURE: 97.2 F | HEART RATE: 75 BPM | BODY MASS INDEX: 33.47 KG/M2

## 2021-08-02 DIAGNOSIS — F41.9 ANXIETY: ICD-10-CM

## 2021-08-02 DIAGNOSIS — I10 ESSENTIAL HYPERTENSION: ICD-10-CM

## 2021-08-02 DIAGNOSIS — G47.00 INSOMNIA, UNSPECIFIED TYPE: ICD-10-CM

## 2021-08-02 DIAGNOSIS — I10 ESSENTIAL HYPERTENSION: Primary | ICD-10-CM

## 2021-08-02 DIAGNOSIS — M19.041 ARTHRITIS OF RIGHT HAND: ICD-10-CM

## 2021-08-02 DIAGNOSIS — Z13.29 SCREENING FOR THYROID DISORDER: ICD-10-CM

## 2021-08-02 DIAGNOSIS — K59.00 CONSTIPATION, UNSPECIFIED CONSTIPATION TYPE: ICD-10-CM

## 2021-08-02 DIAGNOSIS — K21.9 GASTROESOPHAGEAL REFLUX DISEASE WITHOUT ESOPHAGITIS: ICD-10-CM

## 2021-08-02 DIAGNOSIS — J44.9 CHRONIC OBSTRUCTIVE PULMONARY DISEASE, UNSPECIFIED COPD TYPE (HCC): ICD-10-CM

## 2021-08-02 DIAGNOSIS — E11.9 TYPE 2 DIABETES MELLITUS WITHOUT COMPLICATION, WITHOUT LONG-TERM CURRENT USE OF INSULIN (HCC): ICD-10-CM

## 2021-08-02 DIAGNOSIS — E11.621 DIABETIC ULCER OF LEFT MIDFOOT ASSOCIATED WITH TYPE 2 DIABETES MELLITUS, LIMITED TO BREAKDOWN OF SKIN (HCC): ICD-10-CM

## 2021-08-02 DIAGNOSIS — Z12.5 PROSTATE CANCER SCREENING: ICD-10-CM

## 2021-08-02 DIAGNOSIS — F31.64 BIPOLAR DISORD, CRNT EPISODE MIXED, SEVERE, W PSYCH FEATURES (HCC): ICD-10-CM

## 2021-08-02 DIAGNOSIS — G62.9 NEUROPATHY: ICD-10-CM

## 2021-08-02 DIAGNOSIS — L97.421 DIABETIC ULCER OF LEFT MIDFOOT ASSOCIATED WITH TYPE 2 DIABETES MELLITUS, LIMITED TO BREAKDOWN OF SKIN (HCC): ICD-10-CM

## 2021-08-02 DIAGNOSIS — Z23 NEED FOR PNEUMOCOCCAL VACCINATION: ICD-10-CM

## 2021-08-02 DIAGNOSIS — E78.5 HYPERLIPIDEMIA, UNSPECIFIED HYPERLIPIDEMIA TYPE: ICD-10-CM

## 2021-08-02 DIAGNOSIS — E11.9 ENCOUNTER FOR DIABETIC FOOT EXAM (HCC): ICD-10-CM

## 2021-08-02 LAB
ALBUMIN SERPL-MCNC: 4.1 G/DL (ref 3.5–5)
ALBUMIN/GLOB SERPL: 1.3 G/DL (ref 1.1–1.8)
ALP SERPL-CCNC: 89 U/L (ref 38–126)
ALT SERPL W P-5'-P-CCNC: 16 U/L
ANION GAP SERPL CALCULATED.3IONS-SCNC: 9 MMOL/L (ref 5–15)
AST SERPL-CCNC: 20 U/L (ref 17–59)
BASOPHILS # BLD AUTO: 0.03 10*3/MM3 (ref 0–0.2)
BASOPHILS NFR BLD AUTO: 0.4 % (ref 0–1.5)
BILIRUB SERPL-MCNC: 0.2 MG/DL (ref 0.2–1.3)
BUN SERPL-MCNC: 18 MG/DL (ref 7–23)
BUN/CREAT SERPL: 22 (ref 7–25)
CALCIUM SPEC-SCNC: 9.4 MG/DL (ref 8.4–10.2)
CHLORIDE SERPL-SCNC: 103 MMOL/L (ref 101–112)
CHOLEST SERPL-MCNC: 169 MG/DL (ref 150–200)
CO2 SERPL-SCNC: 25 MMOL/L (ref 22–30)
CREAT SERPL-MCNC: 0.82 MG/DL (ref 0.7–1.3)
DEPRECATED RDW RBC AUTO: 45.6 FL (ref 37–54)
EOSINOPHIL # BLD AUTO: 0.33 10*3/MM3 (ref 0–0.4)
EOSINOPHIL NFR BLD AUTO: 4.7 % (ref 0.3–6.2)
ERYTHROCYTE [DISTWIDTH] IN BLOOD BY AUTOMATED COUNT: 13.9 % (ref 12.3–15.4)
GFR SERPL CREATININE-BSD FRML MDRD: 94 ML/MIN/1.73 (ref 49–113)
GLOBULIN UR ELPH-MCNC: 3.1 GM/DL (ref 2.3–3.5)
GLUCOSE SERPL-MCNC: 181 MG/DL (ref 70–99)
HCT VFR BLD AUTO: 35.4 % (ref 37.5–51)
HDLC SERPL-MCNC: 59 MG/DL (ref 40–59)
HGB BLD-MCNC: 11.9 G/DL (ref 13–17.7)
LDLC SERPL CALC-MCNC: 88 MG/DL
LDLC/HDLC SERPL: 1.45 {RATIO} (ref 0–3.55)
LYMPHOCYTES # BLD AUTO: 1.8 10*3/MM3 (ref 0.7–3.1)
LYMPHOCYTES NFR BLD AUTO: 25.5 % (ref 19.6–45.3)
MCH RBC QN AUTO: 31.6 PG (ref 26.6–33)
MCHC RBC AUTO-ENTMCNC: 33.6 G/DL (ref 31.5–35.7)
MCV RBC AUTO: 94.1 FL (ref 79–97)
MONOCYTES # BLD AUTO: 0.7 10*3/MM3 (ref 0.1–0.9)
MONOCYTES NFR BLD AUTO: 9.9 % (ref 5–12)
NEUTROPHILS NFR BLD AUTO: 4.19 10*3/MM3 (ref 1.7–7)
NEUTROPHILS NFR BLD AUTO: 59.5 % (ref 42.7–76)
PLATELET # BLD AUTO: 264 10*3/MM3 (ref 140–450)
PMV BLD AUTO: 9.2 FL (ref 6–12)
POTASSIUM SERPL-SCNC: 4.1 MMOL/L (ref 3.4–5)
PROT SERPL-MCNC: 7.2 G/DL (ref 6.3–8.6)
RBC # BLD AUTO: 3.76 10*6/MM3 (ref 4.14–5.8)
SODIUM SERPL-SCNC: 137 MMOL/L (ref 137–145)
TRIGL SERPL-MCNC: 123 MG/DL
VLDLC SERPL-MCNC: 22 MG/DL (ref 5–40)
WBC # BLD AUTO: 7.05 10*3/MM3 (ref 3.4–10.8)

## 2021-08-02 PROCEDURE — G0103 PSA SCREENING: HCPCS | Performed by: PHYSICIAN ASSISTANT

## 2021-08-02 PROCEDURE — 85025 COMPLETE CBC W/AUTO DIFF WBC: CPT | Performed by: INTERNAL MEDICINE

## 2021-08-02 PROCEDURE — 82043 UR ALBUMIN QUANTITATIVE: CPT | Performed by: PHYSICIAN ASSISTANT

## 2021-08-02 PROCEDURE — 90732 PPSV23 VACC 2 YRS+ SUBQ/IM: CPT | Performed by: PHYSICIAN ASSISTANT

## 2021-08-02 PROCEDURE — 83036 HEMOGLOBIN GLYCOSYLATED A1C: CPT | Performed by: PHYSICIAN ASSISTANT

## 2021-08-02 PROCEDURE — 90471 IMMUNIZATION ADMIN: CPT | Performed by: PHYSICIAN ASSISTANT

## 2021-08-02 PROCEDURE — 82570 ASSAY OF URINE CREATININE: CPT | Performed by: PHYSICIAN ASSISTANT

## 2021-08-02 PROCEDURE — 99214 OFFICE O/P EST MOD 30 MIN: CPT | Performed by: PHYSICIAN ASSISTANT

## 2021-08-02 PROCEDURE — 84439 ASSAY OF FREE THYROXINE: CPT | Performed by: PHYSICIAN ASSISTANT

## 2021-08-02 PROCEDURE — 80061 LIPID PANEL: CPT | Performed by: INTERNAL MEDICINE

## 2021-08-02 PROCEDURE — 80053 COMPREHEN METABOLIC PANEL: CPT | Performed by: INTERNAL MEDICINE

## 2021-08-02 PROCEDURE — 84443 ASSAY THYROID STIM HORMONE: CPT | Performed by: PHYSICIAN ASSISTANT

## 2021-08-02 PROCEDURE — 36415 COLL VENOUS BLD VENIPUNCTURE: CPT | Performed by: INTERNAL MEDICINE

## 2021-08-02 RX ORDER — BLOOD-GLUCOSE METER
1 KIT MISCELLANEOUS DAILY
Qty: 1 EACH | Refills: 0 | Status: SHIPPED | OUTPATIENT
Start: 2021-08-02 | End: 2022-06-13

## 2021-08-02 RX ORDER — LIDOCAINE 50 MG/G
1 PATCH TOPICAL EVERY 24 HOURS
Qty: 30 EACH | Refills: 5 | Status: SHIPPED | OUTPATIENT
Start: 2021-08-02 | End: 2022-06-13 | Stop reason: SDUPTHER

## 2021-08-02 RX ORDER — DOCUSATE SODIUM 100 MG/1
100 CAPSULE, LIQUID FILLED ORAL 2 TIMES DAILY
Qty: 180 CAPSULE | Refills: 1 | Status: SHIPPED | OUTPATIENT
Start: 2021-08-02 | End: 2022-06-13 | Stop reason: SDUPTHER

## 2021-08-02 RX ORDER — LISINOPRIL AND HYDROCHLOROTHIAZIDE 20; 12.5 MG/1; MG/1
1 TABLET ORAL DAILY
Qty: 90 TABLET | Refills: 1 | Status: SHIPPED | OUTPATIENT
Start: 2021-08-02 | End: 2022-02-21

## 2021-08-02 RX ORDER — LANCETS
EACH MISCELLANEOUS
Qty: 100 EACH | Refills: 12 | Status: SHIPPED | OUTPATIENT
Start: 2021-08-02 | End: 2022-06-13

## 2021-08-02 RX ORDER — QUETIAPINE FUMARATE 300 MG/1
450 TABLET, FILM COATED ORAL NIGHTLY
Qty: 60 TABLET | Refills: 2 | Status: SHIPPED | OUTPATIENT
Start: 2021-08-02 | End: 2021-12-09

## 2021-08-02 RX ORDER — GABAPENTIN 800 MG/1
TABLET ORAL
COMMUNITY
Start: 2021-07-19 | End: 2023-03-06

## 2021-08-02 RX ORDER — ATORVASTATIN CALCIUM 20 MG/1
20 TABLET, FILM COATED ORAL NIGHTLY
Qty: 90 TABLET | Refills: 1 | Status: SHIPPED | OUTPATIENT
Start: 2021-08-02 | End: 2022-04-22

## 2021-08-02 RX ORDER — POLYETHYLENE GLYCOL 3350 17 G/17G
17 POWDER, FOR SOLUTION ORAL DAILY PRN
Qty: 100 EACH | Refills: 2 | Status: SHIPPED | OUTPATIENT
Start: 2021-08-02 | End: 2023-03-06

## 2021-08-02 RX ORDER — BUDESONIDE AND FORMOTEROL FUMARATE DIHYDRATE 160; 4.5 UG/1; UG/1
2 AEROSOL RESPIRATORY (INHALATION)
Qty: 10.2 G | Refills: 5 | Status: SHIPPED | OUTPATIENT
Start: 2021-08-02 | End: 2022-06-13 | Stop reason: SDUPTHER

## 2021-08-02 RX ORDER — PANTOPRAZOLE SODIUM 40 MG/1
40 TABLET, DELAYED RELEASE ORAL 2 TIMES DAILY
Qty: 180 TABLET | Refills: 1 | Status: SHIPPED | OUTPATIENT
Start: 2021-08-02 | End: 2022-06-09 | Stop reason: SDUPTHER

## 2021-08-02 NOTE — PROGRESS NOTES
Subjective   Mikey Allison is a 66 y.o. male.   The patient is here for a get acquainted visit. I have reviewed the patient's medical history in detail and updated the computerized patient record.   History of Present Illness     Patient presents today to establish care with pcp. Previous pcp SAMUEL Kingsley.    T2DM - chronic, managed with metformin and januvia. He reports he does not routinely check fsbs, reports he has not check glucose in 5-6 months. He admits to polydipsia, polyuria, polyphagia. Diabetic foot exam due. Diabetic eye exam due. Due hgb a1c.    Essential hypertension - chronic, managed with lisinopril-hctz. He denies chest pain, syncope/presyncope, lightheadedness, dizziness, peripheral edema.    COPD - managed with symbicort, albuterol inhaler prn. He does admit to exertional dyspnea - walking approximately 500 ft before becoming dyspneic. He denies cough, wheezing.     Hyperlipidemia - managed with atorvastatin. Due lipid panel.    BPH - managed with flomax.     GERD - chronic, controlled, managed with protonix, pepcid. He denies nausea, vomiting, abdominal pain.     Bipolar disorder, anxiety, insomnia - managed with seroquel. He reports sleeping approximately 6-8 hours nightly without interruption. He denies feeling down/depressed. He denies SI, HI, or thoughts of self-harm. He admits to feeling anxious/overwhelmed on occasion. He was previously referred to [facility] by previous pcp, failed to follow up.     Constipation - new to me. Reports last bowel movement x 2 days prior. He denies hematochezia, melena, abdominal pain. He does admit to straining with bowel movements.     Past Medical History:   Diagnosis Date   • Abdominal pain     left side   • Acute sinusitis    • Ankle joint pain    • Bipolar disorder (CMS/HCC)    • Bipolar disorder, unspecified (CMS/HCC)    • Cellulitis and abscess of trunk     axilla   • Chest pain    • Chronic anemia    • Degenerative joint  disease involving multiple joints     back   • Depressive disorder    • Diabetes mellitus (CMS/HCC)    • Diabetes mellitus with no complication (CMS/[facility])     type 2 or unspecified type uncontrolled   • Diverticular disease    • Dyspnea    • Enlarged prostate     on GA-on CT   • Essential hypertension    • Febrile convulsion (CMS/[facility])    • Gastroesophageal reflux disease    • Generalized anxiety disorder    • H/O echocardiogram 10/16/2007    Mild to moderate concentric LV hypertrophy with mild left atrial enlargement. LV appears to be hypodynamic with preserved LV systolic function with EF 55-60%. Pericardium appears to be normal with a small pericardial effusion    • Hemorrhoids    • Hyperlipidemia    • Hypertensive disorder    • Hypoglycemia    • Infectious disorder of kidney     kidney infection-treated by [facility]   • Left lower quadrant pain    • Loss of appetite    • Obesity    • Osteoarthritis    • Pain in joint involving ankle and foot    • Pain in limb    • Psoriasis    • Retention of urine     with cath-treated by the [facility]   • Screening for malignant neoplasm of colon    • Sepsis due to urinary tract infection (CMS/[facility])     urosepsis treated by [facility]   • Sinusitis    • Syncope    • Unspecified essential hypertension       Family History   Problem Relation Age of Onset   • Diabetes Other    • Heart disease Other    • Hypertension Other    • Kidney disease Other    • Hypertension Mother    • Clotting disorder Mother       Social History     Socioeconomic History   • Marital status:      Spouse name: Not on file   • Number of children: Not on file   • Years of education: Not on file   • Highest education level: Not on file   Tobacco Use   • Smoking status: Former Smoker     Types: Cigarettes     Quit date:      Years since quittin.6   • Smokeless tobacco: Never Used   • Tobacco comment: smoked 3 years   Vaping Use   • Vaping Use: Never assessed   Substance and Sexual Activity    • Alcohol use: Yes     Alcohol/week: 3.0 - 6.0 standard drinks     Types: 3 - 6 Cans of beer per week     Comment: 1-2 every other day   • Drug use: No   • Sexual activity: Defer      Past Surgical History:   Procedure Laterality Date   • ANKLE SURGERY Left 11/11/2008    Removal of syndesmotic screws, left ankle. Painful syndesmotic screws, left ankle.)   • ANKLE SURGERY  10/19/2007    Open reduction-internal fixation with fluoroscopic control with final x-ray PA and lateral of the ankle. Trimalleolar fracture/subluxation, left ankle.)   • CIRCUMCISION  2016   • COLONOSCOPY  03/10/2015    Diverticulosis found in the sigmoid colon. hemorrhoids found in the anus. Normal cecum   • CYSTOSCOPY  06/22/2016    Cystoscopy, dilation, anchor of Tim catheter. Benign prostatic hypertrophy, urinary retention, urethral stricture history of.   • INJECTION OF MEDICATION      Kenalog (3)   • SHOULDER ARTHROSCOPY Right 10/18/2017    Procedure: SHOULDER ARTHROSCOPY WITH RAFAELA PROCEDURE, AND SUBACROMIAL DECOMPRESSION, AND POSSIBLE ROTATOR  CUFF REPAIR       SHOULDER ARTHROSCOPY WITH RAFAELA PROCEDURE, AND SUBACROMIAL DECOMPRESSION, NO ROTATOR CUFF REPAIR PERFORMED;  Surgeon: Maximus Schreiber MD;  Location: [facility];  Service:    • STOMACH SURGERY     • TIBIA FRACTURE SURGERY          The following portions of the patient's history were reviewed and updated as appropriate: allergies, current medications, past family history, past medical history, past social history, past surgical history and problem list.    Review of Systems   Constitutional: Negative for activity change, appetite change, chills, diaphoresis, fatigue, fever, unexpected weight gain and unexpected weight loss.   HENT: Negative.    Eyes: Negative for blurred vision, double vision and visual disturbance.   Respiratory: Positive for shortness of breath (chronic, exterional). Negative for apnea, cough, choking, chest tightness, wheezing and stridor.     Cardiovascular: Negative for chest pain, palpitations and leg swelling.   Gastrointestinal: Positive for constipation. Negative for abdominal distention, abdominal pain, diarrhea, nausea, vomiting, GERD and indigestion.   Musculoskeletal: Positive for arthralgias, back pain and gait problem (ambulating with cane).   Skin: Negative for rash.   Neurological: Negative for dizziness, facial asymmetry, weakness, light-headedness, headache and confusion.   Psychiatric/Behavioral: Negative.  Negative for sleep disturbance, suicidal ideas, depressed mood and stress. The patient is not nervous/anxious.        Objective   Physical Exam  Vitals and nursing note reviewed.   Constitutional:       General: He is not in acute distress.     Appearance: Normal appearance. He is not ill-appearing, toxic-appearing or diaphoretic.   HENT:      Head: Normocephalic and atraumatic.      Right Ear: External ear normal.      Left Ear: External ear normal.   Eyes:      Extraocular Movements: Extraocular movements intact.      Conjunctiva/sclera: Conjunctivae normal.      Pupils: Pupils are equal, round, and reactive to light.   Neck:      Vascular: No carotid bruit.   Cardiovascular:      Rate and Rhythm: Normal rate and regular rhythm.      Pulses: Normal pulses.           Dorsalis pedis pulses are 2+ on the right side.        Posterior tibial pulses are 2+ on the right side and 2+ on the left side.      Heart sounds: Normal heart sounds. No murmur heard.   No friction rub. No gallop.    Pulmonary:      Effort: Pulmonary effort is normal. No respiratory distress.      Breath sounds: Normal breath sounds. No stridor. No wheezing, rhonchi or rales.   Chest:      Chest wall: No tenderness.   Abdominal:      General: Bowel sounds are normal. There is no distension.      Palpations: Abdomen is soft.      Tenderness: There is no abdominal tenderness. There is no guarding or rebound.   Musculoskeletal:      Cervical back: Normal range of motion  and neck supple.      Right lower leg: No edema.      Left lower leg: No edema.        Feet:    Feet:      Right foot:      Protective Sensation: 10 sites tested. 7 sites sensed.      Skin integrity: Callus (heel) present. No skin breakdown.      Toenail Condition: Right toenails are abnormally thick.      Left foot:      Protective Sensation: 10 sites tested. 5 sites sensed.      Skin integrity: Skin breakdown and callus (heel) present.      Toenail Condition: Left toenails are abnormally thick.      Comments: Nails 1-5 b/l thickened  Skin:     General: Skin is warm and dry.      Findings: No rash.   Neurological:      Mental Status: He is alert and oriented to person, place, and time. Mental status is at baseline.   Psychiatric:         Mood and Affect: Mood normal.         Behavior: Behavior normal.         Thought Content: Thought content normal.         Judgment: Judgment normal.         Vitals:    08/02/21 0943   BP: 138/76   Pulse: 75   Resp: 18   Temp: 97.2 °F (36.2 °C)   SpO2: 99%      PHQ-9 Depression Screening  Little interest or pleasure in doing things? 0   Feeling down, depressed, or hopeless? 0   Trouble falling or staying asleep, or sleeping too much?     Feeling tired or having little energy?     Poor appetite or overeating?     Feeling bad about yourself - or that you are a failure or have let yourself or your family down?     Trouble concentrating on things, such as reading the newspaper or watching television?     Moving or speaking so slowly that other people could have noticed? Or the opposite - being so fidgety or restless that you have been moving around a lot more than usual?     Thoughts that you would be better off dead, or of hurting yourself in some way?     PHQ-9 Total Score 0   If you checked off any problems, how difficult have these problems made it for you to do your work, take care of things at home, or get along with other people?          Assessment/Plan      Diagnoses and all  orders for this visit:    1. Essential hypertension (Primary)  -     CBC w AUTO Differential; Future  -     Comprehensive metabolic panel; Future  -     Lipid panel; Future  -     lisinopril-hydrochlorothiazide (PRINZIDE,ZESTORETIC) 20-12.5 MG per tablet; Take 1 tablet by mouth Daily. Indications: High Blood Pressure Disorder  Dispense: 90 tablet; Refill: 1    2. Need for pneumococcal vaccination  -     Pneumococcal Polysaccharide Vaccine 23-Valent (PPSV23) Greater Than or Equal To 1yo Subcutaneous / IM    3. Type 2 diabetes mellitus without complication, without long-term current use of insulin (CMS/[facility])  -     Hemoglobin A1c; Future  -     Microalbumin / Creatinine Urine Ratio - Urine, Clean Catch; Future  -     glucose monitor monitoring kit; 1 each Daily.  Dispense: 1 each; Refill: 0  -     glucose blood test strip; Use as instructed to check blood sugar one daily  Dispense: 100 each; Refill: 3  -     Lancets (onetouch ultrasoft) lancets; Use as instructed  Dispense: 100 each; Refill: 12  -     glucose blood test strip; tid  Indications: DM  Dispense: 200 each; Refill: 6  -     SITagliptin (Januvia) 100 MG tablet; Take 1 tablet by mouth Daily.  Dispense: 90 tablet; Refill: 1  -     metFORMIN (GLUCOPHAGE) 1000 MG tablet; Take 1 tablet by mouth 2 (Two) Times a Day With Meals. Indications: Type 2 Diabetes  Dispense: 180 tablet; Refill: 1    4. Hyperlipidemia, unspecified hyperlipidemia type  -     atorvastatin (LIPITOR) 20 MG tablet; Take 1 tablet by mouth Every Night. Indications: High Amount of Fats in the Blood  Dispense: 90 tablet; Refill: 1    5. Prostate cancer screening  -     PSA Screen; Future    6. Screening for thyroid disorder  -     TSH; Future  -     T4, free; Future    7. Chronic obstructive pulmonary disease, unspecified COPD type (CMS/[facility])  -     budesonide-formoterol (Symbicort) 160-4.5 MCG/ACT inhaler; Inhale 2 puffs 2 (Two) Times a Day. Indications: Chronic Obstructive Lung Disease  Dispense:  10.2 g; Refill: 5  -     ProAir  (90 Base) MCG/ACT inhaler; 2 PUFFS EVERY 4-6 HOURS UNTIL COUGH/WHEEZING IMPROVE, THAN TAPER OFF OF  Dispense: 18 g; Refill: 5    8. Constipation, unspecified constipation type  -     docusate sodium (COLACE) 100 MG capsule; Take 1 capsule by mouth 2 (Two) Times a Day. Indications: Constipation  Dispense: 180 capsule; Refill: 1  -     polyethylene glycol (MIRALAX) 17 g packet; Take 17 g by mouth Daily As Needed (constipation).  Dispense: 100 each; Refill: 2    9. Arthritis of right hand  -     Diclofenac Sodium (VOLTAREN) 1 % gel gel; Apply  topically to the appropriate area as directed 4 (Four) Times a Day.  Dispense: 100 g; Refill: 5    10. Neuropathy  Comments:  diabetic  Orders:  -     lidocaine (LIDODERM) 5 %; Place 1 patch on the skin as directed by provider Daily. Indications: Allodynia  Dispense: 30 each; Refill: 5    11. Gastroesophageal reflux disease without esophagitis  -     pantoprazole (PROTONIX) 40 MG EC tablet; Take 1 tablet by mouth 2 (Two) Times a Day. Indications: Heartburn, Gastroesophageal Reflux Disease  Dispense: 180 tablet; Refill: 1    12. Diabetic ulcer of left midfoot associated with type 2 diabetes mellitus, limited to breakdown of skin (CMS/[facility])  -     Ambulatory Referral to Podiatry    13. Bipolar disord, crnt episode mixed, severe, w psych features (CMS/[facility])  -     QUEtiapine (SEROquel) 300 MG tablet; Take 1.5 tablets by mouth Every Night. Do not take amitriptyline or respirdal.  Dispense: 60 tablet; Refill: 2    14. Insomnia, unspecified type  -     QUEtiapine (SEROquel) 300 MG tablet; Take 1.5 tablets by mouth Every Night. Do not take amitriptyline or respirdal.  Dispense: 60 tablet; Refill: 2    15. Anxiety  -     QUEtiapine (SEROquel) 300 MG tablet; Take 1.5 tablets by mouth Every Night. Do not take amitriptyline or respirdal.  Dispense: 60 tablet; Refill: 2    16. Encounter for diabetic foot exam (CMS/[facility])      T2DM - chronic, managed with  metformin and januvia. Last hgb a1c 7.2. Due hgb a1c, obtain as above. Discussed lifestyle changes including diet (low carb - no more than 60g carbs per meal, no more than 15g carbs per snack) & exercise 30 mins daily. Discussed importance of glycemic control for prevention of future complications - nephropathy, neuropathy, retinopathy, cardiovascular risks, and additional potential complications.  Microalbumin/creatinine ratio, as above ordered. Prescription for glucometer, lancets, and testing strips as above sent to pharmacy. Discussed importance of keeping bid glucose journal to bring with him next appt for evaluation.  Discussed importance to schedule diabetic eye exam with ophthalmologist and potential eye complications of diabetes including diabetic retinopathy.   Diabetic foot exam completed. Superficial diabetic foot ulcer noted to left plantar midfoot, recommended diabetic shoes of appropriate fit. Educated on padding techniques for offloading. Limit barefoot walking. He would like referral to podiatry, provided per pt's request. Advised tight glucose control and daily foot checks.     Essential hypertension - chronic, managed with lisinopril-hctz. Continue as directed. Advised low sodium diet. Exercise daily as tolerated.     COPD - chronic, controlled, managed with symbicort, albuterol inhaler prn. Continue bronchodilators as directed.     Hyperlipidemia - managed with atorvastatin. Due lipid panel, obtain as above. Advised low fat diet & exercise daily as tolerated.    BPH - chronic, controlled, managed with flomax. Continue as directed.    GERD - chronic, controlled, managed with protonix, pepcid. Continue as directed. Avoid dietary triggers. Avoid lying down <1 hour after meals.    Bipolar disorder, anxiety, insomnia - chronic, controlled, managed with seroquel. Continue as directed. Discussed in length black box warnings. Instructed patient to schedule a same day appointment with me, go directly to  the walk-in clinic, or be evaluated by the ER if symptoms worsen, visual/auditory hallucinations are experienced, thoughts of hurting others, or suicidal thoughts develop. Pt verbalized understanding.     Constipation - new to me. Chronic per pt. Advised to maintain adequate hydration. Advised high fiber diet. Continue docusate. Begin miralax daily prn.    Pneumococcal vaccine - due. Updated as above. Pt tolerated well without sequelae.    Patient educated to follow up in 1 month after labs completed or sooner than next scheduled appointment if symptoms worsen or do not improve. Patient stated understanding and has agreed with plan of care. After visit summary was printed and given to patient.      This document has been electronically signed by Leila Bernard PA-C on August 2, 2021 17:35 CDT,.

## 2021-08-03 ENCOUNTER — PRIOR AUTHORIZATION (OUTPATIENT)
Dept: FAMILY MEDICINE CLINIC | Facility: CLINIC | Age: 66
End: 2021-08-03

## 2021-08-03 LAB
ALBUMIN UR-MCNC: <1.2 MG/DL
CREAT UR-MCNC: 23 MG/DL
HBA1C MFR BLD: 7.6 % (ref 4.8–5.6)
MICROALBUMIN/CREAT UR: NORMAL MG/G{CREAT}
PSA SERPL-MCNC: 0.18 NG/ML (ref 0–4)
T4 FREE SERPL-MCNC: 0.96 NG/DL (ref 0.93–1.7)
TSH SERPL DL<=0.05 MIU/L-ACNC: 1.47 UIU/ML (ref 0.27–4.2)

## 2021-08-05 DIAGNOSIS — D64.9 MILD ANEMIA: Primary | ICD-10-CM

## 2021-08-10 ENCOUNTER — OFFICE VISIT (OUTPATIENT)
Dept: FAMILY MEDICINE CLINIC | Facility: CLINIC | Age: 66
End: 2021-08-10

## 2021-08-10 ENCOUNTER — LAB (OUTPATIENT)
Dept: LAB | Facility: OTHER | Age: 66
End: 2021-08-10

## 2021-08-10 VITALS
HEIGHT: 69 IN | BODY MASS INDEX: 32.05 KG/M2 | SYSTOLIC BLOOD PRESSURE: 132 MMHG | TEMPERATURE: 98.1 F | WEIGHT: 216.4 LBS | HEART RATE: 74 BPM | OXYGEN SATURATION: 98 % | DIASTOLIC BLOOD PRESSURE: 70 MMHG

## 2021-08-10 DIAGNOSIS — D64.9 MILD ANEMIA: ICD-10-CM

## 2021-08-10 DIAGNOSIS — M79.672 BILATERAL FOOT PAIN: ICD-10-CM

## 2021-08-10 DIAGNOSIS — E11.9 TYPE 2 DIABETES MELLITUS WITHOUT COMPLICATION, WITHOUT LONG-TERM CURRENT USE OF INSULIN (HCC): Primary | ICD-10-CM

## 2021-08-10 DIAGNOSIS — M79.671 BILATERAL FOOT PAIN: ICD-10-CM

## 2021-08-10 LAB
BASOPHILS # BLD AUTO: 0.02 10*3/MM3 (ref 0–0.2)
BASOPHILS NFR BLD AUTO: 0.3 % (ref 0–1.5)
DEPRECATED RDW RBC AUTO: 44.2 FL (ref 37–54)
EOSINOPHIL # BLD AUTO: 0.26 10*3/MM3 (ref 0–0.4)
EOSINOPHIL NFR BLD AUTO: 4.5 % (ref 0.3–6.2)
ERYTHROCYTE [DISTWIDTH] IN BLOOD BY AUTOMATED COUNT: 13.4 % (ref 12.3–15.4)
HCT VFR BLD AUTO: 33.7 % (ref 37.5–51)
HGB BLD-MCNC: 11.3 G/DL (ref 13–17.7)
LYMPHOCYTES # BLD AUTO: 2.02 10*3/MM3 (ref 0.7–3.1)
LYMPHOCYTES NFR BLD AUTO: 34.9 % (ref 19.6–45.3)
MCH RBC QN AUTO: 31.5 PG (ref 26.6–33)
MCHC RBC AUTO-ENTMCNC: 33.5 G/DL (ref 31.5–35.7)
MCV RBC AUTO: 93.9 FL (ref 79–97)
MONOCYTES # BLD AUTO: 0.73 10*3/MM3 (ref 0.1–0.9)
MONOCYTES NFR BLD AUTO: 12.6 % (ref 5–12)
NEUTROPHILS NFR BLD AUTO: 2.76 10*3/MM3 (ref 1.7–7)
NEUTROPHILS NFR BLD AUTO: 47.7 % (ref 42.7–76)
PLATELET # BLD AUTO: 291 10*3/MM3 (ref 140–450)
PMV BLD AUTO: 9.2 FL (ref 6–12)
RBC # BLD AUTO: 3.59 10*6/MM3 (ref 4.14–5.8)
WBC # BLD AUTO: 5.79 10*3/MM3 (ref 3.4–10.8)

## 2021-08-10 PROCEDURE — 36415 COLL VENOUS BLD VENIPUNCTURE: CPT | Performed by: INTERNAL MEDICINE

## 2021-08-10 PROCEDURE — 83540 ASSAY OF IRON: CPT | Performed by: PHYSICIAN ASSISTANT

## 2021-08-10 PROCEDURE — 99213 OFFICE O/P EST LOW 20 MIN: CPT | Performed by: PHYSICIAN ASSISTANT

## 2021-08-10 PROCEDURE — 82746 ASSAY OF FOLIC ACID SERUM: CPT | Performed by: PHYSICIAN ASSISTANT

## 2021-08-10 PROCEDURE — 85025 COMPLETE CBC W/AUTO DIFF WBC: CPT | Performed by: INTERNAL MEDICINE

## 2021-08-10 PROCEDURE — 84466 ASSAY OF TRANSFERRIN: CPT | Performed by: PHYSICIAN ASSISTANT

## 2021-08-10 PROCEDURE — 82607 VITAMIN B-12: CPT | Performed by: PHYSICIAN ASSISTANT

## 2021-08-10 PROCEDURE — 96372 THER/PROPH/DIAG INJ SC/IM: CPT | Performed by: PHYSICIAN ASSISTANT

## 2021-08-10 RX ORDER — KETOROLAC TROMETHAMINE 30 MG/ML
30 INJECTION, SOLUTION INTRAMUSCULAR; INTRAVENOUS ONCE
Status: COMPLETED | OUTPATIENT
Start: 2021-08-10 | End: 2021-08-10

## 2021-08-10 RX ORDER — BLOOD-GLUCOSE METER
EACH MISCELLANEOUS
COMMUNITY
Start: 2021-08-02

## 2021-08-10 RX ADMIN — KETOROLAC TROMETHAMINE 30 MG: 30 INJECTION, SOLUTION INTRAMUSCULAR; INTRAVENOUS at 12:14

## 2021-08-10 NOTE — PROGRESS NOTES
Subjective   Mikey Allison is a 66 y.o. male.     History of Present Illness     Pt presents today for a f/u on T2DM.    T2DM - chronic, managed with metformin and januvia. Recent hgb a1c 7.6. He reports fsbs today 189. He reports over the past couple days glucose running approximately 200. He admits to polydipsia, polyuria, polyphagia.     Osteoarthritis and peripheral neuropathy-chronic, moderate control, managed with lortab, zanaflex, gabapentin, and Cymbalta. Following with pain management in [place], Dr. Varner. Reports utilizing lidoderm patches and voltaren gel prn. He admits to chronic pain to plantar feet x several years. Upcoming appt with podiatry 8/13/21.      The following portions of the patient's history were reviewed and updated as appropriate: allergies, current medications, past family history, past medical history, past social history, past surgical history and problem list.    Review of Systems   Constitutional: Negative for activity change, appetite change, chills, diaphoresis, fatigue, fever, unexpected weight gain and unexpected weight loss.   HENT: Negative.    Eyes: Negative for blurred vision, double vision and visual disturbance.   Respiratory: Positive for shortness of breath (chronic, exterional). Negative for apnea, cough, choking, chest tightness, wheezing and stridor.    Cardiovascular: Negative for chest pain, palpitations and leg swelling.   Gastrointestinal: Negative for abdominal distention, abdominal pain, constipation, diarrhea, nausea, vomiting, GERD and indigestion.   Musculoskeletal: Positive for arthralgias, back pain and gait problem (ambulating with cane).   Skin: Negative for rash.   Neurological: Negative for dizziness, facial asymmetry, weakness, light-headedness, headache and confusion.   Psychiatric/Behavioral: Negative.  Negative for sleep disturbance, suicidal ideas, depressed mood and stress. The patient is not nervous/anxious.        Objective    Physical Exam  Vitals and nursing note reviewed.   Constitutional:       General: He is not in acute distress.     Appearance: Normal appearance. He is not ill-appearing, toxic-appearing or diaphoretic.   HENT:      Head: Normocephalic and atraumatic.      Right Ear: External ear normal.      Left Ear: External ear normal.   Eyes:      Extraocular Movements: Extraocular movements intact.      Conjunctiva/sclera: Conjunctivae normal.      Pupils: Pupils are equal, round, and reactive to light.   Neck:      Vascular: No carotid bruit.   Cardiovascular:      Rate and Rhythm: Normal rate and regular rhythm.      Pulses:           Dorsalis pedis pulses are 1+ on the right side and 1+ on the left side.        Posterior tibial pulses are 1+ on the right side and 1+ on the left side.      Heart sounds: Normal heart sounds. No murmur heard.   No friction rub. No gallop.    Pulmonary:      Effort: Pulmonary effort is normal. No respiratory distress.      Breath sounds: Normal breath sounds. No stridor. No wheezing, rhonchi or rales.   Chest:      Chest wall: No tenderness.   Abdominal:      General: Bowel sounds are normal. There is no distension.      Palpations: Abdomen is soft.      Tenderness: There is no abdominal tenderness. There is no guarding or rebound.   Musculoskeletal:      Cervical back: Normal range of motion and neck supple.      Right lower leg: No edema.      Left lower leg: No edema.      Right foot: Decreased range of motion.      Left foot: Decreased range of motion.        Feet:    Feet:      Right foot:      Toenail Condition: Right toenails are abnormally thick.      Left foot:      Toenail Condition: Left toenails are abnormally thick.      Comments: Nails 1-5 b/l thickened  Skin:     General: Skin is warm and dry.      Findings: No rash.   Neurological:      Mental Status: He is alert and oriented to person, place, and time. Mental status is at baseline.   Psychiatric:         Mood and Affect: Mood  normal.         Behavior: Behavior normal.         Thought Content: Thought content normal.         Judgment: Judgment normal.         Vitals:    08/10/21 1131   BP: 132/70   Pulse: 74   Temp: 98.1 °F (36.7 °C)   SpO2: 98%        Assessment/Plan   Diagnoses and all orders for this visit:    1. Type 2 diabetes mellitus without complication, without long-term current use of insulin (CMS/[facility]) (Primary)  -     empagliflozin (Jardiance) 10 MG tablet tablet; Take 1 tablet by mouth Daily.  Dispense: 90 tablet; Refill: 1    2. Bilateral foot pain  -     XR foot 2 vw bilateral; Future  -     ketorolac (TORADOL) injection 30 mg      T2DM - chronic, managed with metformin and januvia. Last hgb a1c with suboptimal control at 7.6. Add jardiance as above. We discussed potential s/e and a/e of medication in office. Handouts provided on medication. Pt verbalized understanding. Discussed lifestyle changes including diet (low carb - no more than 60g carbs per meal, no more than 15g carbs per snack) & exercise 30 mins daily. Discussed importance of glycemic control for prevention of future complications - nephropathy, neuropathy, retinopathy, cardiovascular risks, and additional potential complications.  Microalbumin/creatinine ratio, as above ordered. Prescription for glucometer, lancets, and testing strips as above sent to pharmacy. Discussed importance of keeping bid glucose journal to bring with him next appt for evaluation.  Discussed importance to schedule diabetic eye exam with ophthalmologist and potential eye complications of diabetes including diabetic retinopathy.     Bilateral foot pain - new to me, chronic per pt. obtain xr bilateral feet as above, will notify pt of results when received. Continue RICE therapy. Continue following with pain management as scheduled. Continue medications as prescribed per pain management. He is administered toradol 30 IM in office which he tolerated well without sequelae. Follow up with  podiatry as scheduled.    Patient educated to follow up in 1 month or sooner than next scheduled appointment if symptoms worsen or do not improve. Patient stated understanding and has agreed with plan of care. After visit summary was printed and given to patient.      This document has been electronically signed by Leila Bernard PA-C on August 10, 2021 15:44 CDT,.

## 2021-08-10 NOTE — PATIENT INSTRUCTIONS
Empagliflozin oral tablets  What is this medicine?  EMPAGLIFLOZIN (EM pa gli FLOE zin) helps to treat type 2 diabetes. It helps to control blood sugar. This drug may also reduce the risk of heart attack or stroke if you have type 2 diabetes and risk factors for heart disease. Treatment is combined with diet and exercise.  This medicine may be used for other purposes; ask your health care provider or pharmacist if you have questions.  COMMON BRAND NAME(S): Jardiance  What should I tell my health care provider before I take this medicine?  They need to know if you have any of these conditions:  · dehydration  · diabetic ketoacidosis  · diet low in salt  · eating less due to illness, surgery, dieting, or any other reason  · having surgery  · high cholesterol  · high levels of potassium in the blood  · history of pancreatitis or pancreas problems  · history of yeast infection of the penis or vagina  · if you often drink alcohol  · infections in the bladder, kidneys, or urinary tract  · kidney disease  · liver disease  · low blood pressure  · on hemodialysis  · problems urinating  · type 1 diabetes  · uncircumcised male  · an unusual or allergic reaction to empagliflozin, other medicines, foods, dyes, or preservatives  · pregnant or trying to get pregnant  · breast-feeding  How should I use this medicine?  Take this medicine by mouth with a glass of water. Follow the directions on the prescription label. Take it in the morning, with or without food. Take your dose at the same time each day. Do not take more often than directed. Do not stop taking except on your doctor's advice.  Talk to your pediatrician regarding the use of this medicine in children. Special care may be needed.  Overdosage: If you think you have taken too much of this medicine contact a poison control center or emergency room at once.  NOTE: This medicine is only for you. Do not share this medicine with others.  What if I miss a dose?  If you miss a  dose, take it as soon as you can. If it is almost time for your next dose, take only that dose. Do not take double or extra doses.  What may interact with this medicine?  Do not take this medicine with any of the following medications:  · gatifloxacin  This medicine may also interact with the following medications:  · alcohol  · certain medicines for blood pressure, heart disease  · diuretics  This list may not describe all possible interactions. Give your health care provider a list of all the medicines, herbs, non-prescription drugs, or dietary supplements you use. Also tell them if you smoke, drink alcohol, or use illegal drugs. Some items may interact with your medicine.  What should I watch for while using this medicine?  Visit your doctor or health care professional for regular checks on your progress.  This medicine can cause a serious condition in which there is too much acid in the blood. If you develop nausea, vomiting, stomach pain, unusual tiredness, or breathing problems, stop taking this medicine and call your doctor right away. If possible, use a ketone dipstick to check for ketones in your urine.  A test called the HbA1C (A1C) will be monitored. This is a simple blood test. It measures your blood sugar control over the last 2 to 3 months. You will receive this test every 3 to 6 months.  Learn how to check your blood sugar. Learn the symptoms of low and high blood sugar and how to manage them.  Always carry a quick-source of sugar with you in case you have symptoms of low blood sugar. Examples include hard sugar candy or glucose tablets. Make sure others know that you can choke if you eat or drink when you develop serious symptoms of low blood sugar, such as seizures or unconsciousness. They must get medical help at once.  Tell your doctor or health care professional if you have high blood sugar. You might need to change the dose of your medicine. If you are sick or exercising more than usual, you  might need to change the dose of your medicine.  Do not skip meals. Ask your doctor or health care professional if you should avoid alcohol. Many nonprescription cough and cold products contain sugar or alcohol. These can affect blood sugar.  Wear a medical ID bracelet or chain, and carry a card that describes your disease and details of your medicine and dosage times.  What side effects may I notice from receiving this medicine?  Side effects that you should report to your doctor or health care professional as soon as possible:  · allergic reactions like skin rash, itching or hives, swelling of the face, lips, or tongue  · breathing problems  · dizziness  · feeling faint or lightheaded, falls  · muscle weakness  · nausea, vomiting, unusual stomach upset or pain  · penile discharge, itching, or pain in men  · signs and symptoms of a genital infection, such as fever; tenderness, redness, or swelling in the genitals or area from the genitals to the back of the rectum  · signs and symptoms of low blood sugar such as feeling anxious, confusion, dizziness, increased hunger, unusually weak or tired, sweating, shakiness, cold, irritable, headache, blurred vision, fast heartbeat, loss of consciousness  · signs and symptoms of a urinary tract infection, such as fever, chills, a burning feeling when urinating, blood in the urine, back pain  · trouble passing urine or change in the amount of urine, including an urgent need to urinate more often, in larger amounts, or at night  · unusual tiredness  · vaginal discharge, itching, or odor in women  Side effects that usually do not require medical attention (report to your doctor or health care professional if they continue or are bothersome):  · mild increase in urination  · thirsty  This list may not describe all possible side effects. Call your doctor for medical advice about side effects. You may report side effects to FDA at 8-793-FDA-7147.  Where should I keep my  medicine?  Keep out of the reach of children.  Store at room temperature between 20 and 25 degrees C (68 and 77 degrees F). Throw away any unused medicine after the expiration date.  NOTE: This sheet is a summary. It may not cover all possible information. If you have questions about this medicine, talk to your doctor, pharmacist, or health care provider.  © 2021 [employer] (2018-08-30 10:25:34)

## 2021-08-11 LAB
FOLATE SERPL-MCNC: 9.49 NG/ML (ref 4.78–24.2)
IRON 24H UR-MRATE: 62 MCG/DL (ref 59–158)
IRON SATN MFR SERPL: 21 % (ref 20–50)
TIBC SERPL-MCNC: 289 MCG/DL (ref 298–536)
TRANSFERRIN SERPL-MCNC: 194 MG/DL (ref 200–360)
VIT B12 BLD-MCNC: 292 PG/ML (ref 211–946)

## 2021-08-13 ENCOUNTER — OFFICE VISIT (OUTPATIENT)
Dept: WOUND CARE | Facility: HOSPITAL | Age: 66
End: 2021-08-13

## 2021-08-13 DIAGNOSIS — D64.9 MILD ANEMIA: Primary | ICD-10-CM

## 2021-08-13 DIAGNOSIS — E53.8 VITAMIN B12 DEFICIENCY: ICD-10-CM

## 2021-08-13 RX ORDER — CYANOCOBALAMIN 1000 UG/ML
1000 INJECTION, SOLUTION INTRAMUSCULAR; SUBCUTANEOUS
Status: SHIPPED | OUTPATIENT
Start: 2021-08-13

## 2021-08-20 ENCOUNTER — OFFICE VISIT (OUTPATIENT)
Dept: WOUND CARE | Facility: HOSPITAL | Age: 66
End: 2021-08-20

## 2021-08-20 ENCOUNTER — OUTSIDE FACILITY SERVICE (OUTPATIENT)
Dept: PODIATRY | Facility: CLINIC | Age: 66
End: 2021-08-20

## 2021-08-20 PROCEDURE — G0463 HOSPITAL OUTPT CLINIC VISIT: HCPCS

## 2021-08-20 PROCEDURE — 11055 PARING/CUTG B9 HYPRKER LES 1: CPT

## 2021-08-20 PROCEDURE — 11055 PARING/CUTG B9 HYPRKER LES 1: CPT | Performed by: PODIATRIST

## 2021-08-20 PROCEDURE — 11721 DEBRIDE NAIL 6 OR MORE: CPT | Performed by: PODIATRIST

## 2021-08-20 RX ORDER — BETAMETHASONE DIPROPIONATE 0.5 MG/G
CREAM TOPICAL DAILY
Qty: 45 G | Refills: 1 | Status: SHIPPED | OUTPATIENT
Start: 2021-08-20 | End: 2023-03-06

## 2021-08-27 DIAGNOSIS — G47.00 INSOMNIA, UNSPECIFIED TYPE: ICD-10-CM

## 2021-08-27 DIAGNOSIS — F41.9 ANXIETY: ICD-10-CM

## 2021-08-27 DIAGNOSIS — F31.64 BIPOLAR DISORD, CRNT EPISODE MIXED, SEVERE, W PSYCH FEATURES (HCC): ICD-10-CM

## 2021-08-30 RX ORDER — QUETIAPINE FUMARATE 300 MG/1
TABLET, FILM COATED ORAL
Qty: 60 TABLET | Refills: 0 | OUTPATIENT
Start: 2021-08-30

## 2021-09-02 ENCOUNTER — LAB (OUTPATIENT)
Dept: LAB | Facility: OTHER | Age: 66
End: 2021-09-02

## 2021-09-02 ENCOUNTER — OFFICE VISIT (OUTPATIENT)
Dept: FAMILY MEDICINE CLINIC | Facility: CLINIC | Age: 66
End: 2021-09-02

## 2021-09-02 VITALS
RESPIRATION RATE: 18 BRPM | WEIGHT: 218 LBS | TEMPERATURE: 97.2 F | HEIGHT: 69 IN | DIASTOLIC BLOOD PRESSURE: 78 MMHG | BODY MASS INDEX: 32.29 KG/M2 | SYSTOLIC BLOOD PRESSURE: 130 MMHG

## 2021-09-02 DIAGNOSIS — E11.9 TYPE 2 DIABETES MELLITUS WITHOUT COMPLICATION, WITHOUT LONG-TERM CURRENT USE OF INSULIN (HCC): Primary | ICD-10-CM

## 2021-09-02 DIAGNOSIS — D64.9 MILD ANEMIA: ICD-10-CM

## 2021-09-02 LAB
BASOPHILS # BLD AUTO: 0.02 10*3/MM3 (ref 0–0.2)
BASOPHILS NFR BLD AUTO: 0.3 % (ref 0–1.5)
DEPRECATED RDW RBC AUTO: 47 FL (ref 37–54)
EOSINOPHIL # BLD AUTO: 0.29 10*3/MM3 (ref 0–0.4)
EOSINOPHIL NFR BLD AUTO: 3.9 % (ref 0.3–6.2)
ERYTHROCYTE [DISTWIDTH] IN BLOOD BY AUTOMATED COUNT: 14.2 % (ref 12.3–15.4)
HCT VFR BLD AUTO: 36.2 % (ref 37.5–51)
HGB BLD-MCNC: 11.8 G/DL (ref 13–17.7)
LYMPHOCYTES # BLD AUTO: 1.89 10*3/MM3 (ref 0.7–3.1)
LYMPHOCYTES NFR BLD AUTO: 25.1 % (ref 19.6–45.3)
MCH RBC QN AUTO: 30.6 PG (ref 26.6–33)
MCHC RBC AUTO-ENTMCNC: 32.6 G/DL (ref 31.5–35.7)
MCV RBC AUTO: 94 FL (ref 79–97)
MONOCYTES # BLD AUTO: 0.85 10*3/MM3 (ref 0.1–0.9)
MONOCYTES NFR BLD AUTO: 11.3 % (ref 5–12)
NEUTROPHILS NFR BLD AUTO: 4.48 10*3/MM3 (ref 1.7–7)
NEUTROPHILS NFR BLD AUTO: 59.4 % (ref 42.7–76)
PLATELET # BLD AUTO: 301 10*3/MM3 (ref 140–450)
PMV BLD AUTO: 9 FL (ref 6–12)
RBC # BLD AUTO: 3.85 10*6/MM3 (ref 4.14–5.8)
WBC # BLD AUTO: 7.53 10*3/MM3 (ref 3.4–10.8)

## 2021-09-02 PROCEDURE — 99213 OFFICE O/P EST LOW 20 MIN: CPT | Performed by: PHYSICIAN ASSISTANT

## 2021-09-02 PROCEDURE — 36415 COLL VENOUS BLD VENIPUNCTURE: CPT | Performed by: INTERNAL MEDICINE

## 2021-09-02 PROCEDURE — 85025 COMPLETE CBC W/AUTO DIFF WBC: CPT | Performed by: INTERNAL MEDICINE

## 2021-09-02 NOTE — PROGRESS NOTES
Subjective   Mikey Allison is a 66 y.o. male.     Diabetes  He presents for his follow-up diabetic visit. He has type 2 diabetes mellitus. His disease course has been improving. There are no hypoglycemic associated symptoms. Pertinent negatives for hypoglycemia include no confusion, dizziness or nervousness/anxiousness. Associated symptoms include polyphagia. Pertinent negatives for diabetes include no blurred vision, no chest pain, no fatigue, no foot ulcerations, no polydipsia, no polyuria, no visual change, no weakness and no weight loss. There are no hypoglycemic complications. Risk factors for coronary artery disease include diabetes mellitus, dyslipidemia and hypertension. Current diabetic treatment includes oral agent (triple therapy). His weight is stable. He is following a generally healthy diet. Meal planning includes avoidance of concentrated sweets. He has not had a previous visit with a dietitian. He participates in exercise weekly. His breakfast blood glucose range is generally 140-180 mg/dl. An ACE inhibitor/angiotensin II receptor blocker is being taken.        Pt presents today for a f/u on T2DM.     T2DM - chronic, managed with metformin, januvia, jardiance. Recent hgb a1c 7.6. before beginning jardiance. He reports fsbs running 160s. He admits to polyphagia. He denies polydipsia, polyuria. He denies open lesions/sores to bilateral feet.     The following portions of the patient's history were reviewed and updated as appropriate: allergies, current medications, past family history, past medical history, past social history, past surgical history and problem list.    Review of Systems   Constitutional: Negative for activity change, appetite change, chills, diaphoresis, fatigue, fever, unexpected weight gain and unexpected weight loss.   HENT: Negative.    Eyes: Negative for blurred vision, double vision and visual disturbance.   Respiratory: Positive for shortness of breath (chronic,  exterional). Negative for apnea, cough, choking, chest tightness, wheezing and stridor.    Cardiovascular: Negative for chest pain, palpitations and leg swelling.   Gastrointestinal: Negative for abdominal distention, abdominal pain, constipation, diarrhea, nausea, vomiting, GERD and indigestion.   Endocrine: Positive for polyphagia. Negative for polydipsia and polyuria.   Musculoskeletal: Positive for arthralgias, back pain and gait problem (ambulating with cane).   Skin: Negative for rash.   Neurological: Negative for dizziness, facial asymmetry, weakness, light-headedness, headache and confusion.   Psychiatric/Behavioral: Negative.  Negative for sleep disturbance, suicidal ideas, depressed mood and stress. The patient is not nervous/anxious.        Objective   Physical Exam  Vitals and nursing note reviewed.   Constitutional:       General: He is not in acute distress.     Appearance: Normal appearance. He is not ill-appearing, toxic-appearing or diaphoretic.   HENT:      Head: Normocephalic and atraumatic.      Right Ear: External ear normal.      Left Ear: External ear normal.   Eyes:      Extraocular Movements: Extraocular movements intact.      Conjunctiva/sclera: Conjunctivae normal.      Pupils: Pupils are equal, round, and reactive to light.   Neck:      Vascular: No carotid bruit.   Cardiovascular:      Rate and Rhythm: Normal rate and regular rhythm.      Pulses:           Dorsalis pedis pulses are 1+ on the right side and 1+ on the left side.        Posterior tibial pulses are 1+ on the right side and 1+ on the left side.      Heart sounds: Normal heart sounds. No murmur heard.   No friction rub. No gallop.    Pulmonary:      Effort: Pulmonary effort is normal. No respiratory distress.      Breath sounds: Normal breath sounds. No stridor. No wheezing, rhonchi or rales.   Chest:      Chest wall: No tenderness.   Abdominal:      General: Bowel sounds are normal. There is no distension.      Palpations:  Abdomen is soft.      Tenderness: There is no abdominal tenderness. There is no guarding or rebound.   Musculoskeletal:      Cervical back: Normal range of motion and neck supple.      Right lower leg: No edema.      Left lower leg: No edema.      Right foot: Decreased range of motion.      Left foot: Decreased range of motion.   Feet:      Right foot:      Toenail Condition: Right toenails are abnormally thick.      Left foot:      Toenail Condition: Left toenails are abnormally thick.      Comments: Nails 1-5 b/l thickened  Skin:     General: Skin is warm and dry.      Findings: No rash.   Neurological:      Mental Status: He is alert and oriented to person, place, and time. Mental status is at baseline.   Psychiatric:         Mood and Affect: Mood normal.         Behavior: Behavior normal.         Thought Content: Thought content normal.         Judgment: Judgment normal.       Vitals:    09/02/21 1016   BP: 130/78   Resp: 18   Temp: 97.2 °F (36.2 °C)        Assessment/Plan   Diagnoses and all orders for this visit:    1. Type 2 diabetes mellitus without complication, without long-term current use of insulin (CMS/[facility]) (Primary)  -     Hemoglobin A1c; Future      T2DM - chronic, improving control, managed with metformin, januvia, jardiance. Continue as prescribed. Discussed lifestyle changes including diet (low carb - no more than 60g carbs per meal, no more than 15g carbs per snack) & exercise 30 mins daily. Discussed importance of glycemic control for prevention of future complications - nephropathy, neuropathy, retinopathy, cardiovascular risks, and additional potential complications. Discussed importance of keeping bid glucose journal to bring with him next appt for evaluation.  Discussed importance to schedule diabetic eye exam with ophthalmologist and potential eye complications of diabetes including diabetic retinopathy.      Patient educated to follow up in 3 months or sooner than next scheduled  appointment if needed. Patient stated understanding and has agreed with plan of care. After visit summary was printed and given to patient.      This document has been electronically signed by Leila Bernard PA-C on September 2, 2021 11:57 CDT,.

## 2021-09-09 DIAGNOSIS — R39.11 URINARY HESITANCY: ICD-10-CM

## 2021-09-10 DIAGNOSIS — R39.11 URINARY HESITANCY: ICD-10-CM

## 2021-09-10 RX ORDER — TAMSULOSIN HYDROCHLORIDE 0.4 MG/1
1 CAPSULE ORAL DAILY
Qty: 30 CAPSULE | Refills: 5 | OUTPATIENT
Start: 2021-09-10

## 2021-09-13 RX ORDER — TAMSULOSIN HYDROCHLORIDE 0.4 MG/1
1 CAPSULE ORAL DAILY
Qty: 30 CAPSULE | Refills: 5 | Status: SHIPPED | OUTPATIENT
Start: 2021-09-13 | End: 2022-06-13 | Stop reason: SDUPTHER

## 2021-10-25 RX ORDER — TIZANIDINE 4 MG/1
TABLET ORAL
Qty: 60 TABLET | Refills: 1 | Status: SHIPPED | OUTPATIENT
Start: 2021-10-25 | End: 2021-12-21

## 2021-12-08 DIAGNOSIS — F41.9 ANXIETY: ICD-10-CM

## 2021-12-08 DIAGNOSIS — G47.00 INSOMNIA, UNSPECIFIED TYPE: ICD-10-CM

## 2021-12-08 DIAGNOSIS — F31.64 BIPOLAR DISORD, CRNT EPISODE MIXED, SEVERE, W PSYCH FEATURES (HCC): ICD-10-CM

## 2021-12-09 RX ORDER — QUETIAPINE FUMARATE 300 MG/1
TABLET, FILM COATED ORAL
Qty: 45 TABLET | Refills: 2 | Status: SHIPPED | OUTPATIENT
Start: 2021-12-09 | End: 2022-03-18 | Stop reason: SDUPTHER

## 2021-12-21 RX ORDER — TIZANIDINE 4 MG/1
TABLET ORAL
Qty: 60 TABLET | Refills: 1 | Status: SHIPPED | OUTPATIENT
Start: 2021-12-21 | End: 2022-02-21

## 2022-02-21 DIAGNOSIS — E11.9 TYPE 2 DIABETES MELLITUS WITHOUT COMPLICATION, WITHOUT LONG-TERM CURRENT USE OF INSULIN: ICD-10-CM

## 2022-02-21 DIAGNOSIS — I10 ESSENTIAL HYPERTENSION: ICD-10-CM

## 2022-02-21 RX ORDER — LISINOPRIL AND HYDROCHLOROTHIAZIDE 20; 12.5 MG/1; MG/1
1 TABLET ORAL DAILY
Qty: 90 TABLET | Refills: 0 | Status: SHIPPED | OUTPATIENT
Start: 2022-02-21 | End: 2022-04-22

## 2022-02-21 RX ORDER — TIZANIDINE 4 MG/1
TABLET ORAL
Qty: 60 TABLET | Refills: 0 | Status: SHIPPED | OUTPATIENT
Start: 2022-02-21 | End: 2022-06-13 | Stop reason: SDUPTHER

## 2022-02-21 RX ORDER — SITAGLIPTIN 100 MG/1
TABLET, FILM COATED ORAL
Qty: 90 TABLET | Refills: 0 | Status: SHIPPED | OUTPATIENT
Start: 2022-02-21 | End: 2022-04-22

## 2022-03-03 RX ORDER — TIZANIDINE 4 MG/1
TABLET ORAL
Qty: 60 TABLET | Refills: 1 | OUTPATIENT
Start: 2022-03-03

## 2022-03-07 RX ORDER — TIZANIDINE 4 MG/1
TABLET ORAL
Qty: 60 TABLET | Refills: 0 | OUTPATIENT
Start: 2022-03-07

## 2022-03-18 DIAGNOSIS — G47.00 INSOMNIA, UNSPECIFIED TYPE: ICD-10-CM

## 2022-03-18 DIAGNOSIS — F41.9 ANXIETY: ICD-10-CM

## 2022-03-18 DIAGNOSIS — F31.64: ICD-10-CM

## 2022-03-18 RX ORDER — QUETIAPINE FUMARATE 300 MG/1
TABLET, FILM COATED ORAL
Qty: 45 TABLET | Refills: 2 | Status: SHIPPED | OUTPATIENT
Start: 2022-03-18 | End: 2022-06-09 | Stop reason: SDUPTHER

## 2022-04-22 DIAGNOSIS — E11.9 TYPE 2 DIABETES MELLITUS WITHOUT COMPLICATION, WITHOUT LONG-TERM CURRENT USE OF INSULIN: ICD-10-CM

## 2022-04-22 DIAGNOSIS — I10 ESSENTIAL HYPERTENSION: ICD-10-CM

## 2022-04-22 DIAGNOSIS — E78.5 HYPERLIPIDEMIA, UNSPECIFIED HYPERLIPIDEMIA TYPE: ICD-10-CM

## 2022-04-22 RX ORDER — SITAGLIPTIN 100 MG/1
TABLET, FILM COATED ORAL
Qty: 14 TABLET | Refills: 0 | Status: SHIPPED | OUTPATIENT
Start: 2022-04-22 | End: 2022-06-13 | Stop reason: SDUPTHER

## 2022-04-22 RX ORDER — ATORVASTATIN CALCIUM 20 MG/1
20 TABLET, FILM COATED ORAL NIGHTLY
Qty: 14 TABLET | Refills: 0 | Status: SHIPPED | OUTPATIENT
Start: 2022-04-22 | End: 2022-06-13 | Stop reason: DRUGHIGH

## 2022-04-22 RX ORDER — LISINOPRIL AND HYDROCHLOROTHIAZIDE 20; 12.5 MG/1; MG/1
1 TABLET ORAL DAILY
Qty: 14 TABLET | Refills: 0 | Status: SHIPPED | OUTPATIENT
Start: 2022-04-22 | End: 2022-06-13 | Stop reason: SDUPTHER

## 2022-05-03 DIAGNOSIS — M19.041 ARTHRITIS OF RIGHT HAND: ICD-10-CM

## 2022-05-31 DIAGNOSIS — I10 ESSENTIAL HYPERTENSION: Primary | ICD-10-CM

## 2022-05-31 DIAGNOSIS — E53.8 VITAMIN B12 DEFICIENCY: ICD-10-CM

## 2022-05-31 DIAGNOSIS — D64.9 MILD ANEMIA: ICD-10-CM

## 2022-05-31 DIAGNOSIS — E11.9 TYPE 2 DIABETES MELLITUS WITHOUT COMPLICATION, WITHOUT LONG-TERM CURRENT USE OF INSULIN: ICD-10-CM

## 2022-05-31 DIAGNOSIS — Z13.29 SCREENING FOR THYROID DISORDER: ICD-10-CM

## 2022-05-31 DIAGNOSIS — E78.2 MIXED HYPERLIPIDEMIA: ICD-10-CM

## 2022-06-03 ENCOUNTER — LAB (OUTPATIENT)
Dept: LAB | Facility: OTHER | Age: 67
End: 2022-06-03

## 2022-06-03 DIAGNOSIS — E53.8 VITAMIN B12 DEFICIENCY: ICD-10-CM

## 2022-06-03 DIAGNOSIS — I10 ESSENTIAL HYPERTENSION: ICD-10-CM

## 2022-06-03 DIAGNOSIS — Z13.29 SCREENING FOR THYROID DISORDER: ICD-10-CM

## 2022-06-03 DIAGNOSIS — E11.9 TYPE 2 DIABETES MELLITUS WITHOUT COMPLICATION, WITHOUT LONG-TERM CURRENT USE OF INSULIN: ICD-10-CM

## 2022-06-03 DIAGNOSIS — D64.9 MILD ANEMIA: ICD-10-CM

## 2022-06-03 DIAGNOSIS — E78.2 MIXED HYPERLIPIDEMIA: ICD-10-CM

## 2022-06-03 LAB
ALBUMIN SERPL-MCNC: 4.4 G/DL (ref 3.5–5)
ALBUMIN/GLOB SERPL: 1.3 G/DL (ref 1.1–1.8)
ALP SERPL-CCNC: 104 U/L (ref 38–126)
ALT SERPL W P-5'-P-CCNC: 12 U/L
ANION GAP SERPL CALCULATED.3IONS-SCNC: 11 MMOL/L (ref 5–15)
AST SERPL-CCNC: 21 U/L (ref 17–59)
BASOPHILS # BLD AUTO: 0.05 10*3/MM3 (ref 0–0.2)
BASOPHILS NFR BLD AUTO: 0.7 % (ref 0–1.5)
BILIRUB SERPL-MCNC: 0.6 MG/DL (ref 0.2–1.3)
BUN SERPL-MCNC: 18 MG/DL (ref 7–23)
BUN/CREAT SERPL: 22.8 (ref 7–25)
CALCIUM SPEC-SCNC: 9.4 MG/DL (ref 8.4–10.2)
CHLORIDE SERPL-SCNC: 103 MMOL/L (ref 101–112)
CHOLEST SERPL-MCNC: 207 MG/DL (ref 150–200)
CO2 SERPL-SCNC: 24 MMOL/L (ref 22–30)
CREAT SERPL-MCNC: 0.79 MG/DL (ref 0.7–1.3)
DEPRECATED RDW RBC AUTO: 44.3 FL (ref 37–54)
EGFRCR SERPLBLD CKD-EPI 2021: 97.4 ML/MIN/1.73
EOSINOPHIL # BLD AUTO: 0.14 10*3/MM3 (ref 0–0.4)
EOSINOPHIL NFR BLD AUTO: 2 % (ref 0.3–6.2)
ERYTHROCYTE [DISTWIDTH] IN BLOOD BY AUTOMATED COUNT: 13.8 % (ref 12.3–15.4)
FOLATE SERPL-MCNC: 13.7 NG/ML (ref 4.78–24.2)
GLOBULIN UR ELPH-MCNC: 3.4 GM/DL (ref 2.3–3.5)
GLUCOSE SERPL-MCNC: 149 MG/DL (ref 70–99)
HBA1C MFR BLD: 7.8 % (ref 4.8–5.6)
HCT VFR BLD AUTO: 39 % (ref 37.5–51)
HDLC SERPL-MCNC: 76 MG/DL (ref 40–59)
HGB BLD-MCNC: 12.9 G/DL (ref 13–17.7)
IRON 24H UR-MRATE: 56 MCG/DL (ref 59–158)
IRON SATN MFR SERPL: 15 % (ref 20–50)
LDLC SERPL CALC-MCNC: 120 MG/DL
LDLC/HDLC SERPL: 1.56 {RATIO} (ref 0–3.55)
LYMPHOCYTES # BLD AUTO: 2.05 10*3/MM3 (ref 0.7–3.1)
LYMPHOCYTES NFR BLD AUTO: 28.9 % (ref 19.6–45.3)
MCH RBC QN AUTO: 29.7 PG (ref 26.6–33)
MCHC RBC AUTO-ENTMCNC: 33.1 G/DL (ref 31.5–35.7)
MCV RBC AUTO: 89.9 FL (ref 79–97)
MONOCYTES # BLD AUTO: 0.85 10*3/MM3 (ref 0.1–0.9)
MONOCYTES NFR BLD AUTO: 12 % (ref 5–12)
NEUTROPHILS NFR BLD AUTO: 4 10*3/MM3 (ref 1.7–7)
NEUTROPHILS NFR BLD AUTO: 56.4 % (ref 42.7–76)
PLATELET # BLD AUTO: 325 10*3/MM3 (ref 140–450)
PMV BLD AUTO: 8.7 FL (ref 6–12)
POTASSIUM SERPL-SCNC: 4 MMOL/L (ref 3.4–5)
PROT SERPL-MCNC: 7.8 G/DL (ref 6.3–8.6)
RBC # BLD AUTO: 4.34 10*6/MM3 (ref 4.14–5.8)
SODIUM SERPL-SCNC: 138 MMOL/L (ref 137–145)
T3FREE SERPL-MCNC: 3.23 PG/ML (ref 2–4.4)
T4 FREE SERPL-MCNC: 1.36 NG/DL (ref 0.93–1.7)
TIBC SERPL-MCNC: 370 MCG/DL (ref 298–536)
TRANSFERRIN SERPL-MCNC: 248 MG/DL (ref 200–360)
TRIGL SERPL-MCNC: 61 MG/DL
TSH SERPL DL<=0.05 MIU/L-ACNC: 2.04 UIU/ML (ref 0.27–4.2)
VIT B12 BLD-MCNC: 275 PG/ML (ref 211–946)
VLDLC SERPL-MCNC: 11 MG/DL (ref 5–40)
WBC NRBC COR # BLD: 7.09 10*3/MM3 (ref 3.4–10.8)

## 2022-06-03 PROCEDURE — 85025 COMPLETE CBC W/AUTO DIFF WBC: CPT | Performed by: PHYSICIAN ASSISTANT

## 2022-06-03 PROCEDURE — 84439 ASSAY OF FREE THYROXINE: CPT | Performed by: PHYSICIAN ASSISTANT

## 2022-06-03 PROCEDURE — 83540 ASSAY OF IRON: CPT | Performed by: PHYSICIAN ASSISTANT

## 2022-06-03 PROCEDURE — 80061 LIPID PANEL: CPT | Performed by: PHYSICIAN ASSISTANT

## 2022-06-03 PROCEDURE — 82746 ASSAY OF FOLIC ACID SERUM: CPT | Performed by: PHYSICIAN ASSISTANT

## 2022-06-03 PROCEDURE — 80053 COMPREHEN METABOLIC PANEL: CPT | Performed by: PHYSICIAN ASSISTANT

## 2022-06-03 PROCEDURE — 84466 ASSAY OF TRANSFERRIN: CPT | Performed by: PHYSICIAN ASSISTANT

## 2022-06-03 PROCEDURE — 84443 ASSAY THYROID STIM HORMONE: CPT | Performed by: PHYSICIAN ASSISTANT

## 2022-06-03 PROCEDURE — 82607 VITAMIN B-12: CPT | Performed by: PHYSICIAN ASSISTANT

## 2022-06-03 PROCEDURE — 36415 COLL VENOUS BLD VENIPUNCTURE: CPT | Performed by: PHYSICIAN ASSISTANT

## 2022-06-03 PROCEDURE — 84481 FREE ASSAY (FT-3): CPT | Performed by: PHYSICIAN ASSISTANT

## 2022-06-03 PROCEDURE — 83036 HEMOGLOBIN GLYCOSYLATED A1C: CPT | Performed by: PHYSICIAN ASSISTANT

## 2022-06-07 DIAGNOSIS — E61.1 IRON DEFICIENCY: Primary | ICD-10-CM

## 2022-06-07 RX ORDER — LANOLIN ALCOHOL/MO/W.PET/CERES
325 CREAM (GRAM) TOPICAL
Qty: 30 TABLET | Refills: 11 | Status: SHIPPED | OUTPATIENT
Start: 2022-06-07 | End: 2023-03-06

## 2022-06-09 DIAGNOSIS — F31.64: ICD-10-CM

## 2022-06-09 DIAGNOSIS — F41.9 ANXIETY: ICD-10-CM

## 2022-06-09 DIAGNOSIS — G47.00 INSOMNIA, UNSPECIFIED TYPE: ICD-10-CM

## 2022-06-09 DIAGNOSIS — K21.9 GASTROESOPHAGEAL REFLUX DISEASE WITHOUT ESOPHAGITIS: ICD-10-CM

## 2022-06-09 RX ORDER — QUETIAPINE FUMARATE 300 MG/1
TABLET, FILM COATED ORAL
Qty: 45 TABLET | Refills: 0 | Status: SHIPPED | OUTPATIENT
Start: 2022-06-09 | End: 2022-06-13 | Stop reason: SDUPTHER

## 2022-06-09 RX ORDER — PANTOPRAZOLE SODIUM 40 MG/1
40 TABLET, DELAYED RELEASE ORAL 2 TIMES DAILY
Qty: 60 TABLET | Refills: 0 | Status: SHIPPED | OUTPATIENT
Start: 2022-06-09 | End: 2022-06-13 | Stop reason: SDUPTHER

## 2022-06-13 ENCOUNTER — OFFICE VISIT (OUTPATIENT)
Dept: FAMILY MEDICINE CLINIC | Facility: CLINIC | Age: 67
End: 2022-06-13

## 2022-06-13 VITALS
SYSTOLIC BLOOD PRESSURE: 124 MMHG | RESPIRATION RATE: 18 BRPM | HEART RATE: 83 BPM | TEMPERATURE: 98.1 F | BODY MASS INDEX: 31.25 KG/M2 | WEIGHT: 211 LBS | OXYGEN SATURATION: 99 % | DIASTOLIC BLOOD PRESSURE: 78 MMHG | HEIGHT: 69 IN

## 2022-06-13 DIAGNOSIS — G47.00 INSOMNIA, UNSPECIFIED TYPE: ICD-10-CM

## 2022-06-13 DIAGNOSIS — K59.00 CONSTIPATION, UNSPECIFIED CONSTIPATION TYPE: ICD-10-CM

## 2022-06-13 DIAGNOSIS — M19.041 ARTHRITIS OF RIGHT HAND: ICD-10-CM

## 2022-06-13 DIAGNOSIS — M25.572 CHRONIC PAIN OF LEFT ANKLE: ICD-10-CM

## 2022-06-13 DIAGNOSIS — E11.42 DIABETIC POLYNEUROPATHY ASSOCIATED WITH TYPE 2 DIABETES MELLITUS: ICD-10-CM

## 2022-06-13 DIAGNOSIS — R39.11 URINARY HESITANCY: ICD-10-CM

## 2022-06-13 DIAGNOSIS — G89.29 CHRONIC PAIN OF LEFT ANKLE: ICD-10-CM

## 2022-06-13 DIAGNOSIS — F31.64: ICD-10-CM

## 2022-06-13 DIAGNOSIS — E11.9 TYPE 2 DIABETES MELLITUS WITHOUT COMPLICATION, WITHOUT LONG-TERM CURRENT USE OF INSULIN: Primary | ICD-10-CM

## 2022-06-13 DIAGNOSIS — I10 ESSENTIAL HYPERTENSION: ICD-10-CM

## 2022-06-13 DIAGNOSIS — K21.9 GASTROESOPHAGEAL REFLUX DISEASE WITHOUT ESOPHAGITIS: ICD-10-CM

## 2022-06-13 DIAGNOSIS — E78.2 MIXED HYPERLIPIDEMIA: ICD-10-CM

## 2022-06-13 DIAGNOSIS — G62.9 NEUROPATHY: ICD-10-CM

## 2022-06-13 DIAGNOSIS — F41.9 ANXIETY: ICD-10-CM

## 2022-06-13 DIAGNOSIS — J44.9 CHRONIC OBSTRUCTIVE PULMONARY DISEASE, UNSPECIFIED COPD TYPE: ICD-10-CM

## 2022-06-13 PROCEDURE — 99214 OFFICE O/P EST MOD 30 MIN: CPT | Performed by: PHYSICIAN ASSISTANT

## 2022-06-13 RX ORDER — LISINOPRIL AND HYDROCHLOROTHIAZIDE 20; 12.5 MG/1; MG/1
1 TABLET ORAL DAILY
Qty: 30 TABLET | Refills: 5 | Status: SHIPPED | OUTPATIENT
Start: 2022-06-13 | End: 2023-02-22

## 2022-06-13 RX ORDER — PANTOPRAZOLE SODIUM 40 MG/1
40 TABLET, DELAYED RELEASE ORAL DAILY
Qty: 30 TABLET | Refills: 5 | Status: SHIPPED | OUTPATIENT
Start: 2022-06-13 | End: 2022-07-29

## 2022-06-13 RX ORDER — TIZANIDINE 4 MG/1
4 TABLET ORAL 2 TIMES DAILY PRN
Qty: 60 TABLET | Refills: 5 | Status: SHIPPED | OUTPATIENT
Start: 2022-06-13 | End: 2023-03-06

## 2022-06-13 RX ORDER — ATORVASTATIN CALCIUM 40 MG/1
40 TABLET, FILM COATED ORAL NIGHTLY
Qty: 30 TABLET | Refills: 5 | Status: SHIPPED | OUTPATIENT
Start: 2022-06-13 | End: 2022-12-08

## 2022-06-13 RX ORDER — LIDOCAINE 50 MG/G
1 PATCH TOPICAL EVERY 24 HOURS
Qty: 30 EACH | Refills: 5 | Status: SHIPPED | OUTPATIENT
Start: 2022-06-13

## 2022-06-13 RX ORDER — BUDESONIDE AND FORMOTEROL FUMARATE DIHYDRATE 160; 4.5 UG/1; UG/1
2 AEROSOL RESPIRATORY (INHALATION)
Qty: 10.2 G | Refills: 5 | Status: SHIPPED | OUTPATIENT
Start: 2022-06-13

## 2022-06-13 RX ORDER — BLOOD-GLUCOSE METER
1 KIT MISCELLANEOUS DAILY
Qty: 1 EACH | Refills: 0 | Status: SHIPPED | OUTPATIENT
Start: 2022-06-13

## 2022-06-13 RX ORDER — LANCETS 33 GAUGE
1 EACH MISCELLANEOUS DAILY
Qty: 100 EACH | Refills: 11 | Status: SHIPPED | OUTPATIENT
Start: 2022-06-13

## 2022-06-13 RX ORDER — DOCUSATE SODIUM 100 MG/1
100 CAPSULE, LIQUID FILLED ORAL 2 TIMES DAILY
Qty: 180 CAPSULE | Refills: 1 | Status: SHIPPED | OUTPATIENT
Start: 2022-06-13

## 2022-06-13 RX ORDER — TAMSULOSIN HYDROCHLORIDE 0.4 MG/1
1 CAPSULE ORAL DAILY
Qty: 30 CAPSULE | Refills: 5 | Status: SHIPPED | OUTPATIENT
Start: 2022-06-13 | End: 2023-03-06

## 2022-06-13 RX ORDER — QUETIAPINE FUMARATE 300 MG/1
TABLET, FILM COATED ORAL
Qty: 60 TABLET | Refills: 5 | Status: SHIPPED | OUTPATIENT
Start: 2022-06-13 | End: 2023-01-06 | Stop reason: SDUPTHER

## 2022-06-13 NOTE — PROGRESS NOTES
Subjective   Mikey Allison is a 67 y.o. male.     History of Present Illness     Pt presents today for a follow up on chronic conditions including t2dm, essential htn, copd, hld, bph, gerd, bipolar disorder, anxiety, insomnia.     T2DM - chronic, managed with metformin 1000mg bid, januvia 100mg daily, and jardiance 10mg daily. He reports he does not routinely check his glucose, he reports he actually dropped and broke his glucometer approximately 3 months ago. He denies polydipsia, polyphagia, polyuria. He denies open lesions/sores to bilateral lower extremities. Last hgb a1c 7.80.     Essential htn - chronic, managed with lisinopril-hctz. He denies chest pain, syncope/presyncope, lightheadedness, dizziness, peripheral edema.    COPD - managed with symbicort, albuterol inhaler prn. He does admit to exertional dyspnea - walking approximately 500 ft before becoming dyspneic. He denies cough, wheezing.    Hyperlipidemia - managed with atorvastatin. Last lipid panel with total cholesterol 207, triglycerides 61, hdl 76, ldl 120. Will increase atorvastatin to 40mg daily.     BPH - managed with flomax.      GERD - chronic, controlled, managed with protonix, pepcid. He denies nausea, vomiting, abdominal pain.    Bipolar disorder, anxiety, insomnia - managed with seroquel. He reports sleeping approximately 6-8 hours nightly without interruption. He denies feeling down/depressed. He denies SI, HI, or thoughts of self-harm. He admits to feeling anxious/overwhelmed on occasion    Constipation - well controlled with docusate and miralax prn. He reports regular bowel movements occurring qd/qod without melena or hematochezia. He denies abdominal pain, n/v.     He also reports previously following with pain management, Dr. Varner for chronic left ankle pain. He reports he was lost to follow up. He describes ankle pain as achy, dull. He has hx of diabetic polyneuropathy for which he is managed with gabapentin. He is  requesting new referral to pain management today. He does have a history of old left ankle fracture which underwent surgical repair.  He is diabetic.  He denies any history of diabetic foot ulcerations or wounds.    The following portions of the patient's history were reviewed and updated as appropriate: allergies, current medications, past family history, past medical history, past social history, past surgical history and problem list.    Review of Systems   Constitutional: Negative for activity change, appetite change, chills, diaphoresis, fatigue, fever, unexpected weight gain and unexpected weight loss.   HENT: Negative.    Eyes: Negative for blurred vision, double vision and visual disturbance.   Respiratory: Positive for shortness of breath (chronic, exterional). Negative for apnea, cough, choking, chest tightness, wheezing and stridor.    Cardiovascular: Negative for chest pain, palpitations and leg swelling.   Gastrointestinal: Negative for abdominal distention, abdominal pain, constipation, diarrhea, nausea, vomiting, GERD and indigestion.   Endocrine: Positive for polyphagia. Negative for polydipsia and polyuria.   Musculoskeletal: Positive for arthralgias and gait problem (ambulating with cane). Negative for back pain.   Skin: Negative for rash.   Neurological: Negative for dizziness, facial asymmetry, weakness, light-headedness, headache and confusion.   Psychiatric/Behavioral: Negative.  Negative for sleep disturbance, suicidal ideas, depressed mood and stress. The patient is not nervous/anxious.        Objective   Physical Exam  Vitals and nursing note reviewed.   Constitutional:       General: He is not in acute distress.     Appearance: Normal appearance. He is not ill-appearing, toxic-appearing or diaphoretic.   HENT:      Head: Normocephalic and atraumatic.      Right Ear: External ear normal.      Left Ear: External ear normal.   Eyes:      Extraocular Movements: Extraocular movements intact.       Conjunctiva/sclera: Conjunctivae normal.      Pupils: Pupils are equal, round, and reactive to light.   Neck:      Vascular: No carotid bruit.   Cardiovascular:      Rate and Rhythm: Normal rate and regular rhythm.      Pulses:           Dorsalis pedis pulses are 1+ on the right side and 1+ on the left side.        Posterior tibial pulses are 1+ on the right side and 1+ on the left side.      Heart sounds: Normal heart sounds. No murmur heard.    No friction rub. No gallop.   Pulmonary:      Effort: Pulmonary effort is normal. No respiratory distress.      Breath sounds: Normal breath sounds. No stridor. No wheezing, rhonchi or rales.   Chest:      Chest wall: No tenderness.   Abdominal:      General: Bowel sounds are normal. There is no distension.      Palpations: Abdomen is soft.      Tenderness: There is no abdominal tenderness. There is no guarding or rebound.   Musculoskeletal:      Cervical back: Normal range of motion and neck supple.      Right lower leg: No edema.      Left lower leg: No edema.      Left foot: Decreased range of motion. No tenderness.   Feet:      Right foot:      Toenail Condition: Right toenails are abnormally thick.      Left foot:      Toenail Condition: Left toenails are abnormally thick.      Comments: Nails 1-5 b/l thickened  Skin:     General: Skin is warm and dry.      Findings: No rash.   Neurological:      Mental Status: He is alert and oriented to person, place, and time. Mental status is at baseline.   Psychiatric:         Mood and Affect: Mood normal.         Behavior: Behavior normal.         Thought Content: Thought content normal.         Judgment: Judgment normal.       Vitals:    06/13/22 0933   BP: 124/78   Pulse: 83   Resp: 18   Temp: 98.1 °F (36.7 °C)   SpO2: 99%        WBC   Date Value Ref Range Status   06/03/2022 7.09 3.40 - 10.80 10*3/mm3 Final     RBC   Date Value Ref Range Status   06/03/2022 4.34 4.14 - 5.80 10*6/mm3 Final     Hemoglobin   Date Value Ref Range  Status   06/03/2022 12.9 (L) 13.0 - 17.7 g/dL Final     Hematocrit   Date Value Ref Range Status   06/03/2022 39.0 37.5 - 51.0 % Final     MCV   Date Value Ref Range Status   06/03/2022 89.9 79.0 - 97.0 fL Final     MCH   Date Value Ref Range Status   06/03/2022 29.7 26.6 - 33.0 pg Final     MCHC   Date Value Ref Range Status   06/03/2022 33.1 31.5 - 35.7 g/dL Final     RDW   Date Value Ref Range Status   06/03/2022 13.8 12.3 - 15.4 % Final     RDW-SD   Date Value Ref Range Status   06/03/2022 44.3 37.0 - 54.0 fl Final     MPV   Date Value Ref Range Status   06/03/2022 8.7 6.0 - 12.0 fL Final     Platelets   Date Value Ref Range Status   06/03/2022 325 140 - 450 10*3/mm3 Final     Neutrophil %   Date Value Ref Range Status   06/03/2022 56.4 42.7 - 76.0 % Final     Lymphocyte %   Date Value Ref Range Status   06/03/2022 28.9 19.6 - 45.3 % Final     Monocyte %   Date Value Ref Range Status   06/03/2022 12.0 5.0 - 12.0 % Final     Eosinophil %   Date Value Ref Range Status   06/03/2022 2.0 0.3 - 6.2 % Final     Basophil %   Date Value Ref Range Status   06/03/2022 0.7 0.0 - 1.5 % Final     Immature Grans %   Date Value Ref Range Status   10/01/2020 0.4 0.0 - 0.5 % Final     Neutrophils, Absolute   Date Value Ref Range Status   06/03/2022 4.00 1.70 - 7.00 10*3/mm3 Final     Lymphocytes, Absolute   Date Value Ref Range Status   06/03/2022 2.05 0.70 - 3.10 10*3/mm3 Final     Monocytes, Absolute   Date Value Ref Range Status   06/03/2022 0.85 0.10 - 0.90 10*3/mm3 Final     Eosinophils, Absolute   Date Value Ref Range Status   06/03/2022 0.14 0.00 - 0.40 10*3/mm3 Final     Basophils, Absolute   Date Value Ref Range Status   06/03/2022 0.05 0.00 - 0.20 10*3/mm3 Final     Immature Grans, Absolute   Date Value Ref Range Status   10/01/2020 0.04 0.00 - 0.05 10*3/mm3 Final     nRBC   Date Value Ref Range Status   10/01/2020 0.0 0.0 - 0.2 /100 WBC Final      Lab Results   Component Value Date    GLUCOSE 149 (H) 06/03/2022    BUN  18 06/03/2022    CREATININE 0.79 06/03/2022    EGFRIFNONA 94 08/02/2021    BCR 22.8 06/03/2022    K 4.0 06/03/2022    CO2 24.0 06/03/2022    CALCIUM 9.4 06/03/2022    ALBUMIN 4.40 06/03/2022    AST 21 06/03/2022    ALT 12 06/03/2022      Lipid Panel    Lipid Panel 8/2/21 6/3/22   Total Cholesterol 169 207 (A)   Triglycerides 123 61   HDL Cholesterol 59 76 (A)   VLDL Cholesterol 22 11   LDL Cholesterol  88 120 (A)   LDL/HDL Ratio 1.45 1.56   (A) Abnormal value               Assessment & Plan   Diagnoses and all orders for this visit:    1. Type 2 diabetes mellitus without complication, without long-term current use of insulin ([facility]) (Primary)  -     glucose monitor monitoring kit; 1 each Daily.  Dispense: 1 each; Refill: 0  -     glucose blood test strip; Use as instructed  Dispense: 100 each; Refill: 12  -     OneTouch Delica Lancets 33G misc; 1 each Daily.  Dispense: 100 each; Refill: 11  -     empagliflozin (JARDIANCE) 25 MG tablet tablet; Take 1 tablet by mouth Daily.  Dispense: 30 tablet; Refill: 5  -     SITagliptin (Januvia) 100 MG tablet; Take 1 tablet by mouth Daily.  Dispense: 30 tablet; Refill: 5  -     metFORMIN (GLUCOPHAGE) 1000 MG tablet; Take 1 tablet by mouth 2 (Two) Times a Day With Meals. Indications: Type 2 Diabetes  Dispense: 60 tablet; Refill: 5    2. Chronic obstructive pulmonary disease, unspecified COPD type ([facility])  -     ProAir  (90 Base) MCG/ACT inhaler; 2 PUFFS EVERY 4-6 HOURS UNTIL COUGH/WHEEZING IMPROVE, THAN TAPER OFF OF  Dispense: 18 g; Refill: 5  -     budesonide-formoterol (Symbicort) 160-4.5 MCG/ACT inhaler; Inhale 2 puffs 2 (Two) Times a Day. Indications: Chronic Obstructive Lung Disease  Dispense: 10.2 g; Refill: 5    3. Mixed hyperlipidemia  -     atorvastatin (LIPITOR) 40 MG tablet; Take 1 tablet by mouth Every Night.  Dispense: 30 tablet; Refill: 5    4. Constipation, unspecified constipation type  -     docusate sodium (COLACE) 100 MG capsule; Take 1 capsule by mouth 2  (Two) Times a Day. Indications: Constipation  Dispense: 180 capsule; Refill: 1    5. Arthritis of right hand  -     Diclofenac Sodium (VOLTAREN) 1 % gel gel; Apply  topically to the appropriate area as directed 4 (Four) Times a Day.  Dispense: 100 g; Refill: 5    6. Neuropathy  Comments:  diabetic  Orders:  -     lidocaine (LIDODERM) 5 %; Place 1 patch on the skin as directed by provider Daily. Indications: Allodynia  Dispense: 30 each; Refill: 5    7. Essential hypertension  -     lisinopril-hydrochlorothiazide (PRINZIDE,ZESTORETIC) 20-12.5 MG per tablet; Take 1 tablet by mouth Daily. Indications: High Blood Pressure Disorder  Dispense: 30 tablet; Refill: 5    8. Gastroesophageal reflux disease without esophagitis  -     pantoprazole (PROTONIX) 40 MG EC tablet; Take 1 tablet by mouth Daily. Indications: Heartburn, Gastroesophageal Reflux Disease  Dispense: 30 tablet; Refill: 5    9. Urinary hesitancy  -     tamsulosin (FLOMAX) 0.4 MG capsule 24 hr capsule; Take 1 capsule by mouth Daily. Indications: Benign Enlargement of Prostate  Dispense: 30 capsule; Refill: 5    10. Bipolar disord, crnt episode mixed, severe, w psych features (HCC)  -     QUEtiapine (SEROquel) 300 MG tablet; Take 2 tablets by mouth every night DO NOT TAKE AMITRIPTYLINE OR RESPIRADAL  Dispense: 60 tablet; Refill: 5    11. Insomnia, unspecified type  -     QUEtiapine (SEROquel) 300 MG tablet; Take 2 tablets by mouth every night DO NOT TAKE AMITRIPTYLINE OR RESPIRADAL  Dispense: 60 tablet; Refill: 5    12. Anxiety  -     QUEtiapine (SEROquel) 300 MG tablet; Take 2 tablets by mouth every night DO NOT TAKE AMITRIPTYLINE OR RESPIRADAL  Dispense: 60 tablet; Refill: 5    13. Diabetic polyneuropathy associated with type 2 diabetes mellitus (HCC)  -     Ambulatory Referral to Pain Management    14. Chronic pain of left ankle  -     tiZANidine (ZANAFLEX) 4 MG tablet; Take 1 tablet by mouth 2 (Two) Times a Day As Needed for Muscle Spasms. Indications:  Musculoskeletal Pain, Muscle Spasticity  Dispense: 60 tablet; Refill: 5  -     Ambulatory Referral to Pain Management  -     XR Ankle 3+ View Left; Future      T2DM - chronic, managed with metformin 1000mg bid, januvia, 100mg daily, and jardiance 10mg daily. I will increase his jardiance to 25mg daily. Last hgb a1c 7.8 Discussed lifestyle changes including diet (low carb - no more than 60g carbs per meal, no more than 15g carbs per snack) & exercise 30 mins daily. Discussed importance of glycemic control for prevention of future complications - nephropathy, neuropathy, retinopathy, cardiovascular risks, and additional potential complications.  Microalbumin/creatinine ratio, as above ordered. Prescription for glucometer, lancets, and testing strips as above sent to pharmacy. Discussed importance of keeping bid glucose journal to bring with him next appt for evaluation.  Discussed importance to schedule diabetic eye exam with ophthalmologist and potential eye complications of diabetes including diabetic retinopathy.    Essential hypertension - chronic, managed with lisinopril-hctz. Continue as directed. Advised low sodium diet. Exercise daily as tolerated.      COPD - chronic, controlled, managed with symbicort, albuterol inhaler prn. Continue bronchodilators as directed.      Hyperlipidemia - managed with atorvastatin 20mg daily. Last lipid panel with total cholesterol 207, triglycerides 61, hdl 76, ldl 120. Will increase atorvastatin to 40mg daily. Recheck lipid panel in 3 mos for monitoring.     BPH - chronic, controlled, managed with flomax. Continue as directed.     GERD - chronic, controlled, managed with protonix, pepcid. Continue as directed. Avoid dietary triggers. Avoid lying down <1 hour after meals    Bipolar disorder, anxiety, insomnia - chronic, controlled, managed with seroquel. Continue as directed. Discussed in length black box warnings. Instructed patient to schedule a same day appointment with me,  go directly to the walk-in clinic, or be evaluated by the ER if symptoms worsen, visual/auditory hallucinations are experienced, thoughts of hurting others, or suicidal thoughts develop. Pt verbalized understanding.     Constipation - well controlled with docusate and miralax prn. Advised high fiber diet and adequate fluid intake.    Osteoarthritis and peripheral neuropathy-chronic, moderate control, managed with lortab, zanaflex, gabapentin, and Cymbalta. New referral to pain management provided per patient request. Continue utilizing lidoderm and diclofenac gel as prescribed. Obtain xr left ankle as above for evaluation of prior surgical repair of left ankle. Continue with activity as tolerated but I have recommended rest and activity modification during times of increased pain.  Continue with use of ice and/or heat therapy as needed to minimize pain/inflammation/muscle spasms.     Patient educated to follow up in 3 months or sooner than next scheduled appointment if symptoms worsen or do not improve. Patient stated understanding and has agreed with plan of care. After visit summary was printed and given to patient.      This document has been electronically signed by Leila Bernard PA-C on June 13, 2022 16:53 CDT,.

## 2022-07-28 DIAGNOSIS — K21.9 GASTROESOPHAGEAL REFLUX DISEASE WITHOUT ESOPHAGITIS: ICD-10-CM

## 2022-07-29 RX ORDER — PANTOPRAZOLE SODIUM 40 MG/1
TABLET, DELAYED RELEASE ORAL
Qty: 60 TABLET | Refills: 0 | Status: SHIPPED | OUTPATIENT
Start: 2022-07-29 | End: 2022-12-28

## 2022-12-07 DIAGNOSIS — E78.2 MIXED HYPERLIPIDEMIA: ICD-10-CM

## 2022-12-08 RX ORDER — ATORVASTATIN CALCIUM 40 MG/1
40 TABLET, FILM COATED ORAL NIGHTLY
Qty: 30 TABLET | Refills: 0 | Status: SHIPPED | OUTPATIENT
Start: 2022-12-08 | End: 2023-02-22

## 2022-12-28 DIAGNOSIS — J44.9 CHRONIC OBSTRUCTIVE PULMONARY DISEASE, UNSPECIFIED COPD TYPE: ICD-10-CM

## 2022-12-28 DIAGNOSIS — K21.9 GASTROESOPHAGEAL REFLUX DISEASE WITHOUT ESOPHAGITIS: ICD-10-CM

## 2022-12-28 RX ORDER — ALBUTEROL SULFATE 90 UG/1
AEROSOL, METERED RESPIRATORY (INHALATION)
Qty: 18 G | Refills: 0 | Status: SHIPPED | OUTPATIENT
Start: 2022-12-28 | End: 2023-02-22

## 2022-12-28 RX ORDER — PANTOPRAZOLE SODIUM 40 MG/1
TABLET, DELAYED RELEASE ORAL
Qty: 30 TABLET | Refills: 0 | Status: SHIPPED | OUTPATIENT
Start: 2022-12-28 | End: 2023-02-22

## 2022-12-29 DIAGNOSIS — E11.9 TYPE 2 DIABETES MELLITUS WITHOUT COMPLICATION, WITHOUT LONG-TERM CURRENT USE OF INSULIN: ICD-10-CM

## 2023-01-05 ENCOUNTER — TELEPHONE (OUTPATIENT)
Dept: FAMILY MEDICINE CLINIC | Facility: CLINIC | Age: 68
End: 2023-01-05
Payer: MEDICARE

## 2023-01-05 NOTE — TELEPHONE ENCOUNTER
I have tried calling pt multiple times to schedule a Follow Up with Leila Mattson PA-C there has been no contact. I will try to send a letter

## 2023-01-05 NOTE — TELEPHONE ENCOUNTER
----- Message from Leila Bernard PA-C sent at 1/1/2023  9:03 PM CST -----  Pt needs to reschedule a follow up appt. He was last seen June 2022. Please advise pt of [facility] policy, as he has had 6 no shows within the last year. 1 mo supply sent on ventolin and protonix sent. He will need an appt for any further refills. If he has re-established care, please let me know. If he no shows next appt, please let him know this will result in discharge from our practice. Thank you.

## 2023-01-06 DIAGNOSIS — F31.64: ICD-10-CM

## 2023-01-06 DIAGNOSIS — E11.9 TYPE 2 DIABETES MELLITUS WITHOUT COMPLICATION, WITHOUT LONG-TERM CURRENT USE OF INSULIN: ICD-10-CM

## 2023-01-06 DIAGNOSIS — F41.9 ANXIETY: ICD-10-CM

## 2023-01-06 DIAGNOSIS — G47.00 INSOMNIA, UNSPECIFIED TYPE: ICD-10-CM

## 2023-01-06 DIAGNOSIS — M19.041 ARTHRITIS OF RIGHT HAND: ICD-10-CM

## 2023-01-06 RX ORDER — QUETIAPINE FUMARATE 300 MG/1
TABLET, FILM COATED ORAL
Qty: 30 TABLET | Refills: 0 | Status: SHIPPED | OUTPATIENT
Start: 2023-01-06 | End: 2023-02-22

## 2023-02-01 DIAGNOSIS — G47.00 INSOMNIA, UNSPECIFIED TYPE: ICD-10-CM

## 2023-02-01 DIAGNOSIS — F41.9 ANXIETY: ICD-10-CM

## 2023-02-01 DIAGNOSIS — F31.64: ICD-10-CM

## 2023-02-01 RX ORDER — QUETIAPINE FUMARATE 300 MG/1
TABLET, FILM COATED ORAL
Qty: 30 TABLET | Refills: 0 | OUTPATIENT
Start: 2023-02-01

## 2023-02-08 DIAGNOSIS — E78.2 MIXED HYPERLIPIDEMIA: ICD-10-CM

## 2023-02-08 RX ORDER — ATORVASTATIN CALCIUM 40 MG/1
40 TABLET, FILM COATED ORAL NIGHTLY
Qty: 30 TABLET | Refills: 0 | OUTPATIENT
Start: 2023-02-08

## 2023-02-09 DIAGNOSIS — G47.00 INSOMNIA, UNSPECIFIED TYPE: ICD-10-CM

## 2023-02-09 DIAGNOSIS — K21.9 GASTROESOPHAGEAL REFLUX DISEASE WITHOUT ESOPHAGITIS: ICD-10-CM

## 2023-02-09 DIAGNOSIS — F41.9 ANXIETY: ICD-10-CM

## 2023-02-09 DIAGNOSIS — E11.9 TYPE 2 DIABETES MELLITUS WITHOUT COMPLICATION, WITHOUT LONG-TERM CURRENT USE OF INSULIN: ICD-10-CM

## 2023-02-09 DIAGNOSIS — F31.64: ICD-10-CM

## 2023-02-09 DIAGNOSIS — M19.041 ARTHRITIS OF RIGHT HAND: ICD-10-CM

## 2023-02-09 DIAGNOSIS — J44.9 CHRONIC OBSTRUCTIVE PULMONARY DISEASE, UNSPECIFIED COPD TYPE: ICD-10-CM

## 2023-02-09 RX ORDER — EMPAGLIFLOZIN 25 MG/1
TABLET, FILM COATED ORAL
Qty: 30 TABLET | Refills: 5 | OUTPATIENT
Start: 2023-02-09

## 2023-02-09 RX ORDER — ALBUTEROL SULFATE 90 UG/1
AEROSOL, METERED RESPIRATORY (INHALATION)
Qty: 18 G | Refills: 0 | OUTPATIENT
Start: 2023-02-09

## 2023-02-09 RX ORDER — PANTOPRAZOLE SODIUM 40 MG/1
TABLET, DELAYED RELEASE ORAL
Qty: 30 TABLET | Refills: 0 | OUTPATIENT
Start: 2023-02-09

## 2023-02-09 RX ORDER — QUETIAPINE FUMARATE 300 MG/1
TABLET, FILM COATED ORAL
Qty: 30 TABLET | Refills: 0 | OUTPATIENT
Start: 2023-02-09

## 2023-02-09 RX ORDER — SITAGLIPTIN 100 MG/1
TABLET, FILM COATED ORAL
Qty: 30 TABLET | Refills: 5 | OUTPATIENT
Start: 2023-02-09

## 2023-02-21 DIAGNOSIS — J44.9 CHRONIC OBSTRUCTIVE PULMONARY DISEASE, UNSPECIFIED COPD TYPE: ICD-10-CM

## 2023-02-21 DIAGNOSIS — G47.00 INSOMNIA, UNSPECIFIED TYPE: ICD-10-CM

## 2023-02-21 DIAGNOSIS — I10 ESSENTIAL HYPERTENSION: ICD-10-CM

## 2023-02-21 DIAGNOSIS — F31.64: ICD-10-CM

## 2023-02-21 DIAGNOSIS — E78.2 MIXED HYPERLIPIDEMIA: ICD-10-CM

## 2023-02-21 DIAGNOSIS — M19.041 ARTHRITIS OF RIGHT HAND: ICD-10-CM

## 2023-02-21 DIAGNOSIS — F41.9 ANXIETY: ICD-10-CM

## 2023-02-21 DIAGNOSIS — E11.9 TYPE 2 DIABETES MELLITUS WITHOUT COMPLICATION, WITHOUT LONG-TERM CURRENT USE OF INSULIN: ICD-10-CM

## 2023-02-21 DIAGNOSIS — K21.9 GASTROESOPHAGEAL REFLUX DISEASE WITHOUT ESOPHAGITIS: ICD-10-CM

## 2023-02-21 RX ORDER — LISINOPRIL AND HYDROCHLOROTHIAZIDE 20; 12.5 MG/1; MG/1
1 TABLET ORAL DAILY
Qty: 30 TABLET | Refills: 5 | OUTPATIENT
Start: 2023-02-21

## 2023-02-21 RX ORDER — ALBUTEROL SULFATE 90 UG/1
AEROSOL, METERED RESPIRATORY (INHALATION)
Qty: 18 G | Refills: 0 | OUTPATIENT
Start: 2023-02-21

## 2023-02-21 RX ORDER — PANTOPRAZOLE SODIUM 40 MG/1
TABLET, DELAYED RELEASE ORAL
Qty: 30 TABLET | Refills: 0 | OUTPATIENT
Start: 2023-02-21

## 2023-02-21 RX ORDER — ATORVASTATIN CALCIUM 40 MG/1
40 TABLET, FILM COATED ORAL NIGHTLY
Qty: 30 TABLET | Refills: 0 | OUTPATIENT
Start: 2023-02-21

## 2023-02-21 RX ORDER — SITAGLIPTIN 100 MG/1
TABLET, FILM COATED ORAL
Qty: 30 TABLET | Refills: 5 | OUTPATIENT
Start: 2023-02-21

## 2023-02-21 RX ORDER — QUETIAPINE FUMARATE 300 MG/1
TABLET, FILM COATED ORAL
Qty: 30 TABLET | Refills: 0 | OUTPATIENT
Start: 2023-02-21

## 2023-02-22 RX ORDER — PANTOPRAZOLE SODIUM 40 MG/1
TABLET, DELAYED RELEASE ORAL
Qty: 30 TABLET | Refills: 0 | Status: SHIPPED | OUTPATIENT
Start: 2023-02-22 | End: 2023-03-16

## 2023-02-22 RX ORDER — ATORVASTATIN CALCIUM 40 MG/1
40 TABLET, FILM COATED ORAL NIGHTLY
Qty: 30 TABLET | Refills: 0 | Status: SHIPPED | OUTPATIENT
Start: 2023-02-22 | End: 2023-03-16

## 2023-02-22 RX ORDER — ALBUTEROL SULFATE 90 UG/1
AEROSOL, METERED RESPIRATORY (INHALATION)
Qty: 18 G | Refills: 0 | Status: SHIPPED | OUTPATIENT
Start: 2023-02-22 | End: 2023-03-16

## 2023-02-22 RX ORDER — SITAGLIPTIN 100 MG/1
TABLET, FILM COATED ORAL
Qty: 30 TABLET | Refills: 0 | Status: SHIPPED | OUTPATIENT
Start: 2023-02-22

## 2023-02-22 RX ORDER — LISINOPRIL AND HYDROCHLOROTHIAZIDE 20; 12.5 MG/1; MG/1
1 TABLET ORAL DAILY
Qty: 30 TABLET | Refills: 0 | Status: SHIPPED | OUTPATIENT
Start: 2023-02-22 | End: 2023-03-16

## 2023-02-22 RX ORDER — QUETIAPINE FUMARATE 300 MG/1
TABLET, FILM COATED ORAL
Qty: 30 TABLET | Refills: 0 | Status: SHIPPED | OUTPATIENT
Start: 2023-02-22 | End: 2023-03-06 | Stop reason: SDUPTHER

## 2023-03-03 DIAGNOSIS — F41.9 ANXIETY: ICD-10-CM

## 2023-03-03 DIAGNOSIS — F31.64: ICD-10-CM

## 2023-03-03 DIAGNOSIS — G47.00 INSOMNIA, UNSPECIFIED TYPE: ICD-10-CM

## 2023-03-06 ENCOUNTER — OFFICE VISIT (OUTPATIENT)
Dept: FAMILY MEDICINE CLINIC | Facility: CLINIC | Age: 68
End: 2023-03-06
Payer: MEDICARE

## 2023-03-06 VITALS
HEIGHT: 70 IN | WEIGHT: 218.8 LBS | BODY MASS INDEX: 31.32 KG/M2 | DIASTOLIC BLOOD PRESSURE: 82 MMHG | OXYGEN SATURATION: 97 % | HEART RATE: 77 BPM | SYSTOLIC BLOOD PRESSURE: 122 MMHG

## 2023-03-06 DIAGNOSIS — E78.2 MIXED HYPERLIPIDEMIA: Chronic | ICD-10-CM

## 2023-03-06 DIAGNOSIS — E11.49 OTHER DIABETIC NEUROLOGICAL COMPLICATION ASSOCIATED WITH TYPE 2 DIABETES MELLITUS: Chronic | ICD-10-CM

## 2023-03-06 DIAGNOSIS — Z76.89 ENCOUNTER TO ESTABLISH CARE: ICD-10-CM

## 2023-03-06 DIAGNOSIS — G47.00 INSOMNIA, UNSPECIFIED TYPE: Chronic | ICD-10-CM

## 2023-03-06 DIAGNOSIS — J44.9 CHRONIC OBSTRUCTIVE PULMONARY DISEASE, UNSPECIFIED COPD TYPE: Chronic | ICD-10-CM

## 2023-03-06 DIAGNOSIS — Z00.00 MEDICARE ANNUAL WELLNESS VISIT, SUBSEQUENT: Primary | ICD-10-CM

## 2023-03-06 DIAGNOSIS — F41.9 ANXIETY: Chronic | ICD-10-CM

## 2023-03-06 DIAGNOSIS — Z23 NEED FOR VACCINATION: ICD-10-CM

## 2023-03-06 DIAGNOSIS — M25.572 CHRONIC PAIN OF LEFT ANKLE: Chronic | ICD-10-CM

## 2023-03-06 DIAGNOSIS — F31.64: Chronic | ICD-10-CM

## 2023-03-06 DIAGNOSIS — I10 ESSENTIAL HYPERTENSION: Chronic | ICD-10-CM

## 2023-03-06 DIAGNOSIS — E11.9 ENCOUNTER FOR DIABETIC FOOT EXAM: ICD-10-CM

## 2023-03-06 DIAGNOSIS — E11.9 TYPE 2 DIABETES MELLITUS WITHOUT COMPLICATION, WITHOUT LONG-TERM CURRENT USE OF INSULIN: Chronic | ICD-10-CM

## 2023-03-06 DIAGNOSIS — G89.29 CHRONIC PAIN OF LEFT ANKLE: Chronic | ICD-10-CM

## 2023-03-06 DIAGNOSIS — N40.0 BPH WITHOUT OBSTRUCTION/LOWER URINARY TRACT SYMPTOMS: Chronic | ICD-10-CM

## 2023-03-06 DIAGNOSIS — K21.9 GASTROESOPHAGEAL REFLUX DISEASE WITHOUT ESOPHAGITIS: Chronic | ICD-10-CM

## 2023-03-06 DIAGNOSIS — Z12.5 SCREENING FOR PROSTATE CANCER: ICD-10-CM

## 2023-03-06 DIAGNOSIS — L84 CALLUS OF FOOT: Chronic | ICD-10-CM

## 2023-03-06 PROCEDURE — G0008 ADMIN INFLUENZA VIRUS VAC: HCPCS | Performed by: NURSE PRACTITIONER

## 2023-03-06 PROCEDURE — 1126F AMNT PAIN NOTED NONE PRSNT: CPT | Performed by: NURSE PRACTITIONER

## 2023-03-06 PROCEDURE — 90471 IMMUNIZATION ADMIN: CPT | Performed by: NURSE PRACTITIONER

## 2023-03-06 PROCEDURE — 1170F FXNL STATUS ASSESSED: CPT | Performed by: NURSE PRACTITIONER

## 2023-03-06 PROCEDURE — 90750 HZV VACC RECOMBINANT IM: CPT | Performed by: NURSE PRACTITIONER

## 2023-03-06 PROCEDURE — 90662 IIV NO PRSV INCREASED AG IM: CPT | Performed by: NURSE PRACTITIONER

## 2023-03-06 PROCEDURE — G0439 PPPS, SUBSEQ VISIT: HCPCS | Performed by: NURSE PRACTITIONER

## 2023-03-06 PROCEDURE — 1159F MED LIST DOCD IN RCRD: CPT | Performed by: NURSE PRACTITIONER

## 2023-03-06 PROCEDURE — 99214 OFFICE O/P EST MOD 30 MIN: CPT | Performed by: NURSE PRACTITIONER

## 2023-03-06 RX ORDER — QUETIAPINE FUMARATE 300 MG/1
600 TABLET, FILM COATED ORAL NIGHTLY
Qty: 60 TABLET | Refills: 5 | Status: SHIPPED | OUTPATIENT
Start: 2023-03-06

## 2023-03-06 RX ORDER — SEMAGLUTIDE 1.34 MG/ML
0.25 INJECTION, SOLUTION SUBCUTANEOUS WEEKLY
Qty: 2 ML | Refills: 5 | Status: SHIPPED | OUTPATIENT
Start: 2023-03-06

## 2023-03-06 NOTE — PROGRESS NOTES
The ABCs of the Annual Wellness Visit  Subsequent Medicare Wellness Visit    Subjective        Mikey Allison is a 67 y.o. male who presents for a Subsequent Medicare Wellness Visit.    The following portions of the patient's history were reviewed and   updated as appropriate: allergies, current medications, past family history, past medical history, past social history, past surgical history and problem list.    Compared to one year ago, the patient feels his physical   health is worse.    Compared to one year ago, the patient feels his mental   health is the same.    Recent Hospitalizations:  He was not admitted to the hospital during the last year.       Current Medical Providers:  Patient Care Team:  Leila Cronin APRN as PCP - General (Family Medicine)  Prince Boone APRN as Nurse Practitioner (Orthopedic Surgery)  Yuri Amaral DPM as Consulting Physician (Podiatry)    Outpatient Medications Prior to Visit   Medication Sig Dispense Refill   • atorvastatin (LIPITOR) 40 MG tablet TAKE 1 TABLET BY MOUTH EVERY NIGHT. 30 tablet 0   • Blood Glucose Monitoring Suppl (ONE TOUCH ULTRA 2) w/Device kit      • budesonide-formoterol (Symbicort) 160-4.5 MCG/ACT inhaler Inhale 2 puffs 2 (Two) Times a Day. Indications: Chronic Obstructive Lung Disease 10.2 g 5   • Diclofenac Sodium (VOLTAREN) 1 % gel gel APPLY TOPICALLY TO THE APPROPRIATE AREA AS DIRECTED 4 (FOUR) TIMES A DAY. 100 g 0   • docusate sodium (COLACE) 100 MG capsule Take 1 capsule by mouth 2 (Two) Times a Day. Indications: Constipation 180 capsule 1   • empagliflozin (JARDIANCE) 25 MG tablet tablet Take 1 tablet by mouth Daily. 30 tablet 5   • glucose blood test strip Use as instructed 100 each 12   • glucose monitor monitoring kit 1 each Daily. 1 each 0   • glucose monitor monitoring kit 1 each Daily. 1 each 0   • lidocaine (LIDODERM) 5 % Place 1 patch on the skin as directed by provider Daily. Indications: Allodynia 30 each 5   •  lisinopril-hydrochlorothiazide (PRINZIDE,ZESTORETIC) 20-12.5 MG per tablet TAKE 1 TABLET BY MOUTH DAILY. INDICATIONS: HIGH BLOOD PRESSURE DISORDER 30 tablet 0   • metFORMIN (GLUCOPHAGE) 1000 MG tablet TAKE 1 TABLET BY MOUTH 2 (TWO) TIMES A DAY WITH MEALS FOR TYPE 2 DIABETES 60 tablet 0   • naloxone (Narcan) 4 MG/0.1ML nasal spray 1 spray into the nostril(s) as directed by provider As Needed (opioid overdose) for up to 2 doses. Indications: Opioid Overdose 1 each 1   • OneTouch Delica Lancets 33G misc 1 each Daily. 100 each 11   • pantoprazole (PROTONIX) 40 MG EC tablet TAKE 1 TABLET BY MOUTH DAILY FOR HEARTBURN 30 tablet 0   • QUEtiapine (SEROquel) 300 MG tablet TAKE 2 TABLETS BY MOUTH EVERY NIGHT. DO NOT TAKE AMITRIPTYLINE OR RESPIRADAL 30 tablet 0   • Ventolin  (90 Base) MCG/ACT inhaler 2 PUFFS EVERY 4-6 HOURS UNTIL COUGH/WHEEZING IMPROVE, THAN TAPER OFF OF 18 g 0   • Januvia 100 MG tablet TAKE 1 TABLET BY MOUTH DAILY. 30 tablet 0   • betamethasone dipropionate 0.05 % cream Apply  topically to the appropriate area as directed Daily. 45 g 1   • ferrous sulfate 325 (65 FE) MG EC tablet Take 1 tablet by mouth Daily With Breakfast. 30 tablet 11   • gabapentin (NEURONTIN) 800 MG tablet      • HYDROcodone-acetaminophen (NORCO)  MG per tablet Take 1 tablet by mouth Every 6 (Six) Hours As Needed for Moderate Pain .     • polyethylene glycol (MIRALAX) 17 g packet Take 17 g by mouth Daily As Needed (constipation). 100 each 2   • tamsulosin (FLOMAX) 0.4 MG capsule 24 hr capsule Take 1 capsule by mouth Daily. Indications: Benign Enlargement of Prostate 30 capsule 5   • tiZANidine (ZANAFLEX) 4 MG tablet Take 1 tablet by mouth 2 (Two) Times a Day As Needed for Muscle Spasms. Indications: Musculoskeletal Pain, Muscle Spasticity 60 tablet 5     Facility-Administered Medications Prior to Visit   Medication Dose Route Frequency Provider Last Rate Last Admin   • cyanocobalamin injection 1,000 mcg  1,000 mcg  "Intramuscular Q28 Days Leila Bernard PA-C           No opioid medication identified on active medication list. I have reviewed chart for other potential  high risk medication/s and harmful drug interactions in the elderly.          Aspirin is not on active medication list.  Aspirin use is indicated based on review of current medical condition/s. Pros and cons of this therapy have been discussed with this patient. Benefits of this medication outweigh potential harm.  Patient has been instructed to start taking this medication..    Patient Active Problem List   Diagnosis   • Essential hypertension   • Rotator cuff syndrome of right shoulder   • Partial nontraumatic tear of rotator cuff   • Chronic right shoulder pain   • Nondisplaced fracture of right acromial process with routine healing   • S/P rotator cuff repair   • Primary osteoarthritis of both shoulders   • Gastroesophageal reflux disease without esophagitis   • Anxiety   • Insomnia   • Depression   • Type 2 diabetes mellitus without complication, without long-term current use of insulin ([facility])   • Hammer toes of both feet   • Arthritis of ankle   • Cervical pain   • SOB (shortness of breath)   • Hyperlipidemia   • Arthritis   • Diabetic neuropathy ([facility])   • Periorbital cellulitis of right eye   • Difficulty urinating   • BPH without obstruction/lower urinary tract symptoms   • Acute cystitis without hematuria   • Bladder wall thickening   • Nasopharyngeal mass   • Bipolar disord, crnt episode mixed, severe, w psych features ([facility])   • Learning disabilities     Advance Care Planning  Advance Directive is not on file.  ACP discussion was held with the patient during this visit. Patient does not have an advance directive, declines further assistance.     Objective    Vitals:    03/06/23 1355   BP: 122/82   Pulse: 77   SpO2: 97%   Weight: 99.2 kg (218 lb 12.8 oz)   Height: 177.8 cm (70\")   PainSc: 0-No pain     Estimated body mass index is 31.39 kg/m² as " "calculated from the following:    Height as of this encounter: 177.8 cm (70\").    Weight as of this encounter: 99.2 kg (218 lb 12.8 oz).    BMI is >= 30 and <35. (Class 1 Obesity). The following options were offered after discussion;: weight loss educational material (shared in after visit summary), exercise counseling/recommendations and nutrition counseling/recommendations      Does the patient have evidence of cognitive impairment?   No            HEALTH RISK ASSESSMENT    Smoking Status:  Social History     Tobacco Use   Smoking Status Former   • Types: Cigarettes   • Quit date:    • Years since quittin.2   Smokeless Tobacco Never   Tobacco Comments    smoked 3 years     Alcohol Consumption:  Social History     Substance and Sexual Activity   Alcohol Use Yes   • Alcohol/week: 3.0 - 6.0 standard drinks   • Types: 3 - 6 Cans of beer per week    Comment: 1-2 every other day     Fall Risk Screen:    MAISHA Fall Risk Assessment has not been completed.    Depression Screening:  PHQ-2/PHQ-9 Depression Screening 3/6/2023   Little Interest or Pleasure in Doing Things 0-->not at all   Feeling Down, Depressed or Hopeless 0-->not at all   PHQ-9: Brief Depression Severity Measure Score 0       Health Habits and Functional and Cognitive Screening:  Functional & Cognitive Status 3/6/2023   Do you have difficulty preparing food and eating? No   Do you have difficulty bathing yourself, getting dressed or grooming yourself? No   Do you have difficulty using the toilet? No   Do you have difficulty moving around from place to place? No   Do you have trouble with steps or getting out of a bed or a chair? No   Current Diet Limited Junk Food   Dental Exam Not up to date   Eye Exam Not up to date   Exercise (times per week) 2 times per week   Current Exercises Include Walking   Do you need help using the phone?  No   Are you deaf or do you have serious difficulty hearing?  No   Do you need help with transportation? No   Do you " need help shopping? No   Do you need help preparing meals?  No   Do you need help with housework?  No   Do you need help with laundry? No   Do you need help taking your medications? No   Do you need help managing money? No   Do you ever drive or ride in a car without wearing a seat belt? No   Have you felt unusual stress, anger or loneliness in the last month? No   Who do you live with? Other   If you need help, do you have trouble finding someone available to you? No   Have you been bothered in the last four weeks by sexual problems? No   Do you have difficulty concentrating, remembering or making decisions? No       Age-appropriate Screening Schedule:  Refer to the list below for future screening recommendations based on patient's age, sex and/or medical conditions. Orders for these recommended tests are listed in the plan section. The patient has been provided with a written plan.    Health Maintenance   Topic Date Due   • ZOSTER VACCINE (1 of 2) Never done   • DIABETIC EYE EXAM  Never done   • ANNUAL WELLNESS VISIT  11/22/2018   • COVID-19 Vaccine (3 - Booster for Moderna series) 08/27/2021   • INFLUENZA VACCINE  08/01/2022   • Pneumococcal Vaccine 65+ (2 - PCV) 08/02/2022   • DIABETIC FOOT EXAM  08/02/2022   • URINE MICROALBUMIN  08/02/2022   • HEMOGLOBIN A1C  12/03/2022   • LIPID PANEL  06/03/2023   • COLORECTAL CANCER SCREENING  11/22/2024   • TDAP/TD VACCINES (2 - Td or Tdap) 10/02/2028   • HEPATITIS C SCREENING  Completed   • AAA SCREEN (ONE-TIME)  Completed                CMS Preventative Services Quick Reference  Risk Factors Identified During Encounter:    Immunizations Discussed/Encouraged: Influenza, Shingrix and COVID19    The above risks/problems have been discussed with the patient.  Pertinent information has been shared with the patient in the After Visit Summary.    Diagnoses and all orders for this visit:    1. Medicare annual wellness visit, subsequent (Primary)        Follow Up:   Next Medicare  Wellness visit to be scheduled in 1 year.      An After Visit Summary and PPPS were made available to the patient.

## 2023-03-06 NOTE — PROGRESS NOTES
CC: Establish Care      Subjective:  Mikey Allison is a 67 y.o. male who presents for         Patient presents to office to establish care today.  He is due for annual wellness visit and labs.  His concurrent medical history consist of diabetes mellitus, hypertension, COPD, hyperlipidemia, BPH, GERD, bipolar disorder, anxiety, insomnia, constipation, and neuropathy. States the metformin causes diarrhea wondering if there is something different he could take.    He is due for diabetic foot exam for diabetic shoes.    He is due for an eye exam.  This was encouraged.    He is due for zoster, COVID #3, and flu immunizations today. Is willing to get Flu and Shingrix today.      The following portions of the patient's history were reviewed and updated as appropriate: allergies, current medications, past family history, past medical history, past social history, past surgical history and problem list.    Past Medical History:   Diagnosis Date   • Abdominal pain     left side   • Acute sinusitis    • Ankle joint pain    • Bipolar disorder (HCC)    • Bipolar disorder, unspecified (HCC)    • Cellulitis and abscess of trunk     axilla   • Chest pain    • Chronic anemia    • Degenerative joint disease involving multiple joints     back   • Depressive disorder    • Diabetes mellitus (HCC)    • Diabetes mellitus with no complication ([facility])     type 2 or unspecified type uncontrolled   • Diverticular disease    • Dyspnea    • Enlarged prostate     on SD-on CT   • Essential hypertension    • Febrile convulsion ([facility])    • Gastroesophageal reflux disease    • Generalized anxiety disorder    • H/O echocardiogram 10/16/2007    Mild to moderate concentric LV hypertrophy with mild left atrial enlargement. LV appears to be hypodynamic with preserved LV systolic function with EF 55-60%. Pericardium appears to be normal with a small pericardial effusion    • Hemorrhoids    • Hyperlipidemia    • Hypertensive disorder    • Hypoglycemia     • Infectious disorder of kidney     kidney infection-treated by [facility]   • Left lower quadrant pain    • Loss of appetite    • Obesity    • Osteoarthritis    • Pain in joint involving ankle and foot    • Pain in limb    • Psoriasis    • Retention of urine     with cath-treated by the [facility]   • Screening for malignant neoplasm of colon    • Sepsis due to urinary tract infection (HCC)     urosepsis treated by [facility]   • Sinusitis    • Syncope    • Unspecified essential hypertension          Current Outpatient Medications:   •  atorvastatin (LIPITOR) 40 MG tablet, TAKE 1 TABLET BY MOUTH EVERY NIGHT., Disp: 30 tablet, Rfl: 0  •  Blood Glucose Monitoring Suppl (ONE TOUCH ULTRA 2) w/Device kit, , Disp: , Rfl:   •  budesonide-formoterol (Symbicort) 160-4.5 MCG/ACT inhaler, Inhale 2 puffs 2 (Two) Times a Day. Indications: Chronic Obstructive Lung Disease, Disp: 10.2 g, Rfl: 5  •  Diclofenac Sodium (VOLTAREN) 1 % gel gel, APPLY TOPICALLY TO THE APPROPRIATE AREA AS DIRECTED 4 (FOUR) TIMES A DAY., Disp: 100 g, Rfl: 0  •  docusate sodium (COLACE) 100 MG capsule, Take 1 capsule by mouth 2 (Two) Times a Day. Indications: Constipation, Disp: 180 capsule, Rfl: 1  •  empagliflozin (JARDIANCE) 25 MG tablet tablet, Take 1 tablet by mouth Daily., Disp: 30 tablet, Rfl: 5  •  glucose blood test strip, Use as instructed, Disp: 100 each, Rfl: 12  •  glucose monitor monitoring kit, 1 each Daily., Disp: 1 each, Rfl: 0  •  glucose monitor monitoring kit, 1 each Daily., Disp: 1 each, Rfl: 0  •  lidocaine (LIDODERM) 5 %, Place 1 patch on the skin as directed by provider Daily. Indications: Allodynia, Disp: 30 each, Rfl: 5  •  lisinopril-hydrochlorothiazide (PRINZIDE,ZESTORETIC) 20-12.5 MG per tablet, TAKE 1 TABLET BY MOUTH DAILY. INDICATIONS: HIGH BLOOD PRESSURE DISORDER, Disp: 30 tablet, Rfl: 0  •  naloxone (Narcan) 4 MG/0.1ML nasal spray, 1 spray into the nostril(s) as directed by provider As Needed (opioid overdose) for up  "to 2 doses. Indications: Opioid Overdose, Disp: 1 each, Rfl: 1  •  OneTouch Delica Lancets 33G misc, 1 each Daily., Disp: 100 each, Rfl: 11  •  pantoprazole (PROTONIX) 40 MG EC tablet, TAKE 1 TABLET BY MOUTH DAILY FOR HEARTBURN, Disp: 30 tablet, Rfl: 0  •  QUEtiapine (SEROquel) 300 MG tablet, Take 2 tablets by mouth Every Night., Disp: 60 tablet, Rfl: 5  •  Ventolin  (90 Base) MCG/ACT inhaler, 2 PUFFS EVERY 4-6 HOURS UNTIL COUGH/WHEEZING IMPROVE, THAN TAPER OFF OF, Disp: 18 g, Rfl: 0  •  Januvia 100 MG tablet, TAKE 1 TABLET BY MOUTH DAILY., Disp: 30 tablet, Rfl: 0  •  Semaglutide,0.25 or 0.5MG/DOS, (Ozempic, 0.25 or 0.5 MG/DOSE,) 2 MG/1.5ML solution pen-injector, Inject 0.25 mg under the skin into the appropriate area as directed 1 (One) Time Per Week., Disp: 2 mL, Rfl: 5  •  Zoster Vac Recomb Adjuvanted 50 MCG/0.5ML reconstituted suspension, Inject 0.5 mL into the appropriate muscle as directed by prescriber 1 (One) Time for 1 dose., Disp: 1 each, Rfl: 1    Current Facility-Administered Medications:   •  cyanocobalamin injection 1,000 mcg, 1,000 mcg, Intramuscular, Q28 Days, Leila Bernard PA-C    Review of Systems    Review of Systems   Constitutional: Negative.    HENT: Negative.    Eyes: Negative.    Respiratory: Negative.    Cardiovascular: Negative.    Gastrointestinal: Negative.    Endocrine: Negative.    Genitourinary: Negative.    Musculoskeletal: Negative.    Skin: Negative.    Allergic/Immunologic: Negative.    Neurological: Negative.    Hematological: Negative.    Psychiatric/Behavioral: Negative.    All other systems reviewed and are negative.      Objective  Vitals:    03/06/23 1355   BP: 122/82   Pulse: 77   SpO2: 97%   Weight: 99.2 kg (218 lb 12.8 oz)   Height: 177.8 cm (70\")     Body mass index is 31.39 kg/m².    Physical Exam    Physical Exam  Vitals and nursing note reviewed.   Constitutional:       General: He is not in acute distress.     Appearance: Normal appearance. He is not " ill-appearing, toxic-appearing or diaphoretic.   HENT:      Head: Normocephalic and atraumatic.   Eyes:      General: No scleral icterus.        Right eye: No discharge.         Left eye: No discharge.      Extraocular Movements: Extraocular movements intact.      Conjunctiva/sclera: Conjunctivae normal.   Neck:      Vascular: No carotid bruit.   Cardiovascular:      Rate and Rhythm: Normal rate and regular rhythm.      Pulses: Normal pulses.      Heart sounds: Normal heart sounds. No murmur heard.    No friction rub. No gallop.   Pulmonary:      Effort: Pulmonary effort is normal. No respiratory distress.      Breath sounds: Normal breath sounds. No stridor. No wheezing, rhonchi or rales.   Chest:      Chest wall: No tenderness.   Abdominal:      General: Bowel sounds are normal. There is no distension.      Palpations: Abdomen is soft. There is no mass.      Tenderness: There is no abdominal tenderness. There is no guarding or rebound.      Hernia: No hernia is present.   Musculoskeletal:      Cervical back: Normal range of motion and neck supple. No rigidity or tenderness.      Right lower leg: No edema.      Left lower leg: No edema.      Comments: Uses a cane for ambulation   Lymphadenopathy:      Cervical: No cervical adenopathy.   Skin:     General: Skin is warm and dry.      Capillary Refill: Capillary refill takes less than 2 seconds.      Findings: No rash.      Comments: Bilateral DM foot exam:  Bilateral feet with absent Vibratory and monofilament sensation.  Pulses normal.  +Calous formation noted to bilateral feet  +Thick and yellow nails on bilateral feet.   Neurological:      General: No focal deficit present.      Mental Status: He is alert and oriented to person, place, and time. Mental status is at baseline.   Psychiatric:         Mood and Affect: Mood normal.         Behavior: Behavior normal.         Thought Content: Thought content normal.         Judgment: Judgment normal.              Diagnoses and all orders for this visit:    1. Medicare annual wellness visit, subsequent (Primary)  -     CBC w AUTO Differential; Future  -     Comprehensive metabolic panel; Future  -     TSH  -     Lipid Panel With LDL/HDL Ratio; Future  -     Hemoglobin A1c    2. Type 2 diabetes mellitus without complication, without long-term current use of insulin ([facility])  -     MicroAlbumin, Urine, Random - Urine, Clean Catch; Future  -     Semaglutide,0.25 or 0.5MG/DOS, (Ozempic, 0.25 or 0.5 MG/DOSE,) 2 MG/1.5ML solution pen-injector; Inject 0.25 mg under the skin into the appropriate area as directed 1 (One) Time Per Week.  Dispense: 2 mL; Refill: 5    3. Essential hypertension    4. Chronic obstructive pulmonary disease, unspecified COPD type ([facility])    5. Mixed hyperlipidemia    6. BPH without obstruction/lower urinary tract symptoms    7. Gastroesophageal reflux disease without esophagitis    8. Bipolar disord, crnt episode mixed, severe, w psych features ([facility])  -     QUEtiapine (SEROquel) 300 MG tablet; Take 2 tablets by mouth Every Night.  Dispense: 60 tablet; Refill: 5    9. Anxiety  -     QUEtiapine (SEROquel) 300 MG tablet; Take 2 tablets by mouth Every Night.  Dispense: 60 tablet; Refill: 5    10. Other diabetic neurological complication associated with type 2 diabetes mellitus ([facility])  -     Ambulatory Referral to Pain Management    11. Encounter for diabetic foot exam ([facility])    12. Callus of foot    13. Chronic pain of left ankle  -     Ambulatory Referral to Pain Management    14. Need for vaccination  -     Zoster Vac Recomb Adjuvanted 50 MCG/0.5ML reconstituted suspension; Inject 0.5 mL into the appropriate muscle as directed by prescriber 1 (One) Time for 1 dose.  Dispense: 1 each; Refill: 1  -     Fluzone High-Dose 65+yrs  -     Shingrix Vaccine    15. Insomnia, unspecified type  -     QUEtiapine (SEROquel) 300 MG tablet; Take 2 tablets by mouth Every Night.  Dispense: 60 tablet; Refill: 5    16.  Screening for prostate cancer  -     PSA SCREENING; Future    17. Encounter to establish care       Patient in for Medicare annual wellness visit and to establish care.  Labs have been ordered.  Patient to return fasting to have these drawn.  We will notify him of these results once they are available.  Patient to continue his current medications minus metformin.  We will change this to Ozempic 0.25 mg weekly.  I have taken him off the metformin due to side effects of the metformin with diarrhea.  Refilled his Seroquel as requested.  Diabetic foot exam performed.  Patient does have peripheral neuropathy, callus formation on feet, and thickening of the nails on bilateral feet.  Because of patient's chronic pain of the left ankle and neuropathy, will refer him to pain management as requested.  Patient counseled on needed immunizations today.  He was given the flu shot while in office today.  He tolerated well without complications.  Shingrix was sent to his pharmacy and brought back by patient and given here.  He tolerated well without complications.  He is encouraged to get his diabetic eye exam performed.  He is to follow-up in 3 months or sooner if needed for recheck on diabetes mellitus.  Answered all questions.  Patient verbalized understanding of plan of care.          This document has been electronically signed by SAMUEL Haney on March 6, 2023 14:55 CST

## 2023-03-07 RX ORDER — QUETIAPINE FUMARATE 300 MG/1
TABLET, FILM COATED ORAL
Qty: 30 TABLET | Refills: 0 | OUTPATIENT
Start: 2023-03-07

## 2023-03-16 DIAGNOSIS — Z23 NEED FOR VACCINATION: ICD-10-CM

## 2023-03-16 DIAGNOSIS — E11.9 TYPE 2 DIABETES MELLITUS WITHOUT COMPLICATION, WITHOUT LONG-TERM CURRENT USE OF INSULIN: ICD-10-CM

## 2023-03-16 DIAGNOSIS — K21.9 GASTROESOPHAGEAL REFLUX DISEASE WITHOUT ESOPHAGITIS: ICD-10-CM

## 2023-03-16 DIAGNOSIS — I10 ESSENTIAL HYPERTENSION: ICD-10-CM

## 2023-03-16 DIAGNOSIS — E78.2 MIXED HYPERLIPIDEMIA: ICD-10-CM

## 2023-03-16 DIAGNOSIS — M19.041 ARTHRITIS OF RIGHT HAND: ICD-10-CM

## 2023-03-16 DIAGNOSIS — J44.9 CHRONIC OBSTRUCTIVE PULMONARY DISEASE, UNSPECIFIED COPD TYPE: ICD-10-CM

## 2023-03-16 RX ORDER — LISINOPRIL AND HYDROCHLOROTHIAZIDE 20; 12.5 MG/1; MG/1
1 TABLET ORAL DAILY
Qty: 30 TABLET | Refills: 0 | Status: SHIPPED | OUTPATIENT
Start: 2023-03-16

## 2023-03-16 RX ORDER — ALBUTEROL SULFATE 90 UG/1
AEROSOL, METERED RESPIRATORY (INHALATION)
Qty: 18 G | Refills: 0 | Status: SHIPPED | OUTPATIENT
Start: 2023-03-16 | End: 2023-03-20 | Stop reason: SDUPTHER

## 2023-03-16 RX ORDER — ATORVASTATIN CALCIUM 40 MG/1
40 TABLET, FILM COATED ORAL NIGHTLY
Qty: 30 TABLET | Refills: 0 | Status: SHIPPED | OUTPATIENT
Start: 2023-03-16

## 2023-03-16 RX ORDER — ZOSTER VACCINE RECOMBINANT, ADJUVANTED 50 MCG/0.5
KIT INTRAMUSCULAR
Qty: 1 EACH | Refills: 1 | OUTPATIENT
Start: 2023-03-16

## 2023-03-16 RX ORDER — PANTOPRAZOLE SODIUM 40 MG/1
TABLET, DELAYED RELEASE ORAL
Qty: 30 TABLET | Refills: 0 | Status: SHIPPED | OUTPATIENT
Start: 2023-03-16

## 2023-03-20 ENCOUNTER — PRIOR AUTHORIZATION (OUTPATIENT)
Dept: FAMILY MEDICINE CLINIC | Facility: CLINIC | Age: 68
End: 2023-03-20
Payer: MEDICARE

## 2023-03-20 DIAGNOSIS — J44.9 CHRONIC OBSTRUCTIVE PULMONARY DISEASE, UNSPECIFIED COPD TYPE: ICD-10-CM

## 2023-03-20 RX ORDER — ALBUTEROL SULFATE 90 UG/1
AEROSOL, METERED RESPIRATORY (INHALATION)
Qty: 18 G | Refills: 0 | Status: SHIPPED | OUTPATIENT
Start: 2023-03-20

## 2023-03-20 NOTE — TELEPHONE ENCOUNTER
I received a PA for a Ventolin inhaler ref PA Hinkle [id number]. The generic Albuterol Sulfate HFA is on formulary. I deleted the PA, sent in the generic and notified [facility] staff.

## 2023-03-24 ENCOUNTER — LAB (OUTPATIENT)
Dept: LAB | Facility: OTHER | Age: 68
End: 2023-03-24
Payer: MEDICARE

## 2023-03-24 DIAGNOSIS — Z00.00 MEDICARE ANNUAL WELLNESS VISIT, SUBSEQUENT: ICD-10-CM

## 2023-03-24 DIAGNOSIS — E11.9 TYPE 2 DIABETES MELLITUS WITHOUT COMPLICATION, WITHOUT LONG-TERM CURRENT USE OF INSULIN: Chronic | ICD-10-CM

## 2023-03-24 DIAGNOSIS — Z12.5 SCREENING FOR PROSTATE CANCER: ICD-10-CM

## 2023-03-24 LAB
ALBUMIN SERPL-MCNC: 4.2 G/DL (ref 3.5–5)
ALBUMIN UR-MCNC: <1.2 MG/DL
ALBUMIN/GLOB SERPL: 1.2 G/DL (ref 1.1–1.8)
ALP SERPL-CCNC: 88 U/L (ref 38–126)
ALT SERPL W P-5'-P-CCNC: 18 U/L
ANION GAP SERPL CALCULATED.3IONS-SCNC: 11 MMOL/L (ref 5–15)
AST SERPL-CCNC: 22 U/L (ref 17–59)
BASOPHILS # BLD AUTO: 0.1 10*3/MM3 (ref 0–0.2)
BASOPHILS NFR BLD AUTO: 1.2 % (ref 0–1.5)
BILIRUB SERPL-MCNC: 0.5 MG/DL (ref 0.2–1.3)
BUN SERPL-MCNC: 40 MG/DL (ref 7–23)
BUN/CREAT SERPL: 24.7 (ref 7–25)
CALCIUM SPEC-SCNC: 8.8 MG/DL (ref 8.4–10.2)
CHLORIDE SERPL-SCNC: 98 MMOL/L (ref 101–112)
CHOLEST SERPL-MCNC: 161 MG/DL (ref 0–200)
CO2 SERPL-SCNC: 27 MMOL/L (ref 22–30)
CREAT SERPL-MCNC: 1.62 MG/DL (ref 0.7–1.3)
DEPRECATED RDW RBC AUTO: 45.7 FL (ref 37–54)
EGFRCR SERPLBLD CKD-EPI 2021: 46 ML/MIN/1.73
EOSINOPHIL # BLD AUTO: 0.22 10*3/MM3 (ref 0–0.4)
EOSINOPHIL NFR BLD AUTO: 2.7 % (ref 0.3–6.2)
ERYTHROCYTE [DISTWIDTH] IN BLOOD BY AUTOMATED COUNT: 13.7 % (ref 12.3–15.4)
GLOBULIN UR ELPH-MCNC: 3.4 GM/DL (ref 2.3–3.5)
GLUCOSE SERPL-MCNC: 120 MG/DL (ref 70–99)
HBA1C MFR BLD: 7.3 % (ref 4.8–5.6)
HCT VFR BLD AUTO: 39.5 % (ref 37.5–51)
HDLC SERPL-MCNC: 64 MG/DL (ref 40–60)
HGB BLD-MCNC: 12.9 G/DL (ref 13–17.7)
LDLC SERPL CALC-MCNC: 86 MG/DL (ref 0–100)
LDLC/HDLC SERPL: 1.34 {RATIO}
LYMPHOCYTES # BLD AUTO: 1.92 10*3/MM3 (ref 0.7–3.1)
LYMPHOCYTES NFR BLD AUTO: 23.7 % (ref 19.6–45.3)
MCH RBC QN AUTO: 30.6 PG (ref 26.6–33)
MCHC RBC AUTO-ENTMCNC: 32.7 G/DL (ref 31.5–35.7)
MCV RBC AUTO: 93.8 FL (ref 79–97)
MONOCYTES # BLD AUTO: 0.93 10*3/MM3 (ref 0.1–0.9)
MONOCYTES NFR BLD AUTO: 11.5 % (ref 5–12)
NEUTROPHILS NFR BLD AUTO: 4.94 10*3/MM3 (ref 1.7–7)
NEUTROPHILS NFR BLD AUTO: 60.9 % (ref 42.7–76)
PLATELET # BLD AUTO: 348 10*3/MM3 (ref 140–450)
PMV BLD AUTO: 8.7 FL (ref 6–12)
POTASSIUM SERPL-SCNC: 3.8 MMOL/L (ref 3.4–5)
PROT SERPL-MCNC: 7.6 G/DL (ref 6.3–8.6)
PSA SERPL-MCNC: 0.13 NG/ML (ref 0–4)
RBC # BLD AUTO: 4.21 10*6/MM3 (ref 4.14–5.8)
SODIUM SERPL-SCNC: 136 MMOL/L (ref 137–145)
TRIGL SERPL-MCNC: 56 MG/DL (ref 0–150)
TSH SERPL DL<=0.05 MIU/L-ACNC: 1.08 UIU/ML (ref 0.27–4.2)
VLDLC SERPL-MCNC: 11 MG/DL (ref 5–40)
WBC NRBC COR # BLD: 8.11 10*3/MM3 (ref 3.4–10.8)

## 2023-03-24 PROCEDURE — 36415 COLL VENOUS BLD VENIPUNCTURE: CPT | Performed by: NURSE PRACTITIONER

## 2023-03-24 PROCEDURE — 84443 ASSAY THYROID STIM HORMONE: CPT | Performed by: NURSE PRACTITIONER

## 2023-03-24 PROCEDURE — 82043 UR ALBUMIN QUANTITATIVE: CPT | Performed by: NURSE PRACTITIONER

## 2023-03-24 PROCEDURE — 80061 LIPID PANEL: CPT | Performed by: NURSE PRACTITIONER

## 2023-03-24 PROCEDURE — 83036 HEMOGLOBIN GLYCOSYLATED A1C: CPT | Performed by: NURSE PRACTITIONER

## 2023-03-24 PROCEDURE — 80053 COMPREHEN METABOLIC PANEL: CPT | Performed by: NURSE PRACTITIONER

## 2023-03-24 PROCEDURE — 85025 COMPLETE CBC W/AUTO DIFF WBC: CPT | Performed by: NURSE PRACTITIONER

## 2023-03-24 PROCEDURE — G0103 PSA SCREENING: HCPCS | Performed by: NURSE PRACTITIONER

## 2023-03-30 ENCOUNTER — PATIENT ROUNDING (BHMG ONLY) (OUTPATIENT)
Dept: FAMILY MEDICINE CLINIC | Facility: CLINIC | Age: 68
End: 2023-03-30

## 2023-04-03 DIAGNOSIS — N18.30 STAGE 3 CHRONIC KIDNEY DISEASE, UNSPECIFIED WHETHER STAGE 3A OR 3B CKD: Primary | ICD-10-CM

## 2023-04-03 NOTE — PROGRESS NOTES
"March 6, 2023    Spoke with the patient while in the clinic for appointment to see Leila Cronin.    My name is Dasia the Clinical Coordinator      I am  with [facility] [place]  [facility] - [address] [place] [id number] [phone].    Before we get started may I verify your date of birth? 1955    I am calling to officially welcome you to our practice and ask about your recent visit. Is this a good time to talk? yes    Tell me about your visit with us. What things went well?  \"Everything was good\".       We're always looking for ways to make our patients' experiences even better. Do you have recommendations on ways we may improve?  no    Overall were you satisfied with your visit to our practice? yes       I appreciate you taking the time to speak with me today. Is there anything else I can do for you? no      Thank you, and have a great day.      "

## 2023-04-17 DIAGNOSIS — K21.9 GASTROESOPHAGEAL REFLUX DISEASE WITHOUT ESOPHAGITIS: ICD-10-CM

## 2023-04-17 DIAGNOSIS — I10 ESSENTIAL HYPERTENSION: ICD-10-CM

## 2023-04-17 DIAGNOSIS — M19.041 ARTHRITIS OF RIGHT HAND: ICD-10-CM

## 2023-04-17 DIAGNOSIS — E78.2 MIXED HYPERLIPIDEMIA: ICD-10-CM

## 2023-04-17 DIAGNOSIS — J44.9 CHRONIC OBSTRUCTIVE PULMONARY DISEASE, UNSPECIFIED COPD TYPE: ICD-10-CM

## 2023-04-17 DIAGNOSIS — E11.9 TYPE 2 DIABETES MELLITUS WITHOUT COMPLICATION, WITHOUT LONG-TERM CURRENT USE OF INSULIN: ICD-10-CM

## 2023-04-17 RX ORDER — PANTOPRAZOLE SODIUM 40 MG/1
TABLET, DELAYED RELEASE ORAL
Qty: 30 TABLET | Refills: 0 | Status: SHIPPED | OUTPATIENT
Start: 2023-04-17

## 2023-04-17 RX ORDER — SITAGLIPTIN 100 MG/1
TABLET, FILM COATED ORAL
Qty: 30 TABLET | Refills: 0 | Status: SHIPPED | OUTPATIENT
Start: 2023-04-17

## 2023-04-17 RX ORDER — ALBUTEROL SULFATE 90 UG/1
AEROSOL, METERED RESPIRATORY (INHALATION)
Qty: 8.5 G | Refills: 0 | Status: SHIPPED | OUTPATIENT
Start: 2023-04-17

## 2023-04-17 RX ORDER — LISINOPRIL AND HYDROCHLOROTHIAZIDE 20; 12.5 MG/1; MG/1
1 TABLET ORAL DAILY
Qty: 30 TABLET | Refills: 0 | Status: SHIPPED | OUTPATIENT
Start: 2023-04-17

## 2023-04-17 RX ORDER — ATORVASTATIN CALCIUM 40 MG/1
40 TABLET, FILM COATED ORAL NIGHTLY
Qty: 30 TABLET | Refills: 0 | Status: SHIPPED | OUTPATIENT
Start: 2023-04-17

## 2023-04-20 DIAGNOSIS — E11.9 TYPE 2 DIABETES MELLITUS WITHOUT COMPLICATION, WITHOUT LONG-TERM CURRENT USE OF INSULIN: ICD-10-CM

## 2023-05-22 DIAGNOSIS — M19.041 ARTHRITIS OF RIGHT HAND: ICD-10-CM

## 2023-05-22 DIAGNOSIS — E78.2 MIXED HYPERLIPIDEMIA: ICD-10-CM

## 2023-05-22 DIAGNOSIS — J44.9 CHRONIC OBSTRUCTIVE PULMONARY DISEASE, UNSPECIFIED COPD TYPE: ICD-10-CM

## 2023-05-22 DIAGNOSIS — K21.9 GASTROESOPHAGEAL REFLUX DISEASE WITHOUT ESOPHAGITIS: ICD-10-CM

## 2023-05-22 DIAGNOSIS — E11.9 TYPE 2 DIABETES MELLITUS WITHOUT COMPLICATION, WITHOUT LONG-TERM CURRENT USE OF INSULIN: Chronic | ICD-10-CM

## 2023-05-22 RX ORDER — ALBUTEROL SULFATE 90 UG/1
AEROSOL, METERED RESPIRATORY (INHALATION)
Qty: 18 G | Refills: 0 | Status: SHIPPED | OUTPATIENT
Start: 2023-05-22

## 2023-05-22 RX ORDER — SEMAGLUTIDE 0.68 MG/ML
INJECTION, SOLUTION SUBCUTANEOUS
Qty: 2 ML | Refills: 5 | Status: SHIPPED | OUTPATIENT
Start: 2023-05-22

## 2023-05-22 RX ORDER — PANTOPRAZOLE SODIUM 40 MG/1
TABLET, DELAYED RELEASE ORAL
Qty: 30 TABLET | Refills: 0 | Status: SHIPPED | OUTPATIENT
Start: 2023-05-22

## 2023-05-22 RX ORDER — ATORVASTATIN CALCIUM 40 MG/1
40 TABLET, FILM COATED ORAL NIGHTLY
Qty: 30 TABLET | Refills: 0 | Status: SHIPPED | OUTPATIENT
Start: 2023-05-22

## 2023-05-23 DIAGNOSIS — J44.9 CHRONIC OBSTRUCTIVE PULMONARY DISEASE, UNSPECIFIED COPD TYPE: ICD-10-CM

## 2023-05-23 DIAGNOSIS — E11.9 TYPE 2 DIABETES MELLITUS WITHOUT COMPLICATION, WITHOUT LONG-TERM CURRENT USE OF INSULIN: ICD-10-CM

## 2023-05-23 RX ORDER — SITAGLIPTIN 100 MG/1
TABLET, FILM COATED ORAL
Qty: 30 TABLET | Refills: 0 | Status: SHIPPED | OUTPATIENT
Start: 2023-05-23

## 2023-05-23 RX ORDER — ALBUTEROL SULFATE 90 UG/1
AEROSOL, METERED RESPIRATORY (INHALATION)
Qty: 8.5 G | Refills: 0 | OUTPATIENT
Start: 2023-05-23

## 2023-05-24 ENCOUNTER — OFFICE VISIT (OUTPATIENT)
Dept: FAMILY MEDICINE CLINIC | Facility: CLINIC | Age: 68
End: 2023-05-24
Payer: MEDICARE

## 2023-05-24 VITALS — HEART RATE: 77 BPM | BODY MASS INDEX: 29.06 KG/M2 | OXYGEN SATURATION: 98 % | WEIGHT: 203 LBS | HEIGHT: 70 IN
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
## 2023-05-24 DIAGNOSIS — M25.572 CHRONIC PAIN OF LEFT ANKLE: ICD-10-CM

## 2023-05-24 DIAGNOSIS — E11.49 OTHER DIABETIC NEUROLOGICAL COMPLICATION ASSOCIATED WITH TYPE 2 DIABETES MELLITUS: Primary | ICD-10-CM

## 2023-05-24 DIAGNOSIS — E78.2 MIXED HYPERLIPIDEMIA: ICD-10-CM

## 2023-05-24 DIAGNOSIS — G89.29 CHRONIC PAIN OF LEFT ANKLE: ICD-10-CM

## 2023-05-24 DIAGNOSIS — R07.81 RIB PAIN ON LEFT SIDE: ICD-10-CM

## 2023-05-24 RX ORDER — TAMSULOSIN HYDROCHLORIDE 0.4 MG/1
CAPSULE ORAL
COMMUNITY
Start: 2023-04-04

## 2023-05-24 NOTE — PROGRESS NOTES
CC: Rib Injury (Left side, fell 3 days ago)      Subjective:  Mikey Allison is a 67 y.o. male who presents for         Presents to office today complaining of left-sided rib pain.  He states he fell 3 days ago. He tripped over a throw rug, falling, landing on his left side. States pain is sharp and intermittent. States it is getting a little better. Rates pain at 9 on pain scale. Denies radiation of the pain. Pain is aggravated by coughing, deep breathing, and raising the left arm. No noted alleviating factors. No treatment prior to arrival.       The following portions of the patient's history were reviewed and updated as appropriate: allergies, current medications, past family history, past medical history, past social history, past surgical history and problem list.    Past Medical History:   Diagnosis Date   • Abdominal pain     left side   • Acute sinusitis    • Ankle joint pain    • Bipolar disorder    • Bipolar disorder, unspecified    • Cellulitis and abscess of trunk     axilla   • Chest pain    • Chronic anemia    • Degenerative joint disease involving multiple joints     back   • Depressive disorder    • Diabetes mellitus    • Diabetes mellitus with no complication     type 2 or unspecified type uncontrolled   • Diverticular disease    • Dyspnea    • Enlarged prostate     on LA-on CT   • Essential hypertension    • Febrile convulsion    • Gastroesophageal reflux disease    • Generalized anxiety disorder    • H/O echocardiogram 10/16/2007    Mild to moderate concentric LV hypertrophy with mild left atrial enlargement. LV appears to be hypodynamic with preserved LV systolic function with EF 55-60%. Pericardium appears to be normal with a small pericardial effusion    • Hemorrhoids    • Hyperlipidemia    • Hypertensive disorder    • Hypoglycemia    • Infectious disorder of kidney     kidney infection-treated by [facility]   • Left lower quadrant pain    • Loss of appetite    • Obesity    •  Osteoarthritis    • Pain in joint involving ankle and foot    • Pain in limb    • Psoriasis    • Retention of urine     with cath-treated by the [facility]   • Screening for malignant neoplasm of colon    • Sepsis due to urinary tract infection     urosepsis treated by [facility]   • Sinusitis    • Syncope    • Unspecified essential hypertension          Current Outpatient Medications:   •  atorvastatin (LIPITOR) 40 MG tablet, TAKE 1 TABLET BY MOUTH EVERY NIGHT., Disp: 30 tablet, Rfl: 0  •  Blood Glucose Monitoring Suppl (ONE TOUCH ULTRA 2) w/Device kit, , Disp: , Rfl:   •  budesonide-formoterol (Symbicort) 160-4.5 MCG/ACT inhaler, Inhale 2 puffs 2 (Two) Times a Day. Indications: Chronic Obstructive Lung Disease, Disp: 10.2 g, Rfl: 5  •  Diclofenac Sodium (VOLTAREN) 1 % gel gel, APPLY TOPICALLY TO THE APPROPRIATE AREA AS DIRECTED 4 (FOUR) TIMES A DAY., Disp: 100 g, Rfl: 0  •  docusate sodium (COLACE) 100 MG capsule, Take 1 capsule by mouth 2 (Two) Times a Day. Indications: Constipation, Disp: 180 capsule, Rfl: 1  •  empagliflozin (JARDIANCE) 25 MG tablet tablet, Take 1 tablet by mouth Daily., Disp: 30 tablet, Rfl: 5  •  glucose blood test strip, Use as instructed, Disp: 100 each, Rfl: 12  •  glucose monitor monitoring kit, 1 each Daily., Disp: 1 each, Rfl: 0  •  glucose monitor monitoring kit, 1 each Daily., Disp: 1 each, Rfl: 0  •  Januvia 100 MG tablet, TAKE 1 TABLET BY MOUTH DAILY., Disp: 30 tablet, Rfl: 0  •  lidocaine (LIDODERM) 5 %, Place 1 patch on the skin as directed by provider Daily. Indications: Allodynia, Disp: 30 each, Rfl: 5  •  lisinopril-hydrochlorothiazide (PRINZIDE,ZESTORETIC) 20-12.5 MG per tablet, TAKE 1 TABLET BY MOUTH DAILY. INDICATIONS: HIGH BLOOD PRESSURE DISORDER, Disp: 30 tablet, Rfl: 0  •  naloxone (Narcan) 4 MG/0.1ML nasal spray, 1 spray into the nostril(s) as directed by provider As Needed (opioid overdose) for up to 2 doses. Indications: Opioid Overdose, Disp: 1 each, Rfl: 1  •   "OneTouch Delica Lancets 33G misc, 1 each Daily., Disp: 100 each, Rfl: 11  •  Ozempic, 0.25 or 0.5 MG/DOSE, 2 MG/3ML solution pen-injector, INJECT 0.25 MG UNDER THE SKIN INTO THE APPROPRIATE AREA AS DIRECTED 1 (ONE) TIME PER WEEK., Disp: 2 mL, Rfl: 5  •  pantoprazole (PROTONIX) 40 MG EC tablet, TAKE 1 TABLET BY MOUTH DAILY FOR HEARTBURN, Disp: 30 tablet, Rfl: 0  •  QUEtiapine (SEROquel) 300 MG tablet, Take 2 tablets by mouth Every Night., Disp: 60 tablet, Rfl: 5  •  Ventolin  (90 Base) MCG/ACT inhaler, 2 PUFFS EVERY 4-6 HOURS UNTIL COUGH/WHEEZING IMPROVE, THAN TAPER OFF OF, Disp: 18 g, Rfl: 0  •  diclofenac (VOLTAREN) 50 MG EC tablet, Take 1 tablet by mouth 2 (Two) Times a Day As Needed (Pain)., Disp: 20 tablet, Rfl: 0  •  tamsulosin (FLOMAX) 0.4 MG capsule 24 hr capsule, , Disp: , Rfl:     Current Facility-Administered Medications:   •  cyanocobalamin injection 1,000 mcg, 1,000 mcg, Intramuscular, Q28 Days, Leila Bernard PA-C    Review of Systems    Review of Systems   Constitutional: Negative.    HENT: Negative.    Eyes: Negative.    Respiratory:        Lt rib pain.   Cardiovascular: Negative.    Gastrointestinal: Negative.    Endocrine: Negative.    Genitourinary: Negative.    Musculoskeletal: Negative.    Skin: Negative.    Allergic/Immunologic: Negative.    Neurological: Negative.    Hematological: Negative.    Psychiatric/Behavioral: Negative.    All other systems reviewed and are negative.      Objective  Vitals:    05/24/23 1327   Pulse: 77   SpO2: 98%   Weight: 92.1 kg (203 lb)   Height: 177.8 cm (70\")     Body mass index is 29.13 kg/m².    Physical Exam    Physical Exam  Vitals and nursing note reviewed.   Constitutional:       General: He is not in acute distress.     Appearance: Normal appearance. He is not ill-appearing, toxic-appearing or diaphoretic.   HENT:      Head: Normocephalic and atraumatic.   Cardiovascular:      Rate and Rhythm: Normal rate and regular rhythm.      Pulses: Normal " pulses.      Heart sounds: Normal heart sounds. No murmur heard.    No friction rub. No gallop.   Pulmonary:      Effort: Pulmonary effort is normal. No respiratory distress.      Breath sounds: Normal breath sounds. No stridor. No wheezing, rhonchi or rales.      Comments: Pain on palpation of the left axillary chest wall. Pain noted with deep respiration, also.  Chest:      Chest wall: No tenderness.   Musculoskeletal:      Cervical back: Normal range of motion and neck supple.   Neurological:      Mental Status: He is alert.             Diagnoses and all orders for this visit:    1. Fall, initial encounter (Primary)  -     XR Ribs Left With PA Chest; Future    2. Rib pain on left side  -     XR Ribs Left With PA Chest; Future  -     diclofenac (VOLTAREN) 50 MG EC tablet; Take 1 tablet by mouth 2 (Two) Times a Day As Needed (Pain).  Dispense: 20 tablet; Refill: 0         Di with left-sided rib pain after a fall 3 days ago.  We will obtain x-rays of the left ribs and a PA of the chest on patient's way out today.  We will notify patient of these results once they are available.  We will treat with diclofenac 50 mg p.o. twice daily.  Patient is instructed on how to take this medication and possible side effects.  He is advised to not take any other OTC NSAIDs while taking this medication.  He may take Tylenol along with the diclofenac.  Patient advised to ice the area as needed.  He is advised to follow-up if any acute worsening in symptoms.  Otherwise, patient to keep his scheduled follow-up appointment.  Answered all questions.  Patient verbalized understanding of plan of care.        This document has been electronically signed by SAMUEL Haney on May 24, 2023 13:53 CDT

## 2023-05-25 RX ORDER — ATORVASTATIN CALCIUM 40 MG/1
40 TABLET, FILM COATED ORAL NIGHTLY
Qty: 30 TABLET | Refills: 2 | OUTPATIENT
Start: 2023-05-25

## 2023-05-30 ENCOUNTER — TRANSCRIBE ORDERS (OUTPATIENT)
Dept: LAB | Facility: OTHER | Age: 68
End: 2023-05-30

## 2023-05-30 DIAGNOSIS — N18.31 STAGE 3A CHRONIC KIDNEY DISEASE: ICD-10-CM

## 2023-05-30 DIAGNOSIS — N17.9 AKI (ACUTE KIDNEY INJURY): Primary | ICD-10-CM

## 2023-05-30 DIAGNOSIS — N40.0 BENIGN PROSTATIC HYPERPLASIA WITHOUT LOWER URINARY TRACT SYMPTOMS: ICD-10-CM

## 2023-05-30 DIAGNOSIS — E78.5 HYPERLIPIDEMIA, UNSPECIFIED HYPERLIPIDEMIA TYPE: ICD-10-CM

## 2023-05-30 DIAGNOSIS — E11.9 TYPE 2 DIABETES MELLITUS WITHOUT COMPLICATION, WITHOUT LONG-TERM CURRENT USE OF INSULIN: ICD-10-CM

## 2023-05-30 DIAGNOSIS — I10 HYPERTENSION, UNSPECIFIED TYPE: ICD-10-CM

## 2023-06-01 RX ORDER — TAMSULOSIN HYDROCHLORIDE 0.4 MG/1
CAPSULE ORAL
Qty: 30 CAPSULE | Refills: 5 | Status: SHIPPED | OUTPATIENT
Start: 2023-06-01

## 2023-06-07 ENCOUNTER — OFFICE VISIT (OUTPATIENT)
Dept: FAMILY MEDICINE CLINIC | Facility: CLINIC | Age: 68
End: 2023-06-07
Payer: MEDICARE

## 2023-06-07 VITALS
HEIGHT: 70 IN | HEART RATE: 68 BPM | SYSTOLIC BLOOD PRESSURE: 122 MMHG | WEIGHT: 197.6 LBS | BODY MASS INDEX: 28.29 KG/M2 | OXYGEN SATURATION: 98 % | DIASTOLIC BLOOD PRESSURE: 78 MMHG

## 2023-06-07 DIAGNOSIS — E11.9 TYPE 2 DIABETES MELLITUS WITHOUT COMPLICATION, WITHOUT LONG-TERM CURRENT USE OF INSULIN: Primary | Chronic | ICD-10-CM

## 2023-06-07 DIAGNOSIS — I10 ESSENTIAL HYPERTENSION: Chronic | ICD-10-CM

## 2023-06-07 DIAGNOSIS — Z23 NEED FOR VACCINATION: ICD-10-CM

## 2023-06-07 DIAGNOSIS — K21.9 GASTROESOPHAGEAL REFLUX DISEASE WITHOUT ESOPHAGITIS: Chronic | ICD-10-CM

## 2023-06-07 RX ORDER — SEMAGLUTIDE 0.68 MG/ML
0.5 INJECTION, SOLUTION SUBCUTANEOUS WEEKLY
Qty: 3 ML | Refills: 5 | Status: SHIPPED | OUTPATIENT
Start: 2023-06-07

## 2023-06-07 RX ORDER — PANTOPRAZOLE SODIUM 40 MG/1
40 TABLET, DELAYED RELEASE ORAL 2 TIMES DAILY
Qty: 60 TABLET | Refills: 5 | Status: SHIPPED | OUTPATIENT
Start: 2023-06-07

## 2023-06-07 RX ORDER — LISINOPRIL AND HYDROCHLOROTHIAZIDE 20; 12.5 MG/1; MG/1
1 TABLET ORAL DAILY
Qty: 30 TABLET | Refills: 5 | Status: SHIPPED | OUTPATIENT
Start: 2023-06-07

## 2023-06-07 NOTE — PROGRESS NOTES
CC: Follow-up (3 month FU)      Subjective:  Mikey Allison is a 68 y.o. male who presents for         Patient presents to office for 3-month follow-up on diabetes mellitus.  He continues to take Jardiance, Januvia, and Ozempic.  He checks his sugars daily. He states it varies between 160-170.  His last A1c was 7.3.    Needs a refill on his Zestoretic today for his hypertension.    He is due for eye exam.  This was encouraged.    Patient is due for pneumonia vaccine and second Shingrix vaccine today. He is willing to take these today.    Patient states that he was on Protonix 40 mg twice daily in the past and this helped with his symptoms of GERD.  He has been taken back down to 1 a day.  He is wondering if he could get this increased again to twice daily.  He is still symptomatic with the once daily dosing.      The following portions of the patient's history were reviewed and updated as appropriate: allergies, current medications, past family history, past medical history, past social history, past surgical history and problem list.    Past Medical History:   Diagnosis Date    Abdominal pain     left side    Acute sinusitis     Ankle joint pain     Bipolar disorder     Bipolar disorder, unspecified     Cellulitis and abscess of trunk     axilla    Chest pain     Chronic anemia     Degenerative joint disease involving multiple joints     back    Depressive disorder     Diabetes mellitus     Diabetes mellitus with no complication     type 2 or unspecified type uncontrolled    Diverticular disease     Dyspnea     Enlarged prostate     on MA-on CT    Essential hypertension     Febrile convulsion     Gastroesophageal reflux disease     Generalized anxiety disorder     H/O echocardiogram 10/16/2007    Mild to moderate concentric LV hypertrophy with mild left atrial enlargement. LV appears to be hypodynamic with preserved LV systolic function with EF 55-60%. Pericardium appears to be normal with a small  pericardial effusion     Hemorrhoids     Hyperlipidemia     Hypertensive disorder     Hypoglycemia     Infectious disorder of kidney     kidney infection-treated by [facility]    Left lower quadrant pain     Loss of appetite     Obesity     Osteoarthritis     Pain in joint involving ankle and foot     Pain in limb     Psoriasis     Retention of urine     with cath-treated by the [facility]    Screening for malignant neoplasm of colon     Sepsis due to urinary tract infection     urosepsis treated by [facility]    Sinusitis     Syncope     Unspecified essential hypertension          Current Outpatient Medications:     atorvastatin (LIPITOR) 40 MG tablet, TAKE 1 TABLET BY MOUTH EVERY NIGHT., Disp: 30 tablet, Rfl: 0    Blood Glucose Monitoring Suppl (ONE TOUCH ULTRA 2) w/Device kit, , Disp: , Rfl:     budesonide-formoterol (Symbicort) 160-4.5 MCG/ACT inhaler, Inhale 2 puffs 2 (Two) Times a Day. Indications: Chronic Obstructive Lung Disease, Disp: 10.2 g, Rfl: 5    diclofenac (VOLTAREN) 50 MG EC tablet, Take 1 tablet by mouth 2 (Two) Times a Day As Needed (Pain)., Disp: 20 tablet, Rfl: 0    Diclofenac Sodium (VOLTAREN) 1 % gel gel, APPLY TOPICALLY TO THE APPROPRIATE AREA AS DIRECTED 4 (FOUR) TIMES A DAY., Disp: 100 g, Rfl: 0    docusate sodium (COLACE) 100 MG capsule, Take 1 capsule by mouth 2 (Two) Times a Day. Indications: Constipation, Disp: 180 capsule, Rfl: 1    empagliflozin (JARDIANCE) 25 MG tablet tablet, Take 1 tablet by mouth Daily., Disp: 30 tablet, Rfl: 5    glucose blood test strip, Use as instructed, Disp: 100 each, Rfl: 12    glucose monitor monitoring kit, 1 each Daily., Disp: 1 each, Rfl: 0    glucose monitor monitoring kit, 1 each Daily., Disp: 1 each, Rfl: 0    Januvia 100 MG tablet, TAKE 1 TABLET BY MOUTH DAILY., Disp: 30 tablet, Rfl: 0    lidocaine (LIDODERM) 5 %, Place 1 patch on the skin as directed by provider Daily. Indications: Allodynia, Disp: 30 each, Rfl: 5     lisinopril-hydrochlorothiazide (PRINZIDE,ZESTORETIC) 20-12.5 MG per tablet, Take 1 tablet by mouth Daily. Indications: High Blood Pressure Disorder, Disp: 30 tablet, Rfl: 5    naloxone (Narcan) 4 MG/0.1ML nasal spray, 1 spray into the nostril(s) as directed by provider As Needed (opioid overdose) for up to 2 doses. Indications: Opioid Overdose, Disp: 1 each, Rfl: 1    OneTouch Delica Lancets 33G misc, 1 each Daily., Disp: 100 each, Rfl: 11    pantoprazole (PROTONIX) 40 MG EC tablet, Take 1 tablet by mouth 2 (Two) Times a Day., Disp: 60 tablet, Rfl: 5    QUEtiapine (SEROquel) 300 MG tablet, Take 2 tablets by mouth Every Night., Disp: 60 tablet, Rfl: 5    Semaglutide,0.25 or 0.5MG/DOS, (Ozempic, 0.25 or 0.5 MG/DOSE,) 2 MG/3ML solution pen-injector, Inject 0.5 mg under the skin into the appropriate area as directed 1 (One) Time Per Week., Disp: 3 mL, Rfl: 5    tamsulosin (FLOMAX) 0.4 MG capsule 24 hr capsule, TAKE 1 CAPSULE BY MOUTH DAILY. INDICATIONS: PROSTATE, Disp: 30 capsule, Rfl: 5    Ventolin  (90 Base) MCG/ACT inhaler, 2 PUFFS EVERY 4-6 HOURS UNTIL COUGH/WHEEZING IMPROVE, THAN TAPER OFF OF, Disp: 18 g, Rfl: 0    Zoster Vac Recomb Adjuvanted 50 MCG/0.5ML reconstituted suspension, Inject 0.5 mL into the appropriate muscle as directed by prescriber 1 (One) Time for 1 dose., Disp: 1 each, Rfl: 0    Current Facility-Administered Medications:     cyanocobalamin injection 1,000 mcg, 1,000 mcg, Intramuscular, Q28 Days, Leila Bernard PA-C    Review of Systems    Review of Systems   Constitutional: Negative.    HENT: Negative.     Eyes: Negative.    Respiratory: Negative.     Cardiovascular: Negative.    Gastrointestinal:         GERD symptoms   Endocrine: Negative.    Genitourinary: Negative.    Musculoskeletal: Negative.    Skin: Negative.    Allergic/Immunologic: Negative.    Neurological: Negative.    Hematological: Negative.    Psychiatric/Behavioral: Negative.     All other systems reviewed and are  "negative.    Objective  Vitals:    06/07/23 0928   BP: 122/78   Pulse: 68   SpO2: 98%   Weight: 89.6 kg (197 lb 9.6 oz)   Height: 177.8 cm (70\")     Body mass index is 28.35 kg/m².    Physical Exam    Physical Exam  Vitals and nursing note reviewed.   Constitutional:       General: He is not in acute distress.     Appearance: Normal appearance. He is not ill-appearing, toxic-appearing or diaphoretic.   HENT:      Head: Normocephalic and atraumatic.   Cardiovascular:      Rate and Rhythm: Normal rate and regular rhythm.      Pulses: Normal pulses.      Heart sounds: Normal heart sounds. No murmur heard.    No friction rub. No gallop.   Pulmonary:      Effort: Pulmonary effort is normal. No respiratory distress.      Breath sounds: Normal breath sounds. No stridor. No wheezing, rhonchi or rales.   Chest:      Chest wall: No tenderness.   Abdominal:      General: Bowel sounds are normal. There is no distension.      Palpations: Abdomen is soft. There is no mass.      Tenderness: There is abdominal tenderness (generalized). There is no guarding or rebound.      Hernia: No hernia is present.   Musculoskeletal:      Cervical back: Normal range of motion and neck supple.      Right lower leg: No edema.      Left lower leg: No edema.   Skin:     General: Skin is warm and dry.      Findings: No rash.   Neurological:      Mental Status: He is alert.           Diagnoses and all orders for this visit:    1. Type 2 diabetes mellitus without complication, without long-term current use of insulin (Primary)  -     Semaglutide,0.25 or 0.5MG/DOS, (Ozempic, 0.25 or 0.5 MG/DOSE,) 2 MG/3ML solution pen-injector; Inject 0.5 mg under the skin into the appropriate area as directed 1 (One) Time Per Week.  Dispense: 3 mL; Refill: 5  -     Hemoglobin A1c    2. Gastroesophageal reflux disease without esophagitis  -     pantoprazole (PROTONIX) 40 MG EC tablet; Take 1 tablet by mouth 2 (Two) Times a Day.  Dispense: 60 tablet; Refill: 5    3. " Essential hypertension  -     lisinopril-hydrochlorothiazide (PRINZIDE,ZESTORETIC) 20-12.5 MG per tablet; Take 1 tablet by mouth Daily. Indications: High Blood Pressure Disorder  Dispense: 30 tablet; Refill: 5    4. Need for vaccination  -     Zoster Vac Recomb Adjuvanted 50 MCG/0.5ML reconstituted suspension; Inject 0.5 mL into the appropriate muscle as directed by prescriber 1 (One) Time for 1 dose.  Dispense: 1 each; Refill: 0  -     Pneumococcal Conjugate Vaccine 20-Valent (PCV20)  -     Shingrix Vaccine       Patient in for 3-month follow-up on diabetes mellitus.  He will return at a later date to have his A1c drawn.  We will notify him of these results once they are available.  I have increased his Ozempic to 0.5 mg weekly.  He is aware of how to use this medication and possible side effects.  He is encouraged to get an eye exam performed and have them send us a copy of the note so we can update this in his care gaps.  For the GERD, will increase the Protonix to twice daily.  Patient is advised that sometimes insurance does not want to pay for this medication to be taken twice daily.  If not we will have to change this medication to something else.  Refilled his Zestoretic as requested.  Patient counseled on needed immunizations today.  He was willing to get the pneumonia vaccine and Shingrix today.  Shingrix was sent to his pharmacy and he returned with this and both Shingrix and pneumonia vaccine were given while in office.  He tolerated well without complications.  He is to follow-up in 3 months or sooner if needed for recheck on his diabetes mellitus.  Answered all questions.  Patient verbalized understanding of plan of care.          This document has been electronically signed by SAMUEL Haeny on June 7, 2023 10:33 CDT

## 2023-07-24 DIAGNOSIS — F31.64: Chronic | ICD-10-CM

## 2023-07-24 DIAGNOSIS — F41.9 ANXIETY: Chronic | ICD-10-CM

## 2023-07-24 DIAGNOSIS — G47.00 INSOMNIA, UNSPECIFIED TYPE: Chronic | ICD-10-CM

## 2023-07-24 RX ORDER — QUETIAPINE FUMARATE 300 MG/1
600 TABLET, FILM COATED ORAL NIGHTLY
Qty: 60 TABLET | Refills: 5 | Status: SHIPPED | OUTPATIENT
Start: 2023-07-24

## 2023-08-04 DIAGNOSIS — E78.2 MIXED HYPERLIPIDEMIA: ICD-10-CM

## 2023-08-07 DIAGNOSIS — E11.9 TYPE 2 DIABETES MELLITUS WITHOUT COMPLICATION, WITHOUT LONG-TERM CURRENT USE OF INSULIN: ICD-10-CM

## 2023-08-07 RX ORDER — ATORVASTATIN CALCIUM 40 MG/1
40 TABLET, FILM COATED ORAL NIGHTLY
Qty: 30 TABLET | Refills: 0 | Status: SHIPPED | OUTPATIENT
Start: 2023-08-07

## 2023-08-07 RX ORDER — SITAGLIPTIN 100 MG/1
TABLET, FILM COATED ORAL
Qty: 30 TABLET | Refills: 0 | Status: SHIPPED | OUTPATIENT
Start: 2023-08-07

## 2023-08-15 DIAGNOSIS — M19.041 ARTHRITIS OF RIGHT HAND: ICD-10-CM

## 2023-09-06 DIAGNOSIS — E78.2 MIXED HYPERLIPIDEMIA: ICD-10-CM

## 2023-09-06 DIAGNOSIS — E11.9 TYPE 2 DIABETES MELLITUS WITHOUT COMPLICATION, WITHOUT LONG-TERM CURRENT USE OF INSULIN: ICD-10-CM

## 2023-09-06 RX ORDER — SITAGLIPTIN 100 MG/1
TABLET, FILM COATED ORAL
Qty: 30 TABLET | Refills: 0 | Status: SHIPPED | OUTPATIENT
Start: 2023-09-06

## 2023-09-06 RX ORDER — ATORVASTATIN CALCIUM 40 MG/1
40 TABLET, FILM COATED ORAL NIGHTLY
Qty: 30 TABLET | Refills: 0 | Status: SHIPPED | OUTPATIENT
Start: 2023-09-06

## (undated) DEVICE — GOWN,PREVENTION PLUS,XLNG/XXLARGE,STRL: Brand: MEDLINE

## (undated) DEVICE — GLV SURG TRIUMPH PF LTX 7 STRL

## (undated) DEVICE — PAD,ABDOMINAL,8"X10",ST,LF: Brand: MEDLINE

## (undated) DEVICE — SOL IRR LACT RNG BG 3000ML

## (undated) DEVICE — CANN TWST IN 7CM 8.25MM ID

## (undated) DEVICE — PK SHLDR ARTHSCP 60

## (undated) DEVICE — GLV SURG TRIUMPH LT PF LTX 8 STRL

## (undated) DEVICE — SUT ETHLN 3-0 FS118IN 663H

## (undated) DEVICE — SYR LL 3CC

## (undated) DEVICE — GLV SURG TRIUMPH LT PF LTX 7 STRL

## (undated) DEVICE — GLV SURG TRIUMPH LT PF LTX 7.5 STRL

## (undated) DEVICE — GOWN,PREVENTION PLUS,XLONG/XLARGE,STRL: Brand: MEDLINE

## (undated) DEVICE — ABL OPES COOLCUT ASPIR 3MM 90D

## (undated) DEVICE — BLD SHAVER RESECTOR SERR AGGR 5MM 13CM

## (undated) DEVICE — CONN TBG Y 5 IN 1 LF STRL

## (undated) DEVICE — APPL CHLORAPREP W/TINT 26ML ORNG

## (undated) DEVICE — GLV SURG SENSICARE GREEN W/ALOE PF LF 8.5 STRL

## (undated) DEVICE — SPNG GZ WOVN 4X4IN 12PLY 10/BX STRL

## (undated) DEVICE — TP ELAS ELASTIKON ADHS 4IN 2.5YD

## (undated) DEVICE — Device

## (undated) DEVICE — NDL HYPO PRECISIONGLIDE/REG 18G 1IN PNK

## (undated) DEVICE — GLV SURG SENSICARE GREEN W/ALOE PF LF 8 STRL

## (undated) DEVICE — GLV SURG SENSICARE MICRO PF LF 7.5 STRL

## (undated) DEVICE — GLV SURG SENSICARE ALOE LF PF SZ7.5 GRN

## (undated) DEVICE — TBG PUMP ARTHSCP MAIN AR6400 16FT